# Patient Record
Sex: MALE | Race: WHITE | NOT HISPANIC OR LATINO | Employment: OTHER | URBAN - METROPOLITAN AREA
[De-identification: names, ages, dates, MRNs, and addresses within clinical notes are randomized per-mention and may not be internally consistent; named-entity substitution may affect disease eponyms.]

---

## 2023-05-31 LAB
AMMONIA (UMOL/L) IN PLASMA: 35 UMOL/L
FOLATE (NG/ML) IN SER/PLAS: 14.6 NG/ML
THYROTROPIN (MIU/L) IN SER/PLAS BY DETECTION LIMIT <= 0.05 MIU/L: 5.89 MIU/L (ref 0.44–3.98)
THYROXINE (T4) FREE (NG/DL) IN SER/PLAS: 0.82 NG/DL (ref 0.78–1.48)

## 2023-06-03 LAB — COPPER: 125 UG/DL (ref 70–140)

## 2023-06-05 LAB — METHYLMALONIC ACID, S: 0.12 UMOL/L (ref 0–0.4)

## 2023-06-06 LAB — VITAMIN B1, WHOLE BLOOD: 70 NMOL/L (ref 70–180)

## 2023-09-22 LAB
ALANINE AMINOTRANSFERASE (SGPT) (U/L) IN SER/PLAS: 23 U/L (ref 10–52)
ALBUMIN (G/DL) IN SER/PLAS: 4.6 G/DL (ref 3.4–5)
ALKALINE PHOSPHATASE (U/L) IN SER/PLAS: 76 U/L (ref 33–136)
ANION GAP IN SER/PLAS: 14 MMOL/L (ref 10–20)
ASPARTATE AMINOTRANSFERASE (SGOT) (U/L) IN SER/PLAS: 26 U/L (ref 9–39)
BASOPHILS (10*3/UL) IN BLOOD BY AUTOMATED COUNT: 0.07 X10E9/L (ref 0–0.1)
BASOPHILS/100 LEUKOCYTES IN BLOOD BY AUTOMATED COUNT: 0.9 % (ref 0–2)
BILIRUBIN TOTAL (MG/DL) IN SER/PLAS: 1 MG/DL (ref 0–1.2)
CALCIUM (MG/DL) IN SER/PLAS: 9.9 MG/DL (ref 8.6–10.6)
CARBON DIOXIDE, TOTAL (MMOL/L) IN SER/PLAS: 27 MMOL/L (ref 21–32)
CHLORIDE (MMOL/L) IN SER/PLAS: 105 MMOL/L (ref 98–107)
CHOLESTEROL (MG/DL) IN SER/PLAS: 226 MG/DL (ref 0–199)
CHOLESTEROL IN HDL (MG/DL) IN SER/PLAS: 85.9 MG/DL
CHOLESTEROL/HDL RATIO: 2.6
CREATININE (MG/DL) IN SER/PLAS: 0.77 MG/DL (ref 0.5–1.3)
EOSINOPHILS (10*3/UL) IN BLOOD BY AUTOMATED COUNT: 0.24 X10E9/L (ref 0–0.7)
EOSINOPHILS/100 LEUKOCYTES IN BLOOD BY AUTOMATED COUNT: 3 % (ref 0–6)
ERYTHROCYTE DISTRIBUTION WIDTH (RATIO) BY AUTOMATED COUNT: 14.4 % (ref 11.5–14.5)
ERYTHROCYTE MEAN CORPUSCULAR HEMOGLOBIN CONCENTRATION (G/DL) BY AUTOMATED: 33.4 G/DL (ref 32–36)
ERYTHROCYTE MEAN CORPUSCULAR VOLUME (FL) BY AUTOMATED COUNT: 99 FL (ref 80–100)
ERYTHROCYTES (10*6/UL) IN BLOOD BY AUTOMATED COUNT: 4.14 X10E12/L (ref 4.5–5.9)
GFR MALE: >90 ML/MIN/1.73M2
GLUCOSE (MG/DL) IN SER/PLAS: 96 MG/DL (ref 74–99)
HEMATOCRIT (%) IN BLOOD BY AUTOMATED COUNT: 41 % (ref 41–52)
HEMOGLOBIN (G/DL) IN BLOOD: 13.7 G/DL (ref 13.5–17.5)
IMMATURE GRANULOCYTES/100 LEUKOCYTES IN BLOOD BY AUTOMATED COUNT: 0.3 % (ref 0–0.9)
LDL: 124 MG/DL (ref 0–99)
LEUKOCYTES (10*3/UL) IN BLOOD BY AUTOMATED COUNT: 7.9 X10E9/L (ref 4.4–11.3)
LYMPHOCYTES (10*3/UL) IN BLOOD BY AUTOMATED COUNT: 2.32 X10E9/L (ref 1.2–4.8)
LYMPHOCYTES/100 LEUKOCYTES IN BLOOD BY AUTOMATED COUNT: 29.2 % (ref 13–44)
MONOCYTES (10*3/UL) IN BLOOD BY AUTOMATED COUNT: 0.93 X10E9/L (ref 0.1–1)
MONOCYTES/100 LEUKOCYTES IN BLOOD BY AUTOMATED COUNT: 11.7 % (ref 2–10)
NEUTROPHILS (10*3/UL) IN BLOOD BY AUTOMATED COUNT: 4.36 X10E9/L (ref 1.2–7.7)
NEUTROPHILS/100 LEUKOCYTES IN BLOOD BY AUTOMATED COUNT: 54.9 % (ref 40–80)
NRBC (PER 100 WBCS) BY AUTOMATED COUNT: 0 /100 WBC (ref 0–0)
PLATELETS (10*3/UL) IN BLOOD AUTOMATED COUNT: 245 X10E9/L (ref 150–450)
POTASSIUM (MMOL/L) IN SER/PLAS: 5 MMOL/L (ref 3.5–5.3)
PROSTATE SPECIFIC AG (NG/ML) IN SER/PLAS: 0.32 NG/ML (ref 0–4)
PROTEIN TOTAL: 7.1 G/DL (ref 6.4–8.2)
SODIUM (MMOL/L) IN SER/PLAS: 141 MMOL/L (ref 136–145)
THYROTROPIN (MIU/L) IN SER/PLAS BY DETECTION LIMIT <= 0.05 MIU/L: 14.76 MIU/L (ref 0.44–3.98)
TRIGLYCERIDE (MG/DL) IN SER/PLAS: 82 MG/DL (ref 0–149)
UREA NITROGEN (MG/DL) IN SER/PLAS: 14 MG/DL (ref 6–23)
VLDL: 16 MG/DL (ref 0–40)

## 2023-10-02 ENCOUNTER — APPOINTMENT (OUTPATIENT)
Dept: PHYSICAL THERAPY | Facility: CLINIC | Age: 68
End: 2023-10-02
Payer: COMMERCIAL

## 2023-10-04 ENCOUNTER — APPOINTMENT (OUTPATIENT)
Dept: PHYSICAL THERAPY | Facility: CLINIC | Age: 68
End: 2023-10-04
Payer: COMMERCIAL

## 2023-10-09 ENCOUNTER — APPOINTMENT (OUTPATIENT)
Dept: PHYSICAL THERAPY | Facility: CLINIC | Age: 68
End: 2023-10-09
Payer: COMMERCIAL

## 2023-10-10 PROBLEM — R74.01 TRANSAMINITIS: Status: ACTIVE | Noted: 2023-10-10

## 2023-10-10 PROBLEM — H43.813 PVD (POSTERIOR VITREOUS DETACHMENT), BOTH EYES: Status: ACTIVE | Noted: 2023-10-10

## 2023-10-10 PROBLEM — R26.89 IMBALANCE: Status: ACTIVE | Noted: 2023-10-10

## 2023-10-10 PROBLEM — H90.3 ASYMMETRIC SNHL (SENSORINEURAL HEARING LOSS): Status: ACTIVE | Noted: 2023-10-10

## 2023-10-10 PROBLEM — R42 DIZZINESS: Status: ACTIVE | Noted: 2023-10-10

## 2023-10-10 PROBLEM — R41.3 MEMORY LOSS: Status: ACTIVE | Noted: 2023-10-10

## 2023-10-10 PROBLEM — R25.1 TREMOR: Status: ACTIVE | Noted: 2023-10-10

## 2023-10-10 PROBLEM — F10.939 ALCOHOL WITHDRAWAL (MULTI): Status: ACTIVE | Noted: 2023-10-10

## 2023-10-10 PROBLEM — R45.1 AGITATION: Status: ACTIVE | Noted: 2023-10-10

## 2023-10-10 PROBLEM — H52.4 BILATERAL PRESBYOPIA: Status: ACTIVE | Noted: 2023-10-10

## 2023-10-10 PROBLEM — I10 BENIGN ESSENTIAL HYPERTENSION: Status: ACTIVE | Noted: 2023-10-10

## 2023-10-10 PROBLEM — R26.9 ABNORMAL GAIT: Status: ACTIVE | Noted: 2023-10-10

## 2023-10-10 PROBLEM — R53.83 FATIGUE: Status: ACTIVE | Noted: 2023-10-10

## 2023-10-10 PROBLEM — R29.898 LOWER EXTREMITY WEAKNESS: Status: ACTIVE | Noted: 2023-10-10

## 2023-10-10 PROBLEM — F10.20 ALCOHOL DEPENDENCE (MULTI): Status: ACTIVE | Noted: 2023-10-10

## 2023-10-10 PROBLEM — K40.90 INGUINAL HERNIA, LEFT: Status: ACTIVE | Noted: 2023-10-10

## 2023-10-10 PROBLEM — F17.210 CIGARETTE NICOTINE DEPENDENCE: Status: ACTIVE | Noted: 2023-10-10

## 2023-10-10 PROBLEM — H93.13 SUBJECTIVE TINNITUS OF BOTH EARS: Status: ACTIVE | Noted: 2023-10-10

## 2023-10-10 PROBLEM — R55 SYNCOPE: Status: ACTIVE | Noted: 2023-10-10

## 2023-10-10 PROBLEM — H25.13 AGE-RELATED NUCLEAR CATARACT OF BOTH EYES: Status: ACTIVE | Noted: 2023-10-10

## 2023-10-10 PROBLEM — H53.419 SCOTOMA: Status: ACTIVE | Noted: 2023-10-10

## 2023-10-10 PROBLEM — E78.2 MIXED HYPERLIPIDEMIA: Status: ACTIVE | Noted: 2023-10-10

## 2023-10-10 RX ORDER — ASCORBIC ACID 250 MG
250 TABLET ORAL
COMMUNITY

## 2023-10-10 RX ORDER — AMLODIPINE BESYLATE 2.5 MG/1
2.5 TABLET ORAL DAILY
COMMUNITY
Start: 2023-05-19

## 2023-10-10 RX ORDER — TRAZODONE HYDROCHLORIDE 50 MG/1
TABLET ORAL
COMMUNITY
Start: 2023-07-12

## 2023-10-10 RX ORDER — PHENOBARBITAL 64.8 MG/1
64.8 TABLET ORAL 2 TIMES DAILY
COMMUNITY
Start: 2022-07-10

## 2023-10-10 RX ORDER — FOLIC ACID 1 MG/1
1 TABLET ORAL DAILY
COMMUNITY
Start: 2022-07-10

## 2023-10-10 RX ORDER — LEVOTHYROXINE SODIUM 50 UG/1
TABLET ORAL
COMMUNITY
Start: 2023-09-29

## 2023-10-10 RX ORDER — LEVOTHYROXINE SODIUM 25 UG/1
25 TABLET ORAL
COMMUNITY

## 2023-10-10 RX ORDER — AMLODIPINE BESYLATE 5 MG/1
1 TABLET ORAL DAILY
COMMUNITY
Start: 2021-04-20

## 2023-10-10 RX ORDER — PROPRANOLOL HYDROCHLORIDE 20 MG/1
20 TABLET ORAL 2 TIMES DAILY
COMMUNITY
Start: 2023-06-30

## 2023-10-10 RX ORDER — ACETAMINOPHEN, DEXTROMETHORPHAN HBR, DOXYLAMINE SUCCINATE, PHENYLEPHRINE HCL 650; 20; 12.5; 1 MG/30ML; MG/30ML; MG/30ML; MG/30ML
1 SOLUTION ORAL DAILY
COMMUNITY

## 2023-10-10 RX ORDER — IBUPROFEN 200 MG
1 TABLET ORAL EVERY 24 HOURS
COMMUNITY
Start: 2022-07-10

## 2023-10-10 RX ORDER — MULTIVIT-MIN/IRON FUM/FOLIC AC 7.5 MG-4
1 TABLET ORAL DAILY
COMMUNITY
Start: 2022-07-10

## 2023-10-10 RX ORDER — LANOLIN ALCOHOL/MO/W.PET/CERES
1 CREAM (GRAM) TOPICAL DAILY
COMMUNITY
Start: 2022-07-10

## 2023-10-11 ENCOUNTER — DOCUMENTATION (OUTPATIENT)
Dept: PHYSICAL THERAPY | Facility: CLINIC | Age: 68
End: 2023-10-11
Payer: COMMERCIAL

## 2023-10-11 NOTE — PROGRESS NOTES
Physical Therapy                 Therapy Communication Note    Patient Name: Humble Banks  MRN: [id number]  Today's Date: 10/11/2023     Discipline: Physical Therapy    Missed Visit Reason:      Missed Time: No Show    Comment:  called patient and left message re: no show and with reminder of next appointment.

## 2023-10-13 ENCOUNTER — TREATMENT (OUTPATIENT)
Dept: PHYSICAL THERAPY | Facility: CLINIC | Age: 68
End: 2023-10-13
Payer: COMMERCIAL

## 2023-10-13 DIAGNOSIS — R26.9 ABNORMAL GAIT: Primary | ICD-10-CM

## 2023-10-13 DIAGNOSIS — M54.16 LUMBAR RADICULOPATHY: ICD-10-CM

## 2023-10-13 PROCEDURE — 97110 THERAPEUTIC EXERCISES: CPT | Mod: GP | Performed by: PHYSICAL THERAPIST

## 2023-10-13 PROCEDURE — 97014 ELECTRIC STIMULATION THERAPY: CPT | Mod: GP | Performed by: PHYSICAL THERAPIST

## 2023-10-13 NOTE — PROGRESS NOTES
Physical Therapy  Physical Therapy Progress Note    Patient Name Humble Banks  MRN: [id number]  Today's Date: 10/13/23  Time Calculation  Start Time: 0700  Stop Time: 0755  Time Calculation (min): 55 min    Preferred Name:  Ed    Insurance:     reviewed   Visit number: 15 total 10/24 ordered for LS   8-1-23: [facility]/100%/ no auth needed.   (per information provided by  pre-cert team)  Script: eval and treat for balance, 12+12 for LS/land and water  Script: LS 1-2 x/week for 4-6 weeks. + patient to bring in aquatics script from Dr. Tinoco.     Assessment:  Patient tolerated treatment well, did well with progression this date.  Needs continued work on/skilled PT for remaining functional, objective and subjective deficits to allow them to return to prior /optimal level of function with ADLs./core stability and balance for decreased falls.      Skilled care:  exercise modification /HEP education as noted below, education, modalities,.    Plan:    Continue with poc as documented in the legacy system.     Continue to progress per poc:   Continue with aquatics 2 x/week.  Patient to start gym machine program when he feels up to it and will let us know if he needs any additional instruction in land program.        Therapy Diagnoses:   1. Abnormal gait  Follow Up In Physical Therapy      2. Lumbar radiculopathy  Follow Up In Physical Therapy          Subjective:   Onset Date:  Balance:  1-1-22  LS:  6-1-23    Patient reports:  that he fell in the yard last week when he lost his footing on stones.  Patient fell onto the L side and it is sore on the L lateral hip.  He was able to get up on his own and did not need medical care.  He still has some residual soreness and requests to do land today due to feeling unsteady to get in the water.  Patient reports that he told Dr. Sheth about the fall and he wants him to see Dr. Ya about possible meds for balance.      Have you fallen  since last visit:  yes    Precautions:  "moderate fall risk  Low blood pressure,thyroid condition, possible hernia, see meds in chart  10-15# lifting restriction due to possible hernia.        Pain:  6/10  Location/Type of pain:  L Lateral hip and thigh, typical pain LS and L sided sciatica  What increases pain: walking     What decreases pain:  gel heating pad.      HEP compliance/understanding:  not recently due to fall, advised to do supine LS exercises to relieve pain.     Objective:   Objective Measurements:    Posture: min/mod for flexed.   Gait: guarded and antalgic  Balance:  min/mod high guard position with gait today    Treatment:   **= HEP  NV= Next visit  np= not performed  nb= non-billable  G= group HEP= discharged to [facility]     Therapeutic Exercise:  Minutes:  36'  Nu-step(subjective taken/education): L3 10'    R hip flexor/calf and hams stretch: 30 SEC 3 X/np  Airex CALF RAISES 20X w/ 1 finger tip tough on each hand /np    LTR: x 20**  B SKTC: 10\" x 5 ea. **  B supine piriformis stretch: 3/30\"**    Seated trunk flexion stretch: 10/10**    [employer] Machine instruction: np 10-13-23  [employer] LEG PRESS: Seat: 8: NV  Toe Raises: Seat: 8/ NV  LEG EXT 20# 12X2/np  Seated HIP ABD : Start: 0/40#/ 2 x 10  Seated hip ADD : STart: 2/ 30# / 2 x 10  [employer] Hamstring curl: 6 Pegs/ 2 x 10  [employer] lat pull down peg 4 10x2/np  SEATED ROW PEG 3 10X2/np  CABLE ROWS 12# 20X/np  NMR:    Minutes:  2'  EP UPS 6'' F/L 10X np  alt step taps 8\" 20x , no UE support/np  Airex balance beam WALK SIDE 5x/BACKWARD /heel toe 1X JULISSA BAR   Airex Marching/alt hip abd/ext: 20x each way w/ light touch on julissa bar   SIT TO STAND w/ arms crossed attempted but unable /np  AIREX STANDING BALANCE CG 30SEC X2/  AIREX ARM SWINGSw/ tandem balance CG 60 sec x 1 + 35 sec x 1  AIREX HEAD TURNS 10X /np    Theraband 3 way pull downs: Blue x 20 ea.**  Theraband Obliques: Blue x 20 ea. **  Theraband B pull forward/back: Blue x 20 ea. SBA x 1     pelvic tilts: x 10/5\"**  Bridges: x " 15    Modalities:    Untimed: IFC/MHP to L LS/piriformis area x 15'//40% supine with bolster  Minutes:  15    Education:  appropriate exercise to do during a flare up and when balance is poor.

## 2023-10-18 ENCOUNTER — TREATMENT (OUTPATIENT)
Dept: PHYSICAL THERAPY | Facility: CLINIC | Age: 68
End: 2023-10-18
Payer: COMMERCIAL

## 2023-10-18 DIAGNOSIS — M54.16 LUMBAR RADICULOPATHY: ICD-10-CM

## 2023-10-18 DIAGNOSIS — R26.9 ABNORMAL GAIT: Primary | ICD-10-CM

## 2023-10-18 PROCEDURE — 97113 AQUATIC THERAPY/EXERCISES: CPT | Mod: GP,CQ

## 2023-10-18 NOTE — PROGRESS NOTES
Physical Therapy  Physical Therapy Progress Note    Patient Name Humble Banks  MRN: [id number]  Today's Date: 10/18/23  Time Calculation  Start Time: 0825  Stop Time: 0910  Time Calculation (min): 45 min    Preferred Name:  Ed    Insurance:    (per information provided by  pre-cert team)  Visit #: 16 total 11/24 ordered for LS   8-1-23: [employer]/100%/ no auth needed.   (per information provided by  pre-cert team)  Script: eval and treat for balance, 12+12 for LS/land and water      Script:  LS 1-2 x/week for 4-6 weeks. + patient to bring in aquatics script from Dr. Tinoco.       Assessment:  Patient demonstrated fair tolerance of treatment today as he was unsteady in the pool and needed to maintain UE support in pool with handrails and use of pool noodle when in open space.    Needs continued work on/skilled PT for remaining functional, objective and subjective deficits to allow them to return to prior /optimal level of function with ADLs.  Patient is progressing with core engagement w/ verbal cues in pool.  Skilled care:  exercise/HEP progression, education, modalities, manual.      Plan:    Continue with poc as documented in the legacy system.     Continue to progress per poc:   NV add 3 way hip as able     Therapy Diagnoses:   1. Abnormal gait        2. Lumbar radiculopathy            Subjective:   Onset Date:  Onset Date:  Balance:  1-1-22  LS:  6-1-23     Patient reports:    Reports he is trying to be safe at home so as not to fall again.     Have you fallen  since last visit: no    Precautions: fall riskmoderate fall risk  Low blood pressure,thyroid condition, possible hernia, see meds in chart  10-15# lifting restriction due to possible hernia.          Pain:  5/10  Location/Type of pain:  L Lateral hip and thigh, typical pain LS and L sided sciatica   What increases pain: walking too much    What decreases pain:   gel heating pad     HEP compliance/understanding: has resumed HEP as able since his  fall    Objective:   Objective Measurements:      Gait:guarded and antalgic, reaches for objects for stability  Balance:  min/mod high guard position with gait today     Treatment:   **= HEP  NV= Next visit  np= not performed  nb= non-billable  G= group HEP= discharged to [facility]         Aquatics:  45 min  TM= treadmill T= speed C= current NV= next visit np= not performed nb= non-billable G= group    Stair hams and hip flexor/calf stretches: 3/30 ea.     TM for amb: T=3 C=10 x 4'  TM retro amb: T=0 C=10 x 3'  Side stepping: C=10 x 4 laps ea/ light  UE support    C=10 Marching x 4 directions: x 20 ea. W/ handrail support  C=10 squats with handrail support  C= B UE open paddles vikki flex/ext: x 20w/ BTW  C=10 3 way hip: NV  C=10 B/alt UE/B horizontal abd/add with open paddles: NV    BTW: can cans: w/ UE support   BTW: dynamic hamstring stretch: NV    Deep end with large blue noodle  For Cycle: x 3'  [place]/hip abd/add: 2' ea.   Hang: 3'.

## 2023-10-20 ENCOUNTER — APPOINTMENT (OUTPATIENT)
Dept: PHYSICAL THERAPY | Facility: CLINIC | Age: 68
End: 2023-10-20
Payer: COMMERCIAL

## 2023-10-26 ENCOUNTER — TREATMENT (OUTPATIENT)
Dept: PHYSICAL THERAPY | Facility: CLINIC | Age: 68
End: 2023-10-26
Payer: COMMERCIAL

## 2023-10-26 DIAGNOSIS — R26.9 ABNORMAL GAIT: ICD-10-CM

## 2023-10-26 DIAGNOSIS — M54.16 LUMBAR RADICULOPATHY: ICD-10-CM

## 2023-10-26 PROCEDURE — 97113 AQUATIC THERAPY/EXERCISES: CPT | Mod: GP,CQ

## 2023-10-26 NOTE — PROGRESS NOTES
Physical Therapy  Physical Therapy Progress Note    Patient Name Humble Banks  MRN: [id number]  Today's Date: 10/26/23  Time Calculation  Start Time: 0745  Stop Time: 0830  Time Calculation (min): 45 min    Preferred Name:  ED    Insurance:    (per information provided by  pre-cert team)  Visit #: 17 total 12 /24 ordered for LS   8-1-23: [employer]/100%/ no auth needed.   (per information provided by  pre-cert team)  Script: eval and treat for balance, 12+12 for LS/land and water                Assessment:  Patient tolerated treatment well, did well with progression this date with walking with greater current and required less support with water ex. .  Needs continued work on/balance and strength ,skilled PT for remaining functional, objective and subjective deficits to allow them to return to prior /optimal level of function with ADLs./performed program with greater endurance  Patient is progressing with balance and coordination   Skilled care:  exercise/HEP progression, education, modalities, manual.      Plan:    Continue with poc as documented in the legacy system.     Continue to progress per poc:   NV add paddles with side stepping    Therapy Diagnoses:   1. Abnormal gait  Follow Up In Physical Therapy      2. Lumbar radiculopathy  Follow Up In Physical Therapy          Subjective:   Onset Date:  1/1/22    Patient reports:  reports the water ex. Have been going better for his walking    Have you fallen  since last visit:  no      Precautions: fall riskmoderate fall risk  Low blood pressure,thyroid condition, possible hernia, see meds in chart  10-15# lifting restriction due to possible hernia.     Pain:  0/10  Location/Type of pain:  lower spine  What increases pain: falling     What decreases pain:  rest  HEP compliance/understanding:  yes     Objective:   Objective Measurements:    Function:   walking longer distances in the [employer] pool  Gait: improved with heel to toe walking  Balance:  still requires  hand support on treadmill      Aquatics:    M= treadmill T= speed C= current NV= next visit np= not performed nb= non-billable G= group     Stair hams and hip flexor/calf stretches: 3/30 ea.      TM for amb: T=3 C=10 x 5 min  TM retro amb: T=12 C=10 x 3'  Side stepping: C=12 x 4 laps ea/ light  UE support     C=12 Marching x 4 directions: x 20 ea. W/ barbell  C=12 squats with barbell  C= 12 B UE open paddles vikki flex/ext: x 20w/ BTW  C=12 3 way hip: 15 wall support   C=12 /alt UE/B horizontal abd/add with open paddles: NV     BTW: can cans: w/ UE support   BTW: dynamic hamstring stretch: 15x     Deep end with large blue noodle  For Cycle: x 3'  [place]/hip abd/add: 2' ea.   Hang: 3'.                  Education:  review on posture and abdominal bracing  Hep progression in ca pool  with multi hip

## 2023-11-02 ENCOUNTER — TREATMENT (OUTPATIENT)
Dept: PHYSICAL THERAPY | Facility: CLINIC | Age: 68
End: 2023-11-02
Payer: COMMERCIAL

## 2023-11-02 DIAGNOSIS — M54.16 LUMBAR RADICULOPATHY: ICD-10-CM

## 2023-11-02 DIAGNOSIS — R26.9 ABNORMAL GAIT: Primary | ICD-10-CM

## 2023-11-02 PROCEDURE — 97110 THERAPEUTIC EXERCISES: CPT | Mod: GP,CQ

## 2023-11-02 PROCEDURE — 97112 NEUROMUSCULAR REEDUCATION: CPT | Mod: GP,CQ

## 2023-11-02 NOTE — PROGRESS NOTES
Physical Therapy  Physical Therapy Progress Note    Patient Name Humble Banks   MRN: [id number]  Today's Date:11/2/23  Time Calculation  Start Time: 0745  Stop Time: 0830  Time Calculation (min): 45 min    Insurance:     Visit number: 18#   total 13/24 ordered for LS   8-1-23: [facility]/100%/ no auth needed.   (per information provided by  pre-cert team)  Script: eval and treat for balance, 12+12 for LS/land and water  Script: LS 1-2 x/week for 4-6 weeks. + patient to bring in aquatics script from Dr. Tinoco.  (per information provided by  pre-cert team)      Therapy Diagnoses:   1. Abnormal gait  Follow Up In Physical Therapy      2. Lumbar radiculopathy  Follow Up In Physical Therapy          General:  Reason for visit: pain in lower ext and hip   Referred by: Dr. Earnest Jefferson MD appt:  nov 9th  Preferred Name:  ed  Script:  eval treat  Onset Date:  1/1/2022    Assessment:  Patient tolerated treatment fair, did well with progression this date with walking on cable resistance .  Needs continued work on/ balance.endurance and strength.skilled PT for remaining functional, objective and subjective deficits to allow them to return to prior /optimal level of function with ADLs.  Patient is progressing with function/goals: in walking, but still requires some hand support around objects for balance   Skilled care:  instructed in correct posture with gait    Plan:    Continue with poc as documented in the legacy system.     Continue to progress per poc:   NV add side walks on cable     Subjective:   Patient reports:  states the pain is greater when he becomes more active    Have you fallen since last visit:  no      Precautions: fall riskmoderate fall risk  Low blood pressure,thyroid condition, possible hernia, see meds in chart  10-15# lifting restriction due to possible hernia    Pain:  5/10  Location/Type of pain:  left leg and hip, nagging pain    HEP compliance/understanding:  some times, states he has not  "been coming to the gym on his own    Objective:   Objective Measurements:    Function:   able to perform more yard work  Posture: forward head and shoulders  Gait: slow shuffle gait  Balance: required hand support for most activitys           Treatment:   **= HEP  NV= Next visit  np= not performed  nb= non-billable  G= group HEP= discharged to HEP      Therapeutic Exercise:       minutes    Nu-step(subjective taken/education): L3 10'     R hip flexor/calf and hams stretch: 30 SEC 3 X/np  Airex CALF RAISES 20X w/ 1 finger tip tough on each hand /np     LTR: x 20**  B SKTC: 10\" x 5 ea. **  B supine piriformis stretch: 3/30\"**      CABLE ROWS 12# 20X/  Cable walk pl 2# forward back 5x supervised         Neuromuscular Re-education:      minutes    alt step taps 8\" 20x , no UE support/np  Airex balance beam WALK SIDE 5x/BACKWARD /heel toe 1X JULISSA BAR   Airex Marching/alt hip abd/ext: 20x each way w/ light touch on julissa bar   SIT TO STAND w/ arms crossed attempted but unable /np  AIREX STANDING BALANCE CG 30SEC X2/  AIREX ARM SWINGSw/ tandem balance CG 60 sec x 1 + 35 sec x 1  AIREX HEAD TURNS 10X /np     Theraband 3 way pull downs: Blue x 20 ea.**  Theraband Obliques: Blue x 20 ea. **  Theraband B pull forward/back: Blue x 20 ea. SBA x 1      pelvic tilts: x 10/5\"**np  Bridges: x 15  np          Education:  in proper posture performing squats. Cable walking  Hep reviewed program  "

## 2023-11-07 ENCOUNTER — TREATMENT (OUTPATIENT)
Dept: PHYSICAL THERAPY | Facility: CLINIC | Age: 68
End: 2023-11-07
Payer: COMMERCIAL

## 2023-11-07 DIAGNOSIS — M54.16 LUMBAR RADICULOPATHY: ICD-10-CM

## 2023-11-07 DIAGNOSIS — R26.9 ABNORMAL GAIT: ICD-10-CM

## 2023-11-07 PROCEDURE — 97113 AQUATIC THERAPY/EXERCISES: CPT | Mod: GP,CQ

## 2023-11-07 NOTE — PROGRESS NOTES
Physical Therapy  Physical Therapy Progress Note    Patient Name Humble Banks   MRN: [id number]  Today's Date: 11/7/23  Time Calculation  Start Time: 0745  Stop Time: 0830  Time Calculation (min): 45 min    Insurance:    Visit number: 19   total 14/24 ordered for LS   8-1-23: [facility]/100%/ no auth needed.   (per information provided by  pre-cert team)  Script: eval and treat for balance, 12+12 for LS/land and water  Script: LS 1-2 x/week for 4-6 weeks. + patient to bring in aquatics script from Dr. Tinoco.  (per information provided by  pre-cert team)  Therapy Diagnoses:   1. Abnormal gait  Follow Up In Physical Therapy      2. Lumbar radiculopathy  Follow Up In Physical Therapy          General:  Reason for visit: pain in lower ext and hip   Referred by: Dr. Earnest Jefferson MD appt:  nov 9th  Preferred Name:  Ed  Script:  eval treat  Onset Date:  1/1/2022  Assessment:  Patient tolerated treatment well, did well with progression this date with taking longer strides on treadmill and posture  awareness.  Needs continued work on balance and strength/skilled PT for remaining functional, objective and subjective deficits to allow them to return to prior /optimal level of function with ADLs./  Patient is progressing with function/goals: yes  Skilled care:  cueing in posture and progression in ex    Plan:    Continue with poc as documented in the legacy system.     Continue to progress per poc:   NV add land ,increase balance activity    Subjective:   Patient reports:  states his body feel stiff in the AM but once he stretches he feels better. No change in pain over all but improved function    Have you fallen since last visit:  no    Precautions:   Precautions: fall riskmoderate fall risk  Low blood pressure,thyroid condition, possible hernia, see meds in chart  10-15# lifting restriction due to possible hernia  Pain:  5/10  Location/Type of pain:  left leg and hip, nagging pain     HEP compliance/understanding:   yes    Objective:   Objective Measurements:    Function:   working in yard, racking and bending to      Gait: short steps on land, improved stride in water with bar support. Patient use to take 42 steps across the [employer] pool now 35 steps  Balance: still requires side wall for balance      Treatment:          Aquatics:          45 minutes    M= treadmill T= speed C= current NV= next visit np= not performed nb= non-billable G= group     Stair hams and hip flexor/calf stretches: 3/30 ea.      TM for amb: T=3 C=10 x 5 min  TM retro amb: T=12 C=10 x 3'  Side stepping: C=12 x 4 laps ea/ light  UE support     C=12 Marching x 4 directions: x 20 ea. W/ barbell  C=12 squats with barbell  C= 12 B UE open paddles vikki flex/ext: x 20w/ BTW  C=12 3 way hip: 15 wall support   C=12 /alt UE/B horizontal abd/add with open paddles: NV     BTW: can cans: w/ UE support  10x  BTW: dynamic hamstring stretch: 15x     Deep end with large blue noodle  For Cycle: x 3'  [place]/hip abd/add: 2' ea.     Education:  progression in POC  HEP Progression:  increased longer strides with water walking

## 2023-11-09 ENCOUNTER — DOCUMENTATION (OUTPATIENT)
Dept: PHYSICAL THERAPY | Facility: CLINIC | Age: 68
End: 2023-11-09
Payer: COMMERCIAL

## 2023-11-09 NOTE — PROGRESS NOTES
Physical Therapy                 Therapy Communication Note    Patient Name: Humble Banks  MRN: [id number]  Today's Date: 11/9/2023     Discipline: Physical Therapy    Missed Visit Reason:      Missed Time: No Show    Comment:

## 2023-11-14 ENCOUNTER — TREATMENT (OUTPATIENT)
Dept: PHYSICAL THERAPY | Facility: CLINIC | Age: 68
End: 2023-11-14
Payer: COMMERCIAL

## 2023-11-14 DIAGNOSIS — M54.16 LUMBAR RADICULOPATHY: ICD-10-CM

## 2023-11-14 DIAGNOSIS — R26.9 ABNORMAL GAIT: Primary | ICD-10-CM

## 2023-11-14 PROCEDURE — 97113 AQUATIC THERAPY/EXERCISES: CPT | Mod: GP | Performed by: PHYSICAL THERAPIST

## 2023-11-14 NOTE — PROGRESS NOTES
Physical Therapy  Physical Therapy Progress Note    Patient Name Humble Banks   MRN: [id number]  Today's Date: 11/14/23  Time Calculation  Start Time: 0815  Stop Time: 0900  Time Calculation (min): 45 min    Insurance:    Visit number: 20   total 15/24 ordered for LS   8-1-23: [facility]/100%/ no auth needed.   (per information provided by  pre-cert team)  Script: eval and treat for balance, 12+12 for LS/land and water  Script: LS 1-2 x/week for 4-6 weeks. + patient to bring in aquatics script from Dr. Tinoco.    Therapy Diagnoses:   1. Abnormal gait  Follow Up In Physical Therapy      2. Lumbar radiculopathy  Follow Up In Physical Therapy        General:  Reason for visit: pain in lower ext and hip   Referred by: Dr. Earnest Jefferson MD appt:  nov 9th  Preferred Name:  Ed  Script:  eval treat  Onset Date:  1/1/2022    Assessment:  Patient tolerated treatment well, did well with progression this date.    Patient needs continued work on/skilled PT for: core stability and balance, to address remaining functional, objective and subjective deficits to allow them to return to prior /optimal level of function with ADLs.  Patient is progressing with goals: balance and able to do yard work  Skilled care:  aquatic exercise progression    Plan:    Continue to progress per poc:   NV add resume C for TM and progress with upper extremity resistance for core stability    Subjective:   Patient reports:  that his balance is doing a little better and he has been working on leaf clean up.      Have you fallen since last visit:  no    Precautions: fall riskmoderate fall risk  Low blood pressure,thyroid condition, possible hernia, see meds in chart  10-15# lifting restriction due to possible hernia    Pain:  5/10  Location/Type of pain:  L LS    HEP compliance/understanding:  yes    Objective:   Objective Measurements:    Function:   able to work on leaves without any falls.    Gait: min wide MIKEY and min for flexed posture with gait.      Treatment:   **= HEP  NV= Next visit  np= not performed  nb= non-billable  G= group HEP= discharged to [facility]  Aquatics:          45 minutes    TM= treadmill T= speed C= current NV= next visit np= not performed nb= non-billable G= group     Stair hams and hip flexor/calf stretches: 3/30 ea.      TM for amb: T=3 C=0 x 5 min(add C NV)  TM retro amb: T=0 C=0 x 3' (add C NV)  Side stepping: C=10 x 8 laps ea across the width of pool)     C=10 Marching x 4 directions: x 20 ea. light one UE support prn  C=02 squats with barbell x 20 with some LS pain reported.   C= 12 B UE open paddles vikki flex/ext: x 20w/ BTW  C=12 3 way hip: 20 wall support   C=12 /alt UE/B horizontal abd/add with open paddles: NV     BTW: can cans: w/ UE support  10x  BTW: dynamic hamstring stretch: x 10     Deep end with large blue noodle  For Cycle: x 3'  [place]/hip abd/add: 2' ea.   Hang:  x 3'    Education:  aquatic exercise progression

## 2023-11-16 ENCOUNTER — APPOINTMENT (OUTPATIENT)
Dept: PHYSICAL THERAPY | Facility: CLINIC | Age: 68
End: 2023-11-16
Payer: COMMERCIAL

## 2023-11-21 ENCOUNTER — TREATMENT (OUTPATIENT)
Dept: PHYSICAL THERAPY | Facility: CLINIC | Age: 68
End: 2023-11-21
Payer: COMMERCIAL

## 2023-11-21 DIAGNOSIS — M54.16 LUMBAR RADICULOPATHY: ICD-10-CM

## 2023-11-21 DIAGNOSIS — R26.9 ABNORMAL GAIT: ICD-10-CM

## 2023-11-21 PROCEDURE — 97113 AQUATIC THERAPY/EXERCISES: CPT | Mod: GP,CQ

## 2023-11-21 NOTE — PROGRESS NOTES
Physical Therapy  Physical Therapy Progress Note    Patient Name Humble Banks   MRN: [id number]  Today's Date: 11/21/23  Time Calculation  Start Time: 0745  Stop Time: 0830  Time Calculation (min): 45 min    Insurance:    Visit number: 21   total 16/24 ordered for LS   8-1-23: [facility]/100%/ no auth needed.   (per information provided by  pre-cert team)  Script: eval and treat for balance, 12+12 for LS/land and water  Script: LS 1-2 x/week for 4-6 weeks. + patient to bring in aquatics script from Dr. Tinoco.     Therapy Diagnoses:   1. Abnormal gait  Follow Up In Physical Therapy      2. Lumbar radiculopathy  Follow Up In Physical Therapy          General:  Reason for visit: pain in lower ext and hip   Referred by: Dr. Earnest Jefferson MD appt:  nov 9th  Preferred Name:  Ed  Script:  eval treat  Onset Date:  1/1/2022  Assessment:  Patient tolerated treatment: well, improved with lateral walking with no device and improved posture against current.requires less hand support with exs,  Patient needs continued work on further core and balance ex./skilled PT for: progression in ex. to address remaining functional, objective and subjective deficits to allow them to return to prior /optimal level of function with ADLs.  Patient is progressing with goals: yes  Skilled care:  cueing with posture and progression of ex.    Plan:         Continue to progress per poc:   NV add step ups     Subjective:   Patient reports:  no change in his complaint of pain but function is doing better    Have you fallen since last visit:  no    Precautions:     Pain:  5/10  Location/Type of pain:  L  LS    HEP compliance/understanding: yes    Objective:   Objective Measurements:    Function:   improved endurance, walks around the home with less resting  Posture: improved  with up right posture with cueing    Balance: performed increase ex in water with no hand support   Treatment:           Aquatics:          45 minutes  TM= treadmill T= speed  C= current NV= next visit np= not performed nb= non-billable G= group     Stair hams and hip flexor/calf stretches: 3/30 ea.      TM for amb: T=3 C=10 x 5 min(add C NV)  TM retro amb: T=0 C=10 x 3'   Side stepping: C=10 x 8 laps ea across the width of pool)     C=10 Marching x 4 directions: x 20 ea.   C=02 squats with barbell x 20 with some LS pain reported.   C= 12 B UE open paddles vikki flex/ext: x 20  C=12 3 way hip: 20 wall support   C=12 /alt UE/B horizontal abd/add with open paddles: NV  C=10 hip circles 10x each finger support   C=10 aqua ciser forward/up and down 15x      BTW: can cans: w/ UE support  10x  BTW: dynamic hamstring stretch: x 10     Deep end with large blue noodle  For Cycle: x 3'  [place]/hip abd/add: 2' ea.   Hang:  x 3'          +      Education:  instruction in posture aware ness  HEP Progression:  n/a

## 2023-11-28 ENCOUNTER — TREATMENT (OUTPATIENT)
Dept: PHYSICAL THERAPY | Facility: CLINIC | Age: 68
End: 2023-11-28
Payer: COMMERCIAL

## 2023-11-28 DIAGNOSIS — M54.16 LUMBAR RADICULOPATHY: ICD-10-CM

## 2023-11-28 DIAGNOSIS — R26.9 ABNORMAL GAIT: ICD-10-CM

## 2023-11-28 PROCEDURE — 97113 AQUATIC THERAPY/EXERCISES: CPT | Mod: GP | Performed by: PHYSICAL THERAPIST

## 2023-11-28 NOTE — PROGRESS NOTES
Physical Therapy  Physical Therapy Progress Note    Patient Name Humble Banks   MRN: [id number]  Today's Date: 11/28/23  Time Calculation  Start Time: 0810  Stop Time: 0900  Time Calculation (min): 50 min    Insurance:    Visit number: 22   total 17/24 ordered for LS   8-1-23: [facility]/100%/ no auth needed.   (per information provided by  pre-cert team)  Script: eval and treat for balance, 12+12 for LS/land and water  Script: LS 1-2 x/week for 4-6 weeks. + patient to bring in aquatics script from Dr. Tinoco.  Therapy Diagnoses:   1. Abnormal gait  Follow Up In Physical Therapy      2. Lumbar radiculopathy  Follow Up In Physical Therapy          General:  Reason for visit: pain in lower ext and hip   Referred by: Dr. Earnest Jefferson MD appt:  nov 9th  Preferred Name:  Ed  Script:  eval treat  Onset Date:  1/1/2022    Assessment:  Patient tolerated treatment well, did well with progression this date.    Patient needs continued work on/skilled PT for: balance, core stability and functional strength to address remaining functional, objective and subjective deficits to allow them to return to prior /optimal level of function with ADLs.  Patient is progressing with goals: improved balance and no recent report of falls.   Skilled care:  aquatic exercise progression.     Plan:    Continue to progress per poc:   NV continue to progress with activities in current to challenge balance.     Subjective:   Patient reports:  that he is not having pain, more of an aggravation in the LS.  Patient reports that he is feeling that balance is better, but he is unable to turn quickly and is very careful with this.      Have you fallen since last visit:  no    Precautions: fall riskmoderate fall risk  Low blood pressure,thyroid condition, possible hernia, see meds in chart  10-15# lifting restriction due to possible hernia         Pain:  5/10  Location/Type of pain:  L LS    HEP compliance/understanding:  yes    Objective:   Objective  Measurements:    Function:   good function with walking on flat ground.  Patient has been working on cleaning up his yard, fall yard work.    Posture: increased awareness of proper posture and body mechanics.    Gait: walking 200' on grass with shoes off to work on balance.    Treatment:     Aquatics:          45 minutes  TM= treadmill T= speed C= current NV= next visit np= not performed nb= non-billable G= group     Stair hams and hip flexor/calf stretches: 3/30 ea.      TM for amb: T=1 C=12 x 5 min  TM retro amb: T=0 C=12 x 4'   Side stepping: C=12 x 4 laps ea across the width of pool)     C=12 Marching x 4 directions: x 20 ea.   C=12 squats with barbell x 15 ea.  facing and away from current/cues for form   C= 12 B UE open paddles vikki flex/ext: x 20/np  C=12 3 way hip: 15 barbell support for flexion, but decreased balance/wall support for others.   C=12 /alt UE/B horizontal abd/add with open paddles: NV  C=12 hip circles 10x each finger support   C=12 aqua ciser forward/up and down 15x /np     BTW: can cans: w/ UE support  10x/np  BTW: dynamic hamstring stretch: x 10/np     Deep end with large blue noodle  For Cycle: x 3'  [place]/hip abd/add: 2' ea.   Hang:  x 3'      Education:  aquatic exercise progression

## 2023-11-30 ENCOUNTER — TREATMENT (OUTPATIENT)
Dept: PHYSICAL THERAPY | Facility: CLINIC | Age: 68
End: 2023-11-30
Payer: COMMERCIAL

## 2023-11-30 DIAGNOSIS — R26.9 ABNORMAL GAIT: ICD-10-CM

## 2023-11-30 DIAGNOSIS — M54.16 LUMBAR RADICULOPATHY: ICD-10-CM

## 2023-11-30 PROCEDURE — 97113 AQUATIC THERAPY/EXERCISES: CPT | Mod: GP | Performed by: PHYSICAL THERAPIST

## 2023-11-30 NOTE — PROGRESS NOTES
Physical Therapy  Physical Therapy Progress Note    Patient Name Humble Banks   MRN: [id number]  Today's Date: 11/30/23  Time Calculation  Start Time: 0815  Stop Time: 0910  Time Calculation (min): 55 min    Insurance:    Visit number: 23   total 18/24 ordered for LS   8-1-23: [facility]/100%/ no auth needed.   (per information provided by  pre-cert team)  Script: eval and treat for balance, 12+12 for LS/land and water  Script: LS 1-2 x/week for 4-6 weeks. + patient to bring in aquatics script from Dr. Tinoco.    Therapy Diagnoses:   1. Abnormal gait  Follow Up In Physical Therapy      2. Lumbar radiculopathy  Follow Up In Physical Therapy        General:  Reason for visit: pain in lower ext and hip   Referred by: Dr. Earnest Jefferson MD appt:  nov 9th  Preferred Name:  Ed  Script:  eval treat  Onset Date:  1/1/2022    Assessment:  Patient tolerated treatment well, did well with progression this date.    Patient needs continued work on/skilled PT for: balance and core stability to address remaining functional, objective and subjective deficits to allow them to return to prior /optimal level of function with ADLs.  Patient is progressing with goals: increased postural and body awareness.   Skilled care:  aquatic exercise progression    Plan:    Continue to progress per poc:   NV add activities to challenge balance in current.     Subjective:   Patient reports:  that he still has sciatic pain with prolonged walking, but relief with sitting.     Have you fallen since last visit:  no    Precautions: moderate fall risk  Low blood pressure,thyroid condition, possible hernia, see meds in chart  10-15# lifting restriction due to possible hernia      Pain:  5/10  Location/Type of pain:  L LS aching/sciatic pain at times with prolonged walking.     HEP compliance/understanding:  yes    Objective:   Objective Measurements:    Function:   able to control symptoms with position change.   Posture:  increased postural and  stabilization awareness.     Treatment:     Aquatics:          55 minutes  TM= treadmill T= speed C= current NV= next visit np= not performed nb= non-billable G= group     Stair hams and hip flexor/calf stretches: 3/30 ea.      TM for amb: T=1 C=12 x 5 min  TM retro amb: T=0 C=12 x 3'   Side stepping: C=12 x 5 laps ea across the width of pool facing and away from C     C=12 Marching x 4 directions: x 20 ea.   C=12 squats with barbell x 15 ea.  facing and away from current/cues for form   C= 12 B/alt  UE open paddles vikki flex/ext: x 20  C=12 3 way hip: 15 barbell/wall  support flexion only this date    C=12 /alt UE/B horizontal abd/add with open paddles: x 15   C=12 hip circles 10 x each finger support /NV  C=12 aqua ciser push/pull/up and down 15 x      BTW: can cans: w/ UE support  10x/np  BTW: dynamic hamstring stretch: x 10/np     Deep end with large blue noodle  For Cycle: x 3'  [place]/hip abd/add: 2' ea.   Hang:  x 3'      Education:  aquatic exercise progression

## 2023-12-05 ENCOUNTER — DOCUMENTATION (OUTPATIENT)
Dept: PHYSICAL THERAPY | Facility: CLINIC | Age: 68
End: 2023-12-05
Payer: COMMERCIAL

## 2023-12-05 NOTE — PROGRESS NOTES
Physical Therapy                 Therapy Communication Note    Patient Name: Humble Banks  MRN: [id number]  Today's Date: 12/5/2023     Discipline: Physical Therapy    Missed Visit Reason:      Missed Time: No Show    Comment:

## 2023-12-06 ENCOUNTER — TREATMENT (OUTPATIENT)
Dept: PHYSICAL THERAPY | Facility: CLINIC | Age: 68
End: 2023-12-06
Payer: COMMERCIAL

## 2023-12-06 DIAGNOSIS — R26.9 ABNORMAL GAIT: Primary | ICD-10-CM

## 2023-12-06 DIAGNOSIS — M54.16 LUMBAR RADICULOPATHY: ICD-10-CM

## 2023-12-06 PROCEDURE — 97113 AQUATIC THERAPY/EXERCISES: CPT | Mod: GP

## 2023-12-06 RX ORDER — ONDANSETRON HYDROCHLORIDE 2 MG/ML
INJECTION, SOLUTION INTRAVENOUS
Status: DISPENSED
Start: 2023-12-06 | End: 2023-12-07

## 2023-12-06 RX ORDER — KETOROLAC TROMETHAMINE 30 MG/ML
INJECTION, SOLUTION INTRAMUSCULAR; INTRAVENOUS
Status: DISPENSED
Start: 2023-12-06 | End: 2023-12-07

## 2023-12-06 NOTE — PROGRESS NOTES
"   Physical Therapy  Physical Therapy Progress Note    Patient Name Humble Banks   MRN: [id number]  Today's Date: 12/06/23  Time Calculation  Start Time: 0815  Stop Time: 0900  Time Calculation (min): 45 min    Insurance:    Visit number: 24   total 19/24 ordered for LS   8-1-23: [place]/100%/ no auth needed.   (per information provided by  pre-cert team)  Script: eval and treat for balance, 12+12 for LS/land and water  Script: LS 1-2 x/week for 4-6 weeks. + patient to bring in aquatics script from Dr. Tinoco.    Therapy Diagnoses:   1. Abnormal gait        2. Lumbar radiculopathy          General:  Reason for visit: pain in lower ext and hip   Referred by: Dr. Earnest Jefferson MD appt:  nov 9th  Preferred Name:  Ed  Script:  eval treat  Onset Date:  1/1/2022    Assessment:  Pt completes ex with occasional difficulty staying balanced when working again current of 12 but regains his own balance.   Patient will benefit from a recheck to determine future POC  Patient is progressing with goals: Recheck next visit to determine  Skilled care:  Supervised aquatics program    Plan:    NV Recheck    Subjective:   Patient reports:  he always has left sciatic pain, that only varies by a 1/2 to 1 point depending on his activity    Have you fallen since last visit:  Pt lost his balance as he stepped over the tub and into the shower.  He states he has no grab bars, and states \"I didn't fall and hurt myself\"    Precautions:moderate fall risk  Low blood pressure,thyroid condition, possible hernia, see meds in chart  10-15# lifting restriction due to possible hernia    Pain:  5/10  Location/Type of pain:  Left LE    HEP compliance/understanding:  yes. Pt states he uses obstacles outside as challenges for balance.    Objective:   Function:   Pt is staying busy to keep working on his posture and balance  Posture: stands in a forward flexed posture of trunk, hips, and knees and and ambulates on pool deck with wide MIKEY and shuffling " type gait towards steps of pool  Balance: Pt swayed a bit but regained control of posture when letting go of wall in between exercises    Treatment:   **= HEP  NV= Next visit  np= not performed  nb= non-billable  G= group HEP= discharged to [facility]    Aquatics:          45 minutes  TM= treadmill T= speed C= current NV= next visit np= not performed nb= non-billable G= group     Stair hams and hip flexor/calf stretches: 3/30 ea.      TM for amb: T=1 C=12 x 5 min  TM retro amb: T=0 C=12 x 3'   Side stepping: C=12 x 5 laps ea across the width of pool facing and away from C     C=12 Marching x 4 directions: x 20 ea.   C=12 squats with barbell x 15 ea.  facing and away from current/cues for form   C= 12 B/alt  UE open paddles vikki flex/ext: x 20  C=12 3 way hip: 15 barbell wall support     C=12 /alt UE/B horizontal abd/add with open paddles: x 15   C=12 hip circles 10 x each finger support /NV  C=12 aqua ciser push/pull/up and down 15 x      BTW: can cans: w/ UE support  10x/np  BTW: dynamic hamstring stretch: x 10     Deep end with large blue noodle  For Cycle: x 3'  [place]/hip abd/add: 2' ea.   Hang:  x 3'      Education:  Reinforced need for grab bars in bathroom area due his recent episode of slipping

## 2023-12-07 ENCOUNTER — TREATMENT (OUTPATIENT)
Dept: PHYSICAL THERAPY | Facility: CLINIC | Age: 68
End: 2023-12-07
Payer: COMMERCIAL

## 2023-12-07 DIAGNOSIS — M54.16 LUMBAR RADICULOPATHY: ICD-10-CM

## 2023-12-07 DIAGNOSIS — R26.9 ABNORMAL GAIT: Primary | ICD-10-CM

## 2023-12-07 PROCEDURE — 97110 THERAPEUTIC EXERCISES: CPT | Mod: GP | Performed by: PHYSICAL THERAPIST

## 2023-12-07 NOTE — PROGRESS NOTES
Physical Therapy  Physical Therapy Progress Note/re-assessment/discharge plan    Patient Name Humble Banks   MRN: [id number]  Today's Date: 23  Time Calculation  Start Time: 815  Stop Time: 855  Time Calculation (min): 40 min    Insurance:    Visit number: 24   total  ordered for LS   23: [facility]/100%/ no auth needed.   (per information provided by  pre-cert team)  Script: eval and treat for balance, 12+12 for LS/land and water  Script: LS 1-2 x/week for 4-6 weeks. + patient to bring in aquatics script from Dr. Tinoco.    Therapy Diagnoses:   1. Abnormal gait  Follow Up In Physical Therapy      2. Lumbar radiculopathy  Follow Up In Physical Therapy        General:  Reason for visit: pain in lower ext and hip   Referred by: Dr. Earnest Jefferson MD appt:    Preferred Name:  Ed  Script:  eval treat  Onset Date:  2022     Assessment:  STG: In 4 weeks  Increased postural awareness. Met    Compliant with HEP. Met    LTG: by discharge    Increased postural awareness and posture WFL. Met     pain to: 2/10 and patient I with self management of symptoms. Partially Met    Decreased pain and increased function with ADL's and IADL's. Improve Oswestry to: 15% limitation of function. Partially Met    Normal gait on level and uneven surfaces community level distances for improved function in the community. Partially Met    Normal reciprocal pattern on stairs for improved function to all levels of home. Partially Met    Increase trunk AROM to WFL for improved function with dressing and driving. Met    Increase trunk strength and stability and R/L LE strength to WFL for improved function with chores. Met    Increase B LE flexibility to WFL. Met    Decrease B /lower quarter tenderness 50-75% per patient report. Met    Decrease L LE radicular symptoms 50-75% per patient report. Not Met    I and compliant with HEP and proper:L LE/lower back care. Met    Skilled care:  re-assessment    Plan:     [facility]      Subjective:   Patient reports:  that he is doing aquatics exercises several days/week in addition to PT.  Patient feels that his balance is the same and plans to follow up with neurology re: this.  Patient is looking into other shower options as he is having difficulty with lifting L LE over the old fashioned tub to shower.  He plans to put additional grab bars as well.     Have you fallen since last visit:  no    Precautions: moderate fall risk  Low blood pressure,thyroid condition, possible hernia, see meds in chart  10-15# lifting restriction due to possible hernia    Pain:  5/10  Location/Type of pain:  LS and L LE grossly the same    HEP compliance/understanding:  yes    Objective:   Objective Measurements:    Sleep: a little better with proper positioning/sleep meds prn (was continued poor, interrupted, instructed in proper postures. Feels better supine but unable to sleep well that way.)   ADL's: gave patient a list of grab bars, shower bench and hand held shower to ask Dr. Sheth a script for /increased awareness of safety with dressing and showering(was difficulty with balance with showers, has list of recommended equipment from [facility] PT, needs to script/has one grab bar. Still has not gotten. Needs to get script. )  Chores: reciprocal pattern up stairs, down with step to pattern(was holding rail and patient is able to negotiates step reciprocally at times now )(was difficulty on stairs to do laundry. slow and careful with chores, riding . )  Driving: able to drive using foot now/some difficulty with checking blind spot to the L (was using cruise control so that he does not have to worry about foot shaking )(was some difficulty with transfers, decreased balance with standing. Some tremor with hands on steering wheel. 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 Has 9 holes in the back yard. )    Outcome Measure: ABC: 1000/16:  62.5:  37.5% limitation of function(was 820/16: 48.75%(was 640/16: 60%) limitation of function).   Oswestry: 36% limitation of function.      Posture: clonus B LE's with WB on forefoot in sitting.   23: LSB and for flexed  23/23: Patient reports increased awareness and more upright posture noted.     Palpation: - pop    Gait/stairs: slow with decreased step length and increased MIKEY and decreased B hip extension at terminal stance, stairs with step to pattern with rail with R LE WB.   23/23: reciprocal pattern up stairs at times. More upright pattern with gait.    Flexion: 12\"(was 10\") 3rd to floor  Extension: 0/15(was 0/8)  RSB: 2\" 6\" 3rd to fibular head stretch L  LSB:2\" pain N (was 6\" 3rd to fibular head)  RR: 25%(was 10%)  LR: 25%(was 10%)    Balance: SLS: R: L: NT  [place]: (was 21/)(was 16/) Moderate (was High) fall risk No change  TU.9\"(was 25.1\") (was 30.1\") without device, B UE on arm rests.  normals:  60-69 years of age (7.1 - 9.0 seconds)  70 - 79 years of age: (8.2 - 10.2 seconds)  80 - 99 years of age (10 - 12.7 seconds)    5 x sit-stand: 39.3\"(was 44.5\")(was 56.2\") B UE on arm rests  normals:  60-69 years of age: 11.4 seconds  70 - 79 years of age: 12.6 seconds   80 - 89 years of age: 14. 8 seconds    Neuro: alt finger to nose: min difficulty but slow (was mod difficulty,more so with eyes closed)  Heel up and down shin: min difficulty with eyes open.     ROM: B UE/LE WFL  MMT:   B UE: 4/5 throughout some tremor noted with testing No change  B LE: 4/5 throughout some tremor noted with testing No change  Abd: 3-/5(was 2+/5)(was 1+/5)(was 1/5)  Bridge: 50%(was 25%(was) 50%)    Flexibility:   Hams: 90/90: R: -22(was -18)(was -22)(was -40) L: -33(was -22) (was -40)  Heelcords: R: 0 " L: 3(was 2)(was -3)  Hip flexors: mod (was mod/sev0  Pecs: min/mod (was mod )  Upper traps/lev scap: mod No change    I with transfers, but slowly  Treatment:   **= HEP  NV= Next visit  np= not performed  nb= non-billable  G= group HEP= discharged to HEP  Therapeutic Exercise:     40 minutes  Re-assessment this date    Education:  poc

## 2024-06-28 ENCOUNTER — APPOINTMENT (OUTPATIENT)
Dept: RADIOLOGY | Facility: CLINIC | Age: 69
End: 2024-06-28
Payer: MEDICARE

## 2024-07-02 ENCOUNTER — HOSPITAL ENCOUNTER (OUTPATIENT)
Dept: RADIOLOGY | Facility: CLINIC | Age: 69
Discharge: HOME | End: 2024-07-02
Payer: MEDICARE

## 2024-07-02 DIAGNOSIS — F17.219 CIGARETTE NICOTINE DEPENDENCE WITH NICOTINE-INDUCED DISORDER: ICD-10-CM

## 2024-07-02 PROCEDURE — 71271 CT THORAX LUNG CANCER SCR C-: CPT

## 2024-09-30 ENCOUNTER — APPOINTMENT (OUTPATIENT)
Dept: RADIOLOGY | Facility: HOSPITAL | Age: 69
DRG: 897 | End: 2024-09-30
Payer: MEDICARE

## 2024-09-30 ENCOUNTER — HOSPITAL ENCOUNTER (INPATIENT)
Facility: HOSPITAL | Age: 69
Discharge: NURSING, REHAB OR LONG-TERM CARE | End: 2024-09-30
Attending: INTERNAL MEDICINE | Admitting: INTERNAL MEDICINE
Payer: MEDICARE

## 2024-09-30 ENCOUNTER — APPOINTMENT (OUTPATIENT)
Dept: CARDIOLOGY | Facility: HOSPITAL | Age: 69
DRG: 897 | End: 2024-09-30
Payer: MEDICARE

## 2024-09-30 DIAGNOSIS — F10.939 ALCOHOL WITHDRAWAL SYNDROME WITH COMPLICATION (MULTI): ICD-10-CM

## 2024-09-30 DIAGNOSIS — R26.2 UNABLE TO AMBULATE: Primary | ICD-10-CM

## 2024-09-30 DIAGNOSIS — F10.930 ALCOHOL WITHDRAWAL SYNDROME WITHOUT COMPLICATION (MULTI): ICD-10-CM

## 2024-09-30 DIAGNOSIS — R53.1 GENERALIZED WEAKNESS: ICD-10-CM

## 2024-09-30 LAB
ALBUMIN SERPL BCP-MCNC: 4 G/DL (ref 3.4–5)
ALP SERPL-CCNC: 75 U/L (ref 33–136)
ALT SERPL W P-5'-P-CCNC: 31 U/L (ref 10–52)
AMMONIA PLAS-SCNC: 31 UMOL/L (ref 16–53)
AMPHETAMINES UR QL SCN: NORMAL
ANION GAP SERPL CALC-SCNC: 21 MMOL/L (ref 10–20)
APAP SERPL-MCNC: <10 UG/ML
APPEARANCE UR: CLEAR
APTT PPP: 33 SECONDS (ref 27–38)
AST SERPL W P-5'-P-CCNC: 84 U/L (ref 9–39)
BARBITURATES UR QL SCN: NORMAL
BASOPHILS # BLD AUTO: 0.12 X10*3/UL (ref 0–0.1)
BASOPHILS NFR BLD AUTO: 3.2 %
BENZODIAZ UR QL SCN: NORMAL
BILIRUB SERPL-MCNC: 1 MG/DL (ref 0–1.2)
BILIRUB UR STRIP.AUTO-MCNC: NEGATIVE MG/DL
BNP SERPL-MCNC: 113 PG/ML (ref 0–99)
BUN SERPL-MCNC: 16 MG/DL (ref 6–23)
BZE UR QL SCN: NORMAL
CALCIUM SERPL-MCNC: 8.6 MG/DL (ref 8.6–10.3)
CANNABINOIDS UR QL SCN: NORMAL
CARDIAC TROPONIN I PNL SERPL HS: 14 NG/L (ref 0–20)
CARDIAC TROPONIN I PNL SERPL HS: 15 NG/L (ref 0–20)
CHLORIDE SERPL-SCNC: 101 MMOL/L (ref 98–107)
CO2 SERPL-SCNC: 24 MMOL/L (ref 21–32)
COLOR UR: ABNORMAL
CREAT SERPL-MCNC: 0.47 MG/DL (ref 0.5–1.3)
EGFRCR SERPLBLD CKD-EPI 2021: >90 ML/MIN/1.73M*2
EOSINOPHIL # BLD AUTO: 0.11 X10*3/UL (ref 0–0.7)
EOSINOPHIL NFR BLD AUTO: 3 %
ERYTHROCYTE [DISTWIDTH] IN BLOOD BY AUTOMATED COUNT: 12.8 % (ref 11.5–14.5)
ETHANOL SERPL-MCNC: 92 MG/DL
FENTANYL+NORFENTANYL UR QL SCN: NORMAL
GLUCOSE SERPL-MCNC: 85 MG/DL (ref 74–99)
GLUCOSE UR STRIP.AUTO-MCNC: NORMAL MG/DL
HCT VFR BLD AUTO: 32.9 % (ref 41–52)
HGB BLD-MCNC: 12 G/DL (ref 13.5–17.5)
HOLD SPECIMEN: NORMAL
IMM GRANULOCYTES # BLD AUTO: 0.07 X10*3/UL (ref 0–0.7)
IMM GRANULOCYTES NFR BLD AUTO: 1.9 % (ref 0–0.9)
INR PPP: 1.1 (ref 0.9–1.1)
KETONES UR STRIP.AUTO-MCNC: ABNORMAL MG/DL
LACTATE SERPL-SCNC: 3.1 MMOL/L (ref 0.4–2)
LACTATE SERPL-SCNC: 3.5 MMOL/L (ref 0.4–2)
LEUKOCYTE ESTERASE UR QL STRIP.AUTO: NEGATIVE
LYMPHOCYTES # BLD AUTO: 1.12 X10*3/UL (ref 1.2–4.8)
LYMPHOCYTES NFR BLD AUTO: 30.1 %
MAGNESIUM SERPL-MCNC: 1.04 MG/DL (ref 1.6–2.4)
MCH RBC QN AUTO: 35.1 PG (ref 26–34)
MCHC RBC AUTO-ENTMCNC: 36.5 G/DL (ref 32–36)
MCV RBC AUTO: 96 FL (ref 80–100)
METHADONE UR QL SCN: NORMAL
MONOCYTES # BLD AUTO: 0.6 X10*3/UL (ref 0.1–1)
MONOCYTES NFR BLD AUTO: 16.1 %
NEUTROPHILS # BLD AUTO: 1.7 X10*3/UL (ref 1.2–7.7)
NEUTROPHILS NFR BLD AUTO: 45.7 %
NITRITE UR QL STRIP.AUTO: NEGATIVE
NRBC BLD-RTO: 0 /100 WBCS (ref 0–0)
OPIATES UR QL SCN: NORMAL
OXYCODONE+OXYMORPHONE UR QL SCN: NORMAL
PCP UR QL SCN: NORMAL
PH UR STRIP.AUTO: 6.5 [PH]
PLATELET # BLD AUTO: 59 X10*3/UL (ref 150–450)
POTASSIUM SERPL-SCNC: 2.9 MMOL/L (ref 3.5–5.3)
PROT SERPL-MCNC: 7 G/DL (ref 6.4–8.2)
PROT UR STRIP.AUTO-MCNC: NEGATIVE MG/DL
PROTHROMBIN TIME: 12.2 SECONDS (ref 9.8–12.8)
RBC # BLD AUTO: 3.42 X10*6/UL (ref 4.5–5.9)
RBC # UR STRIP.AUTO: NEGATIVE /UL
SALICYLATES SERPL-MCNC: <3 MG/DL
SODIUM SERPL-SCNC: 143 MMOL/L (ref 136–145)
SP GR UR STRIP.AUTO: 1.01
T4 FREE SERPL-MCNC: 0.81 NG/DL (ref 0.61–1.12)
TSH SERPL-ACNC: 6.84 MIU/L (ref 0.44–3.98)
UROBILINOGEN UR STRIP.AUTO-MCNC: NORMAL MG/DL
WBC # BLD AUTO: 3.7 X10*3/UL (ref 4.4–11.3)

## 2024-09-30 PROCEDURE — G0378 HOSPITAL OBSERVATION PER HR: HCPCS

## 2024-09-30 PROCEDURE — 80143 DRUG ASSAY ACETAMINOPHEN: CPT | Performed by: INTERNAL MEDICINE

## 2024-09-30 PROCEDURE — 72170 X-RAY EXAM OF PELVIS: CPT | Performed by: RADIOLOGY

## 2024-09-30 PROCEDURE — 2500000001 HC RX 250 WO HCPCS SELF ADMINISTERED DRUGS (ALT 637 FOR MEDICARE OP): Performed by: INTERNAL MEDICINE

## 2024-09-30 PROCEDURE — 2500000002 HC RX 250 W HCPCS SELF ADMINISTERED DRUGS (ALT 637 FOR MEDICARE OP, ALT 636 FOR OP/ED): Performed by: INTERNAL MEDICINE

## 2024-09-30 PROCEDURE — 96361 HYDRATE IV INFUSION ADD-ON: CPT

## 2024-09-30 PROCEDURE — 84484 ASSAY OF TROPONIN QUANT: CPT | Performed by: INTERNAL MEDICINE

## 2024-09-30 PROCEDURE — 80307 DRUG TEST PRSMV CHEM ANLYZR: CPT | Performed by: INTERNAL MEDICINE

## 2024-09-30 PROCEDURE — 84443 ASSAY THYROID STIM HORMONE: CPT

## 2024-09-30 PROCEDURE — 70450 CT HEAD/BRAIN W/O DYE: CPT

## 2024-09-30 PROCEDURE — 82140 ASSAY OF AMMONIA: CPT | Performed by: INTERNAL MEDICINE

## 2024-09-30 PROCEDURE — 93005 ELECTROCARDIOGRAM TRACING: CPT

## 2024-09-30 PROCEDURE — 99285 EMERGENCY DEPT VISIT HI MDM: CPT

## 2024-09-30 PROCEDURE — 2500000001 HC RX 250 WO HCPCS SELF ADMINISTERED DRUGS (ALT 637 FOR MEDICARE OP)

## 2024-09-30 PROCEDURE — 72125 CT NECK SPINE W/O DYE: CPT

## 2024-09-30 PROCEDURE — 83735 ASSAY OF MAGNESIUM: CPT | Performed by: INTERNAL MEDICINE

## 2024-09-30 PROCEDURE — 70450 CT HEAD/BRAIN W/O DYE: CPT | Performed by: RADIOLOGY

## 2024-09-30 PROCEDURE — 84075 ASSAY ALKALINE PHOSPHATASE: CPT | Performed by: INTERNAL MEDICINE

## 2024-09-30 PROCEDURE — 2500000004 HC RX 250 GENERAL PHARMACY W/ HCPCS (ALT 636 FOR OP/ED)

## 2024-09-30 PROCEDURE — 85610 PROTHROMBIN TIME: CPT | Performed by: INTERNAL MEDICINE

## 2024-09-30 PROCEDURE — 36415 COLL VENOUS BLD VENIPUNCTURE: CPT | Performed by: INTERNAL MEDICINE

## 2024-09-30 PROCEDURE — 83605 ASSAY OF LACTIC ACID: CPT | Performed by: INTERNAL MEDICINE

## 2024-09-30 PROCEDURE — 2500000004 HC RX 250 GENERAL PHARMACY W/ HCPCS (ALT 636 FOR OP/ED): Performed by: INTERNAL MEDICINE

## 2024-09-30 PROCEDURE — 80320 DRUG SCREEN QUANTALCOHOLS: CPT | Performed by: INTERNAL MEDICINE

## 2024-09-30 PROCEDURE — 85730 THROMBOPLASTIN TIME PARTIAL: CPT | Performed by: INTERNAL MEDICINE

## 2024-09-30 PROCEDURE — 85025 COMPLETE CBC W/AUTO DIFF WBC: CPT | Performed by: INTERNAL MEDICINE

## 2024-09-30 PROCEDURE — 72170 X-RAY EXAM OF PELVIS: CPT

## 2024-09-30 PROCEDURE — 71045 X-RAY EXAM CHEST 1 VIEW: CPT | Performed by: RADIOLOGY

## 2024-09-30 PROCEDURE — 83880 ASSAY OF NATRIURETIC PEPTIDE: CPT | Performed by: INTERNAL MEDICINE

## 2024-09-30 PROCEDURE — 96375 TX/PRO/DX INJ NEW DRUG ADDON: CPT

## 2024-09-30 PROCEDURE — 2060000001 HC INTERMEDIATE ICU ROOM DAILY

## 2024-09-30 PROCEDURE — 81003 URINALYSIS AUTO W/O SCOPE: CPT | Performed by: INTERNAL MEDICINE

## 2024-09-30 PROCEDURE — 71045 X-RAY EXAM CHEST 1 VIEW: CPT

## 2024-09-30 PROCEDURE — 84439 ASSAY OF FREE THYROXINE: CPT

## 2024-09-30 PROCEDURE — 72125 CT NECK SPINE W/O DYE: CPT | Performed by: RADIOLOGY

## 2024-09-30 PROCEDURE — 96365 THER/PROPH/DIAG IV INF INIT: CPT

## 2024-09-30 PROCEDURE — 96366 THER/PROPH/DIAG IV INF ADDON: CPT

## 2024-09-30 RX ORDER — LANOLIN ALCOHOL/MO/W.PET/CERES
100 CREAM (GRAM) TOPICAL DAILY
Status: DISCONTINUED | OUTPATIENT
Start: 2024-10-03 | End: 2024-09-30 | Stop reason: SDUPTHER

## 2024-09-30 RX ORDER — LORAZEPAM 2 MG/1
2 TABLET ORAL EVERY 2 HOUR PRN
Status: DISCONTINUED | OUTPATIENT
Start: 2024-09-30 | End: 2024-09-30

## 2024-09-30 RX ORDER — ENOXAPARIN SODIUM 100 MG/ML
40 INJECTION SUBCUTANEOUS EVERY 24 HOURS
Status: DISCONTINUED | OUTPATIENT
Start: 2024-09-30 | End: 2024-10-02

## 2024-09-30 RX ORDER — PHENOBARBITAL 32.4 MG/1
97.2 TABLET ORAL 3 TIMES DAILY
Status: COMPLETED | OUTPATIENT
Start: 2024-10-01 | End: 2024-10-02

## 2024-09-30 RX ORDER — ESCITALOPRAM OXALATE 10 MG/1
10 TABLET ORAL DAILY
Status: DISPENSED | OUTPATIENT
Start: 2024-09-30

## 2024-09-30 RX ORDER — PROPRANOLOL HYDROCHLORIDE 40 MG/1
40 TABLET ORAL 2 TIMES DAILY
Status: ON HOLD | COMMUNITY

## 2024-09-30 RX ORDER — ESCITALOPRAM OXALATE 10 MG/1
1 TABLET ORAL DAILY
Status: ON HOLD | COMMUNITY

## 2024-09-30 RX ORDER — LORAZEPAM 0.5 MG/1
1 TABLET ORAL EVERY 2 HOUR PRN
Status: DISCONTINUED | OUTPATIENT
Start: 2024-09-30 | End: 2024-09-30

## 2024-09-30 RX ORDER — MULTIVIT-MIN/IRON FUM/FOLIC AC 7.5 MG-4
1 TABLET ORAL DAILY
Status: DISPENSED | OUTPATIENT
Start: 2024-09-30

## 2024-09-30 RX ORDER — PROPRANOLOL HYDROCHLORIDE 40 MG/1
40 TABLET ORAL 2 TIMES DAILY
Status: DISPENSED | OUTPATIENT
Start: 2024-09-30

## 2024-09-30 RX ORDER — FOLIC ACID 1 MG/1
1 TABLET ORAL DAILY
Status: DISCONTINUED | OUTPATIENT
Start: 2024-09-30 | End: 2024-09-30 | Stop reason: SDUPTHER

## 2024-09-30 RX ORDER — LORAZEPAM 0.5 MG/1
0.5 TABLET ORAL EVERY 2 HOUR PRN
Status: DISCONTINUED | OUTPATIENT
Start: 2024-09-30 | End: 2024-09-30

## 2024-09-30 RX ORDER — LANOLIN ALCOHOL/MO/W.PET/CERES
100 CREAM (GRAM) TOPICAL DAILY
Status: DISPENSED | OUTPATIENT
Start: 2024-10-03

## 2024-09-30 RX ORDER — LEVOTHYROXINE SODIUM 75 UG/1
75 TABLET ORAL DAILY
Status: ON HOLD | COMMUNITY

## 2024-09-30 RX ORDER — DIAZEPAM 5 MG/ML
10 INJECTION, SOLUTION INTRAMUSCULAR; INTRAVENOUS EVERY 2 HOUR PRN
Status: DISCONTINUED | OUTPATIENT
Start: 2024-09-30 | End: 2024-10-04

## 2024-09-30 RX ORDER — POTASSIUM CHLORIDE 1.5 G/1.58G
40 POWDER, FOR SOLUTION ORAL ONCE
Status: COMPLETED | OUTPATIENT
Start: 2024-09-30 | End: 2024-09-30

## 2024-09-30 RX ORDER — POTASSIUM CHLORIDE 14.9 MG/ML
20 INJECTION INTRAVENOUS
Status: COMPLETED | OUTPATIENT
Start: 2024-09-30 | End: 2024-09-30

## 2024-09-30 RX ORDER — BUPROPION HYDROCHLORIDE 150 MG/1
150 TABLET ORAL DAILY
Status: ON HOLD | COMMUNITY

## 2024-09-30 RX ORDER — BUPROPION HYDROCHLORIDE 150 MG/1
150 TABLET ORAL DAILY
Status: DISPENSED | OUTPATIENT
Start: 2024-09-30

## 2024-09-30 RX ORDER — MAGNESIUM SULFATE HEPTAHYDRATE 40 MG/ML
2 INJECTION, SOLUTION INTRAVENOUS ONCE
Status: COMPLETED | OUTPATIENT
Start: 2024-09-30 | End: 2024-09-30

## 2024-09-30 RX ORDER — THIAMINE HYDROCHLORIDE 100 MG/ML
100 INJECTION, SOLUTION INTRAMUSCULAR; INTRAVENOUS DAILY
Status: DISCONTINUED | OUTPATIENT
Start: 2024-09-30 | End: 2024-09-30 | Stop reason: SDUPTHER

## 2024-09-30 RX ORDER — MULTIVIT-MIN/IRON FUM/FOLIC AC 7.5 MG-4
1 TABLET ORAL DAILY
Status: DISCONTINUED | OUTPATIENT
Start: 2024-09-30 | End: 2024-09-30 | Stop reason: SDUPTHER

## 2024-09-30 RX ORDER — POTASSIUM CHLORIDE 20 MEQ/1
40 TABLET, EXTENDED RELEASE ORAL ONCE
Status: COMPLETED | OUTPATIENT
Start: 2024-09-30 | End: 2024-09-30

## 2024-09-30 RX ORDER — PHENOBARBITAL 32.4 MG/1
32.4 TABLET ORAL 3 TIMES DAILY
Status: COMPLETED | OUTPATIENT
Start: 2024-10-05 | End: 2024-10-06

## 2024-09-30 RX ORDER — TALC
3 POWDER (GRAM) TOPICAL NIGHTLY
Status: DISPENSED | OUTPATIENT
Start: 2024-09-30

## 2024-09-30 RX ORDER — FOLIC ACID 1 MG/1
1 TABLET ORAL DAILY
Status: DISPENSED | OUTPATIENT
Start: 2024-09-30

## 2024-09-30 RX ORDER — MAGNESIUM SULFATE HEPTAHYDRATE 40 MG/ML
4 INJECTION, SOLUTION INTRAVENOUS ONCE
Status: COMPLETED | OUTPATIENT
Start: 2024-09-30 | End: 2024-10-01

## 2024-09-30 RX ORDER — LEVOTHYROXINE SODIUM 75 UG/1
75 TABLET ORAL
Status: DISPENSED | OUTPATIENT
Start: 2024-09-30

## 2024-09-30 RX ORDER — PHENOBARBITAL SODIUM 65 MG/ML
260 INJECTION, SOLUTION INTRAMUSCULAR; INTRAVENOUS ONCE
Status: COMPLETED | OUTPATIENT
Start: 2024-09-30 | End: 2024-09-30

## 2024-09-30 RX ORDER — PHENOBARBITAL 32.4 MG/1
64.8 TABLET ORAL 3 TIMES DAILY
Status: COMPLETED | OUTPATIENT
Start: 2024-10-03 | End: 2024-10-04

## 2024-09-30 RX ORDER — POTASSIUM CHLORIDE 14.9 MG/ML
20 INJECTION INTRAVENOUS
Status: DISCONTINUED | OUTPATIENT
Start: 2024-09-30 | End: 2024-09-30

## 2024-09-30 RX ADMIN — MAGNESIUM SULFATE HEPTAHYDRATE 4 G: 40 INJECTION, SOLUTION INTRAVENOUS at 21:01

## 2024-09-30 RX ADMIN — PROPRANOLOL HYDROCHLORIDE 40 MG: 40 TABLET ORAL at 20:52

## 2024-09-30 RX ADMIN — Medication 3 MG: at 20:52

## 2024-09-30 RX ADMIN — Medication 1 TABLET: at 08:20

## 2024-09-30 RX ADMIN — FOLIC ACID 1 MG: 1 TABLET ORAL at 08:20

## 2024-09-30 RX ADMIN — THIAMINE HYDROCHLORIDE 100 MG: 100 INJECTION, SOLUTION INTRAMUSCULAR; INTRAVENOUS at 08:20

## 2024-09-30 RX ADMIN — SODIUM CHLORIDE, POTASSIUM CHLORIDE, SODIUM LACTATE AND CALCIUM CHLORIDE 1000 ML: 600; 310; 30; 20 INJECTION, SOLUTION INTRAVENOUS at 08:19

## 2024-09-30 RX ADMIN — PHENOBARBITAL SODIUM 260 MG: 65 INJECTION INTRAMUSCULAR at 20:22

## 2024-09-30 RX ADMIN — POTASSIUM CHLORIDE 20 MEQ: 14.9 INJECTION, SOLUTION INTRAVENOUS at 20:52

## 2024-09-30 RX ADMIN — POTASSIUM CHLORIDE 40 MEQ: 1.5 POWDER, FOR SOLUTION ORAL at 21:02

## 2024-09-30 RX ADMIN — THIAMINE HYDROCHLORIDE 500 MG: 100 INJECTION, SOLUTION INTRAMUSCULAR; INTRAVENOUS at 23:01

## 2024-09-30 RX ADMIN — MAGNESIUM SULFATE HEPTAHYDRATE 2 G: 40 INJECTION, SOLUTION INTRAVENOUS at 10:16

## 2024-09-30 RX ADMIN — LORAZEPAM 0.5 MG: 0.5 TABLET ORAL at 09:17

## 2024-09-30 RX ADMIN — SODIUM CHLORIDE, POTASSIUM CHLORIDE, SODIUM LACTATE AND CALCIUM CHLORIDE 1000 ML: 600; 310; 30; 20 INJECTION, SOLUTION INTRAVENOUS at 20:44

## 2024-09-30 RX ADMIN — POTASSIUM CHLORIDE 40 MEQ: 1500 TABLET, EXTENDED RELEASE ORAL at 10:15

## 2024-09-30 SDOH — SOCIAL STABILITY: SOCIAL INSECURITY: WITHIN THE LAST YEAR, HAVE YOU BEEN AFRAID OF YOUR PARTNER OR EX-PARTNER?: NO

## 2024-09-30 SDOH — ECONOMIC STABILITY: HOUSING INSECURITY: AT ANY TIME IN THE PAST 12 MONTHS, WERE YOU HOMELESS OR LIVING IN A SHELTER (INCLUDING NOW)?: NO

## 2024-09-30 SDOH — ECONOMIC STABILITY: FOOD INSECURITY: WITHIN THE PAST 12 MONTHS, THE FOOD YOU BOUGHT JUST DIDN'T LAST AND YOU DIDN'T HAVE MONEY TO GET MORE.: NEVER TRUE

## 2024-09-30 SDOH — ECONOMIC STABILITY: FOOD INSECURITY: HOW HARD IS IT FOR YOU TO PAY FOR THE VERY BASICS LIKE FOOD, HOUSING, MEDICAL CARE, AND HEATING?: NOT VERY HARD

## 2024-09-30 SDOH — SOCIAL STABILITY: SOCIAL INSECURITY: HAVE YOU HAD THOUGHTS OF HARMING ANYONE ELSE?: NO

## 2024-09-30 SDOH — SOCIAL STABILITY: SOCIAL INSECURITY
WITHIN THE LAST YEAR, HAVE YOU BEEN RAPED OR FORCED TO HAVE ANY KIND OF SEXUAL ACTIVITY BY YOUR PARTNER OR EX-PARTNER?: NO

## 2024-09-30 SDOH — SOCIAL STABILITY: SOCIAL INSECURITY: WERE YOU ABLE TO COMPLETE ALL THE BEHAVIORAL HEALTH SCREENINGS?: YES

## 2024-09-30 SDOH — SOCIAL STABILITY: SOCIAL INSECURITY: WITHIN THE LAST YEAR, HAVE YOU BEEN HUMILIATED OR EMOTIONALLY ABUSED IN OTHER WAYS BY YOUR PARTNER OR EX-PARTNER?: NO

## 2024-09-30 SDOH — SOCIAL STABILITY: SOCIAL INSECURITY
WITHIN THE LAST YEAR, HAVE YOU BEEN KICKED, HIT, SLAPPED, OR OTHERWISE PHYSICALLY HURT BY YOUR PARTNER OR EX-PARTNER?: NO

## 2024-09-30 SDOH — ECONOMIC STABILITY: FOOD INSECURITY: WITHIN THE PAST 12 MONTHS, YOU WORRIED THAT YOUR FOOD WOULD RUN OUT BEFORE YOU GOT MONEY TO BUY MORE.: NEVER TRUE

## 2024-09-30 SDOH — SOCIAL STABILITY: SOCIAL INSECURITY: HAS ANYONE EVER THREATENED TO HURT YOUR FAMILY OR YOUR PETS?: NO

## 2024-09-30 SDOH — ECONOMIC STABILITY: HOUSING INSECURITY: IN THE LAST 12 MONTHS, WAS THERE A TIME WHEN YOU WERE NOT ABLE TO PAY THE MORTGAGE OR RENT ON TIME?: NO

## 2024-09-30 SDOH — ECONOMIC STABILITY: INCOME INSECURITY: IN THE PAST 12 MONTHS, HAS THE ELECTRIC, GAS, OIL, OR WATER COMPANY THREATENED TO SHUT OFF SERVICE IN YOUR HOME?: NO

## 2024-09-30 SDOH — ECONOMIC STABILITY: INCOME INSECURITY: IN THE PAST 12 MONTHS HAS THE ELECTRIC, GAS, OIL, OR WATER COMPANY THREATENED TO SHUT OFF SERVICES IN YOUR HOME?: NO

## 2024-09-30 SDOH — SOCIAL STABILITY: SOCIAL INSECURITY: ABUSE: ADULT

## 2024-09-30 SDOH — ECONOMIC STABILITY: HOUSING INSECURITY: IN THE PAST 12 MONTHS, HOW MANY TIMES HAVE YOU MOVED WHERE YOU WERE LIVING?: 1

## 2024-09-30 SDOH — ECONOMIC STABILITY: TRANSPORTATION INSECURITY
IN THE PAST 12 MONTHS, HAS THE LACK OF TRANSPORTATION KEPT YOU FROM MEDICAL APPOINTMENTS OR FROM GETTING MEDICATIONS?: NO

## 2024-09-30 SDOH — ECONOMIC STABILITY: FOOD INSECURITY: WITHIN THE PAST 12 MONTHS, YOU WORRIED THAT YOUR FOOD WOULD RUN OUT BEFORE YOU GOT THE MONEY TO BUY MORE.: NEVER TRUE

## 2024-09-30 SDOH — SOCIAL STABILITY: SOCIAL INSECURITY
WITHIN THE LAST YEAR, HAVE TO BEEN RAPED OR FORCED TO HAVE ANY KIND OF SEXUAL ACTIVITY BY YOUR PARTNER OR EX-PARTNER?: NO

## 2024-09-30 SDOH — SOCIAL STABILITY: SOCIAL INSECURITY: DO YOU FEEL ANYONE HAS EXPLOITED OR TAKEN ADVANTAGE OF YOU FINANCIALLY OR OF YOUR PERSONAL PROPERTY?: NO

## 2024-09-30 SDOH — SOCIAL STABILITY: SOCIAL INSECURITY: ARE THERE ANY APPARENT SIGNS OF INJURIES/BEHAVIORS THAT COULD BE RELATED TO ABUSE/NEGLECT?: NO

## 2024-09-30 SDOH — ECONOMIC STABILITY: INCOME INSECURITY: IN THE LAST 12 MONTHS, WAS THERE A TIME WHEN YOU WERE NOT ABLE TO PAY THE MORTGAGE OR RENT ON TIME?: NO

## 2024-09-30 SDOH — ECONOMIC STABILITY: TRANSPORTATION INSECURITY: IN THE PAST 12 MONTHS, HAS LACK OF TRANSPORTATION KEPT YOU FROM MEDICAL APPOINTMENTS OR FROM GETTING MEDICATIONS?: NO

## 2024-09-30 SDOH — SOCIAL STABILITY: SOCIAL INSECURITY: ARE YOU OR HAVE YOU BEEN THREATENED OR ABUSED PHYSICALLY, EMOTIONALLY, OR SEXUALLY BY ANYONE?: NO

## 2024-09-30 SDOH — ECONOMIC STABILITY: TRANSPORTATION INSECURITY
IN THE PAST 12 MONTHS, HAS LACK OF TRANSPORTATION KEPT YOU FROM MEETINGS, WORK, OR FROM GETTING THINGS NEEDED FOR DAILY LIVING?: NO

## 2024-09-30 SDOH — SOCIAL STABILITY: SOCIAL INSECURITY: DOES ANYONE TRY TO KEEP YOU FROM HAVING/CONTACTING OTHER FRIENDS OR DOING THINGS OUTSIDE YOUR HOME?: NO

## 2024-09-30 SDOH — SOCIAL STABILITY: SOCIAL INSECURITY: DO YOU FEEL UNSAFE GOING BACK TO THE PLACE WHERE YOU ARE LIVING?: NO

## 2024-09-30 SDOH — SOCIAL STABILITY: SOCIAL INSECURITY: HAVE YOU HAD ANY THOUGHTS OF HARMING ANYONE ELSE?: NO

## 2024-09-30 SDOH — ECONOMIC STABILITY: INCOME INSECURITY: HOW HARD IS IT FOR YOU TO PAY FOR THE VERY BASICS LIKE FOOD, HOUSING, MEDICAL CARE, AND HEATING?: NOT VERY HARD

## 2024-09-30 ASSESSMENT — PATIENT HEALTH QUESTIONNAIRE - PHQ9
2. FEELING DOWN, DEPRESSED OR HOPELESS: NOT AT ALL
SUM OF ALL RESPONSES TO PHQ9 QUESTIONS 1 & 2: 0
1. LITTLE INTEREST OR PLEASURE IN DOING THINGS: NOT AT ALL

## 2024-09-30 ASSESSMENT — LIFESTYLE VARIABLES
ORIENTATION AND CLOUDING OF SENSORIUM: ORIENTED AND CAN DO SERIAL ADDITIONS
HOW OFTEN DURING THE LAST YEAR HAVE YOU NEEDED AN ALCOHOLIC DRINK FIRST THING IN THE MORNING TO GET YOURSELF GOING AFTER A NIGHT OF HEAVY DRINKING: NEVER
AGITATION: NORMAL ACTIVITY
HOW OFTEN DURING THE LAST YEAR HAVE YOU HAD A FEELING OF GUILT OR REMORSE AFTER DRINKING: NEVER
AGITATION: NORMAL ACTIVITY
AUDIT-C TOTAL SCORE: 4
ORIENTATION AND CLOUDING OF SENSORIUM: ORIENTED AND CAN DO SERIAL ADDITIONS
PAROXYSMAL SWEATS: NO SWEAT VISIBLE
VISUAL DISTURBANCES: NOT PRESENT
TOTAL SCORE: 1
TREMOR: 2
TOTAL SCORE: 6
NAUSEA AND VOMITING: NO NAUSEA AND NO VOMITING
TOTAL SCORE: 3
ORIENTATION AND CLOUDING OF SENSORIUM: ORIENTED AND CAN DO SERIAL ADDITIONS
TREMOR: 3
PAROXYSMAL SWEATS: NO SWEAT VISIBLE
ORIENTATION AND CLOUDING OF SENSORIUM: ORIENTED AND CAN DO SERIAL ADDITIONS
HOW OFTEN DO YOU HAVE A DRINK CONTAINING ALCOHOL: 2-3 TIMES A WEEK
AUDITORY DISTURBANCES: NOT PRESENT
VISUAL DISTURBANCES: NOT PRESENT
ORIENTATION AND CLOUDING OF SENSORIUM: ORIENTED AND CAN DO SERIAL ADDITIONS
HOW MANY STANDARD DRINKS CONTAINING ALCOHOL DO YOU HAVE ON A TYPICAL DAY: 3 OR 4
HEADACHE, FULLNESS IN HEAD: NOT PRESENT
NAUSEA AND VOMITING: NO NAUSEA AND NO VOMITING
ANXIETY: MILDLY ANXIOUS
HEADACHE, FULLNESS IN HEAD: NOT PRESENT
AUDITORY DISTURBANCES: NOT PRESENT
TOTAL SCORE: 3
AUDIT-C TOTAL SCORE: 4
PAROXYSMAL SWEATS: NO SWEAT VISIBLE
BLOOD PRESSURE: 162/101
TREMOR: MODERATE, WITH PATIENT'S ARMS EXTENDED
TOTAL SCORE: 4
TREMOR: 3
HOW OFTEN DURING THE LAST YEAR HAVE YOU FAILED TO DO WHAT WAS NORMALLY EXPECTED FROM YOU BECAUSE OF DRINKING: NEVER
NAUSEA AND VOMITING: NO NAUSEA AND NO VOMITING
SKIP TO QUESTIONS 9-10: 0
HEADACHE, FULLNESS IN HEAD: NOT PRESENT
HEADACHE, FULLNESS IN HEAD: NOT PRESENT
TREMOR: 2
EVER FELT BAD OR GUILTY ABOUT YOUR DRINKING: NO
ORIENTATION AND CLOUDING OF SENSORIUM: DISORIENTED FOR DATE BY NO MORE THAN 2 CALENDAR DAYS
AGITATION: NORMAL ACTIVITY
TOTAL SCORE: 3
AGITATION: NORMAL ACTIVITY
AGITATION: NORMAL ACTIVITY
ANXIETY: NO ANXIETY, AT EASE
HAVE YOU EVER FELT YOU SHOULD CUT DOWN ON YOUR DRINKING: NO
ANXIETY: MILDLY ANXIOUS
HAVE PEOPLE ANNOYED YOU BY CRITICIZING YOUR DRINKING: NO
PAROXYSMAL SWEATS: NO SWEAT VISIBLE
AGITATION: NORMAL ACTIVITY
HEADACHE, FULLNESS IN HEAD: NOT PRESENT
ANXIETY: MILDLY ANXIOUS
PRESCIPTION_ABUSE_PAST_12_MONTHS: NO
TOTAL SCORE: 6
VISUAL DISTURBANCES: NOT PRESENT
VISUAL DISTURBANCES: NOT PRESENT
AUDITORY DISTURBANCES: NOT PRESENT
EVER HAD A DRINK FIRST THING IN THE MORNING TO STEADY YOUR NERVES TO GET RID OF A HANGOVER: YES
ORIENTATION AND CLOUDING OF SENSORIUM: ORIENTED AND CAN DO SERIAL ADDITIONS
AUDIT TOTAL SCORE: 0
PAROXYSMAL SWEATS: NO SWEAT VISIBLE
AUDITORY DISTURBANCES: NOT PRESENT
NAUSEA AND VOMITING: NO NAUSEA AND NO VOMITING
TREMOR: 2
ORIENTATION AND CLOUDING OF SENSORIUM: ORIENTED AND CAN DO SERIAL ADDITIONS
HOW OFTEN DURING THE LAST YEAR HAVE YOU FOUND THAT YOU WERE NOT ABLE TO STOP DRINKING ONCE YOU HAD STARTED: NEVER
AGITATION: NORMAL ACTIVITY
AGITATION: NORMAL ACTIVITY
TREMOR: MODERATE, WITH PATIENT'S ARMS EXTENDED
HEADACHE, FULLNESS IN HEAD: NOT PRESENT
AUDIT TOTAL SCORE: -1
VISUAL DISTURBANCES: NOT PRESENT
AGITATION: NORMAL ACTIVITY
TOTAL SCORE: 4
NAUSEA AND VOMITING: NO NAUSEA AND NO VOMITING
VISUAL DISTURBANCES: NOT PRESENT
PAROXYSMAL SWEATS: NO SWEAT VISIBLE
HEADACHE, FULLNESS IN HEAD: NOT PRESENT
NAUSEA AND VOMITING: NO NAUSEA AND NO VOMITING
PAROXYSMAL SWEATS: NO SWEAT VISIBLE
HAVE YOU OR SOMEONE ELSE BEEN INJURED AS A RESULT OF YOUR DRINKING: NO
ORIENTATION AND CLOUDING OF SENSORIUM: ORIENTED AND CAN DO SERIAL ADDITIONS
ANXIETY: MILDLY ANXIOUS
PAROXYSMAL SWEATS: NO SWEAT VISIBLE
HEADACHE, FULLNESS IN HEAD: NOT PRESENT
VISUAL DISTURBANCES: NOT PRESENT
ANXIETY: MILDLY ANXIOUS
HEADACHE, FULLNESS IN HEAD: NOT PRESENT
HEADACHE, FULLNESS IN HEAD: NOT PRESENT
NAUSEA AND VOMITING: NO NAUSEA AND NO VOMITING
TREMOR: 3
AUDITORY DISTURBANCES: NOT PRESENT
PAROXYSMAL SWEATS: NO SWEAT VISIBLE
ORIENTATION AND CLOUDING OF SENSORIUM: ORIENTED AND CAN DO SERIAL ADDITIONS
HOW OFTEN DO YOU HAVE 6 OR MORE DRINKS ON ONE OCCASION: NEVER
AUDITORY DISTURBANCES: NOT PRESENT
ANXIETY: 2
TOTAL SCORE: 4
AUDITORY DISTURBANCES: NOT PRESENT
TOTAL SCORE: 4
AUDITORY DISTURBANCES: NOT PRESENT
NAUSEA AND VOMITING: NO NAUSEA AND NO VOMITING
VISUAL DISTURBANCES: NOT PRESENT
HEADACHE, FULLNESS IN HEAD: NOT PRESENT
AGITATION: NORMAL ACTIVITY
VISUAL DISTURBANCES: NOT PRESENT
HAS A RELATIVE, FRIEND, DOCTOR, OR ANOTHER HEALTH PROFESSIONAL EXPRESSED CONCERN ABOUT YOUR DRINKING OR SUGGESTED YOU CUT DOWN: NO
TREMOR: 3
HEADACHE, FULLNESS IN HEAD: NOT PRESENT
ANXIETY: MILDLY ANXIOUS
NAUSEA AND VOMITING: NO NAUSEA AND NO VOMITING
ANXIETY: MILDLY ANXIOUS
NAUSEA AND VOMITING: NO NAUSEA AND NO VOMITING
TOTAL SCORE: 4
SUBSTANCE_ABUSE_PAST_12_MONTHS: NO
TOTAL SCORE: 4
PAROXYSMAL SWEATS: NO SWEAT VISIBLE
TOTAL SCORE: 4
AUDITORY DISTURBANCES: NOT PRESENT
AGITATION: NORMAL ACTIVITY
VISUAL DISTURBANCES: NOT PRESENT
PAROXYSMAL SWEATS: NO SWEAT VISIBLE
ANXIETY: NO ANXIETY, AT EASE
AUDITORY DISTURBANCES: NOT PRESENT
NAUSEA AND VOMITING: NO NAUSEA AND NO VOMITING
TREMOR: 3
ORIENTATION AND CLOUDING OF SENSORIUM: ORIENTED AND CAN DO SERIAL ADDITIONS
ORIENTATION AND CLOUDING OF SENSORIUM: ORIENTED AND CAN DO SERIAL ADDITIONS
VISUAL DISTURBANCES: NOT PRESENT
AGITATION: NORMAL ACTIVITY
ANXIETY: MILDLY ANXIOUS
VISUAL DISTURBANCES: NOT PRESENT
PAROXYSMAL SWEATS: NO SWEAT VISIBLE
ANXIETY: 2
NAUSEA AND VOMITING: NO NAUSEA AND NO VOMITING

## 2024-09-30 ASSESSMENT — ACTIVITIES OF DAILY LIVING (ADL)
PATIENT'S MEMORY ADEQUATE TO SAFELY COMPLETE DAILY ACTIVITIES?: YES
WALKS IN HOME: NEEDS ASSISTANCE
BATHING: NEEDS ASSISTANCE
ADEQUATE_TO_COMPLETE_ADL: YES
LACK_OF_TRANSPORTATION: NO
LACK_OF_TRANSPORTATION: NO
DRESSING YOURSELF: INDEPENDENT
HEARING - LEFT EAR: FUNCTIONAL
JUDGMENT_ADEQUATE_SAFELY_COMPLETE_DAILY_ACTIVITIES: YES
HEARING - RIGHT EAR: FUNCTIONAL
GROOMING: INDEPENDENT
TOILETING: NEEDS ASSISTANCE
FEEDING YOURSELF: INDEPENDENT

## 2024-09-30 ASSESSMENT — COGNITIVE AND FUNCTIONAL STATUS - GENERAL
MOVING TO AND FROM BED TO CHAIR: A LITTLE
TOILETING: A LITTLE
MOBILITY SCORE: 18
HELP NEEDED FOR BATHING: A LITTLE
DAILY ACTIVITIY SCORE: 18
HELP NEEDED FOR BATHING: A LITTLE
TURNING FROM BACK TO SIDE WHILE IN FLAT BAD: A LITTLE
DRESSING REGULAR UPPER BODY CLOTHING: A LITTLE
DAILY ACTIVITIY SCORE: 22
MOVING TO AND FROM BED TO CHAIR: A LITTLE
DRESSING REGULAR LOWER BODY CLOTHING: A LITTLE
CLIMB 3 TO 5 STEPS WITH RAILING: A LOT
PERSONAL GROOMING: A LITTLE
CLIMB 3 TO 5 STEPS WITH RAILING: A LOT
EATING MEALS: A LITTLE
STANDING UP FROM CHAIR USING ARMS: A LITTLE
HELP NEEDED FOR BATHING: A LITTLE
WALKING IN HOSPITAL ROOM: A LITTLE
WALKING IN HOSPITAL ROOM: TOTAL
MOBILITY SCORE: 15
MOVING TO AND FROM BED TO CHAIR: A LITTLE
TURNING FROM BACK TO SIDE WHILE IN FLAT BAD: A LITTLE
TOILETING: A LITTLE
STANDING UP FROM CHAIR USING ARMS: A LITTLE
MOBILITY SCORE: 18
WALKING IN HOSPITAL ROOM: A LITTLE
DRESSING REGULAR LOWER BODY CLOTHING: A LITTLE
TURNING FROM BACK TO SIDE WHILE IN FLAT BAD: A LITTLE
STANDING UP FROM CHAIR USING ARMS: A LITTLE
PATIENT BASELINE BEDBOUND: NO
DAILY ACTIVITIY SCORE: 21
DRESSING REGULAR LOWER BODY CLOTHING: A LITTLE
CLIMB 3 TO 5 STEPS WITH RAILING: TOTAL

## 2024-09-30 ASSESSMENT — PAIN SCALES - GENERAL
PAINLEVEL_OUTOF10: 0 - NO PAIN

## 2024-09-30 ASSESSMENT — PAIN - FUNCTIONAL ASSESSMENT
PAIN_FUNCTIONAL_ASSESSMENT: 0-10
PAIN_FUNCTIONAL_ASSESSMENT: 0-10

## 2024-09-30 ASSESSMENT — COLUMBIA-SUICIDE SEVERITY RATING SCALE - C-SSRS
1. IN THE PAST MONTH, HAVE YOU WISHED YOU WERE DEAD OR WISHED YOU COULD GO TO SLEEP AND NOT WAKE UP?: NO
2. HAVE YOU ACTUALLY HAD ANY THOUGHTS OF KILLING YOURSELF?: NO
6. HAVE YOU EVER DONE ANYTHING, STARTED TO DO ANYTHING, OR PREPARED TO DO ANYTHING TO END YOUR LIFE?: NO

## 2024-09-30 NOTE — PROGRESS NOTES
Pharmacy Medication History Review    Humble Banks is a 69 y.o. male admitted for Unable to ambulate. Pharmacy reviewed the patient's [id number] medications and allergies for accuracy.    The list below reflectives the updated PTA list. Please review each medication in order reconciliation for additional clarification and justification.  Prior to Admission medications    Medication Sig Start Date End Date Authorizing Provider   buPROPion XL (Wellbutrin XL) 150 mg 24 hr tablet Take 1 tablet (150 mg) by mouth once daily.  Last filled 7/24 for 30ds   Historical Provider, MD   escitalopram (Lexapro) 10 mg tablet Take 1 tablet (10 mg) by mouth once daily.  Last filled 6/6 for 30ds   Historical Provider, MD   levothyroxine (Synthroid, Levoxyl) 75 mcg tablet Take 1 tablet (75 mcg) by mouth once daily.  Last filled 7/24 for 30ds   Historical Provider, MD   propranolol (Inderal) 40 mg tablet Take 1 tablet (40 mg) by mouth 2 times a day.  Last filled 6/6 for 30ds   Historical Provider, MD        The list below reflectives the updated allergy list. Please review each documented allergy for additional clarification and justification.  Allergies  Reviewed by Ivy Ramírez RN on 9/30/2024   No Known Allergies         Below are additional concerns with the patient's PTA list.      Jolynn Mascorro

## 2024-09-30 NOTE — NURSING NOTE
Admission complete. Patient states he drinks whiskey 2-3 days a week. States he drinks 2-3 glasses on the days that he drinks. His last drink was yesterday while watching the football game

## 2024-09-30 NOTE — ED PROVIDER NOTES
HPI   Chief Complaint   Patient presents with    Weakness, Gen       Patient presented for evaluation of generalized weakness.  Patient notes he has had increasing shakiness in the arms and legs.  Patient states he was told to get off the toilet today.  Patient arrives via EMS.  Patient notes his generalized weakness and shakiness has been increasing over the last 2 weeks.  Patient notes he does drink alcohol daily.  Patient states he drinks beer.  Last drink of alcohol was yesterday.  Patient states he had quit drinking alcohol for multiple months and started drinking again in July.  Patient states his son passed away at the age of 14 and his birthday was in July he started drinking again at that time.  Patient denies chest pain or shortness of breath.  Denies vomiting or diarrhea.    Patient notes he has had multiple falls over the last 2 weeks.  Last 1 was 2 days ago.  Patient states he fell in the couch.  Patient denies any his head or neck.      History provided by:  Patient          Patient History   Past Medical History:   Diagnosis Date    Generalized anxiety disorder     Anxiety, generalized     History reviewed. No pertinent surgical history.  Family History   Problem Relation Name Age of Onset    No Known Problems Mother      No Known Problems Father       Social History     Tobacco Use    Smoking status: Every Day     Types: Cigarettes    Smokeless tobacco: Current   Substance Use Topics    Alcohol use: Not on file    Drug use: Not on file       Physical Exam   ED Triage Vitals   Temp Pulse Resp BP   -- -- -- --      SpO2 Temp src Heart Rate Source Patient Position   -- -- -- --      BP Location FiO2 (%)     -- --       Physical Exam  Vitals and nursing note reviewed.   Constitutional:       General: He is not in acute distress.     Appearance: He is normal weight.   HENT:      Head: No Murray's sign, abrasion, contusion or laceration.      Right Ear: External ear normal.      Left Ear: External ear  normal.      Nose: Nose normal.      Mouth/Throat:      Mouth: Mucous membranes are moist.      Pharynx: Oropharynx is clear.   Eyes:      Extraocular Movements: Extraocular movements intact.      Conjunctiva/sclera: Conjunctivae normal.      Pupils: Pupils are equal, round, and reactive to light.   Neck:      Trachea: Trachea normal.   Cardiovascular:      Rate and Rhythm: Normal rate and regular rhythm.      Pulses: Normal pulses.           Radial pulses are 2+ on the right side and 2+ on the left side.        Popliteal pulses are 2+ on the right side and 2+ on the left side.   Pulmonary:      Effort: Pulmonary effort is normal.      Breath sounds: Normal breath sounds.   Abdominal:      Palpations: Abdomen is soft.      Tenderness: There is no abdominal tenderness.   Musculoskeletal:      Right shoulder: No bony tenderness.      Left shoulder: No bony tenderness.      Right elbow: No tenderness.      Left elbow: No tenderness.      Right wrist: No bony tenderness or snuff box tenderness.      Left wrist: No bony tenderness or snuff box tenderness.      Cervical back: No bony tenderness. No spinous process tenderness.      Thoracic back: No bony tenderness.      Lumbar back: No bony tenderness.      Right hip: No bony tenderness. Normal range of motion.      Left hip: No bony tenderness. Normal range of motion.      Right knee: No bony tenderness.      Left knee: No bony tenderness.      Right ankle: No tenderness.      Left ankle: No tenderness.   Skin:     General: Skin is warm.      Capillary Refill: Capillary refill takes less than 2 seconds.      Findings: No abrasion or laceration.   Neurological:      General: No focal deficit present.      Mental Status: He is alert and oriented to person, place, and time. Mental status is at baseline.      Sensory: Sensation is intact.      Motor: Tremor present.      Coordination: Coordination is intact.      Comments: Diffuse tremor in all 4 extremities   Psychiatric:          Mood and Affect: Mood normal.         Behavior: Behavior normal.           ED Course & MDM   ED Course as of 09/30/24 2108   Mon Sep 30, 2024   1227 Reassessed patient.  Patient unable to ambulate.  Concern for ongoing alcohol withdrawal.  Patient agrees with plan of admission. [JA]   1304 Discussed with Dr. Sheth and he accepts the patient to his service.  [JA]      ED Course User Index  [JA] Luis Radford DO         Diagnoses as of 09/30/24 2108   Generalized weakness   Alcohol withdrawal syndrome with complication (Multi)   Unable to ambulate                 No data recorded     [place] Coma Scale Score: 15 (09/30/24 1700 : Tasha Mendez RN)                           Medical Decision Making  Differential diagnosis: Alcohol withdrawal, seizures, intracranial hemorrhage, infection, electrolyte withdrawal, other    No focal neurodeficit on exam.  CT head and C-spine negative.  No infectious source found the ED.  Patient continues to have diffuse tremor.  Concern for alcohol withdrawal.  CIWA 6 at this time.  Patient is a fall risk given multiple falls recently.  Patient admitted for further evaluation.        Procedure  ECG 12 lead    Performed by: Luis Radford DO  Authorized by: Luis Radford DO    ECG interpreted by ED Physician in the absence of a cardiologist: yes    Comments:      9/30/2024 08: 14 sinus rhythm, rate 65, normal axis, ST elevation in aVR with mild diffuse depression of the leads, diffuse T wave flattening, abnormal EKG.  EKG interpreted by myself.       Luis Radford DO  09/30/24 2108

## 2024-09-30 NOTE — H&P
Internal Medicine History & Physical       Patient Name: Humble Banks   YOB: 1955        Primary Care Physician: Dr. Reggie Sheth    Specialists:    Chief Complaint:      History of Presenting Illness:  Patient is a 67-year-old male with past medical history of alcohol abuse (alcohol withdrawal in 2023 requiring phenobarbital with CIWA scores greater than 30), alcohol related hepatitis, hypothyroidism?  Tobacco abuse and hypertension who presented to [facility] with uncontrollable shaking and generalized weakness.  When speaking to the patient he mentions that he was on his way to the bathroom and felt quite weak.  Was unable to get himself off the toilet and that is why he called the ambulance.  He has had episodes of weakness in the past per chart review and patient does say that this feels similar to his previous episodes of withdrawal.  Patient quite tremulous throughout exam.  No other symptoms at this time.  Claims that he drinks 2 shot glasses worth of whiskey every time he drinks with the last drink being 9/29.    Emergency Room Interventions and Results:  Initial presentation the emergency department patient was hypertensive 172/111, pulse ox 97% on room air, heart rate of 74, temperature of 97.2 Fahrenheit and respirations of 18.  Patient's initial labs significant for potassium of 2.9, and anion gap of 18 with lactate of 3.5.  Patient's UA significant for +1 ketones.  Patient's acetaminophen and salicylate levels were undetectable.  Patient's alcohol level was 92.    Comprehensive 10 Point ROS Negative unless otherwise noted in HPI    Diagnoses: Alcohol withdrawal versus untreated hypothyroidism?    Past Medical History:  -As listed above  -Patient does not have a diagnosis of hypothyroidism in his chart but there is reportedly a prescription for levothyroxine in the past?  After evaluating patient's prior TSH levels.  He has  had elevated TSH since , most recently in 2023 was elevated at 14.76.  Unclear if patient has followed up with anybody or been taking any medications.    Past Surgical History:  -No surgical history    Medications: (Per Chart Review)  -Wellbutrin 150 mg daily  -Escitalopram 10 mg daily  -Synthroid 75 mcg daily  -Propranolol 40 mg twice daily.    Allergies:  -No known drug allergies    Family History:  Siblings: No medical issues  Mother: Passed away of diverticulitis  Father: Passed away after falling and hitting his head  Others:    Social History:  ETOH: Heavy alcohol use (8 shots of whiskey daily), stopped while he was  but has been drinking again since he got  and his son passed away.  Smokin cigarettes daily since college  Drugs:  -Lives with a friend and works in [employer].  -Lives in [place]  -Patient's son passed away at 14 years old due to a car accident     Objective:    BP (!) 147/99   Pulse 64   Temp 36.2 °C (97.2 °F) (Temporal)   Resp 20   Ht 1.829 m (6')   Wt 81.6 kg (180 lb)   SpO2 99%   BMI 24.41 kg/m²     Physical Exam:  Constitutional: Well developed,  no distress, alert and cooperative  Respiratory/Thorax: Patent airways, CTAB,   Cardiovascular: Regular, rate and rhythm, S1-S2 present  Gastrointestinal: Nondistended, soft, non-tender,   Neurological: alert and oriented x3, patient very tremulous, significant tremors when moving any of his extremities.  MSK: Full range of motion, no tenderness  Psychological: Appropriate mood and behavior  Skin: Warm and dry,     Assessment/Plan:    Admission Details  Patient is a 69-year-old male with a history of alcohol withdrawal and abuse who presents to [facility] with weakness and shaking with ambulatory dysfunction.  Acute Medical Conditions  #Alcohol withdrawal  #Concern for Warnicke's encephalopathy.  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  -If patient's ambulatory dysfunction does not improve with treatment of his withdrawal possible that it may be secondary to alcoholic myopathy.  His AST is elevated which may be consistent with muscle damage versus hepatocellular.    #Hypokalemia  #Hypomagnesemia  #Anion gap metabolic acidosis  #Lactic acidosis precipitating above.  -4 g magnesium  -80 mEq potassium after magnesium  -We will fluid resuscitate and monitor lactate.    #LFT elevation  -AST to ALT ratio greater than 2-1 consistent with alcohol abuse      Chronic Medical Conditions  #Hypothyroidism-patient has been taking his medications  #Hypertension-continue patient's home propranolol, will have to evaluate whether patient has history of essential tremor?  #Depression-escitalopram  #Anxiety-Wellbutrin    DVT: Enoxaparin  Diet: Regular diet  Fluids: 1 L LR bolus  Electrolytes: Replete magnesium potassium  Dispo: Stepdown to administer phenobarbital  Code Status: Confirmed full code  Consults: None  Antibiotics: None     Enoc Stout MD     Internal Medicine PGY-3  Available on [employer] for any questions.    This is a preliminary note pending signature from the attending physician.

## 2024-10-01 LAB
ANION GAP SERPL CALC-SCNC: 17 MMOL/L (ref 10–20)
BUN SERPL-MCNC: 12 MG/DL (ref 6–23)
CALCIUM SERPL-MCNC: 8.4 MG/DL (ref 8.6–10.3)
CHLORIDE SERPL-SCNC: 95 MMOL/L (ref 98–107)
CO2 SERPL-SCNC: 28 MMOL/L (ref 21–32)
CREAT SERPL-MCNC: 0.49 MG/DL (ref 0.5–1.3)
EGFRCR SERPLBLD CKD-EPI 2021: >90 ML/MIN/1.73M*2
ERYTHROCYTE [DISTWIDTH] IN BLOOD BY AUTOMATED COUNT: 12.9 % (ref 11.5–14.5)
GLUCOSE SERPL-MCNC: 86 MG/DL (ref 74–99)
HCT VFR BLD AUTO: 33.4 % (ref 41–52)
HGB BLD-MCNC: 11.8 G/DL (ref 13.5–17.5)
LACTATE SERPL-SCNC: 1.1 MMOL/L (ref 0.4–2)
MAGNESIUM SERPL-MCNC: 1.92 MG/DL (ref 1.6–2.4)
MCH RBC QN AUTO: 34.9 PG (ref 26–34)
MCHC RBC AUTO-ENTMCNC: 35.3 G/DL (ref 32–36)
MCV RBC AUTO: 99 FL (ref 80–100)
NRBC BLD-RTO: 0.4 /100 WBCS (ref 0–0)
PLATELET # BLD AUTO: 57 X10*3/UL (ref 150–450)
POTASSIUM SERPL-SCNC: 3.3 MMOL/L (ref 3.5–5.3)
RBC # BLD AUTO: 3.38 X10*6/UL (ref 4.5–5.9)
SODIUM SERPL-SCNC: 137 MMOL/L (ref 136–145)
WBC # BLD AUTO: 4.8 X10*3/UL (ref 4.4–11.3)

## 2024-10-01 PROCEDURE — 2060000001 HC INTERMEDIATE ICU ROOM DAILY

## 2024-10-01 PROCEDURE — 2500000004 HC RX 250 GENERAL PHARMACY W/ HCPCS (ALT 636 FOR OP/ED)

## 2024-10-01 PROCEDURE — 97166 OT EVAL MOD COMPLEX 45 MIN: CPT | Mod: GO

## 2024-10-01 PROCEDURE — G0378 HOSPITAL OBSERVATION PER HR: HCPCS

## 2024-10-01 PROCEDURE — 97161 PT EVAL LOW COMPLEX 20 MIN: CPT | Mod: GP

## 2024-10-01 PROCEDURE — 36415 COLL VENOUS BLD VENIPUNCTURE: CPT

## 2024-10-01 PROCEDURE — 80048 BASIC METABOLIC PNL TOTAL CA: CPT

## 2024-10-01 PROCEDURE — 83735 ASSAY OF MAGNESIUM: CPT

## 2024-10-01 PROCEDURE — 85027 COMPLETE CBC AUTOMATED: CPT

## 2024-10-01 PROCEDURE — 83605 ASSAY OF LACTIC ACID: CPT

## 2024-10-01 PROCEDURE — 2500000001 HC RX 250 WO HCPCS SELF ADMINISTERED DRUGS (ALT 637 FOR MEDICARE OP)

## 2024-10-01 RX ORDER — POTASSIUM CHLORIDE 1.5 G/1.58G
40 POWDER, FOR SOLUTION ORAL ONCE
Status: COMPLETED | OUTPATIENT
Start: 2024-10-01 | End: 2024-10-01

## 2024-10-01 RX ADMIN — LEVOTHYROXINE SODIUM 75 MCG: 0.07 TABLET ORAL at 05:58

## 2024-10-01 RX ADMIN — PROPRANOLOL HYDROCHLORIDE 40 MG: 40 TABLET ORAL at 08:25

## 2024-10-01 RX ADMIN — PHENOBARBITAL 97.2 MG: 32.4 TABLET ORAL at 08:25

## 2024-10-01 RX ADMIN — DIAZEPAM 10 MG: 10 INJECTION, SOLUTION INTRAMUSCULAR; INTRAVENOUS at 18:44

## 2024-10-01 RX ADMIN — FOLIC ACID 1 MG: 1 TABLET ORAL at 08:25

## 2024-10-01 RX ADMIN — PHENOBARBITAL 97.2 MG: 32.4 TABLET ORAL at 14:49

## 2024-10-01 RX ADMIN — PROPRANOLOL HYDROCHLORIDE 40 MG: 40 TABLET ORAL at 20:10

## 2024-10-01 RX ADMIN — BUPROPION HYDROCHLORIDE 150 MG: 150 TABLET, EXTENDED RELEASE ORAL at 08:25

## 2024-10-01 RX ADMIN — DIAZEPAM 10 MG: 10 INJECTION, SOLUTION INTRAMUSCULAR; INTRAVENOUS at 21:07

## 2024-10-01 RX ADMIN — Medication 3 MG: at 20:10

## 2024-10-01 RX ADMIN — Medication 1 TABLET: at 08:25

## 2024-10-01 RX ADMIN — POTASSIUM CHLORIDE 40 MEQ: 1.5 POWDER, FOR SOLUTION ORAL at 09:25

## 2024-10-01 RX ADMIN — THIAMINE HYDROCHLORIDE 500 MG: 100 INJECTION, SOLUTION INTRAMUSCULAR; INTRAVENOUS at 21:07

## 2024-10-01 RX ADMIN — PHENOBARBITAL 97.2 MG: 32.4 TABLET ORAL at 20:10

## 2024-10-01 RX ADMIN — THIAMINE HYDROCHLORIDE 500 MG: 100 INJECTION, SOLUTION INTRAMUSCULAR; INTRAVENOUS at 05:27

## 2024-10-01 RX ADMIN — DIAZEPAM 10 MG: 10 INJECTION, SOLUTION INTRAMUSCULAR; INTRAVENOUS at 23:54

## 2024-10-01 RX ADMIN — ESCITALOPRAM OXALATE 10 MG: 10 TABLET ORAL at 08:25

## 2024-10-01 RX ADMIN — THIAMINE HYDROCHLORIDE 500 MG: 100 INJECTION, SOLUTION INTRAMUSCULAR; INTRAVENOUS at 14:49

## 2024-10-01 ASSESSMENT — COGNITIVE AND FUNCTIONAL STATUS - GENERAL
PERSONAL GROOMING: A LITTLE
CLIMB 3 TO 5 STEPS WITH RAILING: A LOT
TOILETING: TOTAL
TURNING FROM BACK TO SIDE WHILE IN FLAT BAD: A LITTLE
PERSONAL GROOMING: A LITTLE
MOBILITY SCORE: 14
STANDING UP FROM CHAIR USING ARMS: A LITTLE
MOVING FROM LYING ON BACK TO SITTING ON SIDE OF FLAT BED WITH BEDRAILS: A LITTLE
CLIMB 3 TO 5 STEPS WITH RAILING: A LOT
DRESSING REGULAR LOWER BODY CLOTHING: A LOT
DAILY ACTIVITIY SCORE: 14
DRESSING REGULAR UPPER BODY CLOTHING: A LITTLE
DRESSING REGULAR UPPER BODY CLOTHING: A LOT
WALKING IN HOSPITAL ROOM: A LITTLE
TURNING FROM BACK TO SIDE WHILE IN FLAT BAD: A LITTLE
MOBILITY SCORE: 18
MOVING TO AND FROM BED TO CHAIR: A LITTLE
HELP NEEDED FOR BATHING: A LITTLE
WALKING IN HOSPITAL ROOM: A LOT
DRESSING REGULAR LOWER BODY CLOTHING: A LITTLE
MOVING TO AND FROM BED TO CHAIR: A LOT
HELP NEEDED FOR BATHING: A LOT
TOILETING: A LITTLE
STANDING UP FROM CHAIR USING ARMS: A LOT
DAILY ACTIVITIY SCORE: 19

## 2024-10-01 ASSESSMENT — LIFESTYLE VARIABLES
TREMOR: MODERATE, WITH PATIENT'S ARMS EXTENDED
PAROXYSMAL SWEATS: NO SWEAT VISIBLE
HEADACHE, FULLNESS IN HEAD: NOT PRESENT
AGITATION: NORMAL ACTIVITY
VISUAL DISTURBANCES: NOT PRESENT
ANXIETY: NO ANXIETY, AT EASE
AUDITORY DISTURBANCES: NOT PRESENT
TREMOR: MODERATE, WITH PATIENT'S ARMS EXTENDED
TREMOR: 3
ORIENTATION AND CLOUDING OF SENSORIUM: ORIENTED AND CAN DO SERIAL ADDITIONS
VISUAL DISTURBANCES: NOT PRESENT
NAUSEA AND VOMITING: NO NAUSEA AND NO VOMITING
VISUAL DISTURBANCES: NOT PRESENT
TOTAL SCORE: 7
AUDITORY DISTURBANCES: VERY MILD HARSHNESS OR ABILITY TO FRIGHTEN
AGITATION: NORMAL ACTIVITY
NAUSEA AND VOMITING: NO NAUSEA AND NO VOMITING
AGITATION: NORMAL ACTIVITY
AGITATION: NORMAL ACTIVITY
TOTAL SCORE: 4
AUDITORY DISTURBANCES: NOT PRESENT
AUDITORY DISTURBANCES: NOT PRESENT
AGITATION: NORMAL ACTIVITY
TREMOR: MODERATE, WITH PATIENT'S ARMS EXTENDED
PAROXYSMAL SWEATS: NO SWEAT VISIBLE
AGITATION: NORMAL ACTIVITY
AUDITORY DISTURBANCES: NOT PRESENT
PAROXYSMAL SWEATS: NO SWEAT VISIBLE
AGITATION: NORMAL ACTIVITY
NAUSEA AND VOMITING: NO NAUSEA AND NO VOMITING
AUDITORY DISTURBANCES: NOT PRESENT
AUDITORY DISTURBANCES: NOT PRESENT
HEADACHE, FULLNESS IN HEAD: NOT PRESENT
NAUSEA AND VOMITING: NO NAUSEA AND NO VOMITING
NAUSEA AND VOMITING: NO NAUSEA AND NO VOMITING
VISUAL DISTURBANCES: NOT PRESENT
AUDITORY DISTURBANCES: VERY MILD HARSHNESS OR ABILITY TO FRIGHTEN
TREMOR: MODERATE, WITH PATIENT'S ARMS EXTENDED
TOTAL SCORE: 4
AUDITORY DISTURBANCES: NOT PRESENT
ORIENTATION AND CLOUDING OF SENSORIUM: ORIENTED AND CAN DO SERIAL ADDITIONS
ORIENTATION AND CLOUDING OF SENSORIUM: ORIENTED AND CAN DO SERIAL ADDITIONS
AGITATION: SOMEWHAT MORE THAN NORMAL ACTIVITY
ANXIETY: MILDLY ANXIOUS
ORIENTATION AND CLOUDING OF SENSORIUM: ORIENTED AND CAN DO SERIAL ADDITIONS
ANXIETY: MILDLY ANXIOUS
ANXIETY: NO ANXIETY, AT EASE
HEADACHE, FULLNESS IN HEAD: NOT PRESENT
AGITATION: NORMAL ACTIVITY
ORIENTATION AND CLOUDING OF SENSORIUM: CANNOT DO SERIAL ADDITIONS OR IS UNCERTAIN ABOUT DATE
ANXIETY: NO ANXIETY, AT EASE
VISUAL DISTURBANCES: VERY MILD SENSITIVITY
ANXIETY: NO ANXIETY, AT EASE
TREMOR: MODERATE, WITH PATIENT'S ARMS EXTENDED
AGITATION: NORMAL ACTIVITY
ORIENTATION AND CLOUDING OF SENSORIUM: ORIENTED AND CAN DO SERIAL ADDITIONS
NAUSEA AND VOMITING: NO NAUSEA AND NO VOMITING
TOTAL SCORE: 4
VISUAL DISTURBANCES: NOT PRESENT
VISUAL DISTURBANCES: NOT PRESENT
ANXIETY: NO ANXIETY, AT EASE
VISUAL DISTURBANCES: VERY MILD SENSITIVITY
ORIENTATION AND CLOUDING OF SENSORIUM: ORIENTED AND CAN DO SERIAL ADDITIONS
TOTAL SCORE: 7
AGITATION: NORMAL ACTIVITY
ORIENTATION AND CLOUDING OF SENSORIUM: ORIENTED AND CAN DO SERIAL ADDITIONS
TREMOR: MODERATE, WITH PATIENT'S ARMS EXTENDED
NAUSEA AND VOMITING: NO NAUSEA AND NO VOMITING
AUDITORY DISTURBANCES: NOT PRESENT
NAUSEA AND VOMITING: NO NAUSEA AND NO VOMITING
VISUAL DISTURBANCES: NOT PRESENT
NAUSEA AND VOMITING: NO NAUSEA AND NO VOMITING
TOTAL SCORE: 4
TOTAL SCORE: 4
HEADACHE, FULLNESS IN HEAD: NOT PRESENT
HEADACHE, FULLNESS IN HEAD: NOT PRESENT
PAROXYSMAL SWEATS: NO SWEAT VISIBLE
ANXIETY: NO ANXIETY, AT EASE
TREMOR: MODERATE, WITH PATIENT'S ARMS EXTENDED
ORIENTATION AND CLOUDING OF SENSORIUM: ORIENTED AND CAN DO SERIAL ADDITIONS
NAUSEA AND VOMITING: NO NAUSEA AND NO VOMITING
ANXIETY: 2
VISUAL DISTURBANCES: VERY MILD SENSITIVITY
TREMOR: 5
TOTAL SCORE: 8
VISUAL DISTURBANCES: NOT PRESENT
ANXIETY: NO ANXIETY, AT EASE
TREMOR: MODERATE, WITH PATIENT'S ARMS EXTENDED
ORIENTATION AND CLOUDING OF SENSORIUM: CANNOT DO SERIAL ADDITIONS OR IS UNCERTAIN ABOUT DATE
PAROXYSMAL SWEATS: NO SWEAT VISIBLE
ORIENTATION AND CLOUDING OF SENSORIUM: ORIENTED AND CAN DO SERIAL ADDITIONS
PAROXYSMAL SWEATS: NO SWEAT VISIBLE
HEADACHE, FULLNESS IN HEAD: NOT PRESENT
HEADACHE, FULLNESS IN HEAD: NOT PRESENT
PAROXYSMAL SWEATS: NO SWEAT VISIBLE
PAROXYSMAL SWEATS: NO SWEAT VISIBLE
HEADACHE, FULLNESS IN HEAD: NOT PRESENT
PAROXYSMAL SWEATS: NO SWEAT VISIBLE
HEADACHE, FULLNESS IN HEAD: NOT PRESENT
TOTAL SCORE: 8
ANXIETY: MILDLY ANXIOUS
AUDITORY DISTURBANCES: VERY MILD HARSHNESS OR ABILITY TO FRIGHTEN
PAROXYSMAL SWEATS: NO SWEAT VISIBLE
HEADACHE, FULLNESS IN HEAD: NOT PRESENT
HEADACHE, FULLNESS IN HEAD: NOT PRESENT
NAUSEA AND VOMITING: NO NAUSEA AND NO VOMITING
TREMOR: MODERATE, WITH PATIENT'S ARMS EXTENDED
PAROXYSMAL SWEATS: NO SWEAT VISIBLE

## 2024-10-01 ASSESSMENT — PAIN SCALES - GENERAL
PAINLEVEL_OUTOF10: 0 - NO PAIN
PAINLEVEL_OUTOF10: 0 - NO PAIN

## 2024-10-01 ASSESSMENT — PAIN - FUNCTIONAL ASSESSMENT: PAIN_FUNCTIONAL_ASSESSMENT: 0-10

## 2024-10-01 NOTE — PROGRESS NOTES
Humble Banks is a 69 y.o. male on day 1 of admission presenting with Unable to ambulate.      Subjective   Patient says he is feeling well with no concerns at this time.         Objective     Last Recorded Vitals  BP (!) 138/94 (BP Location: Right arm, Patient Position: Lying)   Pulse 62   Temp 35.8 °C (96.4 °F) (Temporal)   Resp 18   Wt 81.6 kg (180 lb)   SpO2 96%   Intake/Output last 3 Shifts:    Intake/Output Summary (Last 24 hours) at 10/1/2024 1925  Last data filed at 10/1/2024 0646  Gross per 24 hour   Intake 1882.5 ml   Output 200 ml   Net 1682.5 ml       Admission Weight  Weight: 81.6 kg (180 lb) (09/30/24 0753)    Daily Weight  09/30/24 : 81.6 kg (180 lb)    Image Results  ECG 12 lead  Normal sinus rhythm  Nonspecific ST and T wave abnormality  Prolonged QT  Abnormal ECG  When compared with ECG of 29-MAR-2023 23:36,  Previous ECG has undetermined rhythm, needs review      Physical Exam  Constitutional: Well developed,  no distress, alert and cooperative  Respiratory/Thorax: Patent airways, CTAB,   Cardiovascular: Regular, rate and rhythm, S1-S2 present  Gastrointestinal: Nondistended, soft, non-tender,   Neurological: alert and oriented x3, patient's tremor improved from prior  MSK: Full range of motion, no tenderness  Psychological: Appropriate mood and behavior  Skin: Warm and dry,  Relevant Results               Assessment/Plan:     Admission Details  Patient is a 69-year-old male with a history of alcohol withdrawal and abuse who presents to [facility] with weakness and shaking with ambulatory dysfunction.  Acute Medical Conditions  #Alcohol withdrawal  #Concern for Warnicke's encephalopathy.  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  -PT/OT scores 14, will attempt placement     #Hypokalemia  #Hypomagnesemia  -Lactic acidosis resolved post fluid administration  -Electrolyte abnormality improved post repletion     #LFT elevation  -AST to ALT ratio greater than 2-1 consistent with alcohol abuse        Chronic Medical Conditions  #Hypothyroidism-patient has been taking his medications  #Hypertension-continue patient's home propranolol, will have to evaluate whether patient has history of essential tremor?  #Depression-escitalopram  #Anxiety-Wellbutrin     DVT: Enoxaparin  Diet: Regular diet  Fluids: 1 L LR bolus  Electrolytes: Replete potassium  Dispo: Stepdown   Code Status: Confirmed full code  Consults: None  Antibiotics: None      Enoc Stout MD     Internal Medicine PGY-3  Available on [employer] for any questions.     This is a preliminary note pending signature from the attending physician.

## 2024-10-01 NOTE — PROGRESS NOTES
Social work consult placed for positive medical risk screen. SW reviewed pt's chart and communicated with TCC. Per TCC note, pt denies SW needs and declined ETOH resources. No SW needs foreseen at this time. SW signing off; available upon request.

## 2024-10-01 NOTE — PROGRESS NOTES
Physical Therapy    Physical Therapy Evaluation    Patient Name: Humble Banks  MRN: [id number]  Today's Date: 10/1/2024   Time Calculation  Start Time: 0935  Stop Time: 0952  Time Calculation (min): 17 min  818/818-A    Assessment/Plan   PT Assessment  PT Assessment Results: Decreased strength, Decreased endurance, Impaired balance, Decreased mobility  End of Session Communication: Bedside nurse  End of Session Patient Position: Bed, 2 rail up, Alarm on (All needs in reach and no complaints noted)  IP OR SWING BED PT PLAN  Inpatient or Swing Bed: Inpatient  PT Plan  Treatment/Interventions: Bed mobility, Transfer training, Gait training  PT Plan: Ongoing PT  PT Frequency: 3 times per week  PT Discharge Recommendations: Moderate intensity level of continued care  PT - OK to Discharge: Yes    Subjective     Current Problem:  1. Unable to ambulate        2. Generalized weakness        3. Alcohol withdrawal syndrome with complication (Multi)          Patient Active Problem List   Diagnosis    Abnormal gait    Age-related nuclear cataract of both eyes    Agitation    Alcohol withdrawal (Multi)    Asymmetric SNHL (sensorineural hearing loss)    Benign essential hypertension    Bilateral presbyopia    Alcohol dependence    Cigarette nicotine dependence    Dizziness    Fatigue    Imbalance    Inguinal hernia, left    Lower extremity weakness    Memory loss    Mixed hyperlipidemia    PVD (posterior vitreous detachment), both eyes    Scotoma    Subjective tinnitus of both ears    Syncope    Transaminitis    Tremor    Lumbar radiculopathy    Unable to ambulate     General Visit Information:  General  Reason for Referral: PT Eval and Treat  Referred By: Reggie Sheth MD  Past Medical History Relevant to Rehab: 67-year-old male with past medical history of alcohol abuse (alcohol withdrawal in 2023 requiring phenobarbital with CIWA scores greater than 30), alcohol related hepatitis, hypothyroidism?  Tobacco abuse and  hypertension who presented to [facility] with uncontrollable shaking and generalized weakness.  When speaking to the patient he mentions that he was on his way to the bathroom and felt quite weak.  Was unable to get himself off the toilet and that is why he called the ambulance.  Co-Treatment: OT  Co-Treatment Reason: maximize safety and functional mobility  Prior to Session Communication: Bedside nurse  Patient Position Received: Bed, 2 rail up, Alarm off, not on at start of session (Agreeable to PT)    Home Living:  Home Living  Home Living Comments: Pt lives with friends in a 1 story house with 3 TERESA and HR. Laundry in basement. Bathroom has tub/shower with grab bar and standard toilet. Pt sleeps in a standard bed.    Prior Level of Function:  Prior Function Per Pt/Caregiver Report  Level of [place]: Independent with ADLs and functional transfers, Independent with homemaking with ambulation (Pt amb without an AD, (+) drive)    Precautions:  Precautions  Precautions Comment: Fall precautions, tremulous which increases with mobility    Objective     Pain:  Pain Assessment  Pain Assessment:  (0/10)    Cognition:  Cognition  Overall Cognitive Status: Within Functional Limits    General Assessments:  Sensation  Light Touch:  (B LE numbness/tingling, L>R)  Strength  Strength Comments: B LE ROM WFL, B LE hip strength 3+/5, B knee ext and ankle DF strength 4-/5  Dynamic Standing Balance  Dynamic Standing-Comments: Poor with RW and min A x 2    Functional Assessments:  Bed Mobility  Bed Mobility:  (supine <> sitting: SBA with increased effort required)  Transfers  Transfer:  (STS from EOB: min A x 2with gait belt 2/2 tremors and instability)  Ambulation/Gait Training  Ambulation/Gait Training Performed:  (Pt was able to side step x 3' using RW with min A x 2 with cueing for safety and RW management)    Outcome Measures:  [facility] Basic Mobility  Turning from your back to your side while in a flat bed  without using bedrails: A little  Moving from lying on your back to sitting on the side of a flat bed without using bedrails: A little  Moving to and from bed to chair (including a wheelchair): A lot  Standing up from a chair using your arms (e.g. wheelchair or bedside chair): A lot  To walk in hospital room: A lot  Climbing 3-5 steps with railing: A lot  Basic Mobility - Total Score: 14     Goals:  Encounter Problems       Encounter Problems (Active)       PT Problem       STG - Pt will transition supine <> sitting with SUP  (Progressing)       Start:  10/01/24    Expected End:  10/15/24            STG - Pt will transfer STS with CGA  (Progressing)       Start:  10/01/24    Expected End:  10/15/24            STG - Pt will amb 30' using RW with CGA  (Progressing)       Start:  10/01/24    Expected End:  10/15/24               Pain - Adult            Education Documentation  Mobility Training, taught by Komal Zhong PT at 10/1/2024  3:21 PM.  Learner: Patient  Readiness: Acceptance  Method: Explanation  Response: Verbalizes Understanding    Education Comments  No comments found.

## 2024-10-01 NOTE — PROGRESS NOTES
Occupational Therapy    Evaluation    Patient Name: Humble Banks  MRN: [id number]  Today's Date: 10/1/2024  Time Calculation  Start Time: 0936  Stop Time: 0952  Time Calculation (min): 16 min  818/818-A    Assessment  IP OT Assessment  OT Assessment: This hospitalization 9/30/2024 due to weakness, could not get up from toilet.  Per medical team assessment: #Alcohol withdrawal  #Concern for Warnicke's encephalopathy.  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     Patient would benefit from further OT to address ADL's and functional transfers/mobility due to high risk for falls and decline in baseline function.  Prognosis: Good  End of Session Communication: Bedside nurse  End of Session Patient Position: Bed, 2 rail up, Alarm off, not on at start of session; call-light within reach    Plan:  Treatment Interventions: ADL retraining, Functional transfer training, Patient/family training, Equipment evaluation/education, Compensatory technique education, Endurance training (energy conservation / diaphragmatic breathing techniques)  OT Frequency: 3 times per week  OT Discharge Recommendations: Moderate intensity level of continued care  OT - OK to Discharge: Yes (to next level of care when medically cleared by physician/medical team)    Subjective   Current Problem:  1. Unable to ambulate        2. Generalized weakness        3. Alcohol withdrawal syndrome with complication (Multi)          General:  General  Reason for Referral: ADL  Referred By: Reggie Sheth MD  Past Medical History Relevant to Rehab: alcohol abuse (alcohol withdrawal in 2023 requiring phenobarbital with CIWA scores greater than 30), alcohol related hepatitis, hypothyroidism?  Tobacco abuse, hypertension, anxiety/depression  Co-Treatment: PT (co-eval to maximize safety when assessing mobility)  Prior to Session  Communication: Bedside nurse who confirmed that patient is medically stable to participate in this OT session  Patient Position Received: Bed, 2 rail up  General Comment: Patient seen bedside; cooperative, motivated    Precautions:  Medical Precautions: Fall precautions  Precautions Comment: tremulous especially during moblity tasks    Pain:  Pain Assessment  Pain Assessment: 0-10  0-10 (Numeric) Pain Score: 0 - No pain    Objective   Cognition:  Overall Cognitive Status: Within Functional Limits  Orientation Level: Disoriented to time  Processing Speed: Delayed     Home Living:  Type of Home: House  Lives With: Friends  Home Adaptive Equipment: None  Home Layout: One level  Home Access: Stairs to enter with rails (3)  Bathroom Shower/Tub: Tub/shower unit  Bathroom Toilet: Standard  Bathroom Equipment: Grab bars in shower, Hand-held shower hose (plans on toilet grab bars)  Home Living Comments: sleeps in regular bed     Prior Function:  Level of [place]: Independent with ADLs and functional transfers, Independent with homemaking with ambulation  Ambulatory Assistance: Independent (no device)  Hand Dominance: Right  Prior Function Comments: drives; history of 2 falls (last one was last week) due to vertigo per patient    Current ADL:  LE Dressing Assistance: Minimal  LE Dressing Deficit: Don/doff R sock (SBA don sock for left foot with some difficulty)  ADL Comments: To further address in OT sessions using adaptive equipment/assistive techniques to promote indep. and ease in performance    Activity Tolerance:  Rate of Perceived Exertion (RPE): generalize weakness; easily fatigues    Bed Mobility/Transfers:   Bed Mobility  Bed Mobility: Yes  Bed Mobility 1  Bed Mobility 1: Supine to sitting, Sitting to supine  Level of Assistance 1: Moderate verbal cues (SBA with difficulty)  Bed Mobility Comments 1: HOB elevated  Transfers  Transfer: Yes  Transfer 1  Transfer From 1: Sit to, Stand to  Transfer to 1:  Bed  Transfer Device 1: Walker  Transfer Level of Assistance 1: Minimum assistance, +2, Moderate verbal cues    Ambulation/Gait Training:  Functional Mobility  Functional Mobility Performed: Yes  Functional Mobility 1  Device 1: Rolling walker  Functional Mobility Support Devices: Gait belt  Assistance 1: Minimum assistance, Moderate verbal cues (of 2 staff)  Comments 1: to perform few sidesteps along hospital bed    Sitting Balance:  Static Sitting Balance  Static Sitting-Level of Assistance: Contact guard    Standing Balance:  Static Standing Balance  Static Standing-Level of Assistance: Minimum assistance    Sensation:  Sensation Comment: reported BLE from sciatica LLE worse then RLE    Extremities: RUE   RUE : Within Functional Limits (AROM throughout.  Strength tested:  shoulder 4/5, elbow 4-/5, hand  4/5.) and LUE   LUE: Within Functional Limits (AROM throughout. Strength tested: shoulder 4/5, elbow 4-/5, hand  4/5.)    Outcome Measures: [facility] Daily Activity  Putting on and taking off regular lower body clothing: A lot  Bathing (including washing, rinsing, drying): A lot  Putting on and taking off regular upper body clothing: A lot  Toileting, which includes using toilet, bedpan or urinal: Total  Taking care of personal grooming such as brushing teeth: A little  Eating Meals: None  Daily Activity - Total Score: 14    EDUCATION:  Education Documentation  Mobility Training, taught by Mei Hilario OT at 10/1/2024 11:54 AM.  Learner: Patient  Readiness: Acceptance  Method: Explanation  Response: Demonstrated Understanding, Needs Reinforcement  Comment: to promote safety; bodily tremors    Goals:   Encounter Problems       Encounter Problems (Active)       OT Goals       Patient will complete upper and lower body bathing/dressing; toileting with supervision using adaptive equipment as needed  (Progressing)       Start:  10/01/24    Expected End:  10/15/24            Patient will perform bed mobility  and functional transfers safely with supervision: bed, chair, commode using DME as needed  (Progressing)       Start:  10/01/24    Expected End:  10/15/24            Patient will tolerate standing for 5 mins. and show overall good (-) standing balance during ADL's and functional transfers/mobility  (Progressing)       Start:  10/01/24    Expected End:  10/15/24            Patient will apply energy conservation/diaphragmatic breathing techniques to ADL's and functional transfers with minimal cues  (Progressing)       Start:  10/01/24    Expected End:  10/15/24

## 2024-10-01 NOTE — PROGRESS NOTES
10/01/24 0954   Discharge Planning   Living Arrangements Friends   Support Systems Friends/neighbors   Assistance Needed Independent   Type of Residence Private residence   Who is requesting discharge planning? Provider   Home or Post Acute Services None   Expected Discharge Disposition Home   Does the patient need discharge transport arranged? No   Patient Choice   Patient / Family choosing to utilize agency / facility established prior to hospitalization No     Care transitions assessment completed via bedside with patient. TCC introduced self and explained role. Demographics verified. Patient from home with friend.  Independent PTA. Denies use of assistive devices. Denies SW needs at this time. Declines ETOH resources.  Patient anticipates no skilled needs at discharge. States would prefer to continue outpatient therapy at [facility].  Dispo pending hospital course, await PT/OT evals. Patient's family/friends to transport home at time of discharge. TCC to continue to follow for discharge planning needs.      PCP: Dr. Reggie Sheth  Last seen: This Admit   Pharmacy: [facility] [place]   Insurance: [employer] Medicare   Dispo: PT/OT pending, prefers to continue w outpatient therapy at [facility].     MARLENE WHEATLEY RN TCC

## 2024-10-02 ENCOUNTER — APPOINTMENT (OUTPATIENT)
Dept: RADIOLOGY | Facility: HOSPITAL | Age: 69
DRG: 897 | End: 2024-10-02
Payer: MEDICARE

## 2024-10-02 LAB
ALBUMIN SERPL BCP-MCNC: 3.9 G/DL (ref 3.4–5)
ALP SERPL-CCNC: 63 U/L (ref 33–136)
ALT SERPL W P-5'-P-CCNC: 21 U/L (ref 10–52)
ANION GAP SERPL CALC-SCNC: 14 MMOL/L (ref 10–20)
AST SERPL W P-5'-P-CCNC: 44 U/L (ref 9–39)
BILIRUB SERPL-MCNC: 1.2 MG/DL (ref 0–1.2)
BUN SERPL-MCNC: 18 MG/DL (ref 6–23)
CALCIUM SERPL-MCNC: 8.7 MG/DL (ref 8.6–10.3)
CHLORIDE SERPL-SCNC: 97 MMOL/L (ref 98–107)
CO2 SERPL-SCNC: 29 MMOL/L (ref 21–32)
CREAT SERPL-MCNC: 0.51 MG/DL (ref 0.5–1.3)
EGFRCR SERPLBLD CKD-EPI 2021: >90 ML/MIN/1.73M*2
ERYTHROCYTE [DISTWIDTH] IN BLOOD BY AUTOMATED COUNT: 12.9 % (ref 11.5–14.5)
GLUCOSE SERPL-MCNC: 90 MG/DL (ref 74–99)
HCT VFR BLD AUTO: 33.5 % (ref 41–52)
HGB BLD-MCNC: 11.9 G/DL (ref 13.5–17.5)
MCH RBC QN AUTO: 35.3 PG (ref 26–34)
MCHC RBC AUTO-ENTMCNC: 35.5 G/DL (ref 32–36)
MCV RBC AUTO: 99 FL (ref 80–100)
NRBC BLD-RTO: 0 /100 WBCS (ref 0–0)
PLATELET # BLD AUTO: 69 X10*3/UL (ref 150–450)
POTASSIUM SERPL-SCNC: 3.2 MMOL/L (ref 3.5–5.3)
PROT SERPL-MCNC: 6.3 G/DL (ref 6.4–8.2)
RBC # BLD AUTO: 3.37 X10*6/UL (ref 4.5–5.9)
SODIUM SERPL-SCNC: 137 MMOL/L (ref 136–145)
WBC # BLD AUTO: 6.5 X10*3/UL (ref 4.4–11.3)

## 2024-10-02 PROCEDURE — 85027 COMPLETE CBC AUTOMATED: CPT

## 2024-10-02 PROCEDURE — 70551 MRI BRAIN STEM W/O DYE: CPT

## 2024-10-02 PROCEDURE — 2500000001 HC RX 250 WO HCPCS SELF ADMINISTERED DRUGS (ALT 637 FOR MEDICARE OP)

## 2024-10-02 PROCEDURE — 72148 MRI LUMBAR SPINE W/O DYE: CPT | Performed by: RADIOLOGY

## 2024-10-02 PROCEDURE — 72141 MRI NECK SPINE W/O DYE: CPT

## 2024-10-02 PROCEDURE — 36415 COLL VENOUS BLD VENIPUNCTURE: CPT | Performed by: PSYCHIATRY & NEUROLOGY

## 2024-10-02 PROCEDURE — 84075 ASSAY ALKALINE PHOSPHATASE: CPT

## 2024-10-02 PROCEDURE — 2500000004 HC RX 250 GENERAL PHARMACY W/ HCPCS (ALT 636 FOR OP/ED)

## 2024-10-02 PROCEDURE — 36415 COLL VENOUS BLD VENIPUNCTURE: CPT

## 2024-10-02 PROCEDURE — 99223 1ST HOSP IP/OBS HIGH 75: CPT | Performed by: PSYCHIATRY & NEUROLOGY

## 2024-10-02 PROCEDURE — 70551 MRI BRAIN STEM W/O DYE: CPT | Performed by: RADIOLOGY

## 2024-10-02 PROCEDURE — 82607 VITAMIN B-12: CPT | Mod: PARLAB | Performed by: PSYCHIATRY & NEUROLOGY

## 2024-10-02 PROCEDURE — 83921 ORGANIC ACID SINGLE QUANT: CPT | Performed by: PSYCHIATRY & NEUROLOGY

## 2024-10-02 PROCEDURE — 72141 MRI NECK SPINE W/O DYE: CPT | Performed by: RADIOLOGY

## 2024-10-02 PROCEDURE — 82746 ASSAY OF FOLIC ACID SERUM: CPT | Mod: PARLAB | Performed by: PSYCHIATRY & NEUROLOGY

## 2024-10-02 PROCEDURE — 2060000001 HC INTERMEDIATE ICU ROOM DAILY

## 2024-10-02 PROCEDURE — G0378 HOSPITAL OBSERVATION PER HR: HCPCS

## 2024-10-02 PROCEDURE — 72148 MRI LUMBAR SPINE W/O DYE: CPT

## 2024-10-02 PROCEDURE — 84520 ASSAY OF UREA NITROGEN: CPT

## 2024-10-02 RX ORDER — POTASSIUM CHLORIDE 1.5 G/1.58G
40 POWDER, FOR SOLUTION ORAL ONCE
Status: COMPLETED | OUTPATIENT
Start: 2024-10-02 | End: 2024-10-02

## 2024-10-02 RX ORDER — POTASSIUM CHLORIDE 14.9 MG/ML
20 INJECTION INTRAVENOUS
Status: COMPLETED | OUTPATIENT
Start: 2024-10-02 | End: 2024-10-03

## 2024-10-02 RX ADMIN — Medication 3 MG: at 20:57

## 2024-10-02 RX ADMIN — PHENOBARBITAL 97.2 MG: 32.4 TABLET ORAL at 21:12

## 2024-10-02 RX ADMIN — PHENOBARBITAL 97.2 MG: 32.4 TABLET ORAL at 16:15

## 2024-10-02 RX ADMIN — POTASSIUM CHLORIDE 20 MEQ: 200 INJECTION, SOLUTION INTRAVENOUS at 23:12

## 2024-10-02 RX ADMIN — FOLIC ACID 1 MG: 1 TABLET ORAL at 09:30

## 2024-10-02 RX ADMIN — POTASSIUM CHLORIDE 20 MEQ: 200 INJECTION, SOLUTION INTRAVENOUS at 20:57

## 2024-10-02 RX ADMIN — PROPRANOLOL HYDROCHLORIDE 40 MG: 40 TABLET ORAL at 20:57

## 2024-10-02 RX ADMIN — PROPRANOLOL HYDROCHLORIDE 40 MG: 40 TABLET ORAL at 09:34

## 2024-10-02 RX ADMIN — THIAMINE HYDROCHLORIDE 500 MG: 100 INJECTION, SOLUTION INTRAMUSCULAR; INTRAVENOUS at 20:56

## 2024-10-02 RX ADMIN — Medication 1 TABLET: at 09:30

## 2024-10-02 RX ADMIN — PHENOBARBITAL 97.2 MG: 32.4 TABLET ORAL at 10:51

## 2024-10-02 RX ADMIN — DIAZEPAM 10 MG: 10 INJECTION, SOLUTION INTRAMUSCULAR; INTRAVENOUS at 02:48

## 2024-10-02 RX ADMIN — THIAMINE HYDROCHLORIDE 500 MG: 100 INJECTION, SOLUTION INTRAMUSCULAR; INTRAVENOUS at 05:34

## 2024-10-02 RX ADMIN — LEVOTHYROXINE SODIUM 75 MCG: 0.07 TABLET ORAL at 05:34

## 2024-10-02 RX ADMIN — BUPROPION HYDROCHLORIDE 150 MG: 150 TABLET, EXTENDED RELEASE ORAL at 09:30

## 2024-10-02 RX ADMIN — POTASSIUM CHLORIDE 40 MEQ: 1.5 POWDER, FOR SOLUTION ORAL at 21:06

## 2024-10-02 RX ADMIN — ESCITALOPRAM OXALATE 10 MG: 10 TABLET ORAL at 09:30

## 2024-10-02 RX ADMIN — DIAZEPAM 10 MG: 10 INJECTION, SOLUTION INTRAMUSCULAR; INTRAVENOUS at 16:15

## 2024-10-02 RX ADMIN — DIAZEPAM 10 MG: 10 INJECTION, SOLUTION INTRAMUSCULAR; INTRAVENOUS at 22:03

## 2024-10-02 RX ADMIN — THIAMINE HYDROCHLORIDE 500 MG: 100 INJECTION, SOLUTION INTRAMUSCULAR; INTRAVENOUS at 14:24

## 2024-10-02 ASSESSMENT — LIFESTYLE VARIABLES
TREMOR: MODERATE, WITH PATIENT'S ARMS EXTENDED
ORIENTATION AND CLOUDING OF SENSORIUM: ORIENTED AND CAN DO SERIAL ADDITIONS
PAROXYSMAL SWEATS: NO SWEAT VISIBLE
VISUAL DISTURBANCES: VERY MILD SENSITIVITY
PAROXYSMAL SWEATS: NO SWEAT VISIBLE
TREMOR: 2
HEADACHE, FULLNESS IN HEAD: NOT PRESENT
TREMOR: MODERATE, WITH PATIENT'S ARMS EXTENDED
PAROXYSMAL SWEATS: NO SWEAT VISIBLE
NAUSEA AND VOMITING: NO NAUSEA AND NO VOMITING
ANXIETY: MILDLY ANXIOUS
NAUSEA AND VOMITING: NO NAUSEA AND NO VOMITING
TREMOR: 3
NAUSEA AND VOMITING: NO NAUSEA AND NO VOMITING
HEADACHE, FULLNESS IN HEAD: NOT PRESENT
VISUAL DISTURBANCES: NOT PRESENT
AGITATION: SOMEWHAT MORE THAN NORMAL ACTIVITY
PAROXYSMAL SWEATS: NO SWEAT VISIBLE
TOTAL SCORE: 8
TOTAL SCORE: 6
ORIENTATION AND CLOUDING OF SENSORIUM: ORIENTED AND CAN DO SERIAL ADDITIONS
NAUSEA AND VOMITING: NO NAUSEA AND NO VOMITING
TOTAL SCORE: 3
AGITATION: NORMAL ACTIVITY
HEADACHE, FULLNESS IN HEAD: NOT PRESENT
AUDITORY DISTURBANCES: VERY MILD HARSHNESS OR ABILITY TO FRIGHTEN
PULSE: 70
NAUSEA AND VOMITING: NO NAUSEA AND NO VOMITING
TOTAL SCORE: 2
AUDITORY DISTURBANCES: NOT PRESENT
PAROXYSMAL SWEATS: NO SWEAT VISIBLE
ANXIETY: NO ANXIETY, AT EASE
HEADACHE, FULLNESS IN HEAD: NOT PRESENT
AGITATION: NORMAL ACTIVITY
AGITATION: NORMAL ACTIVITY
VISUAL DISTURBANCES: VERY MILD SENSITIVITY
ORIENTATION AND CLOUDING OF SENSORIUM: CANNOT DO SERIAL ADDITIONS OR IS UNCERTAIN ABOUT DATE
ANXIETY: MILDLY ANXIOUS
AUDITORY DISTURBANCES: NOT PRESENT
TOTAL SCORE: 10
ORIENTATION AND CLOUDING OF SENSORIUM: CANNOT DO SERIAL ADDITIONS OR IS UNCERTAIN ABOUT DATE
AGITATION: NORMAL ACTIVITY
ORIENTATION AND CLOUDING OF SENSORIUM: CANNOT DO SERIAL ADDITIONS OR IS UNCERTAIN ABOUT DATE
NAUSEA AND VOMITING: NO NAUSEA AND NO VOMITING
ANXIETY: 2
TREMOR: MODERATE, WITH PATIENT'S ARMS EXTENDED
ANXIETY: MILDLY ANXIOUS
PAROXYSMAL SWEATS: NO SWEAT VISIBLE
ORIENTATION AND CLOUDING OF SENSORIUM: CANNOT DO SERIAL ADDITIONS OR IS UNCERTAIN ABOUT DATE
VISUAL DISTURBANCES: NOT PRESENT
AUDITORY DISTURBANCES: VERY MILD HARSHNESS OR ABILITY TO FRIGHTEN
AUDITORY DISTURBANCES: NOT PRESENT
AGITATION: 2
HEADACHE, FULLNESS IN HEAD: NOT PRESENT
VISUAL DISTURBANCES: NOT PRESENT
TREMOR: MODERATE, WITH PATIENT'S ARMS EXTENDED
VISUAL DISTURBANCES: NOT PRESENT
AUDITORY DISTURBANCES: NOT PRESENT
HEADACHE, FULLNESS IN HEAD: NOT PRESENT
ANXIETY: NO ANXIETY, AT EASE
TOTAL SCORE: 8

## 2024-10-02 ASSESSMENT — COGNITIVE AND FUNCTIONAL STATUS - GENERAL
DAILY ACTIVITIY SCORE: 21
WALKING IN HOSPITAL ROOM: A LITTLE
TURNING FROM BACK TO SIDE WHILE IN FLAT BAD: A LITTLE
MOBILITY SCORE: 19
CLIMB 3 TO 5 STEPS WITH RAILING: A LOT
MOVING TO AND FROM BED TO CHAIR: A LITTLE
MOVING TO AND FROM BED TO CHAIR: A LITTLE
PERSONAL GROOMING: A LITTLE
STANDING UP FROM CHAIR USING ARMS: A LITTLE
TURNING FROM BACK TO SIDE WHILE IN FLAT BAD: A LITTLE
TOILETING: A LITTLE
WALKING IN HOSPITAL ROOM: A LITTLE
EATING MEALS: A LITTLE
DAILY ACTIVITIY SCORE: 22
TOILETING: A LITTLE
MOBILITY SCORE: 18
STANDING UP FROM CHAIR USING ARMS: A LITTLE
CLIMB 3 TO 5 STEPS WITH RAILING: A LITTLE
PERSONAL GROOMING: A LITTLE

## 2024-10-02 ASSESSMENT — PAIN SCALES - GENERAL
PAINLEVEL_OUTOF10: 0 - NO PAIN
PAINLEVEL_OUTOF10: 0 - NO PAIN

## 2024-10-02 ASSESSMENT — ENCOUNTER SYMPTOMS
WEAKNESS: 1
CONFUSION: 1

## 2024-10-02 NOTE — PROGRESS NOTES
Humble Banks is a 69 y.o. male on day 2 of admission presenting with Unable to ambulate.      Subjective   Patient says he is feeling well with no concerns at this time.         Objective     Last Recorded Vitals  /84   Pulse 70   Temp 36 °C (96.8 °F)   Resp 18   Wt 81.6 kg (180 lb)   SpO2 98%   Intake/Output last 3 Shifts:    Intake/Output Summary (Last 24 hours) at 10/2/2024 1820  Last data filed at 10/1/2024 2126  Gross per 24 hour   Intake 240 ml   Output --   Net 240 ml       Admission Weight  Weight: 81.6 kg (180 lb) (09/30/24 0753)    Daily Weight  09/30/24 : 81.6 kg (180 lb)    Image Results  ECG 12 lead  Normal sinus rhythm  Nonspecific ST and T wave abnormality  Prolonged QT  Abnormal ECG  When compared with ECG of 29-MAR-2023 23:36,  Previous ECG has undetermined rhythm, needs review      Physical Exam  Constitutional: Well developed,  no distress, alert and cooperative  Respiratory/Thorax: Patent airways, CTAB,   Cardiovascular: Regular, rate and rhythm, S1-S2 present  Gastrointestinal: Nondistended, soft, non-tender,   Neurological: alert and oriented x3, patient's tremor improved from prior  MSK: Full range of motion, no tenderness  Psychological: Appropriate mood and behavior  Skin: Warm and dry,  Relevant Results               Assessment/Plan:     Admission Details  Patient is a 69-year-old male with a history of alcohol withdrawal and abuse who presents to [facility] with weakness and shaking with ambulatory dysfunction.  Daily progress:    10/2/2024: Patient scoring up to 8 overnight on CIWA, receiving as needed diazepam.  His strength and tremors that are both greatly improved.  Discussed the patient should not be drinking again.    Acute Medical Conditions  #Alcohol withdrawal  #Concern for Warnicke's encephalopathy.  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  -PT/OT scores 14, will attempt placement     #Hypokalemia  #Hypomagnesemia  -Lactic acidosis resolved post fluid administration  -Electrolyte abnormality improved post repletion     #LFT elevation  -AST to ALT ratio greater than 2-1 consistent with alcohol abuse        Chronic Medical Conditions  #Hypothyroidism-patient has been taking his medications  #Hypertension-continue patient's home propranolol, will have to evaluate whether patient has history of essential tremor?  #Depression-escitalopram  #Anxiety-Wellbutrin     DVT: Enoxaparin  Diet: Regular diet  Fluids: None   electrolytes: Replete potassium  Dispo: Stepdown   Code Status: Confirmed full code  Consults: None  Antibiotics: None      Enoc Stout MD     Internal Medicine PGY-3  Available on [employer]2Go for any questions.     This is a preliminary note pending signature from the attending physician.

## 2024-10-02 NOTE — CARE PLAN
The patient's goals for the shift include  CIWA less than 6.    The clinical goals for the shift include CIWA score less than 6

## 2024-10-02 NOTE — CONSULTS
Inpatient consult to Neurology  Consult performed by: Saroj Benítez MD  Consult ordered by: Reggie Sheth MD          History Of Present Illness  Humble Banks is a 69 y.o. male presenting with difficulty with ambulation.  The patient states that he was on his way to the bathroom prior to being admitted and felt quite weak in his lower extremities.  He states that he was unable to get himself off the toilet and he called 911 and ambulance came to his house.  The patient states that he has had episodes of the weakness in the past.  The patient states that he has not been able to ambulates well since this past Sunday.  He feels that his lower extremities are weak but he denies any neck pain, back pain or upper extremity weakness.  The patient was admitted to the ER and had a CT scan of the brain done that was negative for any acute process.  The patient also CT scan of the cervical spine that showed no fracture or subluxation.  Past Medical History  Past Medical History:   Diagnosis Date    Generalized anxiety disorder     Anxiety, generalized   The patient's past medical history is significant for hypothyroidism.  Surgical History  The patient's past surgical history was reviewed and shows that he has had no surgeries.  Social History  Social History     Tobacco Use    Smoking status: Every Day     Types: Cigarettes    Smokeless tobacco: Current     Allergies  Patient has no known allergies.  Medications Prior to Admission   Medication Sig Dispense Refill Last Dose    buPROPion XL (Wellbutrin XL) 150 mg 24 hr tablet Take 1 tablet (150 mg) by mouth once daily.   Unknown    escitalopram (Lexapro) 10 mg tablet Take 1 tablet (10 mg) by mouth once daily.   Unknown    levothyroxine (Synthroid, Levoxyl) 75 mcg tablet Take 1 tablet (75 mcg) by mouth once daily.   Unknown    propranolol (Inderal) 40 mg tablet Take 1 tablet (40 mg) by mouth 2 times a day.   Unknown     Family history  The patient's family history  shows that his father  after he fell and hit his head.  His mother  of diverticulitis.    Review of Systems   Neurological:  Positive for weakness.   Psychiatric/Behavioral:  Positive for confusion.    All other systems reviewed and are negative.        Neurological Exam  Physical Exam  Last Recorded Vitals  Blood pressure 139/84, pulse 70, temperature 36 °C (96.8 °F), resp. rate 18, height 1.829 m (6'), weight 81.6 kg (180 lb), SpO2 98%.    The patient is a well developed, [mildly obese] [male] in no acute distress.    The patient's funduscopic examination shows no papilledema bilaterally.    The patient's extremity examination shows that the pulses are 2+ in the upper and lower extremities bilaterally and there is no edema in the lower extremities bilaterally.    The patient's mental status testing is alert and oriented × 2 with mild confusion but  with no evidence of aphasia or dysarthria.  The patient's memory testing, fund of knowledge and concentration are all poor and the patient is unable to remember any objects out of 3 at 5 minutes.  The patient's cranial nerves 2, 3, 4, 5, 6, 7, 8, 9, 10, 11 and 12 are all within normal limits.  The patient's motor testing shows normal tone, bulk, and power in the upper extremities and 5-/5 power in the proximal lower extremities bilaterally.  The patient's distal lower extremity strength is 5/5.  The patient has a tremor of the upper extremities bilaterally and lower extremities bilaterally at rest.  The patient has no evidence to suggest decree spontaneous movements or mild bradykinesia.  There is a question of mild cogwheel rigidity of the upper extremities bilaterally.  x  The patient's sensory testing is intact to light touch in the upper and lower extremities bilaterally.  The patient's cerebellar testing is intact in the upper and lower extremities bilaterally.  The patient's station and gait were not tested as the patient is a high fall risk.  The  patient's reflexes are 1+ in the upper and lower extremities and symmetrical.    Relevant Results            Diana Coma Scale  Best Eye Response: Spontaneous  Best Verbal Response: Oriented  Best Motor Response: Follows commands  Mcclellan Coma Scale Score: 15            Scheduled medications  buPROPion XL, 150 mg, oral, Daily  escitalopram, 10 mg, oral, Daily  folic acid, 1 mg, oral, Daily  levothyroxine, 75 mcg, oral, Daily before breakfast  melatonin, 3 mg, oral, Nightly  multivitamin with minerals, 1 tablet, oral, Daily  PHENobarbitaL, 97.2 mg, oral, TID   Followed by  [START ON 10/3/2024] PHENobarbitaL, 64.8 mg, oral, TID   Followed by  [START ON 10/5/2024] PHENobarbitaL, 32.4 mg, oral, TID  potassium chloride, 40 mEq, oral, Once  potassium chloride, 20 mEq, intravenous, q2h  propranolol, 40 mg, oral, BID  [START ON 10/3/2024] thiamine, 100 mg, oral, Daily  thiamine, 500 mg, intravenous, q8h LUCY      Continuous medications     PRN medications  PRN medications: diazePAM    Results for orders placed or performed during the hospital encounter of 09/30/24 (from the past 96 hour(s))   CBC and Auto Differential   Result Value Ref Range    WBC 3.7 (L) 4.4 - 11.3 x10*3/uL    nRBC 0.0 0.0 - 0.0 /100 WBCs    RBC 3.42 (L) 4.50 - 5.90 x10*6/uL    Hemoglobin 12.0 (L) 13.5 - 17.5 g/dL    Hematocrit 32.9 (L) 41.0 - 52.0 %    MCV 96 80 - 100 fL    MCH 35.1 (H) 26.0 - 34.0 pg    MCHC 36.5 (H) 32.0 - 36.0 g/dL    RDW 12.8 11.5 - 14.5 %    Platelets 59 (L) 150 - 450 x10*3/uL    Neutrophils % 45.7 40.0 - 80.0 %    Immature Granulocytes %, Automated 1.9 (H) 0.0 - 0.9 %    Lymphocytes % 30.1 13.0 - 44.0 %    Monocytes % 16.1 2.0 - 10.0 %    Eosinophils % 3.0 0.0 - 6.0 %    Basophils % 3.2 0.0 - 2.0 %    Neutrophils Absolute 1.70 1.20 - 7.70 x10*3/uL    Immature Granulocytes Absolute, Automated 0.07 0.00 - 0.70 x10*3/uL    Lymphocytes Absolute 1.12 (L) 1.20 - 4.80 x10*3/uL    Monocytes Absolute 0.60 0.10 - 1.00 x10*3/uL     Eosinophils Absolute 0.11 0.00 - 0.70 x10*3/uL    Basophils Absolute 0.12 (H) 0.00 - 0.10 x10*3/uL   Magnesium   Result Value Ref Range    Magnesium 1.04 (L) 1.60 - 2.40 mg/dL   Comprehensive metabolic panel   Result Value Ref Range    Glucose 85 74 - 99 mg/dL    Sodium 143 136 - 145 mmol/L    Potassium 2.9 (LL) 3.5 - 5.3 mmol/L    Chloride 101 98 - 107 mmol/L    Bicarbonate 24 21 - 32 mmol/L    Anion Gap 21 (H) 10 - 20 mmol/L    Urea Nitrogen 16 6 - 23 mg/dL    Creatinine 0.47 (L) 0.50 - 1.30 mg/dL    eGFR >90 >60 mL/min/1.73m*2    Calcium 8.6 8.6 - 10.3 mg/dL    Albumin 4.0 3.4 - 5.0 g/dL    Alkaline Phosphatase 75 33 - 136 U/L    Total Protein 7.0 6.4 - 8.2 g/dL    AST 84 (H) 9 - 39 U/L    Bilirubin, Total 1.0 0.0 - 1.2 mg/dL    ALT 31 10 - 52 U/L   Lactate   Result Value Ref Range    Lactate 3.5 (H) 0.4 - 2.0 mmol/L   B-Type Natriuretic Peptide   Result Value Ref Range     (H) 0 - 99 pg/mL   Protime-INR   Result Value Ref Range    Protime 12.2 9.8 - 12.8 seconds    INR 1.1 0.9 - 1.1   aPTT   Result Value Ref Range    aPTT 33 27 - 38 seconds   Ammonia   Result Value Ref Range    Ammonia 31 16 - 53 umol/L   Troponin I, High Sensitivity, Initial   Result Value Ref Range    Troponin I, High Sensitivity 14 0 - 20 ng/L   Acute Toxicology Panel, Blood   Result Value Ref Range    Acetaminophen <10.0 10.0 - 30.0 ug/mL    Salicylate  <3 4 - 20 mg/dL    Alcohol 92 (H) <=10 mg/dL   ECG 12 lead   Result Value Ref Range    Ventricular Rate 65 BPM    Atrial Rate 65 BPM    WV Interval 176 ms    QRS Duration 92 ms    QT Interval 448 ms    QTC Calculation(Bazett) 465 ms    P Axis 4 degrees    R Axis -6 degrees    T Axis 17 degrees    QRS Count 11 beats    Q Onset 210 ms    P Onset 122 ms    P Offset 175 ms    T Offset 434 ms    QTC Fredericia 459 ms   Troponin, High Sensitivity, 1 Hour   Result Value Ref Range    Troponin I, High Sensitivity 15 0 - 20 ng/L   Lactate   Result Value Ref Range    Lactate 3.1 (H) 0.4 - 2.0  mmol/L   TSH with reflex to Free T4 if abnormal   Result Value Ref Range    Thyroid Stimulating Hormone 6.84 (H) 0.44 - 3.98 mIU/L   Thyroxine, Free   Result Value Ref Range    Thyroxine, Free 0.81 0.61 - 1.12 ng/dL   Drug Screen, Urine   Result Value Ref Range    Amphetamine Screen, Urine Presumptive Negative Presumptive Negative    Barbiturate Screen, Urine Presumptive Negative Presumptive Negative    Benzodiazepines Screen, Urine Presumptive Negative Presumptive Negative    Cannabinoid Screen, Urine Presumptive Negative Presumptive Negative    Cocaine Metabolite Screen, Urine Presumptive Negative Presumptive Negative    Fentanyl Screen, Urine Presumptive Negative Presumptive Negative    Opiate Screen, Urine Presumptive Negative Presumptive Negative    Oxycodone Screen, Urine Presumptive Negative Presumptive Negative    PCP Screen, Urine Presumptive Negative Presumptive Negative    Methadone Screen, Urine Presumptive Negative Presumptive Negative   Urinalysis with Reflex Culture and Microscopic   Result Value Ref Range    Color, Urine Light-Yellow Light-Yellow, Yellow, Dark-Yellow    Appearance, Urine Clear Clear    Specific Gravity, Urine 1.014 1.005 - 1.035    pH, Urine 6.5 5.0, 5.5, 6.0, 6.5, 7.0, 7.5, 8.0    Protein, Urine NEGATIVE NEGATIVE, 10 (TRACE), 20 (TRACE) mg/dL    Glucose, Urine Normal Normal mg/dL    Blood, Urine NEGATIVE NEGATIVE    Ketones, Urine 20 (1+) (A) NEGATIVE mg/dL    Bilirubin, Urine NEGATIVE NEGATIVE    Urobilinogen, Urine Normal Normal mg/dL    Nitrite, Urine NEGATIVE NEGATIVE    Leukocyte Esterase, Urine NEGATIVE NEGATIVE   Extra Urine Gray Tube   Result Value Ref Range    Extra Tube Hold for add-ons.    CBC   Result Value Ref Range    WBC 4.8 4.4 - 11.3 x10*3/uL    nRBC 0.4 (H) 0.0 - 0.0 /100 WBCs    RBC 3.38 (L) 4.50 - 5.90 x10*6/uL    Hemoglobin 11.8 (L) 13.5 - 17.5 g/dL    Hematocrit 33.4 (L) 41.0 - 52.0 %    MCV 99 80 - 100 fL    MCH 34.9 (H) 26.0 - 34.0 pg    MCHC 35.3 32.0 - 36.0  g/dL    RDW 12.9 11.5 - 14.5 %    Platelets 57 (L) 150 - 450 x10*3/uL   Basic metabolic panel   Result Value Ref Range    Glucose 86 74 - 99 mg/dL    Sodium 137 136 - 145 mmol/L    Potassium 3.3 (L) 3.5 - 5.3 mmol/L    Chloride 95 (L) 98 - 107 mmol/L    Bicarbonate 28 21 - 32 mmol/L    Anion Gap 17 10 - 20 mmol/L    Urea Nitrogen 12 6 - 23 mg/dL    Creatinine 0.49 (L) 0.50 - 1.30 mg/dL    eGFR >90 >60 mL/min/1.73m*2    Calcium 8.4 (L) 8.6 - 10.3 mg/dL   Lactate   Result Value Ref Range    Lactate 1.1 0.4 - 2.0 mmol/L   Magnesium   Result Value Ref Range    Magnesium 1.92 1.60 - 2.40 mg/dL   CBC   Result Value Ref Range    WBC 6.5 4.4 - 11.3 x10*3/uL    nRBC 0.0 0.0 - 0.0 /100 WBCs    RBC 3.37 (L) 4.50 - 5.90 x10*6/uL    Hemoglobin 11.9 (L) 13.5 - 17.5 g/dL    Hematocrit 33.5 (L) 41.0 - 52.0 %    MCV 99 80 - 100 fL    MCH 35.3 (H) 26.0 - 34.0 pg    MCHC 35.5 32.0 - 36.0 g/dL    RDW 12.9 11.5 - 14.5 %    Platelets 69 (L) 150 - 450 x10*3/uL   Comprehensive Metabolic Panel   Result Value Ref Range    Glucose 90 74 - 99 mg/dL    Sodium 137 136 - 145 mmol/L    Potassium 3.2 (L) 3.5 - 5.3 mmol/L    Chloride 97 (L) 98 - 107 mmol/L    Bicarbonate 29 21 - 32 mmol/L    Anion Gap 14 10 - 20 mmol/L    Urea Nitrogen 18 6 - 23 mg/dL    Creatinine 0.51 0.50 - 1.30 mg/dL    eGFR >90 >60 mL/min/1.73m*2    Calcium 8.7 8.6 - 10.3 mg/dL    Albumin 3.9 3.4 - 5.0 g/dL    Alkaline Phosphatase 63 33 - 136 U/L    Total Protein 6.3 (L) 6.4 - 8.2 g/dL    AST 44 (H) 9 - 39 U/L    Bilirubin, Total 1.2 0.0 - 1.2 mg/dL    ALT 21 10 - 52 U/L          I have personally reviewed the following imaging results ECG 12 lead    Result Date: 10/1/2024  Normal sinus rhythm Nonspecific ST and T wave abnormality Prolonged QT Abnormal ECG When compared with ECG of 29-MAR-2023 23:36, Previous ECG has undetermined rhythm, needs review    CT cervical spine wo IV contrast    Result Date: 9/30/2024  Interpreted By:  Tesha Mejia, STUDY: CT CERVICAL SPINE WO IV  CONTRAST;  9/30/2024 8:35 am   INDICATION: Signs/Symptoms:fall.     COMPARISON: None.   ACCESSION NUMBER(S): [id number]   ORDERING CLINICIAN: ELSA HANEY   TECHNIQUE: Axial CT images of the cervical spine are obtained. Axial, coronal and sagittal reconstructions are provided for review.   FINDINGS: There is mild anterior osteophytic spurring at C4-5 and C5-6. There is disc space narrowing with moderate anterior and mild posterior osteophytic spurring at C6-7   Fractures: There is no evidence for an acute fracture of the cervical spine.   Vertebral Alignment: Within normal limits.   Craniocervical Junction: The odontoid process and craniocervical junction are intact.   Vertebrae/Disc Spaces:  The cervical vertebral body heights are intact and the disc spaces are preserved.   Prevertebral/Paraspinal Soft Tissues: The prevertebral and paraspinal soft tissues are unremarkable.         No evidence for an acute fracture or subluxation of the cervical spine.   MACRO: None   Signed by: Tesha Mejia 9/30/2024 8:42 AM Dictation workstation:   [id number]    CT head wo IV contrast    Result Date: 9/30/2024  Interpreted By:  Tesha Mejia, STUDY: CT HEAD WO IV CONTRAST;  9/30/2024 8:35 am   INDICATION: Signs/Symptoms:fall.   COMPARISON: 03/29/2023   ACCESSION NUMBER(S): [id number]   ORDERING CLINICIAN: ELSA HANEY   TECHNIQUE: Examination was performed in the axial plane with sagittal and coronal reconstructions. Bone and soft tissue algorithms were performed.   FINDINGS: INTRACRANIAL: The ventricular system is symmetrical and nondilated. No mass or mass effect is identified. There is no hemorrhage or subdural fluid collection. There is no acute infarct. There is a fracture of the calvarium. There are small areas of sclerosis involving the right frontal bone and left parieto-occipital bone, stable compared to 03/29/2023. These are likely bone islands.   EXTRACRANIAL: There is moderate mucosal thickening the ethmoid  air cells. There is mild mucosal thickening of the maxillary sinuses. There is non destructive benign-appearing thickening of the right maxillary sinus, likely reactive from inflammation or prior infection.       No acute intracranial pathology.   MACRO: None   Signed by: Tesha Mejia 9/30/2024 8:40 AM Dictation workstation:   [id number]    XR chest 1 view    Result Date: 9/30/2024  Interpreted By:  Angelina Guzmán, STUDY: XR CHEST 1 VIEW 9/30/2024 8:10 am   INDICATION: Generalized weakness and fall   COMPARISON: 03/29/2023   ACCESSION NUMBER(S): [id number]   ORDERING CLINICIAN: ELSA HANEY   TECHNIQUE: AP erect view of the chest at bedside   FINDINGS: The cardiac size is within normal limits with calcified plaque visible within the aortic knob and wall of the descending thoracic aorta. The lungs are clear without pleural abnormality. The bony thorax appears intact.       No acute cardiopulmonary disease.   Signed by: Angelina Guzmán 9/30/2024 8:26 AM Dictation workstation:   [id number]    XR pelvis 1-2 views    Result Date: 9/30/2024  Interpreted By:  Angelina Guzmán, STUDY: XR PELVIS 1-2 VIEWS 9/30/2024 8:10 am   INDICATION: Generalized weakness with pain and fall   COMPARISON: None available.   ACCESSION NUMBER(S): [id number]   ORDERING CLINICIAN: ELSA HANEY   TECHNIQUE: AP view of the pelvis   FINDINGS: Normal mineralization. The hip joint space is maintained. No fracture, dislocation, or bony abnormality is seen.   The pelvic ring is intact without fracture or disruption.       No pelvic fracture demonstrated. No acute bony abnormality of the pelvis is seen.   Signed by: Angelina Guzmán 9/30/2024 8:25 AM Dictation workstation:   [id number]       Assessment/Plan   Assessment & Plan  Unable to ambulate    Impression: The patient is a 69-year-old male with multiple medical problems who presents with difficulties with ambulation and probable alcohol withdrawal.  His neurological examination is abnormal and noted  above.  The differential diagnosis of his difficulty with ambulation includes lumbar stenosis, cervical stenosis, cerebral infarction, underlying alcoholic polyneuropathy, general debilitation and systemic infection.    The differential diagnosis for his tremor includes alcohol withdrawal, essential tremor and medication effect.        Plan: The patient needs an MRI of the brain, cervical and lumbar spines all without contrast.  The patient needs to continue thiamine, multivitamins and folic acid.  The patient needs to stop drinking alcohol.  I would certainly treat any underlying factions and normalize any metabolic abnormalities.  The patient needs to continue stroke risk factor modification.   The patient needs a PT, OT, social service, rehab and speech therapy consult.  The patient needs DVT prophylaxis.  The patient needs neurochecks as per protocol.  I will follow his tremor clinically.  I will send the note to Dr. Sheth.  Thank you very much for sending me this very interesting consultation.  I discussed all these issues in detail with the patient and answered all their questions.  I will continue to follow the patient while they are in the hospital.  The patient needs follow-up with their primary care doctor within 2 weeks of discharge.  On discharge, the patient will follow up with me in the office in 4 months.          Saroj Benítez MD

## 2024-10-02 NOTE — PROGRESS NOTES
Attempted bedside visit for dispo planning. PT/OT recommending SNF, [facility] 14. SNF list printed and attempted to provide to patient. Patient resting with eyes closed. Will re-attempt as able.   MARLENE WHEATLEY RN T[facility]

## 2024-10-03 ENCOUNTER — APPOINTMENT (OUTPATIENT)
Dept: RADIOLOGY | Facility: HOSPITAL | Age: 69
DRG: 897 | End: 2024-10-03
Payer: MEDICARE

## 2024-10-03 LAB
ANION GAP SERPL CALC-SCNC: 13 MMOL/L (ref 10–20)
ATRIAL RATE: 65 BPM
BUN SERPL-MCNC: 16 MG/DL (ref 6–23)
CALCIUM SERPL-MCNC: 8.4 MG/DL (ref 8.6–10.3)
CHLORIDE SERPL-SCNC: 101 MMOL/L (ref 98–107)
CO2 SERPL-SCNC: 26 MMOL/L (ref 21–32)
CREAT SERPL-MCNC: 0.52 MG/DL (ref 0.5–1.3)
EGFRCR SERPLBLD CKD-EPI 2021: >90 ML/MIN/1.73M*2
FOLATE SERPL-MCNC: 12.8 NG/ML
GLUCOSE SERPL-MCNC: 105 MG/DL (ref 74–99)
HOLD SPECIMEN: NORMAL
P AXIS: 4 DEGREES
P OFFSET: 175 MS
P ONSET: 122 MS
POTASSIUM SERPL-SCNC: 3.2 MMOL/L (ref 3.5–5.3)
PR INTERVAL: 176 MS
Q ONSET: 210 MS
QRS COUNT: 11 BEATS
QRS DURATION: 92 MS
QT INTERVAL: 448 MS
QTC CALCULATION(BAZETT): 465 MS
QTC FREDERICIA: 459 MS
R AXIS: -6 DEGREES
SODIUM SERPL-SCNC: 137 MMOL/L (ref 136–145)
T AXIS: 17 DEGREES
T OFFSET: 434 MS
VENTRICULAR RATE: 65 BPM
VIT B12 SERPL-MCNC: 511 PG/ML (ref 211–911)

## 2024-10-03 PROCEDURE — 80048 BASIC METABOLIC PNL TOTAL CA: CPT

## 2024-10-03 PROCEDURE — 72146 MRI CHEST SPINE W/O DYE: CPT | Performed by: RADIOLOGY

## 2024-10-03 PROCEDURE — 2500000001 HC RX 250 WO HCPCS SELF ADMINISTERED DRUGS (ALT 637 FOR MEDICARE OP)

## 2024-10-03 PROCEDURE — 2500000004 HC RX 250 GENERAL PHARMACY W/ HCPCS (ALT 636 FOR OP/ED)

## 2024-10-03 PROCEDURE — G0378 HOSPITAL OBSERVATION PER HR: HCPCS

## 2024-10-03 PROCEDURE — 99233 SBSQ HOSP IP/OBS HIGH 50: CPT | Performed by: PSYCHIATRY & NEUROLOGY

## 2024-10-03 PROCEDURE — 36415 COLL VENOUS BLD VENIPUNCTURE: CPT

## 2024-10-03 PROCEDURE — 2060000001 HC INTERMEDIATE ICU ROOM DAILY

## 2024-10-03 PROCEDURE — 72146 MRI CHEST SPINE W/O DYE: CPT

## 2024-10-03 RX ADMIN — THIAMINE HYDROCHLORIDE 500 MG: 100 INJECTION, SOLUTION INTRAMUSCULAR; INTRAVENOUS at 14:16

## 2024-10-03 RX ADMIN — ESCITALOPRAM OXALATE 10 MG: 10 TABLET ORAL at 10:53

## 2024-10-03 RX ADMIN — THIAMINE HCL TAB 100 MG 100 MG: 100 TAB at 10:52

## 2024-10-03 RX ADMIN — PROPRANOLOL HYDROCHLORIDE 40 MG: 40 TABLET ORAL at 19:48

## 2024-10-03 RX ADMIN — LEVOTHYROXINE SODIUM 75 MCG: 0.07 TABLET ORAL at 06:13

## 2024-10-03 RX ADMIN — BUPROPION HYDROCHLORIDE 150 MG: 150 TABLET, EXTENDED RELEASE ORAL at 10:53

## 2024-10-03 RX ADMIN — FOLIC ACID 1 MG: 1 TABLET ORAL at 10:53

## 2024-10-03 RX ADMIN — Medication 1 TABLET: at 10:53

## 2024-10-03 RX ADMIN — PHENOBARBITAL 64.8 MG: 32.4 TABLET ORAL at 14:18

## 2024-10-03 RX ADMIN — Medication 3 MG: at 19:48

## 2024-10-03 RX ADMIN — THIAMINE HYDROCHLORIDE 500 MG: 100 INJECTION, SOLUTION INTRAMUSCULAR; INTRAVENOUS at 05:39

## 2024-10-03 RX ADMIN — PHENOBARBITAL 64.8 MG: 32.4 TABLET ORAL at 10:56

## 2024-10-03 RX ADMIN — PHENOBARBITAL 64.8 MG: 32.4 TABLET ORAL at 19:48

## 2024-10-03 ASSESSMENT — COGNITIVE AND FUNCTIONAL STATUS - GENERAL
TOILETING: A LITTLE
PERSONAL GROOMING: A LITTLE
MOVING TO AND FROM BED TO CHAIR: A LITTLE
DRESSING REGULAR LOWER BODY CLOTHING: A LITTLE
DAILY ACTIVITIY SCORE: 18
MOBILITY SCORE: 16
MOVING FROM LYING ON BACK TO SITTING ON SIDE OF FLAT BED WITH BEDRAILS: A LITTLE
WALKING IN HOSPITAL ROOM: A LOT
DRESSING REGULAR UPPER BODY CLOTHING: A LITTLE
TURNING FROM BACK TO SIDE WHILE IN FLAT BAD: A LITTLE
HELP NEEDED FOR BATHING: A LITTLE
STANDING UP FROM CHAIR USING ARMS: A LITTLE
EATING MEALS: A LITTLE
CLIMB 3 TO 5 STEPS WITH RAILING: A LOT

## 2024-10-03 ASSESSMENT — LIFESTYLE VARIABLES
HEADACHE, FULLNESS IN HEAD: NOT PRESENT
PAROXYSMAL SWEATS: NO SWEAT VISIBLE
ANXIETY: NO ANXIETY, AT EASE
TREMOR: 2
ORIENTATION AND CLOUDING OF SENSORIUM: ORIENTED AND CAN DO SERIAL ADDITIONS
VISUAL DISTURBANCES: NOT PRESENT
AUDITORY DISTURBANCES: NOT PRESENT
ANXIETY: NO ANXIETY, AT EASE
VISUAL DISTURBANCES: NOT PRESENT
TOTAL SCORE: 2
TOTAL SCORE: 2
TREMOR: NOT VISIBLE, BUT CAN BE FELT FINGERTIP TO FINGERTIP
TREMOR: NOT VISIBLE, BUT CAN BE FELT FINGERTIP TO FINGERTIP
NAUSEA AND VOMITING: NO NAUSEA AND NO VOMITING
AUDITORY DISTURBANCES: NOT PRESENT
TOTAL SCORE: 2
AUDITORY DISTURBANCES: NOT PRESENT
VISUAL DISTURBANCES: NOT PRESENT
NAUSEA AND VOMITING: NO NAUSEA AND NO VOMITING
AGITATION: NORMAL ACTIVITY
TOTAL SCORE: 2
VISUAL DISTURBANCES: NOT PRESENT
AGITATION: NORMAL ACTIVITY
ORIENTATION AND CLOUDING OF SENSORIUM: CANNOT DO SERIAL ADDITIONS OR IS UNCERTAIN ABOUT DATE
TOTAL SCORE: 1
AGITATION: NORMAL ACTIVITY
ANXIETY: NO ANXIETY, AT EASE
VISUAL DISTURBANCES: NOT PRESENT
AUDITORY DISTURBANCES: NOT PRESENT
TOTAL SCORE: 1
NAUSEA AND VOMITING: NO NAUSEA AND NO VOMITING
NAUSEA AND VOMITING: NO NAUSEA AND NO VOMITING
VISUAL DISTURBANCES: NOT PRESENT
AUDITORY DISTURBANCES: NOT PRESENT
NAUSEA AND VOMITING: NO NAUSEA AND NO VOMITING
NAUSEA AND VOMITING: NO NAUSEA AND NO VOMITING
ORIENTATION AND CLOUDING OF SENSORIUM: CANNOT DO SERIAL ADDITIONS OR IS UNCERTAIN ABOUT DATE
HEADACHE, FULLNESS IN HEAD: NOT PRESENT
VISUAL DISTURBANCES: NOT PRESENT
TOTAL SCORE: 2
TREMOR: NOT VISIBLE, BUT CAN BE FELT FINGERTIP TO FINGERTIP
TOTAL SCORE: 3
HEADACHE, FULLNESS IN HEAD: NOT PRESENT
PAROXYSMAL SWEATS: NO SWEAT VISIBLE
AUDITORY DISTURBANCES: NOT PRESENT
ANXIETY: NO ANXIETY, AT EASE
AGITATION: NORMAL ACTIVITY
TOTAL SCORE: 3
NAUSEA AND VOMITING: NO NAUSEA AND NO VOMITING
PAROXYSMAL SWEATS: NO SWEAT VISIBLE
ORIENTATION AND CLOUDING OF SENSORIUM: CANNOT DO SERIAL ADDITIONS OR IS UNCERTAIN ABOUT DATE
PAROXYSMAL SWEATS: NO SWEAT VISIBLE
PAROXYSMAL SWEATS: NO SWEAT VISIBLE
AGITATION: NORMAL ACTIVITY
ORIENTATION AND CLOUDING OF SENSORIUM: CANNOT DO SERIAL ADDITIONS OR IS UNCERTAIN ABOUT DATE
AGITATION: NORMAL ACTIVITY
TOTAL SCORE: 3
NAUSEA AND VOMITING: NO NAUSEA AND NO VOMITING
TREMOR: NOT VISIBLE, BUT CAN BE FELT FINGERTIP TO FINGERTIP
ANXIETY: NO ANXIETY, AT EASE
VISUAL DISTURBANCES: NOT PRESENT
AGITATION: NORMAL ACTIVITY
TREMOR: 2
NAUSEA AND VOMITING: NO NAUSEA AND NO VOMITING
ORIENTATION AND CLOUDING OF SENSORIUM: CANNOT DO SERIAL ADDITIONS OR IS UNCERTAIN ABOUT DATE
HEADACHE, FULLNESS IN HEAD: NOT PRESENT
HEADACHE, FULLNESS IN HEAD: NOT PRESENT
AGITATION: NORMAL ACTIVITY
HEADACHE, FULLNESS IN HEAD: NOT PRESENT
AUDITORY DISTURBANCES: NOT PRESENT
ANXIETY: NO ANXIETY, AT EASE
AGITATION: NORMAL ACTIVITY
ORIENTATION AND CLOUDING OF SENSORIUM: CANNOT DO SERIAL ADDITIONS OR IS UNCERTAIN ABOUT DATE
TREMOR: NOT VISIBLE, BUT CAN BE FELT FINGERTIP TO FINGERTIP
HEADACHE, FULLNESS IN HEAD: NOT PRESENT
ANXIETY: NO ANXIETY, AT EASE
HEADACHE, FULLNESS IN HEAD: NOT PRESENT
TREMOR: NOT VISIBLE, BUT CAN BE FELT FINGERTIP TO FINGERTIP
AUDITORY DISTURBANCES: NOT PRESENT
PAROXYSMAL SWEATS: NO SWEAT VISIBLE
AUDITORY DISTURBANCES: NOT PRESENT
HEADACHE, FULLNESS IN HEAD: NOT PRESENT
TREMOR: NOT VISIBLE, BUT CAN BE FELT FINGERTIP TO FINGERTIP
ANXIETY: NO ANXIETY, AT EASE
PAROXYSMAL SWEATS: NO SWEAT VISIBLE
ORIENTATION AND CLOUDING OF SENSORIUM: ORIENTED AND CAN DO SERIAL ADDITIONS
VISUAL DISTURBANCES: NOT PRESENT
ANXIETY: NO ANXIETY, AT EASE
ANXIETY: NO ANXIETY, AT EASE
VISUAL DISTURBANCES: NOT PRESENT
HEADACHE, FULLNESS IN HEAD: NOT PRESENT
PAROXYSMAL SWEATS: NO SWEAT VISIBLE
AGITATION: NORMAL ACTIVITY
ORIENTATION AND CLOUDING OF SENSORIUM: CANNOT DO SERIAL ADDITIONS OR IS UNCERTAIN ABOUT DATE
PAROXYSMAL SWEATS: NO SWEAT VISIBLE
TREMOR: 2
ORIENTATION AND CLOUDING OF SENSORIUM: CANNOT DO SERIAL ADDITIONS OR IS UNCERTAIN ABOUT DATE
PAROXYSMAL SWEATS: NO SWEAT VISIBLE
NAUSEA AND VOMITING: NO NAUSEA AND NO VOMITING
AUDITORY DISTURBANCES: NOT PRESENT

## 2024-10-03 ASSESSMENT — PAIN SCALES - GENERAL
PAINLEVEL_OUTOF10: 0 - NO PAIN
PAINLEVEL_OUTOF10: 0 - NO PAIN

## 2024-10-03 NOTE — PROGRESS NOTES
Humble Banks is a 69 y.o. male on day 3 of admission presenting with Unable to ambulate.      Subjective   Patient says he is feeling well with no concerns at this time.         Objective     Last Recorded Vitals  /81   Pulse 60   Temp 36.2 °C (97.2 °F)   Resp 18   Wt 81.6 kg (180 lb)   SpO2 96%   Intake/Output last 3 Shifts:    Intake/Output Summary (Last 24 hours) at 10/3/2024 6518  Last data filed at 10/3/2024 0614  Gross per 24 hour   Intake 1040 ml   Output --   Net 1040 ml       Admission Weight  Weight: 81.6 kg (180 lb) (09/30/24 0753)    Daily Weight  09/30/24 : 81.6 kg (180 lb)    Image Results  ECG 12 lead  Normal sinus rhythm  Nonspecific ST and T wave abnormality  Prolonged QT  Abnormal ECG  When compared with ECG of 29-MAR-2023 23:36,  Previous ECG has undetermined rhythm, needs review  See ED provider note for full interpretation and clinical correlation  Confirmed by Leidy Ceballos ([id number]) on 10/3/2024 5:34:53 PM      Physical Exam  Constitutional: Well developed,  no distress, alert and cooperative  Respiratory/Thorax: Patent airways, CTAB,   Cardiovascular: Regular, rate and rhythm, S1-S2 present  Gastrointestinal: Nondistended, soft, non-tender,   Neurological: alert and oriented x3, patient's tremor improved from prior  MSK: Full range of motion, no tenderness  Psychological: Appropriate mood and behavior  Skin: Warm and dry,  Relevant Results               Assessment/Plan:     Admission Details  Patient is a 69-year-old male with a history of alcohol withdrawal and abuse who presents to [facility] with weakness and shaking with ambulatory dysfunction.  Daily progress:    10/2/2024: Patient scoring up to 8 overnight on CIWA, receiving as needed diazepam.  His strength and tremors that are both greatly improved.  Discussed the patient should not be drinking again.    10/3/2024: MRIs were ordered by neurology team to evaluate patient's ambulatory dysfunction, these  are pending.  Patient's CIWA scores were as high as 10 overnight but improved throughout the day.    Acute Medical Conditions  #Alcohol withdrawal  #Concern for Warnicke's encephalopathy.  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  -PT/OT scores 14, will attempt placement     #Hypokalemia  #Hypomagnesemia  -Lactic acidosis resolved post fluid administration  -Electrolyte abnormality improved post repletion     #LFT elevation  -AST to ALT ratio greater than 2-1 consistent with alcohol abuse        Chronic Medical Conditions  #Hypothyroidism-patient has been taking his medications  #Hypertension-continue patient's home propranolol, will have to evaluate whether patient has history of essential tremor?  #Depression-escitalopram  #Anxiety-Wellbutrin     DVT: Enoxaparin  Diet: Regular diet  Fluids: None   electrolytes: Replete potassium  Dispo: Stepdown   Code Status: Confirmed full code  Consults: None  Antibiotics: None      Enoc Stout MD     Internal Medicine PGY-3  Available on [employer] for any questions.     This is a preliminary note pending signature from the attending physician.

## 2024-10-03 NOTE — PROGRESS NOTES
Hmuble Banks is a 69 y.o. male on day 3 of admission presenting with Unable to ambulate.      Subjective   The patient is a 69-year-old male who was on his way to the bathroom prior to being admitted and felt weak in his lower extremities.  He states that he was unable to get himself off the toilet and called 911.  The patient was taken by squad to the ER where he had a CT scan of the brain done that was negative for any acute process.  Currently the patient is stable and denies any new neurological problems.       Objective     Last Recorded Vitals  Blood pressure 123/79, pulse 59, temperature 35.9 °C (96.6 °F), resp. rate 18, height 1.829 m (6'), weight 81.6 kg (180 lb), SpO2 100%.    Physical Exam  Neurological Exam  The patient's mental status testing shows that the patient is alert and oriented ×3 with no evidence of any aphasia or dysarthria.  The patient's cranial nerve testing 2, 3, 4, 5, 6, and 7 are all within normal limits.  The patient's motor testing shows normal tone, bulk and power in the upper and lower extremities bilaterally with the exception of the patient has 5-45 strength in the proximal lower extremities.        Relevant Results    Scheduled medications  buPROPion XL, 150 mg, oral, Daily  escitalopram, 10 mg, oral, Daily  folic acid, 1 mg, oral, Daily  levothyroxine, 75 mcg, oral, Daily before breakfast  melatonin, 3 mg, oral, Nightly  multivitamin with minerals, 1 tablet, oral, Daily  PHENobarbitaL, 64.8 mg, oral, TID   Followed by  [START ON 10/5/2024] PHENobarbitaL, 32.4 mg, oral, TID  propranolol, 40 mg, oral, BID  thiamine, 100 mg, oral, Daily  thiamine, 500 mg, intravenous, q8h LUCY      Continuous medications     PRN medications  PRN medications: diazePAM    Results for orders placed or performed during the hospital encounter of 09/30/24 (from the past 96 hour(s))   CBC and Auto Differential   Result Value Ref Range    WBC 3.7 (L) 4.4 - 11.3 x10*3/uL    nRBC 0.0 0.0 - 0.0 /100 WBCs     RBC 3.42 (L) 4.50 - 5.90 x10*6/uL    Hemoglobin 12.0 (L) 13.5 - 17.5 g/dL    Hematocrit 32.9 (L) 41.0 - 52.0 %    MCV 96 80 - 100 fL    MCH 35.1 (H) 26.0 - 34.0 pg    MCHC 36.5 (H) 32.0 - 36.0 g/dL    RDW 12.8 11.5 - 14.5 %    Platelets 59 (L) 150 - 450 x10*3/uL    Neutrophils % 45.7 40.0 - 80.0 %    Immature Granulocytes %, Automated 1.9 (H) 0.0 - 0.9 %    Lymphocytes % 30.1 13.0 - 44.0 %    Monocytes % 16.1 2.0 - 10.0 %    Eosinophils % 3.0 0.0 - 6.0 %    Basophils % 3.2 0.0 - 2.0 %    Neutrophils Absolute 1.70 1.20 - 7.70 x10*3/uL    Immature Granulocytes Absolute, Automated 0.07 0.00 - 0.70 x10*3/uL    Lymphocytes Absolute 1.12 (L) 1.20 - 4.80 x10*3/uL    Monocytes Absolute 0.60 0.10 - 1.00 x10*3/uL    Eosinophils Absolute 0.11 0.00 - 0.70 x10*3/uL    Basophils Absolute 0.12 (H) 0.00 - 0.10 x10*3/uL   Magnesium   Result Value Ref Range    Magnesium 1.04 (L) 1.60 - 2.40 mg/dL   Comprehensive metabolic panel   Result Value Ref Range    Glucose 85 74 - 99 mg/dL    Sodium 143 136 - 145 mmol/L    Potassium 2.9 (LL) 3.5 - 5.3 mmol/L    Chloride 101 98 - 107 mmol/L    Bicarbonate 24 21 - 32 mmol/L    Anion Gap 21 (H) 10 - 20 mmol/L    Urea Nitrogen 16 6 - 23 mg/dL    Creatinine 0.47 (L) 0.50 - 1.30 mg/dL    eGFR >90 >60 mL/min/1.73m*2    Calcium 8.6 8.6 - 10.3 mg/dL    Albumin 4.0 3.4 - 5.0 g/dL    Alkaline Phosphatase 75 33 - 136 U/L    Total Protein 7.0 6.4 - 8.2 g/dL    AST 84 (H) 9 - 39 U/L    Bilirubin, Total 1.0 0.0 - 1.2 mg/dL    ALT 31 10 - 52 U/L   Lactate   Result Value Ref Range    Lactate 3.5 (H) 0.4 - 2.0 mmol/L   B-Type Natriuretic Peptide   Result Value Ref Range     (H) 0 - 99 pg/mL   Protime-INR   Result Value Ref Range    Protime 12.2 9.8 - 12.8 seconds    INR 1.1 0.9 - 1.1   aPTT   Result Value Ref Range    aPTT 33 27 - 38 seconds   Ammonia   Result Value Ref Range    Ammonia 31 16 - 53 umol/L   Troponin I, High Sensitivity, Initial   Result Value Ref Range    Troponin I, High Sensitivity  14 0 - 20 ng/L   Acute Toxicology Panel, Blood   Result Value Ref Range    Acetaminophen <10.0 10.0 - 30.0 ug/mL    Salicylate  <3 4 - 20 mg/dL    Alcohol 92 (H) <=10 mg/dL   ECG 12 lead   Result Value Ref Range    Ventricular Rate 65 BPM    Atrial Rate 65 BPM    NV Interval 176 ms    QRS Duration 92 ms    QT Interval 448 ms    QTC Calculation(Bazett) 465 ms    P Axis 4 degrees    R Axis -6 degrees    T Axis 17 degrees    QRS Count 11 beats    Q Onset 210 ms    P Onset 122 ms    P Offset 175 ms    T Offset 434 ms    QTC Fredericia 459 ms   Troponin, High Sensitivity, 1 Hour   Result Value Ref Range    Troponin I, High Sensitivity 15 0 - 20 ng/L   Lactate   Result Value Ref Range    Lactate 3.1 (H) 0.4 - 2.0 mmol/L   TSH with reflex to Free T4 if abnormal   Result Value Ref Range    Thyroid Stimulating Hormone 6.84 (H) 0.44 - 3.98 mIU/L   Thyroxine, Free   Result Value Ref Range    Thyroxine, Free 0.81 0.61 - 1.12 ng/dL   Drug Screen, Urine   Result Value Ref Range    Amphetamine Screen, Urine Presumptive Negative Presumptive Negative    Barbiturate Screen, Urine Presumptive Negative Presumptive Negative    Benzodiazepines Screen, Urine Presumptive Negative Presumptive Negative    Cannabinoid Screen, Urine Presumptive Negative Presumptive Negative    Cocaine Metabolite Screen, Urine Presumptive Negative Presumptive Negative    Fentanyl Screen, Urine Presumptive Negative Presumptive Negative    Opiate Screen, Urine Presumptive Negative Presumptive Negative    Oxycodone Screen, Urine Presumptive Negative Presumptive Negative    PCP Screen, Urine Presumptive Negative Presumptive Negative    Methadone Screen, Urine Presumptive Negative Presumptive Negative   Urinalysis with Reflex Culture and Microscopic   Result Value Ref Range    Color, Urine Light-Yellow Light-Yellow, Yellow, Dark-Yellow    Appearance, Urine Clear Clear    Specific Gravity, Urine 1.014 1.005 - 1.035    pH, Urine 6.5 5.0, 5.5, 6.0, 6.5, 7.0, 7.5, 8.0     Protein, Urine NEGATIVE NEGATIVE, 10 (TRACE), 20 (TRACE) mg/dL    Glucose, Urine Normal Normal mg/dL    Blood, Urine NEGATIVE NEGATIVE    Ketones, Urine 20 (1+) (A) NEGATIVE mg/dL    Bilirubin, Urine NEGATIVE NEGATIVE    Urobilinogen, Urine Normal Normal mg/dL    Nitrite, Urine NEGATIVE NEGATIVE    Leukocyte Esterase, Urine NEGATIVE NEGATIVE   Extra Urine Gray Tube   Result Value Ref Range    Extra Tube Hold for add-ons.    CBC   Result Value Ref Range    WBC 4.8 4.4 - 11.3 x10*3/uL    nRBC 0.4 (H) 0.0 - 0.0 /100 WBCs    RBC 3.38 (L) 4.50 - 5.90 x10*6/uL    Hemoglobin 11.8 (L) 13.5 - 17.5 g/dL    Hematocrit 33.4 (L) 41.0 - 52.0 %    MCV 99 80 - 100 fL    MCH 34.9 (H) 26.0 - 34.0 pg    MCHC 35.3 32.0 - 36.0 g/dL    RDW 12.9 11.5 - 14.5 %    Platelets 57 (L) 150 - 450 x10*3/uL   Basic metabolic panel   Result Value Ref Range    Glucose 86 74 - 99 mg/dL    Sodium 137 136 - 145 mmol/L    Potassium 3.3 (L) 3.5 - 5.3 mmol/L    Chloride 95 (L) 98 - 107 mmol/L    Bicarbonate 28 21 - 32 mmol/L    Anion Gap 17 10 - 20 mmol/L    Urea Nitrogen 12 6 - 23 mg/dL    Creatinine 0.49 (L) 0.50 - 1.30 mg/dL    eGFR >90 >60 mL/min/1.73m*2    Calcium 8.4 (L) 8.6 - 10.3 mg/dL   Lactate   Result Value Ref Range    Lactate 1.1 0.4 - 2.0 mmol/L   Magnesium   Result Value Ref Range    Magnesium 1.92 1.60 - 2.40 mg/dL   CBC   Result Value Ref Range    WBC 6.5 4.4 - 11.3 x10*3/uL    nRBC 0.0 0.0 - 0.0 /100 WBCs    RBC 3.37 (L) 4.50 - 5.90 x10*6/uL    Hemoglobin 11.9 (L) 13.5 - 17.5 g/dL    Hematocrit 33.5 (L) 41.0 - 52.0 %    MCV 99 80 - 100 fL    MCH 35.3 (H) 26.0 - 34.0 pg    MCHC 35.5 32.0 - 36.0 g/dL    RDW 12.9 11.5 - 14.5 %    Platelets 69 (L) 150 - 450 x10*3/uL   Comprehensive Metabolic Panel   Result Value Ref Range    Glucose 90 74 - 99 mg/dL    Sodium 137 136 - 145 mmol/L    Potassium 3.2 (L) 3.5 - 5.3 mmol/L    Chloride 97 (L) 98 - 107 mmol/L    Bicarbonate 29 21 - 32 mmol/L    Anion Gap 14 10 - 20 mmol/L    Urea Nitrogen 18 6 -  23 mg/dL    Creatinine 0.51 0.50 - 1.30 mg/dL    eGFR >90 >60 mL/min/1.73m*2    Calcium 8.7 8.6 - 10.3 mg/dL    Albumin 3.9 3.4 - 5.0 g/dL    Alkaline Phosphatase 63 33 - 136 U/L    Total Protein 6.3 (L) 6.4 - 8.2 g/dL    AST 44 (H) 9 - 39 U/L    Bilirubin, Total 1.2 0.0 - 1.2 mg/dL    ALT 21 10 - 52 U/L   Folate   Result Value Ref Range    Folate, Serum 12.8 >5.0 ng/mL   Vitamin B12   Result Value Ref Range    Vitamin B12 511 211 - 911 pg/mL   Basic Metabolic Panel   Result Value Ref Range    Glucose 105 (H) 74 - 99 mg/dL    Sodium 137 136 - 145 mmol/L    Potassium 3.2 (L) 3.5 - 5.3 mmol/L    Chloride 101 98 - 107 mmol/L    Bicarbonate 26 21 - 32 mmol/L    Anion Gap 13 10 - 20 mmol/L    Urea Nitrogen 16 6 - 23 mg/dL    Creatinine 0.52 0.50 - 1.30 mg/dL    eGFR >90 >60 mL/min/1.73m*2    Calcium 8.4 (L) 8.6 - 10.3 mg/dL   Lavender Top   Result Value Ref Range    Extra Tube Hold for add-ons.      I did review the images of the MRI of the brain, cervical and lumbar spines  There is no evidence to suggest an acute stroke in the brain.  There are mild degenerative changes noted in the cervical and lumbar spines but no significant cervical or lumbar stenosis was noted.  The official reports are pending.    Assessment/Plan     The patient is stable from a neurological standpoint.  I will check the official results of the MRIs that been done.  The patient does need an MRI of the thoracic spine.  I would certainly continue with thiamine, multivitamins and folic acid.  The patient needs to stop drinking alcohol.  I would certainly treat any underlying infections and normalize any metabolic abnormalities.  I would certainly continue stroke risk factor modification.  The patient will need an EMG and nerve conduction study as an outpatient.  I am very concerned the patient will not be able to take care of himself at home and will need to be placed in a nursing home.  The neurology NP, Soniya Llanos will continue to follow the  patient while he is in the hospital.      Saroj Benítez MD

## 2024-10-03 NOTE — CARE PLAN
The patient's goals for the shift include  rest    The clinical goals for the shift include monitor CIWA scoring and medicate as ordered    Problem: Safety - Adult  Goal: Free from fall injury  Outcome: Progressing

## 2024-10-04 LAB
ANION GAP SERPL CALC-SCNC: 12 MMOL/L (ref 10–20)
BUN SERPL-MCNC: 17 MG/DL (ref 6–23)
CALCIUM SERPL-MCNC: 8.6 MG/DL (ref 8.6–10.3)
CHLORIDE SERPL-SCNC: 101 MMOL/L (ref 98–107)
CO2 SERPL-SCNC: 27 MMOL/L (ref 21–32)
CREAT SERPL-MCNC: 0.55 MG/DL (ref 0.5–1.3)
EGFRCR SERPLBLD CKD-EPI 2021: >90 ML/MIN/1.73M*2
GLUCOSE SERPL-MCNC: 108 MG/DL (ref 74–99)
HOLD SPECIMEN: NORMAL
HOLD SPECIMEN: NORMAL
POTASSIUM SERPL-SCNC: 3.2 MMOL/L (ref 3.5–5.3)
SODIUM SERPL-SCNC: 137 MMOL/L (ref 136–145)

## 2024-10-04 PROCEDURE — 99232 SBSQ HOSP IP/OBS MODERATE 35: CPT | Performed by: NURSE PRACTITIONER

## 2024-10-04 PROCEDURE — G0378 HOSPITAL OBSERVATION PER HR: HCPCS

## 2024-10-04 PROCEDURE — 99232 SBSQ HOSP IP/OBS MODERATE 35: CPT

## 2024-10-04 PROCEDURE — 36415 COLL VENOUS BLD VENIPUNCTURE: CPT

## 2024-10-04 PROCEDURE — 97535 SELF CARE MNGMENT TRAINING: CPT | Mod: CO,GO

## 2024-10-04 PROCEDURE — 97535 SELF CARE MNGMENT TRAINING: CPT | Mod: GP,CQ

## 2024-10-04 PROCEDURE — 2500000004 HC RX 250 GENERAL PHARMACY W/ HCPCS (ALT 636 FOR OP/ED)

## 2024-10-04 PROCEDURE — 2500000001 HC RX 250 WO HCPCS SELF ADMINISTERED DRUGS (ALT 637 FOR MEDICARE OP)

## 2024-10-04 PROCEDURE — 2060000001 HC INTERMEDIATE ICU ROOM DAILY

## 2024-10-04 PROCEDURE — 80048 BASIC METABOLIC PNL TOTAL CA: CPT

## 2024-10-04 RX ORDER — DIAZEPAM 5 MG/ML
5 INJECTION, SOLUTION INTRAMUSCULAR; INTRAVENOUS EVERY 2 HOUR PRN
Status: ACTIVE | OUTPATIENT
Start: 2024-10-04

## 2024-10-04 RX ORDER — ENOXAPARIN SODIUM 100 MG/ML
40 INJECTION SUBCUTANEOUS EVERY 24 HOURS
Status: DISPENSED | OUTPATIENT
Start: 2024-10-04

## 2024-10-04 RX ORDER — POTASSIUM CHLORIDE 14.9 MG/ML
20 INJECTION INTRAVENOUS
Status: COMPLETED | OUTPATIENT
Start: 2024-10-04 | End: 2024-10-04

## 2024-10-04 RX ADMIN — PROPRANOLOL HYDROCHLORIDE 40 MG: 40 TABLET ORAL at 20:21

## 2024-10-04 RX ADMIN — PHENOBARBITAL 64.8 MG: 32.4 TABLET ORAL at 08:37

## 2024-10-04 RX ADMIN — FOLIC ACID 1 MG: 1 TABLET ORAL at 08:37

## 2024-10-04 RX ADMIN — PHENOBARBITAL 64.8 MG: 32.4 TABLET ORAL at 20:21

## 2024-10-04 RX ADMIN — ESCITALOPRAM OXALATE 10 MG: 10 TABLET ORAL at 08:37

## 2024-10-04 RX ADMIN — Medication 3 MG: at 20:21

## 2024-10-04 RX ADMIN — LEVOTHYROXINE SODIUM 75 MCG: 0.07 TABLET ORAL at 06:11

## 2024-10-04 RX ADMIN — Medication 1 TABLET: at 08:37

## 2024-10-04 RX ADMIN — THIAMINE HCL TAB 100 MG 100 MG: 100 TAB at 08:37

## 2024-10-04 RX ADMIN — BUPROPION HYDROCHLORIDE 150 MG: 150 TABLET, EXTENDED RELEASE ORAL at 08:37

## 2024-10-04 RX ADMIN — PHENOBARBITAL 64.8 MG: 32.4 TABLET ORAL at 15:22

## 2024-10-04 RX ADMIN — POTASSIUM CHLORIDE 20 MEQ: 200 INJECTION, SOLUTION INTRAVENOUS at 11:14

## 2024-10-04 RX ADMIN — POTASSIUM CHLORIDE 20 MEQ: 200 INJECTION, SOLUTION INTRAVENOUS at 08:37

## 2024-10-04 RX ADMIN — ENOXAPARIN SODIUM 40 MG: 40 INJECTION SUBCUTANEOUS at 13:42

## 2024-10-04 ASSESSMENT — LIFESTYLE VARIABLES
HEADACHE, FULLNESS IN HEAD: NOT PRESENT
AUDITORY DISTURBANCES: NOT PRESENT
AUDITORY DISTURBANCES: NOT PRESENT
VISUAL DISTURBANCES: NOT PRESENT
ORIENTATION AND CLOUDING OF SENSORIUM: ORIENTED AND CAN DO SERIAL ADDITIONS
VISUAL DISTURBANCES: NOT PRESENT
NAUSEA AND VOMITING: NO NAUSEA AND NO VOMITING
NAUSEA AND VOMITING: NO NAUSEA AND NO VOMITING
HEADACHE, FULLNESS IN HEAD: NOT PRESENT
ORIENTATION AND CLOUDING OF SENSORIUM: ORIENTED AND CAN DO SERIAL ADDITIONS
TREMOR: 2
PAROXYSMAL SWEATS: NO SWEAT VISIBLE
ORIENTATION AND CLOUDING OF SENSORIUM: ORIENTED AND CAN DO SERIAL ADDITIONS
TREMOR: NOT VISIBLE, BUT CAN BE FELT FINGERTIP TO FINGERTIP
PAROXYSMAL SWEATS: NO SWEAT VISIBLE
ANXIETY: NO ANXIETY, AT EASE
VISUAL DISTURBANCES: VERY MILD SENSITIVITY
ORIENTATION AND CLOUDING OF SENSORIUM: ORIENTED AND CAN DO SERIAL ADDITIONS
PAROXYSMAL SWEATS: NO SWEAT VISIBLE
ANXIETY: NO ANXIETY, AT EASE
NAUSEA AND VOMITING: NO NAUSEA AND NO VOMITING
AGITATION: NORMAL ACTIVITY
NAUSEA AND VOMITING: NO NAUSEA AND NO VOMITING
ANXIETY: NO ANXIETY, AT EASE
PAROXYSMAL SWEATS: NO SWEAT VISIBLE
TACTILE DISTURBANCES: VERY MILD ITCHING, PINS AND NEEDLES, BURNING OR NUMBNESS
TOTAL SCORE: 3
ANXIETY: NO ANXIETY, AT EASE
TREMOR: NOT VISIBLE, BUT CAN BE FELT FINGERTIP TO FINGERTIP
AUDITORY DISTURBANCES: NOT PRESENT
AGITATION: NORMAL ACTIVITY
NAUSEA AND VOMITING: NO NAUSEA AND NO VOMITING
AGITATION: NORMAL ACTIVITY
TACTILE DISTURBANCES: VERY MILD ITCHING, PINS AND NEEDLES, BURNING OR NUMBNESS
AUDITORY DISTURBANCES: NOT PRESENT
HEADACHE, FULLNESS IN HEAD: NOT PRESENT
TOTAL SCORE: 3
ORIENTATION AND CLOUDING OF SENSORIUM: ORIENTED AND CAN DO SERIAL ADDITIONS
AUDITORY DISTURBANCES: NOT PRESENT
HEADACHE, FULLNESS IN HEAD: NOT PRESENT
PAROXYSMAL SWEATS: NO SWEAT VISIBLE
AUDITORY DISTURBANCES: NOT PRESENT
TOTAL SCORE: 1
ORIENTATION AND CLOUDING OF SENSORIUM: ORIENTED AND CAN DO SERIAL ADDITIONS
ANXIETY: NO ANXIETY, AT EASE
VISUAL DISTURBANCES: NOT PRESENT
AGITATION: NORMAL ACTIVITY
PAROXYSMAL SWEATS: NO SWEAT VISIBLE
VISUAL DISTURBANCES: NOT PRESENT
AGITATION: NORMAL ACTIVITY
TREMOR: NOT VISIBLE, BUT CAN BE FELT FINGERTIP TO FINGERTIP
VISUAL DISTURBANCES: NOT PRESENT
AGITATION: NORMAL ACTIVITY
TREMOR: 2
TREMOR: 2
HEADACHE, FULLNESS IN HEAD: NOT PRESENT
ANXIETY: NO ANXIETY, AT EASE
TOTAL SCORE: 1
TOTAL SCORE: 1
HEADACHE, FULLNESS IN HEAD: NOT PRESENT
TOTAL SCORE: 4
TACTILE DISTURBANCES: VERY MILD ITCHING, PINS AND NEEDLES, BURNING OR NUMBNESS
NAUSEA AND VOMITING: NO NAUSEA AND NO VOMITING

## 2024-10-04 ASSESSMENT — COGNITIVE AND FUNCTIONAL STATUS - GENERAL
STANDING UP FROM CHAIR USING ARMS: A LOT
MOBILITY SCORE: 12
EATING MEALS: A LITTLE
MOVING FROM LYING ON BACK TO SITTING ON SIDE OF FLAT BED WITH BEDRAILS: A LITTLE
HELP NEEDED FOR BATHING: A LOT
WALKING IN HOSPITAL ROOM: TOTAL
MOVING TO AND FROM BED TO CHAIR: A LOT
DRESSING REGULAR LOWER BODY CLOTHING: A LOT
PERSONAL GROOMING: A LITTLE
DRESSING REGULAR UPPER BODY CLOTHING: A LOT
TURNING FROM BACK TO SIDE WHILE IN FLAT BAD: A LITTLE
CLIMB 3 TO 5 STEPS WITH RAILING: TOTAL
DAILY ACTIVITIY SCORE: 14
TOILETING: A LOT

## 2024-10-04 ASSESSMENT — ACTIVITIES OF DAILY LIVING (ADL): HOME_MANAGEMENT_TIME_ENTRY: 15

## 2024-10-04 ASSESSMENT — PAIN - FUNCTIONAL ASSESSMENT: PAIN_FUNCTIONAL_ASSESSMENT: 0-10

## 2024-10-04 ASSESSMENT — PAIN SCALES - GENERAL
PAINLEVEL_OUTOF10: 0 - NO PAIN

## 2024-10-04 NOTE — PROGRESS NOTES
Humble Banks is a 69 y.o. male on day 4 of admission presenting with Unable to ambulate.      Subjective   Patient reports to feeling mildly anxious and agitated. He denies any shortness of breath, chest pain, dizziness, diaphoresis, nausea/vomiting, diarrhea, constipation.    Objective     Last Recorded Vitals  BP (!) 137/98 (BP Location: Left arm, Patient Position: Lying)   Pulse 60   Temp 35.6 °C (96.1 °F) (Temporal)   Resp 16   Wt 81.6 kg (180 lb)   SpO2 98%   Intake/Output last 3 Shifts:    Intake/Output Summary (Last 24 hours) at 10/4/2024 1123  Last data filed at 10/4/2024 0416  Gross per 24 hour   Intake --   Output 250 ml   Net -250 ml       Admission Weight  Weight: 81.6 kg (180 lb) (09/30/24 0753)    Daily Weight  09/30/24 : 81.6 kg (180 lb)    Image Results  MR lumbar spine wo IV contrast  Narrative: Interpreted By:  Maulik Moscoso,   STUDY:  MR LUMBAR SPINE WO IV CONTRAST performed 10/2/2024 8:47 pm      INDICATION:  Signs/Symptoms:Difficulty with ambulation.          COMPARISON:  None.      ACCESSION NUMBER(S):  [id number]      ORDERING CLINICIAN:  DERREK DELONG      TECHNIQUE:  Multiplanar multisequence MR imaging of the lumbar spine performed  without intravenous contrast. Sagittal T1, T2, STIR, and partial  axial T2 weighted images of the lumbar spine were acquired. Per  technologist note, patient undergoing alcohol withdrawal symptoms  with motion artifact degrading assessment. Patient self extricated  precluding acquisition of complete axial images. Moreover, the  partially acquired T2 axial images are essentially nondiagnostic  given the degree of motion artifact.      FINDINGS:  Segmentation: Presumed normal segmentation.      Conus: The conus terminates at the L1-L2 interspace level. Cauda  equina are unremarkable to the extent visualized.      Epidural fluid: None.      Alignment: Grade 1 anterolisthesis of L4 on L5 estimated at 3 mm.  Alignment is otherwise anatomic.       Vertebral bodies: Vertebral body heights are maintained.      Marrow signal: Mild nonspecific marrow heterogeneity that may reflect  underlying demineralization. No suspicious STIR hyperintensity/marrow  edema.      Intervertebral discs: Mild degenerative disc height loss at L4-L5  with disc desiccation. Remaining discs are grossly maintained.          Degenerative change: Assessment is limited in the absence of  diagnostic axial imaging.      T12-L1: No spinal canal stenosis or neural foraminal narrowing.      L1-2: Mild disc bulge. No spinal canal stenosis. No significant  neural foraminal narrowing.      L2-3: Mild para foraminal disc bulging and endplate spurring. No  spinal canal stenosis. Minimal neural foraminal narrowing.      L3-4: Mild disc bulge and endplate spurring. No definite spinal canal  stenosis. Mild bilateral neural foraminal narrowing.      L4-5: There is moderate to severe facet arthropathy suggested  bilaterally. There is disc uncovering/bulge with likely tiny  superimposed central disc extrusion with cranial migration. There is  mild spinal canal stenosis suggested. Moderate  left-greater-than-right neural foraminal narrowing with suspected  impingement of the exiting left L4 nerve root.      L5-S1: Mild disc bulge. No spinal canal stenosis. Minimal/mild  bilateral neural foraminal narrowing.      Soft tissues: The prevertebral and posterior paraspinal soft tissues  are unremarkable, significantly limited in assessment.      Impression: 1. Exam limitations as described above with diagnostic axial  sequences unable to be acquired.  2. Facet arthropathy with degenerative grade 1 anterolisthesis of L4  on L5 with mild spinal canal stenosis and moderate  left-greater-than-right neural foraminal narrowing suspected with  probable impingement of the exiting left L4 nerve root.      MACRO:  None      Signed by: Maulik Moscoso 10/4/2024 9:04 AM  Dictation workstation:   [id number]   thoracic  spine wo IV contrast  Narrative: Interpreted By:  Maulik Moscoso,   STUDY:  MR THORACIC SPINE WO IV CONTRAST;  10/3/2024 7:10 pm      INDICATION:  Signs/Symptoms:The patient has difficulties with ambulation..          COMPARISON:  None.      ACCESSION NUMBER(S):  [id number]      ORDERING CLINICIAN:  DERREK DELONG      TECHNIQUE:  Noncontrast multiplanar multisequence MR imaging of the thoracic  spine was performed. Sagittal T1, T2, STIR and axial T2 and T1  weighted MR images of the thoracic spine were obtained.      FINDINGS:  Spinal cord: Normal in caliber and signal characteristics.      Extra-axial fluid: None.      Alignment: Within normal limits.      Vertebral body heights: Normal.      Marrow signal: Element of nonspecific mild marrow heterogeneity.  There is no abnormal STIR hyperintensity/marrow edema within the  thoracic spine.      Intervertebral discs: No signal abnormality. There are a few trace  noncompressive tiny disc protrusions and minimal disc bulge  components within the thoracic region.      Degenerative change: No spinal canal stenosis. No neural foraminal  narrowing. No significant facet arthropathy.      Paraspinous Soft Tissues: Within normal limits.      Impression: Unremarkable MR assessment of the thoracic spine. Thoracic vertebral  body height and alignment is maintained with no suspicious marrow  signal abnormality. No significant degenerative change. Normal MR  appearance of the thoracic spinal cord.      MACRO:  None      Signed by: Maulik Moscoso 10/4/2024 8:56 AM  Dictation workstation:   [id number]  MR cervical spine wo IV contrast  Narrative: Interpreted By:  Maulik Moscoso,   STUDY:  MR CERVICAL SPINE WO IV CONTRAST;  10/2/2024 8:46 pm      INDICATION:  Signs/Symptoms:Difficulty with ambulation.      COMPARISON:  CT cervical spine dated 09/30/2024.      ACCESSION NUMBER(S):  [id number]      ORDERING CLINICIAN:  DERREK DELONG      TECHNIQUE:  Multiplanar multisequence  noncontrast MR imaging was performed  through the cervical spine. Noncontrast sagittal T1, T2, STIR, axial  T1 and axial T2 weighted images were acquired through the cervical  spine.      Motion artifact degrades assessment, particularly on axial T1 images.  Per technologist note, patient undergoing alcohol withdrawal.      FINDINGS:  Cord: Normal in caliber and signal.      Epidural fluid: None.      Alignment: Straightening of the expected cervical lordosis. Equivocal  anterolisthesis of C7 on T1 with right-sided facet arthropathy.      Vertebral bodies: Degenerative endplate height loss at C6 and C7.  Remaining cervical vertebral body heights are maintained.      Marrow signal: Type 2 Modic endplate signal change at C6-C7. No  suspicious STIR hyperintensity/marrow edema within the cervical  region.      Intervertebral Discs: Moderate to advanced degenerative disc height  loss at C6-C7. Remaining disc spaces are grossly maintained.          Degenerative change:      C1-C2:  Minimal/mild arthrosis centered about the dens. No spinal  canal stenosis.      C2-C3:  No spinal canal stenosis or neural foraminal narrowing.      C3-C4:  Minimal bilateral uncovertebral spurring. No spinal canal  stenosis or substantial neural foraminal narrowing.      C4-C5:  Minimal uncovertebral and facet spurring. Minimal disc bulge.  No spinal canal stenosis. No substantial neural foraminal narrowing.      C5-C6:  No significant facet arthropathy. Right-greater-than-left  ligamentum flavum hypertrophy. Mild disc bulge with bilateral  uncovertebral spurring. Tiny central disc osteophyte protrusion  component. Mild spinal canal stenosis with slight ventral  flattening/indentation of the cervical spinal cord with no cord  signal abnormality. No substantial neural foraminal narrowing.      C6-C7:  Minimal/mild facet arthropathy with ligamentum flavum  thickening. Mild disc bulge with uncovertebral and endplate spurring.  Ventral thecal sac  indentation without spinal canal stenosis.  Moderate bilateral, right-greater-than-left, neural foraminal  narrowing.      C7-T1:  Moderate right and mild left facet arthropathy. Slight  ligamentum flavum thickening. Minimal disc uncovering. No spinal  canal stenosis. Mild right and no substantial left neural foraminal  narrowing.      Soft tissues: The prevertebral and posterior paraspinous soft tissues  are within normal limits.      Impression: Mild spinal canal stenosis at C5-C6. No additional spinal canal  stenosis within the remainder of the cervical spine.      Cervical spondylosis and uncovertebral spurring at C6-C7 with  moderate, right-greater-than-left, bilateral neural foraminal  narrowing.      Slight ventral cord flattening/indentation at C5-C6 as it is draped  over posterior disc osteophyte components. Otherwise normal MR  appearance of the cervical spinal cord.      MACRO:  None      Signed by: Maulik Moscoso 10/4/2024 8:52 AM  Dictation workstation:   [id number]  MR brain wo IV contrast  Narrative: Interpreted By:  Maulik Moscoso,   STUDY:  MR BRAIN WO IV CONTRAST;  10/2/2024 8:46 pm      INDICATION:  Signs/Symptoms:Difficulty with ambulation.          COMPARISON:  None.      ACCESSION NUMBER(S):  [id number]      ORDERING CLINICIAN:  DERREK DELONG      TECHNIQUE:  Standard multiplanar multisequence MR imaging was performed through  the brain without intravenous contrast. Axial T2, FLAIR, DWI,  gradient echo T2 and sagittal and coronal T1 weighted images of brain  were acquired.  Motion artifact mildly degrades assessment.      FINDINGS:  Parenchyma: There is no diffusion restriction abnormality to suggest  acute infarct.  No evidence of recent hemorrhage. There is no mass  effect or midline shift. Mild burden of nonspecific T2/FLAIR white  matter hyperintensities within the bilateral cerebral hemispheres,  favor chronic small vessel ischemic disease. Suspect tiny remote left  subinsular lacunar  infarct.      CSF Spaces: The ventricles, sulci and basal cisterns are within  normal limits for age with mild-to-moderate generalized brain  atrophy. No disproportionate ventriculomegaly. Basilar cisterns are  patent.      Extra-axial spaces: No extra-axial fluid collection.      Paranasal Sinuses: Mild, relatively diffuse mucosal thickening of the  bilateral ethmoids and maxillary paranasal sinuses. No significant  opacification.      Mastoids: Trace fluid bilaterally without significant opacification.      Orbits: Normal.      Calvarium: No suspicious osseous marrow signal.      Impression: No acute infarct, recent hemorrhage, or intracranial mass effect.      Mild chronic small vessel ischemic disease. Mild-to-moderate  generalized brain atrophy. No disproportionate ventriculomegaly.      MACRO:  None      Signed by: Maulik Moscoso 10/4/2024 8:42 AM  Dictation workstation:   [id number]      Physical Exam  Constitutional: well developed, awake, alert, no acute distress  ENMT: mucous membranes moist, EOMI, conjunctivae clear  Head/Neck: normocephalic, atraumatic; supple, trachea midline  Respiratory/Thorax: patent airways, CTAB; no wheezes, rales, or rhonchi  Cardiovascular: RRR, no murmur  Gastrointestinal: soft, nondistended, non-tender, bowel sounds appreciated  Extremities: palpable peripheral pulses, no edema or cyanosis  Neurological: AO x2, no focal deficits, NO tremors appreciated on outstretched arms today  Psychological: appropriate mood and behavior  Skin: warm and dry     Assessment/Plan   Humble Banks is a 69-year-old male with a past medical history significant for alcohol withdrawal and abuse, presented to [facility] with weakness, shaking and ambulatory dysfunction.    Daily progress:  10/4/2024  CIWA score today is 2.  Patient is symptomatically feeling better.  Receiving diazepam as needed.  Patient counseled on alcohol abstinence.  He did follow recommendations mentioned below.    Update: As per  radiology report, MRI of the thoracic spine was unremarkable, MRI of the lumbar spine showed facet arthropathy with degenerative grade 1 anterior listhesis of L4 on L5 with mild spinal canal stenosis and moderate left greater than right neuroforaminal narrowing with suspect probable impingement of existing left L4 nerve root, of note the exam was limited as patient was undergoing alcohol withdrawals.  MRI of cervical spine showed mild spinal canal stenosis at C5-C6.  Cervical spondylosis and on uncovertebral spurring at C6-C7 with moderate right greater than left bilateral neuroforaminal narrowing.  Slight ventral cord flattening/indentation at C5-C6 as it is draped over posterior disc osteophyte components.    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  #Ambulatory dysfunction in setting of above  -Continue phenobarbital taper  -As per patient's chart it indicates that he has had severe alcohol withdrawal in the past and CIWA scores greater than 30  -Folic acid and thiamine supplementation  -As needed diazepam available for CIWA scores  -PT/OT 14, will attempt placement    #Hypokalemia  # Hypomagnesemia, resolved  -Lactic acidosis resolved post fluid administration  -Replete electrolyte abnormalities, as needed    #LFT elevation  -AST to ALT ratio greater than 2-1 consistent with alcohol abuse    Chronic medical condition  #Hypothyroidism  #Hypertension  #Depression  #Anxiety  -Continue levothyroxine, propranolol, citalopram, Wellbutrin    DVT enoxaparin  Diet regular diet  Fluids none  Electrolytes replete potassium  Dispo stepdown  CODE STATUS full code  Consults none      Jennifer Grissom MD  PGY1 internal medcine

## 2024-10-04 NOTE — PROGRESS NOTES
Spoke to patient at bedside for dispo planning. Discussed PT/OT recommending SNF, [facility]. Patient agreeable to SNF. SNF list provided, freedom of choice explained. Patient states understanding. No preference at this time. Agreeable to blanket referral and will make preference after responses. DSC tasked to send referrals to [facility] at [facility], [facility], [facility], [facility] and [facility]. Encouraged patient to let TC know if additional choices made from the SNF list. Will need updated PT/OT notes for precert, update board for PT/OT to see today. TCC to continue to follow for discharge planning.     Needs to be  sitter free x24-48 hrs for SNF placement.     UPDATE 1543: Per CAITY, ASHER Tinoco [phone]. Rn obtained POA paperwork. Plan for [facility] at [facility] at this time. Can accept when off CIWA precautions.     MARLENE WHEATLEY RN TCC

## 2024-10-04 NOTE — CARE PLAN
The clinical goals for the shift include CIWA score less than 6    Problem: Safety - Adult  Goal: Free from fall injury  Outcome: Progressing

## 2024-10-04 NOTE — PROGRESS NOTES
Physical Therapy    Physical Therapy Treatment    Patient Name: Humble Banks  MRN: [id number]  Department: [facility]  Room: [address] [place] [address]  Today's Date: 10/4/2024  Time Calculation  Start Time: 1347  Stop Time: 1410  Time Calculation (min): 23 min       Assessment/Plan   PT Assessment  End of Session Patient Position: Bed, 2 rail up, Alarm off, not on at start of session, Alarm off, caregiver present (sitter present in room)  PT Plan  Treatment/Interventions: Bed mobility, Transfer training, Gait training  PT Plan: Ongoing PT  PT Frequency: 3 times per week  PT Discharge Recommendations: Moderate intensity level of continued care  PT - OK to Discharge: Yes      General Visit Information:   PT  Visit  PT Received On: 10/04/24  General  Co-Treatment: co-tx with OT to ensure safe therapeutic tx to facilitate maximum participation with skilled intervention  Prior to Session Communication: Bedside nurse  Patient Position Received: Bed, 2 rail up, Alarm off, not on at start of session, Alarm off, caregiver present (sitter present in room)  General Comment:  (pt pleasant; agreeable to participate in therapy session)       Subjective   Precautions:  Precautions  Medical Precautions: Fall precautions  Precautions Comment:  tremulous; external catheter        Objective   Pain:  Pain Assessment  Pain Assessment: 0-10  0-10 (Numeric) Pain Score: 0 - No pain     Treatments:  Bed Mobility  Bed Mobility:  (sup <> sit with SBA)    Ambulation/Gait Training  Ambulation/Gait Training Performed:  (pt ambulates 3 ft bed > BSC with FWW and mod A x 2.  slow pace; appearing effortful to initially advance BLEs.  pt then ambulates 6 ft back to bed including sidetepping R, FWW and min A x 2.  pt moving much quicker and slightly impulsive upon return to bed.  cuing for sequencing throughout.  pt tremulous. )    Transfers  Transfer:  (sit <> stand x2 trials with FWW and mod A x 2.  cuing for safe hand placement and sequencing though pt with  difficulty adhering.)    Outcome Measures:  [facility] Basic Mobility  Turning from your back to your side while in a flat bed without using bedrails: A little  Moving from lying on your back to sitting on the side of a flat bed without using bedrails: A little  Moving to and from bed to chair (including a wheelchair): A lot  Standing up from a chair using your arms (e.g. wheelchair or bedside chair): A lot  To walk in hospital room: Total  Climbing 3-5 steps with railing: Total  Basic Mobility - Total Score: 12    Education Documentation  Mobility Training, taught by Nirali Alonso PTA at 10/4/2024  2:49 PM.  Learner: Patient  Readiness: Acceptance  Method: Explanation  Response: Verbalizes Understanding, Needs Reinforcement    Education Comments  No comments found.        EDUCATION:       Encounter Problems       Encounter Problems (Active)       PT Problem       STG - Pt will transition supine <> sitting with SUP  (Progressing)       Start:  10/01/24    Expected End:  10/15/24            STG - Pt will transfer STS with CGA  (Progressing)       Start:  10/01/24    Expected End:  10/15/24            STG - Pt will amb 30' using RW with CGA  (Progressing)       Start:  10/01/24    Expected End:  10/15/24

## 2024-10-04 NOTE — PROGRESS NOTES
Occupational Therapy    OT Treatment    Patient Name: Humble Banks  MRN: [id number]  Department: [facility]  Room: [facility]-  Today's Date: 10/4/2024  Time Calculation  Start Time: 1347  Stop Time: 1410  Time Calculation (min): 23 min        Assessment:  End of Session Communication: PCT/NA/CTA     Plan:  Treatment Interventions: ADL retraining, Functional transfer training, Patient/family training, Equipment evaluation/education, Compensatory technique education, Endurance training (energy conservation / diaphragmatic breathing techniques)  OT Frequency: 3 times per week  OT Discharge Recommendations: Moderate intensity level of continued care  OT - OK to Discharge: Yes (to next level of care when medically cleared by physician/medical team)  Treatment Interventions: ADL retraining, Functional transfer training, Patient/family training, Equipment evaluation/education, Compensatory technique education, Endurance training (energy conservation / diaphragmatic breathing techniques)    Subjective   Previous Visit Info:  OT Last Visit  OT Received On: 10/04/24  General:  General  Co-Treatment: PT (Simultaneous filing. User may not have seen previous data.)  Co-Treatment Reason: maximize safety and functional mobility  Prior to Session Communication: Bedside nurse (Simultaneous filing. User may not have seen previous data.)  Patient Position Received: Bed, 3 rail up, Alarm off, caregiver present (Simultaneous filing. User may not have seen previous data.)  General Comment: pleasantly confused, in agreement to therapy session, sitter present (Simultaneous filing. User may not have seen previous data.)  Precautions:  Medical Precautions: Fall precautions (Simultaneous filing. User may not have seen previous data.)  Precautions Comment: external catheter (Simultaneous filing. User may not have seen previous data.)           Objective         Activities of Daily Living: UE Dressing  UE Dressing Level of Assistance: Moderate  assistance  UE Dressing Where Assessed: Bed level  UE Dressing Comments: don gown d/t soiled gown. pt requires increased time to complete d/t moving at slow pace    LE Dressing  LE Dressing: Yes  Sock Level of Assistance: Maximum assistance  LE Dressing Where Assessed: Edge of bed    Toileting  Toileting Level of Assistance: Maximum assistance  Where Assessed: Bedside commode  Functional Standing Tolerance:  Time: 1:30 standing at FWW  Bed Mobility/Transfers: Bed Mobility 1  Bed Mobility 1: Supine to sitting, Sitting to supine  Level of Assistance 1:  (SBA with cues for technique)    Transfer 1  Technique 1: Sit to stand, Stand to sit  Transfer Device 1: Walker  Transfer Level of Assistance 1: Moderate assistance (x2)    Toilet Transfers  Toilet Transfer From: Rolling walker  Toilet Transfer Type: To and from  Toilet Transfer to: Standard bedside commode  Toilet Transfer Technique: Ambulating  Toilet Transfers: Moderate assistance (x2)    Functional Mobility:  Functional Mobility  Functional Mobility Performed: Yes  Functional Mobility 1  Device 1: Rolling walker  Assistance 1:  (Mod A x2 from EOB to commode increased difficulty and slow pace. MIn A x2 from commode back to EOB)      Outcome Measures:[facility] Daily Activity  Putting on and taking off regular lower body clothing: A lot  Bathing (including washing, rinsing, drying): A lot  Putting on and taking off regular upper body clothing: A lot  Toileting, which includes using toilet, bedpan or urinal: A lot  Taking care of personal grooming such as brushing teeth: A little  Eating Meals: A little  Daily Activity - Total Score: 14        Education Documentation  No documentation found.  Education Comments  No comments found.             Goals:  Encounter Problems       Encounter Problems (Active)       OT Goals       Patient will complete upper and lower body bathing/dressing; toileting with supervision using adaptive equipment as needed  (Progressing)       Start:   10/01/24    Expected End:  10/15/24            Patient will perform bed mobility and functional transfers safely with supervision: bed, chair, commode using DME as needed  (Progressing)       Start:  10/01/24    Expected End:  10/15/24            Patient will tolerate standing for 5 mins. and show overall good (-) standing balance during ADL's and functional transfers/mobility  (Progressing)       Start:  10/01/24    Expected End:  10/15/24            Patient will apply energy conservation/diaphragmatic breathing techniques to ADL's and functional transfers with minimal cues  (Progressing)       Start:  10/01/24    Expected End:  10/15/24

## 2024-10-04 NOTE — PROGRESS NOTES
"[facility] Progress Note    Humble Banks is a 69 y.o. male on day 4 of admission presenting with Unable to ambulate.  Subjective   Pt seen and examined by myself for first time today. Initially seen by Dr. Benítez. Pt resting in bed. Sitter at bedside. Pt sleeping, easily arousable. Declines any new neurological symptoms today, states \"I am just trying to catch up on sleep.\"       Objective     Vitals:    10/04/24 0812 10/04/24 1001 10/04/24 1201 10/04/24 1600   BP: (!) 167/99 (!) 137/98 139/87 135/88   BP Location: Left arm Left arm Left arm Left arm   Patient Position: Lying Lying Lying Lying   Pulse: 50 60 57 59   Resp:  16     Temp: 36 °C (96.8 °F) 35.6 °C (96.1 °F) 35.6 °C (96.1 °F) 35.9 °C (96.6 °F)   TempSrc: Temporal Temporal Temporal Temporal   SpO2: 96% 98% 95% 95%   Weight:       Height:             Physical Exam  Vitals reviewed.   Psychiatric:         Speech: Speech normal.       Neurological Exam  Mental Status  Arousable to verbal stimuli. Oriented to person, place and time. Speech is normal. Language is fluent with no aphasia.    Cranial Nerves  CN II-XII grossly intact.    Motor  Normal muscle bulk throughout. No fasciculations present. Normal muscle tone. No abnormal involuntary movements.  At least antigravity throughout.    Sensory  Light touch is normal in upper and lower extremities.     Coordination  Right: Finger-to-nose normal.Left: Finger-to-nose normal.      Scheduled medications  buPROPion XL, 150 mg, oral, Daily  enoxaparin, 40 mg, subcutaneous, q24h  escitalopram, 10 mg, oral, Daily  folic acid, 1 mg, oral, Daily  levothyroxine, 75 mcg, oral, Daily before breakfast  melatonin, 3 mg, oral, Nightly  multivitamin with minerals, 1 tablet, oral, Daily  PHENobarbitaL, 64.8 mg, oral, TID   Followed by  [START ON 10/5/2024] PHENobarbitaL, 32.4 mg, oral, TID  propranolol, 40 mg, oral, BID  thiamine, 100 mg, oral, Daily      Continuous medications     PRN medications  PRN medications: " [id number]     Lab Results   Component Value Date    WBC 6.5 10/02/2024    RBC 3.37 (L) 10/02/2024    HGB 11.9 (L) 10/02/2024    HCT 33.5 (L) 10/02/2024    PLT 69 (L) 10/02/2024     10/04/2024    K 3.2 (L) 10/04/2024     10/04/2024    BUN 17 10/04/2024    CREATININE 0.55 10/04/2024    EGFR >90 10/04/2024    CALCIUM 8.6 10/04/2024    ALKPHOS 63 10/02/2024    AST 44 (H) 10/02/2024    ALT 21 10/02/2024    MG 1.92 10/01/2024    SIVLQFQY16 511 10/02/2024    HGBA1C 5.3 02/01/2022    CHOL 226 (H) 09/22/2023    HDL 85.9 09/22/2023    TRIG 82 09/22/2023    TSH 6.84 (H) 09/30/2024    TSH 14.76 (H) 09/22/2023    TSH 5.89 (H) 05/31/2023       Below CT and MRI images and reports have been personally reviewed in PACS, agree with interpretations.    MR thoracic spine wo IV contrast 10/03/2024    Narrative  Interpreted By:  Maulik Moscoso,  STUDY:  MR THORACIC SPINE WO IV CONTRAST;  10/3/2024 7:10 pm    INDICATION:  Signs/Symptoms:The patient has difficulties with ambulation..      COMPARISON:  None.    ACCESSION NUMBER(S):  [id number]    ORDERING CLINICIAN:  DERREK DELONG    TECHNIQUE:  Noncontrast multiplanar multisequence MR imaging of the thoracic  spine was performed. Sagittal T1, T2, STIR and axial T2 and T1  weighted MR images of the thoracic spine were obtained.    FINDINGS:  Spinal cord: Normal in caliber and signal characteristics.    Extra-axial fluid: None.    Alignment: Within normal limits.    Vertebral body heights: Normal.    Marrow signal: Element of nonspecific mild marrow heterogeneity.  There is no abnormal STIR hyperintensity/marrow edema within the  thoracic spine.    Intervertebral discs: No signal abnormality. There are a few trace  noncompressive tiny disc protrusions and minimal disc bulge  components within the thoracic region.    Degenerative change: No spinal canal stenosis. No neural foraminal  narrowing. No significant facet arthropathy.    Paraspinous Soft Tissues: Within normal  limits.    Impression  Unremarkable MR assessment of the thoracic spine. Thoracic vertebral  body height and alignment is maintained with no suspicious marrow  signal abnormality. No significant degenerative change. Normal MR  appearance of the thoracic spinal cord.    MACRO:  None    Signed by: Maulik Moscoso 10/4/2024 8:56 AM  Dictation workstation:   [id number]      MR lumbar spine wo IV contrast 10/02/2024    Narrative  Interpreted By:  Maulik Moscoso,  STUDY:  MR LUMBAR SPINE WO IV CONTRAST performed 10/2/2024 8:47 pm    INDICATION:  Signs/Symptoms:Difficulty with ambulation.      COMPARISON:  None.    ACCESSION NUMBER(S):  [id number]    ORDERING CLINICIAN:  DERREK DELONG    TECHNIQUE:  Multiplanar multisequence MR imaging of the lumbar spine performed  without intravenous contrast. Sagittal T1, T2, STIR, and partial  axial T2 weighted images of the lumbar spine were acquired. Per  technologist note, patient undergoing alcohol withdrawal symptoms  with motion artifact degrading assessment. Patient self extricated  precluding acquisition of complete axial images. Moreover, the  partially acquired T2 axial images are essentially nondiagnostic  given the degree of motion artifact.    FINDINGS:  Segmentation: Presumed normal segmentation.    Conus: The conus terminates at the L1-L2 interspace level. Cauda  equina are unremarkable to the extent visualized.    Epidural fluid: None.    Alignment: Grade 1 anterolisthesis of L4 on L5 estimated at 3 mm.  Alignment is otherwise anatomic.    Vertebral bodies: Vertebral body heights are maintained.    Marrow signal: Mild nonspecific marrow heterogeneity that may reflect  underlying demineralization. No suspicious STIR hyperintensity/marrow  edema.    Intervertebral discs: Mild degenerative disc height loss at L4-L5  with disc desiccation. Remaining discs are grossly maintained.      Degenerative change: Assessment is limited in the absence of  diagnostic axial  imaging.    T12-L1: No spinal canal stenosis or neural foraminal narrowing.    L1-2: Mild disc bulge. No spinal canal stenosis. No significant  neural foraminal narrowing.    L2-3: Mild para foraminal disc bulging and endplate spurring. No  spinal canal stenosis. Minimal neural foraminal narrowing.    L3-4: Mild disc bulge and endplate spurring. No definite spinal canal  stenosis. Mild bilateral neural foraminal narrowing.    L4-5: There is moderate to severe facet arthropathy suggested  bilaterally. There is disc uncovering/bulge with likely tiny  superimposed central disc extrusion with cranial migration. There is  mild spinal canal stenosis suggested. Moderate  left-greater-than-right neural foraminal narrowing with suspected  impingement of the exiting left L4 nerve root.    L5-S1: Mild disc bulge. No spinal canal stenosis. Minimal/mild  bilateral neural foraminal narrowing.    Soft tissues: The prevertebral and posterior paraspinal soft tissues  are unremarkable, significantly limited in assessment.    Impression  1. Exam limitations as described above with diagnostic axial  sequences unable to be acquired.  2. Facet arthropathy with degenerative grade 1 anterolisthesis of L4  on L5 with mild spinal canal stenosis and moderate  left-greater-than-right neural foraminal narrowing suspected with  probable impingement of the exiting left L4 nerve root.    MACRO:  None    Signed by: Maulik Moscoso 10/4/2024 9:04 AM  Dictation workstation:   [id number]      MR cervical spine wo IV contrast 10/02/2024    Narrative  Interpreted By:  Maulik Moscoso,  STUDY:  MR CERVICAL SPINE WO IV CONTRAST;  10/2/2024 8:46 pm    INDICATION:  Signs/Symptoms:Difficulty with ambulation.    COMPARISON:  CT cervical spine dated 09/30/2024.    ACCESSION NUMBER(S):  [id number]    ORDERING CLINICIAN:  DREREK DELONG    TECHNIQUE:  Multiplanar multisequence noncontrast MR imaging was performed  through the cervical spine. Noncontrast  sagittal T1, T2, STIR, axial  T1 and axial T2 weighted images were acquired through the cervical  spine.    Motion artifact degrades assessment, particularly on axial T1 images.  Per technologist note, patient undergoing alcohol withdrawal.    FINDINGS:  Cord: Normal in caliber and signal.    Epidural fluid: None.    Alignment: Straightening of the expected cervical lordosis. Equivocal  anterolisthesis of C7 on T1 with right-sided facet arthropathy.    Vertebral bodies: Degenerative endplate height loss at C6 and C7.  Remaining cervical vertebral body heights are maintained.    Marrow signal: Type 2 Modic endplate signal change at C6-C7. No  suspicious STIR hyperintensity/marrow edema within the cervical  region.    Intervertebral Discs: Moderate to advanced degenerative disc height  loss at C6-C7. Remaining disc spaces are grossly maintained.      Degenerative change:    C1-C2:  Minimal/mild arthrosis centered about the dens. No spinal  canal stenosis.    C2-C3:  No spinal canal stenosis or neural foraminal narrowing.    C3-C4:  Minimal bilateral uncovertebral spurring. No spinal canal  stenosis or substantial neural foraminal narrowing.    C4-C5:  Minimal uncovertebral and facet spurring. Minimal disc bulge.  No spinal canal stenosis. No substantial neural foraminal narrowing.    C5-C6:  No significant facet arthropathy. Right-greater-than-left  ligamentum flavum hypertrophy. Mild disc bulge with bilateral  uncovertebral spurring. Tiny central disc osteophyte protrusion  component. Mild spinal canal stenosis with slight ventral  flattening/indentation of the cervical spinal cord with no cord  signal abnormality. No substantial neural foraminal narrowing.    C6-C7:  Minimal/mild facet arthropathy with ligamentum flavum  thickening. Mild disc bulge with uncovertebral and endplate spurring.  Ventral thecal sac indentation without spinal canal stenosis.  Moderate bilateral, right-greater-than-left, neural  foraminal  narrowing.    C7-T1:  Moderate right and mild left facet arthropathy. Slight  ligamentum flavum thickening. Minimal disc uncovering. No spinal  canal stenosis. Mild right and no substantial left neural foraminal  narrowing.    Soft tissues: The prevertebral and posterior paraspinous soft tissues  are within normal limits.    Impression  Mild spinal canal stenosis at C5-C6. No additional spinal canal  stenosis within the remainder of the cervical spine.    Cervical spondylosis and uncovertebral spurring at C6-C7 with  moderate, right-greater-than-left, bilateral neural foraminal  narrowing.    Slight ventral cord flattening/indentation at C5-C6 as it is draped  over posterior disc osteophyte components. Otherwise normal MR  appearance of the cervical spinal cord.    MACRO:  None    Signed by: Maulik Moscoso 10/4/2024 8:52 AM  Dictation workstation:   [id number]                 Diana Coma Scale  Best Eye Response: Spontaneous  Best Verbal Response: Oriented  Best Motor Response: Follows commands  Diana Coma Scale Score: 15        Assessment/Plan   This patient currently has cardiac telemetry ordered; if you would like to modify or discontinue the telemetry order, click here to go to the orders activity to modify/discontinue the order.  Principal Problem:    Unable to ambulate      IMPRESSION:  Humble Banks is a 69 y.o. male with history of alcohol abuse, alcohol related hepatitis, hypothyroidism, HTN and smoking presented for weakness and difficulty ambulating.   HCT - no acute findings  CT c-spine - no acute findings  MRI brain wo contrast - no acute findings, mild SVID and mild-moderate atrophy  MRI c-spine - mild spinal canal stenosis at C5-C6 with slight ventral cord flattening, spondylosis C6-C7   MRI t-spine - unremarkable  MRI l-spine - mild spinal canal stenosis L4-L5 with probable impingement of exiting L L4 nerve root    RECOMMENDATIONS:  Continue supportive care  Continue PT/OT, likely  will need SNF on discharge  ETOH cessation  Continue MV, thiamine and folate supplementation.   Consider EMG/NCT outpatient.     Discussed with patient, all questions answered.   Will sign off from neurology, please reach out if further input needed.        I personally spent 35 minutes today, exclusive of procedures, providing care for this patient, including preparation, face to face time, documentation and other services such as review of medical records, diagnostic result, patient education, counseling, coordination of care as specified in the encounter.    Soniya Llanos, APRN-CNP

## 2024-10-05 LAB
ALBUMIN SERPL BCP-MCNC: 3.6 G/DL (ref 3.4–5)
ANION GAP SERPL CALC-SCNC: 11 MMOL/L (ref 10–20)
BUN SERPL-MCNC: 16 MG/DL (ref 6–23)
CALCIUM SERPL-MCNC: 8.9 MG/DL (ref 8.6–10.3)
CHLORIDE SERPL-SCNC: 103 MMOL/L (ref 98–107)
CO2 SERPL-SCNC: 26 MMOL/L (ref 21–32)
CREAT SERPL-MCNC: 0.48 MG/DL (ref 0.5–1.3)
EGFRCR SERPLBLD CKD-EPI 2021: >90 ML/MIN/1.73M*2
ERYTHROCYTE [DISTWIDTH] IN BLOOD BY AUTOMATED COUNT: 12.8 % (ref 11.5–14.5)
GLUCOSE SERPL-MCNC: 100 MG/DL (ref 74–99)
HCT VFR BLD AUTO: 33 % (ref 41–52)
HGB BLD-MCNC: 11.5 G/DL (ref 13.5–17.5)
MCH RBC QN AUTO: 35.2 PG (ref 26–34)
MCHC RBC AUTO-ENTMCNC: 34.8 G/DL (ref 32–36)
MCV RBC AUTO: 101 FL (ref 80–100)
NRBC BLD-RTO: 0 /100 WBCS (ref 0–0)
PHOSPHATE SERPL-MCNC: 3.7 MG/DL (ref 2.5–4.9)
PLATELET # BLD AUTO: 127 X10*3/UL (ref 150–450)
POTASSIUM SERPL-SCNC: 3.4 MMOL/L (ref 3.5–5.3)
RBC # BLD AUTO: 3.27 X10*6/UL (ref 4.5–5.9)
SODIUM SERPL-SCNC: 137 MMOL/L (ref 136–145)
WBC # BLD AUTO: 6.5 X10*3/UL (ref 4.4–11.3)

## 2024-10-05 PROCEDURE — G0378 HOSPITAL OBSERVATION PER HR: HCPCS

## 2024-10-05 PROCEDURE — 80069 RENAL FUNCTION PANEL: CPT

## 2024-10-05 PROCEDURE — 2060000001 HC INTERMEDIATE ICU ROOM DAILY

## 2024-10-05 PROCEDURE — 84100 ASSAY OF PHOSPHORUS: CPT

## 2024-10-05 PROCEDURE — 85027 COMPLETE CBC AUTOMATED: CPT

## 2024-10-05 PROCEDURE — 2500000001 HC RX 250 WO HCPCS SELF ADMINISTERED DRUGS (ALT 637 FOR MEDICARE OP)

## 2024-10-05 PROCEDURE — 36415 COLL VENOUS BLD VENIPUNCTURE: CPT

## 2024-10-05 PROCEDURE — 2500000002 HC RX 250 W HCPCS SELF ADMINISTERED DRUGS (ALT 637 FOR MEDICARE OP, ALT 636 FOR OP/ED)

## 2024-10-05 PROCEDURE — 99232 SBSQ HOSP IP/OBS MODERATE 35: CPT

## 2024-10-05 PROCEDURE — 2500000004 HC RX 250 GENERAL PHARMACY W/ HCPCS (ALT 636 FOR OP/ED)

## 2024-10-05 RX ORDER — POTASSIUM CHLORIDE 20 MEQ/1
40 TABLET, EXTENDED RELEASE ORAL ONCE
Status: COMPLETED | OUTPATIENT
Start: 2024-10-05 | End: 2024-10-05

## 2024-10-05 RX ADMIN — Medication 1 TABLET: at 09:23

## 2024-10-05 RX ADMIN — BUPROPION HYDROCHLORIDE 150 MG: 150 TABLET, EXTENDED RELEASE ORAL at 09:22

## 2024-10-05 RX ADMIN — PHENOBARBITAL 32.4 MG: 32.4 TABLET ORAL at 15:28

## 2024-10-05 RX ADMIN — PHENOBARBITAL 32.4 MG: 32.4 TABLET ORAL at 19:56

## 2024-10-05 RX ADMIN — ESCITALOPRAM OXALATE 10 MG: 10 TABLET ORAL at 09:23

## 2024-10-05 RX ADMIN — ENOXAPARIN SODIUM 40 MG: 40 INJECTION SUBCUTANEOUS at 13:01

## 2024-10-05 RX ADMIN — Medication 3 MG: at 19:56

## 2024-10-05 RX ADMIN — LEVOTHYROXINE SODIUM 75 MCG: 0.07 TABLET ORAL at 05:17

## 2024-10-05 RX ADMIN — PROPRANOLOL HYDROCHLORIDE 40 MG: 40 TABLET ORAL at 09:22

## 2024-10-05 RX ADMIN — PHENOBARBITAL 32.4 MG: 32.4 TABLET ORAL at 09:22

## 2024-10-05 RX ADMIN — THIAMINE HCL TAB 100 MG 100 MG: 100 TAB at 09:23

## 2024-10-05 RX ADMIN — POTASSIUM CHLORIDE 40 MEQ: 1500 TABLET, EXTENDED RELEASE ORAL at 17:31

## 2024-10-05 RX ADMIN — FOLIC ACID 1 MG: 1 TABLET ORAL at 09:23

## 2024-10-05 RX ADMIN — PROPRANOLOL HYDROCHLORIDE 40 MG: 40 TABLET ORAL at 19:56

## 2024-10-05 ASSESSMENT — COGNITIVE AND FUNCTIONAL STATUS - GENERAL
MOBILITY SCORE: 16
CLIMB 3 TO 5 STEPS WITH RAILING: A LOT
TURNING FROM BACK TO SIDE WHILE IN FLAT BAD: A LITTLE
PERSONAL GROOMING: A LITTLE
TOILETING: A LITTLE
EATING MEALS: A LITTLE
DRESSING REGULAR UPPER BODY CLOTHING: A LITTLE
DAILY ACTIVITIY SCORE: 18
MOVING FROM LYING ON BACK TO SITTING ON SIDE OF FLAT BED WITH BEDRAILS: A LITTLE
WALKING IN HOSPITAL ROOM: A LOT
HELP NEEDED FOR BATHING: A LITTLE
MOVING TO AND FROM BED TO CHAIR: A LITTLE
STANDING UP FROM CHAIR USING ARMS: A LITTLE
DRESSING REGULAR LOWER BODY CLOTHING: A LITTLE

## 2024-10-05 ASSESSMENT — PAIN SCALES - GENERAL
PAINLEVEL_OUTOF10: 0 - NO PAIN
PAINLEVEL_OUTOF10: 0 - NO PAIN

## 2024-10-05 NOTE — PROGRESS NOTES
Humble Banks is a 69 y.o. male on day 5 of admission presenting with Unable to ambulate.      Subjective   Patient says he feels a lot more comfortable today.  He does not feel anxious or agitated.  He says he does not have any active symptoms like shortness of breath, chest pain, nausea/vomiting diarrhea or diaphoresis.    Objective     Last Recorded Vitals  /85   Pulse 56   Temp 36.2 °C (97.2 °F)   Resp 18   Wt 81.6 kg (180 lb)   SpO2 96%   Intake/Output last 3 Shifts:    Intake/Output Summary (Last 24 hours) at 10/5/2024 1616  Last data filed at 10/4/2024 1700  Gross per 24 hour   Intake --   Output 600 ml   Net -600 ml       Admission Weight  Weight: 81.6 kg (180 lb) (09/30/24 0753)    Daily Weight  09/30/24 : 81.6 kg (180 lb)    Image Results  MR lumbar spine wo IV contrast  Narrative: Interpreted By:  Maulik Moscoso,   STUDY:  MR LUMBAR SPINE WO IV CONTRAST performed 10/2/2024 8:47 pm      INDICATION:  Signs/Symptoms:Difficulty with ambulation.          COMPARISON:  None.      ACCESSION NUMBER(S):  [id number]      ORDERING CLINICIAN:  DERREK DELONG      TECHNIQUE:  Multiplanar multisequence MR imaging of the lumbar spine performed  without intravenous contrast. Sagittal T1, T2, STIR, and partial  axial T2 weighted images of the lumbar spine were acquired. Per  technologist note, patient undergoing alcohol withdrawal symptoms  with motion artifact degrading assessment. Patient self extricated  precluding acquisition of complete axial images. Moreover, the  partially acquired T2 axial images are essentially nondiagnostic  given the degree of motion artifact.      FINDINGS:  Segmentation: Presumed normal segmentation.      Conus: The conus terminates at the L1-L2 interspace level. Cauda  equina are unremarkable to the extent visualized.      Epidural fluid: None.      Alignment: Grade 1 anterolisthesis of L4 on L5 estimated at 3 mm.  Alignment is otherwise anatomic.      Vertebral bodies:  Vertebral body heights are maintained.      Marrow signal: Mild nonspecific marrow heterogeneity that may reflect  underlying demineralization. No suspicious STIR hyperintensity/marrow  edema.      Intervertebral discs: Mild degenerative disc height loss at L4-L5  with disc desiccation. Remaining discs are grossly maintained.          Degenerative change: Assessment is limited in the absence of  diagnostic axial imaging.      T12-L1: No spinal canal stenosis or neural foraminal narrowing.      L1-2: Mild disc bulge. No spinal canal stenosis. No significant  neural foraminal narrowing.      L2-3: Mild para foraminal disc bulging and endplate spurring. No  spinal canal stenosis. Minimal neural foraminal narrowing.      L3-4: Mild disc bulge and endplate spurring. No definite spinal canal  stenosis. Mild bilateral neural foraminal narrowing.      L4-5: There is moderate to severe facet arthropathy suggested  bilaterally. There is disc uncovering/bulge with likely tiny  superimposed central disc extrusion with cranial migration. There is  mild spinal canal stenosis suggested. Moderate  left-greater-than-right neural foraminal narrowing with suspected  impingement of the exiting left L4 nerve root.      L5-S1: Mild disc bulge. No spinal canal stenosis. Minimal/mild  bilateral neural foraminal narrowing.      Soft tissues: The prevertebral and posterior paraspinal soft tissues  are unremarkable, significantly limited in assessment.      Impression: 1. Exam limitations as described above with diagnostic axial  sequences unable to be acquired.  2. Facet arthropathy with degenerative grade 1 anterolisthesis of L4  on L5 with mild spinal canal stenosis and moderate  left-greater-than-right neural foraminal narrowing suspected with  probable impingement of the exiting left L4 nerve root.      MACRO:  None      Signed by: Maulik Moscoso 10/4/2024 9:04 AM  Dictation workstation:   [id number]  MR thoracic spine wo IV  contrast  Narrative: Interpreted By:  Maulik Moscoso,   STUDY:  MR THORACIC SPINE WO IV CONTRAST;  10/3/2024 7:10 pm      INDICATION:  Signs/Symptoms:The patient has difficulties with ambulation..          COMPARISON:  None.      ACCESSION NUMBER(S):  [id number]      ORDERING CLINICIAN:  DERREK DELONG      TECHNIQUE:  Noncontrast multiplanar multisequence MR imaging of the thoracic  spine was performed. Sagittal T1, T2, STIR and axial T2 and T1  weighted MR images of the thoracic spine were obtained.      FINDINGS:  Spinal cord: Normal in caliber and signal characteristics.      Extra-axial fluid: None.      Alignment: Within normal limits.      Vertebral body heights: Normal.      Marrow signal: Element of nonspecific mild marrow heterogeneity.  There is no abnormal STIR hyperintensity/marrow edema within the  thoracic spine.      Intervertebral discs: No signal abnormality. There are a few trace  noncompressive tiny disc protrusions and minimal disc bulge  components within the thoracic region.      Degenerative change: No spinal canal stenosis. No neural foraminal  narrowing. No significant facet arthropathy.      Paraspinous Soft Tissues: Within normal limits.      Impression: Unremarkable MR assessment of the thoracic spine. Thoracic vertebral  body height and alignment is maintained with no suspicious marrow  signal abnormality. No significant degenerative change. Normal MR  appearance of the thoracic spinal cord.      MACRO:  None      Signed by: Maulik Moscoso 10/4/2024 8:56 AM  Dictation workstation:   [id number]  MR cervical spine wo IV contrast  Narrative: Interpreted By:  Maulik Moscoso,   STUDY:  MR CERVICAL SPINE WO IV CONTRAST;  10/2/2024 8:46 pm      INDICATION:  Signs/Symptoms:Difficulty with ambulation.      COMPARISON:  CT cervical spine dated 09/30/2024.      ACCESSION NUMBER(S):  [id number]      ORDERING CLINICIAN:  DERREK DELONG      TECHNIQUE:  Multiplanar multisequence noncontrast MR  imaging was performed  through the cervical spine. Noncontrast sagittal T1, T2, STIR, axial  T1 and axial T2 weighted images were acquired through the cervical  spine.      Motion artifact degrades assessment, particularly on axial T1 images.  Per technologist note, patient undergoing alcohol withdrawal.      FINDINGS:  Cord: Normal in caliber and signal.      Epidural fluid: None.      Alignment: Straightening of the expected cervical lordosis. Equivocal  anterolisthesis of C7 on T1 with right-sided facet arthropathy.      Vertebral bodies: Degenerative endplate height loss at C6 and C7.  Remaining cervical vertebral body heights are maintained.      Marrow signal: Type 2 Modic endplate signal change at C6-C7. No  suspicious STIR hyperintensity/marrow edema within the cervical  region.      Intervertebral Discs: Moderate to advanced degenerative disc height  loss at C6-C7. Remaining disc spaces are grossly maintained.          Degenerative change:      C1-C2:  Minimal/mild arthrosis centered about the dens. No spinal  canal stenosis.      C2-C3:  No spinal canal stenosis or neural foraminal narrowing.      C3-C4:  Minimal bilateral uncovertebral spurring. No spinal canal  stenosis or substantial neural foraminal narrowing.      C4-C5:  Minimal uncovertebral and facet spurring. Minimal disc bulge.  No spinal canal stenosis. No substantial neural foraminal narrowing.      C5-C6:  No significant facet arthropathy. Right-greater-than-left  ligamentum flavum hypertrophy. Mild disc bulge with bilateral  uncovertebral spurring. Tiny central disc osteophyte protrusion  component. Mild spinal canal stenosis with slight ventral  flattening/indentation of the cervical spinal cord with no cord  signal abnormality. No substantial neural foraminal narrowing.      C6-C7:  Minimal/mild facet arthropathy with ligamentum flavum  thickening. Mild disc bulge with uncovertebral and endplate spurring.  Ventral thecal sac indentation  without spinal canal stenosis.  Moderate bilateral, right-greater-than-left, neural foraminal  narrowing.      C7-T1:  Moderate right and mild left facet arthropathy. Slight  ligamentum flavum thickening. Minimal disc uncovering. No spinal  canal stenosis. Mild right and no substantial left neural foraminal  narrowing.      Soft tissues: The prevertebral and posterior paraspinous soft tissues  are within normal limits.      Impression: Mild spinal canal stenosis at C5-C6. No additional spinal canal  stenosis within the remainder of the cervical spine.      Cervical spondylosis and uncovertebral spurring at C6-C7 with  moderate, right-greater-than-left, bilateral neural foraminal  narrowing.      Slight ventral cord flattening/indentation at C5-C6 as it is draped  over posterior disc osteophyte components. Otherwise normal MR  appearance of the cervical spinal cord.      MACRO:  None      Signed by: Maulik Moscoso 10/4/2024 8:52 AM  Dictation workstation:   [id number]  MR brain wo IV contrast  Narrative: Interpreted By:  Maulik Moscoso,   STUDY:  MR BRAIN WO IV CONTRAST;  10/2/2024 8:46 pm      INDICATION:  Signs/Symptoms:Difficulty with ambulation.          COMPARISON:  None.      ACCESSION NUMBER(S):  [id number]      ORDERING CLINICIAN:  DERREK DELONG      TECHNIQUE:  Standard multiplanar multisequence MR imaging was performed through  the brain without intravenous contrast. Axial T2, FLAIR, DWI,  gradient echo T2 and sagittal and coronal T1 weighted images of brain  were acquired.  Motion artifact mildly degrades assessment.      FINDINGS:  Parenchyma: There is no diffusion restriction abnormality to suggest  acute infarct.  No evidence of recent hemorrhage. There is no mass  effect or midline shift. Mild burden of nonspecific T2/FLAIR white  matter hyperintensities within the bilateral cerebral hemispheres,  favor chronic small vessel ischemic disease. Suspect tiny remote left  subinsular lacunar infarct.       CSF Spaces: The ventricles, sulci and basal cisterns are within  normal limits for age with mild-to-moderate generalized brain  atrophy. No disproportionate ventriculomegaly. Basilar cisterns are  patent.      Extra-axial spaces: No extra-axial fluid collection.      Paranasal Sinuses: Mild, relatively diffuse mucosal thickening of the  bilateral ethmoids and maxillary paranasal sinuses. No significant  opacification.      Mastoids: Trace fluid bilaterally without significant opacification.      Orbits: Normal.      Calvarium: No suspicious osseous marrow signal.      Impression: No acute infarct, recent hemorrhage, or intracranial mass effect.      Mild chronic small vessel ischemic disease. Mild-to-moderate  generalized brain atrophy. No disproportionate ventriculomegaly.      MACRO:  None      Signed by: Maulik Moscoso 10/4/2024 8:42 AM  Dictation workstation:   [id number]      Physical Exam  Constitutional: well developed, awake, alert, no acute distress  ENMT: mucous membranes moist, EOMI, conjunctivae clear  Head/Neck: normocephalic, atraumatic; supple, trachea midline  Respiratory/Thorax: patent airways, CTAB; no wheezes, rales, or rhonchi  Cardiovascular: RRR, no murmur  Gastrointestinal: soft, nondistended, non-tender, bowel sounds appreciated  Extremities: palpable peripheral pulses, no edema or cyanosis  Neurological: AO x3, no focal deficits, no tremors appreciated on outstretched arms today  Psychological: appropriate mood and behavior  Skin: warm and dry     Assessment/Plan   Humble Banks is a 69-year-old male with a past medical history significant for alcohol withdrawal and abuse, presented to [facility] with weakness, shaking and ambulatory dysfunction.    Daily progress:  10/5/2024  CIWA score today is 0.  Removing CIWA and sitter protocol.  Patient is feeling better.  Patient counseled on alcohol abstinence.  He did follow recommendations mentioned below.    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  #Ambulatory dysfunction in setting of above  -Continue phenobarbital taper  -Folic acid and thiamine supplementation  -As needed diazepam for acute agitation  -PT/OT 14, will attempt placement    #Hypokalemia  # Hypomagnesemia, resolved  -Lactic acidosis resolved post fluid administration  -Replete electrolyte abnormalities, as needed    #LFT elevation  -AST to ALT ratio greater than 2-1 consistent with alcohol abuse    Chronic medical condition  #Hypothyroidism  #Hypertension  #Depression  #Anxiety  -Continue levothyroxine, propranolol, citalopram, Wellbutrin    DVT enoxaparin  Diet regular diet  Fluids none  Electrolytes replete potassium  Dispo stepdown  CODE STATUS full code  Consults none      Jennifer Grissom MD  PGY1 internal medcine

## 2024-10-06 VITALS
WEIGHT: 180 LBS | BODY MASS INDEX: 24.38 KG/M2 | HEART RATE: 55 BPM | SYSTOLIC BLOOD PRESSURE: 144 MMHG | RESPIRATION RATE: 16 BRPM | TEMPERATURE: 97 F | OXYGEN SATURATION: 96 % | HEIGHT: 72 IN | DIASTOLIC BLOOD PRESSURE: 80 MMHG

## 2024-10-06 PROBLEM — R26.2 UNABLE TO AMBULATE: Status: RESOLVED | Noted: 2024-09-30 | Resolved: 2024-10-06

## 2024-10-06 LAB
ALBUMIN SERPL BCP-MCNC: 3.4 G/DL (ref 3.4–5)
ANION GAP SERPL CALC-SCNC: 13 MMOL/L (ref 10–20)
BUN SERPL-MCNC: 16 MG/DL (ref 6–23)
CALCIUM SERPL-MCNC: 8.6 MG/DL (ref 8.6–10.3)
CHLORIDE SERPL-SCNC: 103 MMOL/L (ref 98–107)
CO2 SERPL-SCNC: 26 MMOL/L (ref 21–32)
CREAT SERPL-MCNC: 0.47 MG/DL (ref 0.5–1.3)
EGFRCR SERPLBLD CKD-EPI 2021: >90 ML/MIN/1.73M*2
ERYTHROCYTE [DISTWIDTH] IN BLOOD BY AUTOMATED COUNT: 12.8 % (ref 11.5–14.5)
GLUCOSE SERPL-MCNC: 99 MG/DL (ref 74–99)
HCT VFR BLD AUTO: 32.1 % (ref 41–52)
HGB BLD-MCNC: 11.1 G/DL (ref 13.5–17.5)
MCH RBC QN AUTO: 34.8 PG (ref 26–34)
MCHC RBC AUTO-ENTMCNC: 34.6 G/DL (ref 32–36)
MCV RBC AUTO: 101 FL (ref 80–100)
METHYLMALONATE SERPL-SCNC: 0.15 UMOL/L (ref 0–0.4)
NRBC BLD-RTO: 0 /100 WBCS (ref 0–0)
PHOSPHATE SERPL-MCNC: 3.5 MG/DL (ref 2.5–4.9)
PLATELET # BLD AUTO: 157 X10*3/UL (ref 150–450)
POTASSIUM SERPL-SCNC: 3.9 MMOL/L (ref 3.5–5.3)
RBC # BLD AUTO: 3.19 X10*6/UL (ref 4.5–5.9)
SODIUM SERPL-SCNC: 138 MMOL/L (ref 136–145)
WBC # BLD AUTO: 6.7 X10*3/UL (ref 4.4–11.3)

## 2024-10-06 PROCEDURE — 2060000001 HC INTERMEDIATE ICU ROOM DAILY

## 2024-10-06 PROCEDURE — 85027 COMPLETE CBC AUTOMATED: CPT

## 2024-10-06 PROCEDURE — 80069 RENAL FUNCTION PANEL: CPT

## 2024-10-06 PROCEDURE — 36415 COLL VENOUS BLD VENIPUNCTURE: CPT

## 2024-10-06 PROCEDURE — 99239 HOSP IP/OBS DSCHRG MGMT >30: CPT

## 2024-10-06 PROCEDURE — 2500000001 HC RX 250 WO HCPCS SELF ADMINISTERED DRUGS (ALT 637 FOR MEDICARE OP)

## 2024-10-06 PROCEDURE — 2500000004 HC RX 250 GENERAL PHARMACY W/ HCPCS (ALT 636 FOR OP/ED)

## 2024-10-06 PROCEDURE — G0378 HOSPITAL OBSERVATION PER HR: HCPCS

## 2024-10-06 RX ORDER — FOLIC ACID 1 MG/1
1 TABLET ORAL DAILY
Qty: 30 TABLET | Refills: 0 | Status: SHIPPED | OUTPATIENT
Start: 2024-10-07 | End: 2024-11-06

## 2024-10-06 RX ORDER — LANOLIN ALCOHOL/MO/W.PET/CERES
100 CREAM (GRAM) TOPICAL DAILY
Qty: 30 TABLET | Refills: 0 | Status: SHIPPED | OUTPATIENT
Start: 2024-10-07 | End: 2024-11-06

## 2024-10-06 RX ORDER — MULTIVIT-MIN/IRON FUM/FOLIC AC 7.5 MG-4
1 TABLET ORAL DAILY
Qty: 30 TABLET | Refills: 0 | Status: SHIPPED | OUTPATIENT
Start: 2024-10-06 | End: 2024-11-05

## 2024-10-06 RX ADMIN — PHENOBARBITAL 32.4 MG: 32.4 TABLET ORAL at 09:56

## 2024-10-06 RX ADMIN — PHENOBARBITAL 32.4 MG: 32.4 TABLET ORAL at 15:01

## 2024-10-06 RX ADMIN — THIAMINE HCL TAB 100 MG 100 MG: 100 TAB at 09:56

## 2024-10-06 RX ADMIN — BUPROPION HYDROCHLORIDE 150 MG: 150 TABLET, EXTENDED RELEASE ORAL at 09:57

## 2024-10-06 RX ADMIN — PROPRANOLOL HYDROCHLORIDE 40 MG: 40 TABLET ORAL at 20:47

## 2024-10-06 RX ADMIN — ESCITALOPRAM OXALATE 10 MG: 10 TABLET ORAL at 09:56

## 2024-10-06 RX ADMIN — LEVOTHYROXINE SODIUM 75 MCG: 0.07 TABLET ORAL at 05:28

## 2024-10-06 RX ADMIN — PHENOBARBITAL 32.4 MG: 32.4 TABLET ORAL at 20:47

## 2024-10-06 RX ADMIN — Medication 1 TABLET: at 09:56

## 2024-10-06 RX ADMIN — PROPRANOLOL HYDROCHLORIDE 40 MG: 40 TABLET ORAL at 09:56

## 2024-10-06 RX ADMIN — Medication 3 MG: at 20:47

## 2024-10-06 RX ADMIN — ENOXAPARIN SODIUM 40 MG: 40 INJECTION SUBCUTANEOUS at 13:43

## 2024-10-06 RX ADMIN — FOLIC ACID 1 MG: 1 TABLET ORAL at 09:57

## 2024-10-06 ASSESSMENT — PAIN SCALES - GENERAL
PAINLEVEL_OUTOF10: 0 - NO PAIN
PAINLEVEL_OUTOF10: 0 - NO PAIN

## 2024-10-06 ASSESSMENT — COGNITIVE AND FUNCTIONAL STATUS - GENERAL
WALKING IN HOSPITAL ROOM: A LOT
TURNING FROM BACK TO SIDE WHILE IN FLAT BAD: A LITTLE
DRESSING REGULAR UPPER BODY CLOTHING: A LITTLE
MOBILITY SCORE: 16
EATING MEALS: A LITTLE
MOVING FROM LYING ON BACK TO SITTING ON SIDE OF FLAT BED WITH BEDRAILS: A LITTLE
DRESSING REGULAR LOWER BODY CLOTHING: A LITTLE
MOVING TO AND FROM BED TO CHAIR: A LITTLE
CLIMB 3 TO 5 STEPS WITH RAILING: A LOT
DAILY ACTIVITIY SCORE: 18
PERSONAL GROOMING: A LITTLE
TOILETING: A LITTLE
STANDING UP FROM CHAIR USING ARMS: A LITTLE
HELP NEEDED FOR BATHING: A LITTLE

## 2024-10-06 NOTE — DISCHARGE SUMMARY
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   propranolol 40 mg tablet; Commonly known as: Inderal; Ask about: Which   instructions should I use?       Test Results Pending At Discharge  Pending Labs       Order Current Status    Methylmalonic Acid In process            Hospital Course  Edsouth Banks is a 69-year-old male with a past medical history significant for alcohol withdrawal and abuse, presented to [facility] with weakness, shaking and ambulatory dysfunction.  Patient mention he was on his way to the bathroom and felt quite weak was unable to get himself off the toilet and that is when he called the EMS.  In the ED patient was hypertensive 172/111, pulse ox 97% on room air, heart rate 74, afebrile respirations 18.  Labs are significant for low potassium 2.9, anion gap 18, lactate 3.5.  Urinalysis was significant for +1 ketones.  Patient's alcohol level was 92.  Patient was admitted to general medicine dia for further management.  Lactic acidosis resolved post fluid administration.  LFT elevations were consistent with alcohol abuse (AST to ALT ratio greater than 2-1).  CIWA protocol was begun and sitter precautions were in place.  Phenobarbital taper was started for preventing alcohol withdrawals, thiamine and folate were began.  Neurology was consulted as patient was having difficulty with ambulation which was suspected to be secondary to alcoholic myopathy.  Neurology  recommended MRI of the brain, cervical and lumbar spines without contrast.  Thoracic spine MRI was unremarkable, lumbar spine showed facet arthropathy with degenerative changes and mild spinal canal stenosis.  MRI of the cervical spine showed mild spinal canal stenosis and cervical spondylosis all correlating with degenerative changes, please read full MRI report attached on patient's file for finer details. Neurology recommended outpatient nerve conduction study and EMG.  Patient is doing much better than on admission, CIWA score 0, CIWA protocol and seizure precautions were removed yesterday.  Patient received low impact scores and is now being discharged to skilled nursing facility, with thiamine and folate supplementation and multivitamin.  Patient will follow-up with neurology and primary care provider in the outpatient setting for posthospitalization follow-up upon discharge.      Pertinent Physical Exam At Time of Discharge  Physical Exam  Constitutional: well developed, awake, alert, no acute distress  ENMT: mucous membranes moist, EOMI, conjunctivae clear  Head/Neck: normocephalic, atraumatic; supple, trachea midline  Respiratory/Thorax: patent airways, CTAB; no wheezes, rales, or rhonchi  Cardiovascular: RRR, no murmur  Gastrointestinal: soft, nondistended, non-tender, bowel sounds appreciated  Extremities: palpable peripheral pulses, no edema or cyanosis  Neurological: AO x3, no focal deficits  Psychological: appropriate mood and behavior  Skin: warm and dry    Outpatient Follow Up  No future appointments.      Jennifer Grissom MD  PGY1 internal medicine

## 2024-10-06 NOTE — PROGRESS NOTES
Received secure message from patient's nurse, patient has a written discharge order.  Patient's nurse confirms, he is restraint and sitter-free x 24 hrs.  Prior TCC notes reviewed.  [facility] reviewed for updates to blanket SNF referral.  All facilities are able to accept patient.  This TCC met with patient for preference.  Patient identified, verified demographic information.  Patient's preference is [facility].  Direct precert team messaged to start precert.  Nursing, provider, and facility updated.  Care Transitions will continue to follow.    1:52 pm addendum  Received secure message from provider, patient was to discharge to [facility].  Reviewed TCC note for POA contact information.  This TCC placed call to Earnest STERLING, to verify preference is [facility], not [facility].  This TCC updated patient's contact information to add POA information.  POA very appreciative.  Facilities updated via [facility].  Provider and nursing updated.  Direct Precert team updated with change to [facility], instead of [facility].  Precert team to update precert on Monday.  Care Transitions will continue to follow.    3:04 pm addendum  Call placed to Naida SANTIAGO, clinical liaison for [facility], and provided update.  Naida appreciative of information.  Care Transitions will continue to follow.

## 2024-10-06 NOTE — DISCHARGE INSTRUCTIONS
Please follow-up with your primary care provider for posthospitalization follow-up.    Please follow-up with neurology for further study: nerve conduction study and an EMG study in the outpatient setting.

## 2024-10-06 NOTE — CARE PLAN
The patient's goals for the shift include rest and comfort.    The clinical goals for the shift include rest

## 2024-10-07 VITALS
HEART RATE: 58 BPM | BODY MASS INDEX: 24.38 KG/M2 | HEIGHT: 72 IN | DIASTOLIC BLOOD PRESSURE: 105 MMHG | TEMPERATURE: 97.9 F | OXYGEN SATURATION: 98 % | WEIGHT: 180 LBS | SYSTOLIC BLOOD PRESSURE: 158 MMHG | RESPIRATION RATE: 16 BRPM

## 2024-10-07 PROCEDURE — G0378 HOSPITAL OBSERVATION PER HR: HCPCS

## 2024-10-07 PROCEDURE — 2500000001 HC RX 250 WO HCPCS SELF ADMINISTERED DRUGS (ALT 637 FOR MEDICARE OP)

## 2024-10-07 RX ADMIN — Medication 1 TABLET: at 08:24

## 2024-10-07 RX ADMIN — ESCITALOPRAM OXALATE 10 MG: 10 TABLET ORAL at 08:24

## 2024-10-07 RX ADMIN — THIAMINE HCL TAB 100 MG 100 MG: 100 TAB at 08:24

## 2024-10-07 RX ADMIN — FOLIC ACID 1 MG: 1 TABLET ORAL at 08:24

## 2024-10-07 RX ADMIN — LEVOTHYROXINE SODIUM 75 MCG: 0.07 TABLET ORAL at 05:59

## 2024-10-07 RX ADMIN — BUPROPION HYDROCHLORIDE 150 MG: 150 TABLET, EXTENDED RELEASE ORAL at 08:24

## 2024-10-07 RX ADMIN — PROPRANOLOL HYDROCHLORIDE 40 MG: 40 TABLET ORAL at 08:24

## 2024-10-07 ASSESSMENT — COGNITIVE AND FUNCTIONAL STATUS - GENERAL
MOVING TO AND FROM BED TO CHAIR: A LITTLE
MOVING FROM LYING ON BACK TO SITTING ON SIDE OF FLAT BED WITH BEDRAILS: A LITTLE
TURNING FROM BACK TO SIDE WHILE IN FLAT BAD: A LITTLE
DRESSING REGULAR LOWER BODY CLOTHING: A LITTLE
PERSONAL GROOMING: A LITTLE
EATING MEALS: A LITTLE
TOILETING: A LITTLE
WALKING IN HOSPITAL ROOM: A LOT
HELP NEEDED FOR BATHING: A LITTLE
DRESSING REGULAR UPPER BODY CLOTHING: A LITTLE
STANDING UP FROM CHAIR USING ARMS: A LITTLE
CLIMB 3 TO 5 STEPS WITH RAILING: A LOT
MOBILITY SCORE: 16
DAILY ACTIVITIY SCORE: 18

## 2024-10-07 ASSESSMENT — PAIN SCALES - GENERAL: PAINLEVEL_OUTOF10: 0 - NO PAIN

## 2024-10-07 NOTE — PROGRESS NOTES
"Awaiting precert for SNF. Direct precert team notified precert change to [facility] from [facility]. Direct Precert team states portal saying [facility] is OON. DSC tasked to check with facility if truly OON.     UPDATE 0942: Message from [facility] \"Our renato team has confirmed that our contract covers this plan as in network. I've asked them to reach out to rectify the issue so we can proceed.\"    UPDATE 0945: Patient updated at bedside, awaiting precert. Confirmed [facility].     UPDATE 1259: Precert approved for SNF and was able to switch to [facility]. Primary resident notified and confirmed, patient ready for discharge. DSC tasked to arrange transport. Awaiting transport time.     UPDATE 1313: Transport for 230PM via [facility]. RN updated via secure chat. POA called and updated. [facility]   MARLENE WHEATLEY, RN TCC      "

## 2024-12-19 ENCOUNTER — APPOINTMENT (OUTPATIENT)
Dept: RADIOLOGY | Facility: HOSPITAL | Age: 69
End: 2024-12-19
Payer: MEDICARE

## 2024-12-19 ENCOUNTER — HOSPITAL ENCOUNTER (EMERGENCY)
Facility: HOSPITAL | Age: 69
Discharge: HOME | End: 2024-12-19
Attending: EMERGENCY MEDICINE
Payer: MEDICARE

## 2024-12-19 VITALS
SYSTOLIC BLOOD PRESSURE: 152 MMHG | RESPIRATION RATE: 20 BRPM | HEART RATE: 66 BPM | OXYGEN SATURATION: 98 % | HEIGHT: 72 IN | TEMPERATURE: 97.2 F | WEIGHT: 186 LBS | BODY MASS INDEX: 25.19 KG/M2 | DIASTOLIC BLOOD PRESSURE: 92 MMHG
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
## 2024-12-19 DIAGNOSIS — F10.930 ALCOHOL WITHDRAWAL SYNDROME WITHOUT COMPLICATION (MULTI): ICD-10-CM

## 2024-12-19 DIAGNOSIS — F10.90 ALCOHOL USE DISORDER: Primary | ICD-10-CM

## 2024-12-19 DIAGNOSIS — E03.9 HYPOTHYROIDISM, UNSPECIFIED TYPE: ICD-10-CM

## 2024-12-19 DIAGNOSIS — R53.1 GENERALIZED WEAKNESS: ICD-10-CM

## 2024-12-19 DIAGNOSIS — F32.A DEPRESSION, UNSPECIFIED DEPRESSION TYPE: ICD-10-CM

## 2024-12-19 DIAGNOSIS — F41.9 ANXIETY: ICD-10-CM

## 2024-12-19 LAB
ALBUMIN SERPL BCP-MCNC: 4 G/DL (ref 3.4–5)
ALP SERPL-CCNC: 100 U/L (ref 33–136)
ALT SERPL W P-5'-P-CCNC: 31 U/L (ref 10–52)
ANION GAP SERPL CALC-SCNC: 14 MMOL/L (ref 10–20)
AST SERPL W P-5'-P-CCNC: 117 U/L (ref 9–39)
BASOPHILS # BLD AUTO: 0.09 X10*3/UL (ref 0–0.1)
BASOPHILS NFR BLD AUTO: 1.9 %
BILIRUB SERPL-MCNC: 0.4 MG/DL (ref 0–1.2)
BUN SERPL-MCNC: 14 MG/DL (ref 6–23)
CALCIUM SERPL-MCNC: 8.9 MG/DL (ref 8.6–10.3)
CHLORIDE SERPL-SCNC: 106 MMOL/L (ref 98–107)
CO2 SERPL-SCNC: 28 MMOL/L (ref 21–32)
CREAT SERPL-MCNC: 0.58 MG/DL (ref 0.5–1.3)
EGFRCR SERPLBLD CKD-EPI 2021: >90 ML/MIN/1.73M*2
EOSINOPHIL # BLD AUTO: 0.2 X10*3/UL (ref 0–0.7)
EOSINOPHIL NFR BLD AUTO: 4.1 %
ERYTHROCYTE [DISTWIDTH] IN BLOOD BY AUTOMATED COUNT: 14 % (ref 11.5–14.5)
ETHANOL SERPL-MCNC: 431 MG/DL
GLUCOSE SERPL-MCNC: 96 MG/DL (ref 74–99)
HCT VFR BLD AUTO: 36.2 % (ref 41–52)
HGB BLD-MCNC: 12.4 G/DL (ref 13.5–17.5)
IMM GRANULOCYTES # BLD AUTO: 0.04 X10*3/UL (ref 0–0.7)
IMM GRANULOCYTES NFR BLD AUTO: 0.8 % (ref 0–0.9)
LYMPHOCYTES # BLD AUTO: 2.14 X10*3/UL (ref 1.2–4.8)
LYMPHOCYTES NFR BLD AUTO: 44 %
MAGNESIUM SERPL-MCNC: 1.49 MG/DL (ref 1.6–2.4)
MCH RBC QN AUTO: 34.4 PG (ref 26–34)
MCHC RBC AUTO-ENTMCNC: 34.3 G/DL (ref 32–36)
MCV RBC AUTO: 101 FL (ref 80–100)
MONOCYTES # BLD AUTO: 0.82 X10*3/UL (ref 0.1–1)
MONOCYTES NFR BLD AUTO: 16.9 %
NEUTROPHILS # BLD AUTO: 1.57 X10*3/UL (ref 1.2–7.7)
NEUTROPHILS NFR BLD AUTO: 32.3 %
NRBC BLD-RTO: 0 /100 WBCS (ref 0–0)
PLATELET # BLD AUTO: 67 X10*3/UL (ref 150–450)
POTASSIUM SERPL-SCNC: 3.6 MMOL/L (ref 3.5–5.3)
PROT SERPL-MCNC: 7.2 G/DL (ref 6.4–8.2)
RBC # BLD AUTO: 3.6 X10*6/UL (ref 4.5–5.9)
SODIUM SERPL-SCNC: 144 MMOL/L (ref 136–145)
WBC # BLD AUTO: 4.9 X10*3/UL (ref 4.4–11.3)

## 2024-12-19 PROCEDURE — 85025 COMPLETE CBC W/AUTO DIFF WBC: CPT

## 2024-12-19 PROCEDURE — 71046 X-RAY EXAM CHEST 2 VIEWS: CPT

## 2024-12-19 PROCEDURE — 70450 CT HEAD/BRAIN W/O DYE: CPT

## 2024-12-19 PROCEDURE — 36415 COLL VENOUS BLD VENIPUNCTURE: CPT

## 2024-12-19 PROCEDURE — 83735 ASSAY OF MAGNESIUM: CPT

## 2024-12-19 PROCEDURE — 71046 X-RAY EXAM CHEST 2 VIEWS: CPT | Mod: FOREIGN READ | Performed by: RADIOLOGY

## 2024-12-19 PROCEDURE — 84075 ASSAY ALKALINE PHOSPHATASE: CPT

## 2024-12-19 PROCEDURE — 2500000001 HC RX 250 WO HCPCS SELF ADMINISTERED DRUGS (ALT 637 FOR MEDICARE OP)

## 2024-12-19 PROCEDURE — 82077 ASSAY SPEC XCP UR&BREATH IA: CPT

## 2024-12-19 PROCEDURE — 99285 EMERGENCY DEPT VISIT HI MDM: CPT | Mod: 25 | Performed by: EMERGENCY MEDICINE

## 2024-12-19 PROCEDURE — 72125 CT NECK SPINE W/O DYE: CPT

## 2024-12-19 RX ORDER — ESCITALOPRAM OXALATE 10 MG/1
10 TABLET ORAL DAILY
Qty: 14 TABLET | Refills: 0 | Status: SHIPPED | OUTPATIENT
Start: 2024-12-19 | End: 2025-01-02

## 2024-12-19 RX ORDER — MULTIVIT-MIN/IRON FUM/FOLIC AC 7.5 MG-4
1 TABLET ORAL DAILY
Qty: 14 TABLET | Refills: 0 | Status: SHIPPED | OUTPATIENT
Start: 2024-12-19 | End: 2025-01-02

## 2024-12-19 RX ORDER — LEVOTHYROXINE SODIUM 75 UG/1
75 TABLET ORAL DAILY
Qty: 14 TABLET | Refills: 0 | Status: SHIPPED | OUTPATIENT
Start: 2024-12-19 | End: 2025-01-02

## 2024-12-19 RX ORDER — BUPROPION HYDROCHLORIDE 150 MG/1
150 TABLET ORAL DAILY
Qty: 14 TABLET | Refills: 0 | Status: SHIPPED | OUTPATIENT
Start: 2024-12-19 | End: 2025-01-02

## 2024-12-19 RX ORDER — LANOLIN ALCOHOL/MO/W.PET/CERES
100 CREAM (GRAM) TOPICAL DAILY
Status: DISCONTINUED | OUTPATIENT
Start: 2024-12-19 | End: 2024-12-20 | Stop reason: HOSPADM

## 2024-12-19 RX ORDER — MULTIVIT-MIN/IRON FUM/FOLIC AC 7.5 MG-4
1 TABLET ORAL DAILY
Status: DISCONTINUED | OUTPATIENT
Start: 2024-12-19 | End: 2024-12-20 | Stop reason: HOSPADM

## 2024-12-19 RX ORDER — FOLIC ACID 1 MG/1
1 TABLET ORAL DAILY
Status: DISCONTINUED | OUTPATIENT
Start: 2024-12-19 | End: 2024-12-20 | Stop reason: HOSPADM

## 2024-12-19 RX ORDER — PROPRANOLOL HYDROCHLORIDE 40 MG/1
40 TABLET ORAL 2 TIMES DAILY
Qty: 28 TABLET | Refills: 0 | Status: SHIPPED | OUTPATIENT
Start: 2024-12-19 | End: 2024-12-27 | Stop reason: HOSPADM

## 2024-12-19 RX ADMIN — FOLIC ACID 1 MG: 1 TABLET ORAL at 13:47

## 2024-12-19 RX ADMIN — THIAMINE HCL TAB 100 MG 100 MG: 100 TAB at 13:47

## 2024-12-19 RX ADMIN — Medication 1 TABLET: at 13:48

## 2024-12-19 ASSESSMENT — LIFESTYLE VARIABLES
VISUAL DISTURBANCES: NOT PRESENT
TOTAL SCORE: 1
TREMOR: NO TREMOR
AGITATION: NORMAL ACTIVITY
PULSE: 66
HEADACHE, FULLNESS IN HEAD: NOT PRESENT
PAROXYSMAL SWEATS: NO SWEAT VISIBLE
BLOOD PRESSURE: 152/92
ORIENTATION AND CLOUDING OF SENSORIUM: ORIENTED AND CAN DO SERIAL ADDITIONS
AUDITORY DISTURBANCES: NOT PRESENT
NAUSEA AND VOMITING: NO NAUSEA AND NO VOMITING
ANXIETY: MILDLY ANXIOUS

## 2024-12-19 ASSESSMENT — COLUMBIA-SUICIDE SEVERITY RATING SCALE - C-SSRS
2. HAVE YOU ACTUALLY HAD ANY THOUGHTS OF KILLING YOURSELF?: NO
6. HAVE YOU EVER DONE ANYTHING, STARTED TO DO ANYTHING, OR PREPARED TO DO ANYTHING TO END YOUR LIFE?: NO
1. IN THE PAST MONTH, HAVE YOU WISHED YOU WERE DEAD OR WISHED YOU COULD GO TO SLEEP AND NOT WAKE UP?: NO

## 2024-12-19 ASSESSMENT — PAIN - FUNCTIONAL ASSESSMENT: PAIN_FUNCTIONAL_ASSESSMENT: 0-10

## 2024-12-19 ASSESSMENT — PAIN SCALES - GENERAL: PAINLEVEL_OUTOF10: 0 - NO PAIN

## 2024-12-19 NOTE — DISCHARGE INSTRUCTIONS
Please continue all your current medications.  Follow-up with your primary care doctor as needed.  Return to the emergency department for any new or worsening symptoms or for any other concerns.

## 2024-12-19 NOTE — ED PROVIDER NOTES
Emergency Department Provider Note        History of Present Illness     CC: Fall     HPI:  This is a 69-year-old male with a past medical history of alcohol use, alcohol withdrawal, hypertension, hypothyroidism, anxiety and depression who presents to the emergency department for generalized weakness and alcohol use.  Patient states that he had about 1-1/2 shots of alcohol this morning and his coffee.  Felt the need to urinate and was trying to get to the bathroom.  Successfully mated to the toilet however was unable to get himself up afterwards.  Denies falling down to the ground but does state that he lowered himself.  Was unable to get himself up after this.  His roommate called EMS.  States that he drinks about every other day.  Denies history of alcohol withdrawal however this is documented multiple times in his chart.  Denies a history of seizures.  Has been eating and drinking well recently.  Denies fever, chills, infectious symptoms.  Denies any current symptoms including chest pain, shortness of breath, or abdominal pain.  Denies extremity pain.  Denies headache or neck pain.  Denies any symptoms at this time.    Limitations to history: Alcohol intoxication   Independent historian(s): None  Records Reviewed: Recent available ED and inpatient notes reviewed in EMR.    PMHx/PSHx:  Per HPI.   - has a past medical history of Generalized anxiety disorder.  - has no past surgical history on file.    Medications:  Reviewed in EMR. See EMR for complete list of medications and doses.    Allergies:  Patient has no known allergies.    Social History:  - Tobacco:  reports that he has been smoking cigarettes. He uses smokeless tobacco.   - Alcohol:  has no history on file for alcohol use.   - Illicit Drugs:  has no history on file for drug use.     ROS:  Per HPI.       Physical Exam     Triage Vitals:  T 36.2 °C (97.2 °F)  HR 59  /78  RR 20  O2        General: Male patient lying comfortably in bed, no acute  cardiorespiratory distress, chronically ill-appearing, appropriately interactive though intoxicated.  Head: Normocephalic. Atraumatic.  Neck: No midline cervical spine tenderness with palpation.  FROM. No gross masses.   Eyes: EOMI. No scleral icterus or injection.  4 mm equal and reactive bilaterally.  ENT: Dry mucous membranes, no apparent trauma or lesions.  CV: Regular rhythm. No murmurs, rubs, gallops appreciated. 2+ radial pulses bilaterally.  Resp: Clear to auscultation bilaterally. No respiratory distress.   GI: Soft, non-distended.  No tenderness with palpation.     MSK: Full ROM in bilateral upper and lower extremities. No gross step offs or deformities.  EXT: No peripheral edema, contusions, or wounds.  Skin: Warm and dry, no rashes or lesions.  Neuro: Alert.  Intoxicated.  No focal neurological deficits.  Equal motor strength in bilateral upper and lower extremities.  Sensation intact throughout.  Speech fluent.      Medical Decision Making & ED Course     Labs:   Labs Reviewed   CBC WITH AUTO DIFFERENTIAL   COMPREHENSIVE METABOLIC PANEL   MAGNESIUM   URINALYSIS WITH REFLEX CULTURE AND MICROSCOPIC    Narrative:     The following orders were created for panel order Urinalysis with Reflex Culture and Microscopic.  Procedure                               Abnormality         Status                     ---------                               -----------         ------                     Urinalysis with Reflex C...[[id number]]                                                 Extra Urine Gray Tube[[id number]]                                                         Please view results for these tests on the individual orders.   ALCOHOL   URINALYSIS WITH REFLEX CULTURE AND MICROSCOPIC   EXTRA URINE GRAY TUBE   POCT GLUCOSE METER        Imaging:   XR chest 2 views    (Results Pending)   CT head wo IV contrast    (Results Pending)   CT cervical spine wo IV contrast    (Results Pending)        EKG:  None    MDM:  This  is a 69-year-old male who presents to the emergency department with generalized weakness and intoxication.  He is hemodynamically stable and in no significant distress upon arrival.  Vital signs are within normal limits.  Aside from intoxication his physical exam is unremarkable.  Suspect his weakness is secondary to alcohol use however will ensure there was no traumatic injury as the patient is an unreliable historian.  Also considered infection including viral illness, urinary tract infection, pneumonia.  Will rule out traumatic injury of the head and neck with CT imaging.  Basic labs and chest x-ray to be obtained as well.     See below for the ED course.  Patient remained without signs of withdrawal while here in the emergency department.  His workup is largely unremarkable.  He is appropriate for discharge home.  Unfortunately his roommate is not willing to accept him back into the home given his alcohol use and frequent falls.  Social work and [facility] were consulted to evaluate the patient.  Patient is agreeable for rehabilitative services with [facility].  They were able to find a rehab facility that accepted the patient for admission.  Patient remained appropriate for discharge and ultimately will be discharged to a rehab facility.      ED Course:  ED Course as of 12/19/24 1903   Thu Dec 19, 2024   1302 Alcohol(!!): 431 [VM]   1302 CT head wo IV contrast  No intracranial abnormality. [VM]   1302 CT cervical spine wo IV contrast  No cervical neck injury. [VM]   1302 XR chest 2 views  No acute cardiopulmonary process. [VM]   1303 CBC and Auto Differential(!)  No leukocytosis.  There is a mild macrocytic anemia of 12.4 not requiring transfusion. [VM]   1303 Comprehensive metabolic panel(!)  No electrolyte abnormalities. [VM]   1303 AST(!): 117  Consistent with chronic alcohol use. []      ED Course User Index  [] Darrell Bautista DO         Diagnoses as of 12/19/24 1903   Alcohol use disorder   Fall, initial  encounter   Generalized weakness       Independent Result Review and Interpretation: Relevant laboratory and radiographic results were reviewed and independently interpreted by myself.  As necessary, they are commented on in the ED Course.    Social Determinants Limiting Care:  None identified      Patient seen by and discussed with the attending emergency medicine physician.       Disposition    Discharge    Darrell Bautista DO   Emergency Medicine PGY-3  [facility]      Procedures      Procedures ? SmartLinks last updated 12/19/2024 10:39 AM        Darrell Bautista DO  Resident  12/19/24 0226

## 2024-12-19 NOTE — ED TRIAGE NOTES
Pt presents to ED via EMS from home for a unwitnessed fall. Pt states he got dizzy in the bathroom. Pt denies LOC or hitting his head. Pt endorses + ETOH.

## 2024-12-19 NOTE — PROGRESS NOTES
Spiritual Care Visit   Notes  Mr. Banks welcomed a visit. This visit showed care and concern and offered the opportunity for emotional, spiritual care if needed. I allowed time and space for spiritual support as part of a holistic approach to wellness that addressed the whole person. *** was offered the opportunity for a visit for emotional, spiritual support. I was unable to assess at this time. This was a holistic approach that addressed wellness as whole-body care the allows for the integration of the body, mind and spirit. I followed the policy for PPE. There are no other needs.  Spiritual Care Request    Reason for Visit:  Routine Visit: Introduction  Continue Visiting: No   Request Received From:  Referral From:   Focus of Care:  Visited With: Patient   Refer to :    Spiritual Care Assessment    Spiritual Assessment:  are Provided:  Sense of Community and or Pentecostalism Affiliation:  None    Addressed Needs/Concerns and/or Catie Through:  Outcome:  Outcome of Spiritual Care Visit: Comfort/healing presence   Advance Directives:    Patient: Humble Banks    Date: 12/19/2024  Time: 3:13 PM  Total time (min.):***    I am available upon request.  [facility] Department of Spiritual Care Contact #: [phone]  Signed by: Rev. Kendra Go ThM, MA

## 2024-12-20 ENCOUNTER — HOSPITAL ENCOUNTER (INPATIENT)
Facility: HOSPITAL | Age: 69
End: 2024-12-20
Attending: EMERGENCY MEDICINE | Admitting: NURSE PRACTITIONER
Payer: MEDICARE

## 2024-12-20 DIAGNOSIS — R26.2 UNABLE TO AMBULATE: Primary | ICD-10-CM

## 2024-12-20 DIAGNOSIS — F10.930 ALCOHOL WITHDRAWAL SYNDROME WITHOUT COMPLICATION (MULTI): ICD-10-CM

## 2024-12-20 DIAGNOSIS — M54.32 LEFT SIDED SCIATICA: ICD-10-CM

## 2024-12-20 DIAGNOSIS — I10 BENIGN ESSENTIAL HYPERTENSION: ICD-10-CM

## 2024-12-20 PROBLEM — S22.49XA RIB FRACTURES: Status: ACTIVE | Noted: 2024-12-20

## 2024-12-20 PROBLEM — M51.369 DDD (DEGENERATIVE DISC DISEASE), LUMBAR: Status: ACTIVE | Noted: 2023-07-19

## 2024-12-20 PROBLEM — F41.1 GENERALIZED ANXIETY DISORDER: Status: ACTIVE | Noted: 2024-12-20

## 2024-12-20 PROBLEM — H43.819 POSTERIOR VITREOUS DETACHMENT: Status: ACTIVE | Noted: 2022-11-08

## 2024-12-20 LAB
ALBUMIN SERPL BCP-MCNC: 4.2 G/DL (ref 3.4–5)
ALP SERPL-CCNC: 80 U/L (ref 33–136)
ALT SERPL W P-5'-P-CCNC: 28 U/L (ref 10–52)
ANION GAP SERPL CALC-SCNC: 20 MMOL/L (ref 10–20)
AST SERPL W P-5'-P-CCNC: 71 U/L (ref 9–39)
BASOPHILS # BLD AUTO: 0.07 X10*3/UL (ref 0–0.1)
BASOPHILS NFR BLD AUTO: 1.4 %
BILIRUB SERPL-MCNC: 0.9 MG/DL (ref 0–1.2)
BUN SERPL-MCNC: 21 MG/DL (ref 6–23)
CALCIUM SERPL-MCNC: 9.7 MG/DL (ref 8.6–10.6)
CHLORIDE SERPL-SCNC: 100 MMOL/L (ref 98–107)
CO2 SERPL-SCNC: 25 MMOL/L (ref 21–32)
CREAT SERPL-MCNC: 0.59 MG/DL (ref 0.5–1.3)
EGFRCR SERPLBLD CKD-EPI 2021: >90 ML/MIN/1.73M*2
EOSINOPHIL # BLD AUTO: 0.06 X10*3/UL (ref 0–0.7)
EOSINOPHIL NFR BLD AUTO: 1.2 %
ERYTHROCYTE [DISTWIDTH] IN BLOOD BY AUTOMATED COUNT: 13.4 % (ref 11.5–14.5)
ETHANOL SERPL-MCNC: <10 MG/DL
GLUCOSE SERPL-MCNC: 84 MG/DL (ref 74–99)
HCT VFR BLD AUTO: 32.7 % (ref 41–52)
HGB BLD-MCNC: 11.8 G/DL (ref 13.5–17.5)
IMM GRANULOCYTES # BLD AUTO: 0.04 X10*3/UL (ref 0–0.7)
IMM GRANULOCYTES NFR BLD AUTO: 0.8 % (ref 0–0.9)
LIPASE SERPL-CCNC: 47 U/L (ref 9–82)
LYMPHOCYTES # BLD AUTO: 1.5 X10*3/UL (ref 1.2–4.8)
LYMPHOCYTES NFR BLD AUTO: 29.6 %
MAGNESIUM SERPL-MCNC: 1.14 MG/DL (ref 1.6–2.4)
MCH RBC QN AUTO: 34.4 PG (ref 26–34)
MCHC RBC AUTO-ENTMCNC: 36.1 G/DL (ref 32–36)
MCV RBC AUTO: 95 FL (ref 80–100)
MONOCYTES # BLD AUTO: 1.24 X10*3/UL (ref 0.1–1)
MONOCYTES NFR BLD AUTO: 24.5 %
NEUTROPHILS # BLD AUTO: 2.16 X10*3/UL (ref 1.2–7.7)
NEUTROPHILS NFR BLD AUTO: 42.5 %
NRBC BLD-RTO: 0 /100 WBCS (ref 0–0)
PLATELET # BLD AUTO: 73 X10*3/UL (ref 150–450)
POTASSIUM SERPL-SCNC: 3.2 MMOL/L (ref 3.5–5.3)
PROT SERPL-MCNC: 7.9 G/DL (ref 6.4–8.2)
RBC # BLD AUTO: 3.43 X10*6/UL (ref 4.5–5.9)
SODIUM SERPL-SCNC: 142 MMOL/L (ref 136–145)
WBC # BLD AUTO: 5.1 X10*3/UL (ref 4.4–11.3)

## 2024-12-20 PROCEDURE — 2500000004 HC RX 250 GENERAL PHARMACY W/ HCPCS (ALT 636 FOR OP/ED): Performed by: NURSE PRACTITIONER

## 2024-12-20 PROCEDURE — 36415 COLL VENOUS BLD VENIPUNCTURE: CPT | Performed by: NURSE PRACTITIONER

## 2024-12-20 PROCEDURE — 97161 PT EVAL LOW COMPLEX 20 MIN: CPT | Mod: GP

## 2024-12-20 PROCEDURE — S4991 NICOTINE PATCH NONLEGEND: HCPCS | Performed by: NURSE PRACTITIONER

## 2024-12-20 PROCEDURE — 2500000005 HC RX 250 GENERAL PHARMACY W/O HCPCS: Performed by: NURSE PRACTITIONER

## 2024-12-20 PROCEDURE — 82077 ASSAY SPEC XCP UR&BREATH IA: CPT | Performed by: NURSE PRACTITIONER

## 2024-12-20 PROCEDURE — 96372 THER/PROPH/DIAG INJ SC/IM: CPT | Performed by: NURSE PRACTITIONER

## 2024-12-20 PROCEDURE — 2500000001 HC RX 250 WO HCPCS SELF ADMINISTERED DRUGS (ALT 637 FOR MEDICARE OP): Performed by: NURSE PRACTITIONER

## 2024-12-20 PROCEDURE — 1100000001 HC PRIVATE ROOM DAILY

## 2024-12-20 PROCEDURE — 99223 1ST HOSP IP/OBS HIGH 75: CPT | Performed by: NURSE PRACTITIONER

## 2024-12-20 PROCEDURE — 80053 COMPREHEN METABOLIC PANEL: CPT | Performed by: NURSE PRACTITIONER

## 2024-12-20 PROCEDURE — 97110 THERAPEUTIC EXERCISES: CPT | Mod: GP

## 2024-12-20 PROCEDURE — 99285 EMERGENCY DEPT VISIT HI MDM: CPT | Performed by: EMERGENCY MEDICINE

## 2024-12-20 PROCEDURE — 83690 ASSAY OF LIPASE: CPT | Performed by: NURSE PRACTITIONER

## 2024-12-20 PROCEDURE — 83735 ASSAY OF MAGNESIUM: CPT | Performed by: NURSE PRACTITIONER

## 2024-12-20 PROCEDURE — 2500000002 HC RX 250 W HCPCS SELF ADMINISTERED DRUGS (ALT 637 FOR MEDICARE OP, ALT 636 FOR OP/ED): Performed by: NURSE PRACTITIONER

## 2024-12-20 PROCEDURE — 85025 COMPLETE CBC W/AUTO DIFF WBC: CPT | Performed by: NURSE PRACTITIONER

## 2024-12-20 PROCEDURE — 99285 EMERGENCY DEPT VISIT HI MDM: CPT | Performed by: NURSE PRACTITIONER

## 2024-12-20 RX ORDER — ACETAMINOPHEN 500 MG
5 TABLET ORAL NIGHTLY PRN
Status: DISCONTINUED | OUTPATIENT
Start: 2024-12-20 | End: 2024-12-27 | Stop reason: HOSPADM

## 2024-12-20 RX ORDER — LIDOCAINE 560 MG/1
1 PATCH PERCUTANEOUS; TOPICAL; TRANSDERMAL DAILY
Status: DISCONTINUED | OUTPATIENT
Start: 2024-12-20 | End: 2024-12-27 | Stop reason: HOSPADM

## 2024-12-20 RX ORDER — BUPROPION HYDROCHLORIDE 150 MG/1
150 TABLET ORAL DAILY
Status: DISCONTINUED | OUTPATIENT
Start: 2024-12-21 | End: 2024-12-27 | Stop reason: HOSPADM

## 2024-12-20 RX ORDER — MULTIVIT-MIN/IRON FUM/FOLIC AC 7.5 MG-4
1 TABLET ORAL DAILY
Status: DISCONTINUED | OUTPATIENT
Start: 2024-12-21 | End: 2024-12-27 | Stop reason: HOSPADM

## 2024-12-20 RX ORDER — POTASSIUM CHLORIDE 20 MEQ/1
40 TABLET, EXTENDED RELEASE ORAL ONCE
Status: COMPLETED | OUTPATIENT
Start: 2024-12-20 | End: 2024-12-20

## 2024-12-20 RX ORDER — IBUPROFEN 600 MG/1
600 TABLET ORAL EVERY 6 HOURS PRN
Status: DISCONTINUED | OUTPATIENT
Start: 2024-12-20 | End: 2024-12-27 | Stop reason: HOSPADM

## 2024-12-20 RX ORDER — LORAZEPAM 1 MG/1
2 TABLET ORAL EVERY 2 HOUR PRN
Status: DISCONTINUED | OUTPATIENT
Start: 2024-12-20 | End: 2024-12-27 | Stop reason: HOSPADM

## 2024-12-20 RX ORDER — METHOCARBAMOL 500 MG/1
500 TABLET, FILM COATED ORAL EVERY 6 HOURS PRN
Status: DISCONTINUED | OUTPATIENT
Start: 2024-12-20 | End: 2024-12-27 | Stop reason: HOSPADM

## 2024-12-20 RX ORDER — ENOXAPARIN SODIUM 100 MG/ML
40 INJECTION SUBCUTANEOUS EVERY 24 HOURS
Status: DISCONTINUED | OUTPATIENT
Start: 2024-12-20 | End: 2024-12-27 | Stop reason: HOSPADM

## 2024-12-20 RX ORDER — POLYETHYLENE GLYCOL 3350 17 G/17G
17 POWDER, FOR SOLUTION ORAL DAILY PRN
Status: DISCONTINUED | OUTPATIENT
Start: 2024-12-20 | End: 2024-12-27 | Stop reason: HOSPADM

## 2024-12-20 RX ORDER — TRAMADOL HYDROCHLORIDE 50 MG/1
50 TABLET ORAL EVERY 6 HOURS PRN
Status: ACTIVE | OUTPATIENT
Start: 2024-12-20 | End: 2024-12-21

## 2024-12-20 RX ORDER — LORAZEPAM 1 MG/1
1 TABLET ORAL EVERY 2 HOUR PRN
Status: DISCONTINUED | OUTPATIENT
Start: 2024-12-20 | End: 2024-12-27 | Stop reason: HOSPADM

## 2024-12-20 RX ORDER — ONDANSETRON 4 MG/1
4 TABLET, FILM COATED ORAL EVERY 6 HOURS PRN
COMMUNITY
Start: 2024-12-20

## 2024-12-20 RX ORDER — ONDANSETRON 4 MG/1
4 TABLET, ORALLY DISINTEGRATING ORAL EVERY 6 HOURS PRN
Status: DISCONTINUED | OUTPATIENT
Start: 2024-12-20 | End: 2024-12-20

## 2024-12-20 RX ORDER — PROPRANOLOL HYDROCHLORIDE 40 MG/1
40 TABLET ORAL 2 TIMES DAILY
Status: DISCONTINUED | OUTPATIENT
Start: 2024-12-20 | End: 2024-12-27 | Stop reason: HOSPADM

## 2024-12-20 RX ORDER — ONDANSETRON HYDROCHLORIDE 2 MG/ML
4 INJECTION, SOLUTION INTRAVENOUS EVERY 6 HOURS PRN
Status: DISCONTINUED | OUTPATIENT
Start: 2024-12-20 | End: 2024-12-20

## 2024-12-20 RX ORDER — ESCITALOPRAM OXALATE 10 MG/1
10 TABLET ORAL DAILY
Status: DISCONTINUED | OUTPATIENT
Start: 2024-12-21 | End: 2024-12-27 | Stop reason: HOSPADM

## 2024-12-20 RX ORDER — LEVOTHYROXINE SODIUM 75 UG/1
75 TABLET ORAL DAILY
Status: DISCONTINUED | OUTPATIENT
Start: 2024-12-21 | End: 2024-12-27 | Stop reason: HOSPADM

## 2024-12-20 RX ORDER — CLONIDINE HYDROCHLORIDE 0.1 MG/1
0.1 TABLET ORAL AS NEEDED
COMMUNITY
Start: 2024-12-20 | End: 2024-12-20 | Stop reason: ALTCHOICE

## 2024-12-20 RX ORDER — LANOLIN ALCOHOL/MO/W.PET/CERES
100 CREAM (GRAM) TOPICAL DAILY
Status: DISCONTINUED | OUTPATIENT
Start: 2024-12-20 | End: 2024-12-21

## 2024-12-20 RX ORDER — IBUPROFEN 600 MG/1
600 TABLET ORAL EVERY 6 HOURS PRN
COMMUNITY
Start: 2024-12-20

## 2024-12-20 RX ORDER — FOLIC ACID 1 MG/1
1 TABLET ORAL DAILY
Status: DISCONTINUED | OUTPATIENT
Start: 2024-12-20 | End: 2024-12-27 | Stop reason: HOSPADM

## 2024-12-20 RX ORDER — KETOROLAC TROMETHAMINE 30 MG/ML
30 INJECTION, SOLUTION INTRAMUSCULAR; INTRAVENOUS ONCE
Status: COMPLETED | OUTPATIENT
Start: 2024-12-20 | End: 2024-12-20

## 2024-12-20 RX ORDER — IBUPROFEN 200 MG
1 TABLET ORAL EVERY 24 HOURS
Status: DISCONTINUED | OUTPATIENT
Start: 2024-12-20 | End: 2024-12-27 | Stop reason: HOSPADM

## 2024-12-20 RX ORDER — MAGNESIUM SULFATE HEPTAHYDRATE 40 MG/ML
2 INJECTION, SOLUTION INTRAVENOUS ONCE
Status: COMPLETED | OUTPATIENT
Start: 2024-12-20 | End: 2024-12-21

## 2024-12-20 RX ORDER — LORAZEPAM 0.5 MG/1
0.5 TABLET ORAL EVERY 2 HOUR PRN
Status: DISCONTINUED | OUTPATIENT
Start: 2024-12-20 | End: 2024-12-27 | Stop reason: HOSPADM

## 2024-12-20 RX ORDER — ORPHENADRINE CITRATE 30 MG/ML
60 INJECTION INTRAMUSCULAR; INTRAVENOUS ONCE
Status: COMPLETED | OUTPATIENT
Start: 2024-12-20 | End: 2024-12-20

## 2024-12-20 RX ADMIN — ORPHENADRINE CITRATE 60 MG: 30 INJECTION, SOLUTION INTRAMUSCULAR; INTRAVENOUS at 15:08

## 2024-12-20 RX ADMIN — IBUPROFEN 600 MG: 600 TABLET, FILM COATED ORAL at 22:52

## 2024-12-20 RX ADMIN — FOLIC ACID 1 MG: 1 TABLET ORAL at 21:49

## 2024-12-20 RX ADMIN — POTASSIUM CHLORIDE 40 MEQ: 1500 TABLET, EXTENDED RELEASE ORAL at 21:49

## 2024-12-20 RX ADMIN — THIAMINE HCL TAB 100 MG 100 MG: 100 TAB at 21:49

## 2024-12-20 RX ADMIN — LIDOCAINE 1 PATCH: 4 PATCH TOPICAL at 21:49

## 2024-12-20 RX ADMIN — KETOROLAC TROMETHAMINE 30 MG: 30 INJECTION, SOLUTION INTRAMUSCULAR; INTRAVENOUS at 15:10

## 2024-12-20 RX ADMIN — MAGNESIUM SULFATE IN WATER 2 G: 40 INJECTION, SOLUTION INTRAVENOUS at 21:49

## 2024-12-20 RX ADMIN — NICOTINE 1 PATCH: 14 PATCH, EXTENDED RELEASE TRANSDERMAL at 21:49

## 2024-12-20 ASSESSMENT — LIFESTYLE VARIABLES
ANXIETY: NO ANXIETY, AT EASE
AGITATION: NORMAL ACTIVITY
TREMOR: NO TREMOR
HEADACHE, FULLNESS IN HEAD: NOT PRESENT
PAROXYSMAL SWEATS: NO SWEAT VISIBLE
VISUAL DISTURBANCES: NOT PRESENT
BLOOD PRESSURE: 149/91
ORIENTATION AND CLOUDING OF SENSORIUM: ORIENTED AND CAN DO SERIAL ADDITIONS
AUDITORY DISTURBANCES: NOT PRESENT
PULSE: 65
NAUSEA AND VOMITING: NO NAUSEA AND NO VOMITING

## 2024-12-20 ASSESSMENT — PAIN - FUNCTIONAL ASSESSMENT
PAIN_FUNCTIONAL_ASSESSMENT: 0-10
PAIN_FUNCTIONAL_ASSESSMENT: 0-10

## 2024-12-20 ASSESSMENT — PAIN DESCRIPTION - LOCATION
LOCATION: LEG
LOCATION: LEG
LOCATION: BACK

## 2024-12-20 ASSESSMENT — ACTIVITIES OF DAILY LIVING (ADL): LACK_OF_TRANSPORTATION: NO

## 2024-12-20 ASSESSMENT — COGNITIVE AND FUNCTIONAL STATUS - GENERAL
CLIMB 3 TO 5 STEPS WITH RAILING: TOTAL
MOBILITY SCORE: 13
STANDING UP FROM CHAIR USING ARMS: A LOT
TURNING FROM BACK TO SIDE WHILE IN FLAT BAD: A LITTLE
MOVING FROM LYING ON BACK TO SITTING ON SIDE OF FLAT BED WITH BEDRAILS: A LITTLE
MOVING TO AND FROM BED TO CHAIR: A LOT
WALKING IN HOSPITAL ROOM: A LOT

## 2024-12-20 ASSESSMENT — ENCOUNTER SYMPTOMS
WEAKNESS: 1
HEADACHES: 0
VOMITING: 0
CHILLS: 0
COUGH: 0
DIARRHEA: 0
DIFFICULTY URINATING: 0
SHORTNESS OF BREATH: 0
NAUSEA: 0
DIZZINESS: 0
FEVER: 0
BACK PAIN: 1
ABDOMINAL PAIN: 0

## 2024-12-20 ASSESSMENT — PAIN SCALES - GENERAL
PAINLEVEL_OUTOF10: 7

## 2024-12-20 ASSESSMENT — PAIN DESCRIPTION - PAIN TYPE: TYPE: ACUTE PAIN

## 2024-12-20 ASSESSMENT — PAIN DESCRIPTION - DESCRIPTORS
DESCRIPTORS: ACHING
DESCRIPTORS: TINGLING

## 2024-12-20 ASSESSMENT — PAIN DESCRIPTION - ORIENTATION: ORIENTATION: LEFT;LOWER

## 2024-12-20 NOTE — PROGRESS NOTES
12/20/24 1644   Discharge Planning   Living Arrangements Friends   Support Systems Friends/neighbors   Assistance Needed walker   Type of Residence Private residence   Number of Stairs to Enter Residence 4   Number of Stairs Within Residence 9  (basement)   Do you have animals or pets at home? No   Who is requesting discharge planning? Provider   Home or Post Acute Services None   Expected Discharge Disposition SNF   Does the patient need discharge transport arranged? Yes   RoundTrip coordination needed? Yes   Financial Resource Strain   How hard is it for you to pay for the very basics like food, housing, medical care, and heating? Somewhat   Housing Stability   In the last 12 months, was there a time when you were not able to pay the mortgage or rent on time? Y   In the past 12 months, how many times have you moved where you were living? 1   At any time in the past 12 months, were you homeless or living in a shelter (including now)? N   Transportation Needs   In the past 12 months, has lack of transportation kept you from medical appointments or from getting medications? no   In the past 12 months, has lack of transportation kept you from meetings, work, or from getting things needed for daily living? No   Patient Choice   Patient / Family choosing to utilize agency / facility established prior to hospitalization No   Stroke Family Assessment   Stroke Family Assessment Needed No   Intensity of Service   Intensity of Service 0-30 min     Humble Banks is a 69 y.o. male on day 0 of admission     TCC introduced self to patient and role in care. Patient reports he lives in a single family home with roommate Earnest who is a support to him.  Patient reports before admission he used a walker to ambulate and was independent for all ADLs. He reports he has been having numbness and tingling in his left side for months but the trouble walking only started today. When asked about physical therapy and going to a SNF for  "rehab patient reports he wants to \"fix his feet\" and wants to go back home. TCC informed patient that in order to go home safely he may need to go to the SNF to help strengthen his legs. Patient stated he would think about it. Patient reports having a PCP, Dr. Iverson, out of [place]. Patient does report he is having trouble paying his mortgage and water and sewage bills. TCC will continue to follow patient for discharge planning.    1733- PT recommended SNF for patient. TCC to review choices with patient.    1840- TCC gave patient a list of SNFs nearby his residence to review. TCC will check back in with patient for choices.    1937- Patient allowed TCC to chose 4 facilities as long as he is close to home. Referral sent to [facility], [facility], [facility] and [facility] and the [facility] in [facility]     Nikki Meraz, MELINDA      "

## 2024-12-20 NOTE — ED PROVIDER NOTES
Chief Complaint   Patient presents with    Leg Pain    Sciatica       HPI       69 year old male presents to the Emergency Department today complaining of left lower back pain that he describes as tingling in nature, constant for the past week, radiates down the lateral aspect of the left leg to the knee, and varies in intensity. Reports to having a longstanding history of such. Denies any associated fever, chills, headache, neck pain, chest pain, shortness of breath, abdominal pain, nausea, vomiting, diarrhea, constipation, hematemesis, hematochezia, melena, urinary symptoms, paresthesias, focal weakness of extremities, or problems with bowel or bladder function. Was evaluated at [facility] yesterday and was discharged to an alcohol rehabilitation facility. When it was noted that he could not walk, he was brought here for further evaluation and care for ECF placement.       History provided by:  Patient             Patient History   Past Medical History:   Diagnosis Date    Generalized anxiety disorder     Anxiety, generalized     No past surgical history on file.  Family History   Problem Relation Name Age of Onset    No Known Problems Mother      No Known Problems Father       Social History     Tobacco Use    Smoking status: Every Day     Types: Cigarettes    Smokeless tobacco: Current   Substance Use Topics    Alcohol use: Not on file    Drug use: Not on file           Physical Exam  Constitutional:       Appearance: Normal appearance.   HENT:      Head: Normocephalic.      Right Ear: Tympanic membrane, ear canal and external ear normal.      Left Ear: Tympanic membrane, ear canal and external ear normal.      Nose: Nose normal. No septal deviation.      Right Turbinates: Not enlarged.      Left Turbinates: Not enlarged.      Mouth/Throat:      Lips: Pink.      Mouth: Mucous membranes are moist.      Dentition: No dental caries.      Tongue: No lesions.      Pharynx: Oropharynx is clear. Uvula midline.       Tonsils: No tonsillar exudate. 1+ on the right. 1+ on the left.   Eyes:      General: Lids are normal.      Conjunctiva/sclera: Conjunctivae normal.   Cardiovascular:      Rate and Rhythm: Normal rate and regular rhythm.      Pulses:           Radial pulses are 3+ on the right side and 3+ on the left side.      Heart sounds: Normal heart sounds. No murmur heard.     No friction rub. No gallop.   Pulmonary:      Effort: Pulmonary effort is normal.      Breath sounds: Normal breath sounds. No wheezing, rhonchi or rales.   Abdominal:      General: Abdomen is flat. Bowel sounds are normal.      Palpations: Abdomen is soft.      Tenderness: There is no abdominal tenderness. There is no right CVA tenderness or left CVA tenderness. Negative signs include Trejo's sign and McBurney's sign.   Musculoskeletal:      Cervical back: Full passive range of motion without pain and neck supple.      Comments: No edema, cyanosis, or clubbing noted. No spinous process tenderness, but does have left paraspinal muscle tenderness to the lumbar sacral region. No saddle paresthesias. Negative straight leg raises. Able to plantarflex and dorsiflex bilateral great toes without difficulty.   Lymphadenopathy:      Cervical: No cervical adenopathy.   Skin:     General: Skin is warm.      Capillary Refill: Capillary refill takes less than 2 seconds.      Findings: No petechiae or rash. Rash is not purpuric.   Neurological:      Mental Status: He is alert and oriented to person, place, and time.      Sensory: Sensation is intact.      Motor: Motor function is intact.      Coordination: Coordination is intact.      Gait: Gait is intact.   Psychiatric:         Attention and Perception: Attention normal.         Mood and Affect: Mood normal.         Speech: Speech normal.         Behavior: Behavior normal. Behavior is cooperative.         Labs Reviewed   CBC WITH AUTO DIFFERENTIAL   COMPREHENSIVE METABOLIC PANEL   LIPASE   ALCOHOL       No orders to  display            ED Course & MDM   ED Course as of 12/20/24 1758   Fri Dec 20, 2024   1704 CIWA is a 1. PT at bedside for evaluation.   [JZ]   1727 PT recommends SNF placement. [JZ]   1747 Social work recommends admission due to insurance approval will not likely occur until Monday.   [JZ]   1752 Reached out to [facility] for admission and they are requesting labs prior to accepting for admission. [JZ]      ED Course User Index  [JZ] ADELITA Arciniega-CNP         Diagnoses as of 12/20/24 1758   Unable to ambulate           Medical Decision Making  Patient was seen and evaluated by Dr. Parks. Social work consulted for evaluation for SNF placement. PT and OT consults placed. PT recommends SNF placement. Social work recommends admission due to medical approval not being completed until Monday. [facility] Plan is to admit for SNF placement when labs are resulted. Care signed out to oncoming staff pending such.    Diagnostic Impression:    1. Inability to ambulate           Your medication list        ASK your doctor about these medications        Instructions Last Dose Given Next Dose Due   buPROPion  mg 24 hr tablet  Commonly known as: Wellbutrin XL      Take 1 tablet (150 mg) by mouth once daily for 14 days.       escitalopram 10 mg tablet  Commonly known as: Lexapro      Take 1 tablet (10 mg) by mouth once daily for 14 days.       levothyroxine 75 mcg tablet  Commonly known as: Synthroid, Levoxyl      Take 1 tablet (75 mcg) by mouth once daily for 14 days.       multivitamin with minerals tablet      Take 1 tablet by mouth once daily for 14 days.       propranolol 40 mg tablet  Commonly known as: Inderal      Take 1 tablet (40 mg) by mouth 2 times a day for 14 days.                  Procedure  Procedures     NEVA Arciniega  12/20/24 7359

## 2024-12-20 NOTE — ED TRIAGE NOTES
Pt states that he fell yesterday and has left leg pain, pt was seen yesterday for same complaint, denies any new trauma or injury

## 2024-12-20 NOTE — PROGRESS NOTES
Physical Therapy    Physical Therapy Evaluation & Treatment    Patient Name: Humble Banks  MRN: [id number]  Department: [facility] [place] [facility] ED  Room: [id number]*  Today's Date: 12/20/2024   Time Calculation  Start Time: 1645  Stop Time: 1709  Time Calculation (min): 24 min    Assessment/Plan   PT Assessment  PT Assessment Results: Decreased strength, Impaired balance, Decreased mobility, Decreased coordination, Decreased safety awareness  Rehab Prognosis: Good  Barriers to Discharge Home: Caregiver assistance, Cognition needs, Physical needs  Caregiver Assistance: Caregiver assistance needed per identified barriers - however, level of patient's required assistance exceeds assistance available at home  Cognition Needs: 24hr supervision for safety awareness needed, Insight of patient limited regarding functional ability/needs, Cognition-related high falls risk  Physical Needs: Stair navigation into home limited by function/safety, Ambulating household distances limited by function/safety, High falls risk due to function or environment  End of Session Communication: Bedside nurse  Assessment Comment: Pt currently demos decreased strength and poor balance, needing increased assist levels to complete mobility tasks.  Will benefit from skilled PT to address mobilty deficits.  End of Session Patient Position: Up in chair, Alarm off, not on at start of session   IP OR SWING BED PT PLAN  Inpatient or Swing Bed: Inpatient  PT Plan  Treatment/Interventions: Bed mobility, Transfer training, Gait training, Stair training, Balance training, Strengthening, Therapeutic exercise, Therapeutic activity  PT Plan: Ongoing PT  PT Frequency: 3 times per week  PT Discharge Recommendations: Moderate intensity level of continued care  PT Recommended Transfer Status: Assist x1  PT - OK to Discharge: Yes (POC/goals/discharge rec intensity created)      Subjective     General Visit Information:  General  Reason for Referral: falls, difficulty  "amb  Past Medical History Relevant to Rehab: alcohol abuse, HTN, anxiety, Wernicke encephalopathy?  Prior to Session Communication: Bedside nurse  General Comment: Pt agreeable to participate, reports ongoing tingling in (L) LE, reports has not been up walking this date. States falls were from \"passing out\".  Demonstrates difficulty with sit-> stand transfers and amb with whw, needing assist to maintain safety.  Some difficulty with directional cues.  Will continue to follow.  Home Living:  Home Living  Type of Home: House  Lives With: Friends  Home Adaptive Equipment: Walker rolling or standard  Home Access: Stairs to enter with rails  Entrance Stairs-Number of Steps: 3  Home Living Comments: was in [place] ER yesterday for falls, was discharged to Alcohol inpatient rehab center but could not ambulate there, sent to [facility] ED  Prior Level of Function:  Prior Function Per Pt/Caregiver Report  ADL Assistance:  (typically (I))  Ambulatory Assistance:  (reports (I) with rollator)  Prior Function Comments: noted hospital admission in Oct with falls/weakness with discharge to SNF  Precautions:  Precautions  Hearing/Visual Limitations: appears WFL  Medical Precautions: Fall precautions    Vital Signs (Past 2hrs)        Date/Time Vitals Session Patient Position Pulse Resp SpO2 BP MAP (mmHg)    12/20/24 1652 --  --  65  --  --  149/91  --                       Objective   Pain:  Pain Assessment  Pain Assessment: 0-10  0-10 (Numeric) Pain Score: 7  Pain Location: Leg  Pain Orientation: Left  Pain Descriptors: Tingling  Cognition:  Cognition  Arousal/Alertness: Appropriate responses to stimuli  Following Commands: Follows one step commands with repetition (some difficulty with multistep cues, difficulty with sequencing tasks)  Safety Judgment: Decreased awareness of need for safety precautions  Insight: Mild  Impulsive: Moderately    General Assessments:     Activity Tolerance  Endurance: Tolerates 10 - 20 min exercise with " multiple rests    Sensation  Sensation Comment: reports tingling (L) LE but also describes as pain        Perception  Inattention/Neglect: Appears intact  Initiation: Cues to initiate tasks  Motor Planning: Hand over hand to sequence tasks  Perseveration: Not present    Coordination  Movements are Fluid and Coordinated: No  Coordination Comment: tremors (B) UEs and LEs with activity, (B) UE also at rest    Postural Control  Posture Comment: kyphotic posture     Static Standing Balance  Static Standing-Balance Support: Bilateral upper extremity supported  Static Standing-Level of Assistance: Minimum assistance, Contact guard  Static Standing-Comment/Number of Minutes: whw  Dynamic Standing Balance  Dynamic Standing-Balance Support: Bilateral upper extremity supported  Dynamic Standing-Level of Assistance: Moderate assistance  Dynamic Standing-Comments: whw  Functional Assessments:  Bed Mobility  Bed Mobility: No (seated in wheelchair in ED will upon PT entry and exit)    Transfers  Transfer: Yes  Transfer 1  Transfer From 1: Chair with arms to  Transfer to 1: Stand  Transfer Device 1: Walker  Transfer Level of Assistance 1: Maximum assistance, Maximum verbal cues, Minimal tactile cues  Trials/Comments 1: cues to push off wheelchair arms initially though pt unsuccesful with first attempt.  Second attempt with 2 hands on walker also unsuccessful.  Required step by step cues for rocking motion and momentum to complete full stand on 3rd trial.  Transfers 2  Transfer From 2: Stand to  Transfer to 2: Chair with arms  Transfer Level of Assistance 2: Moderate assistance, Moderate verbal cues, Minimal tactile cues  Trials/Comments 2: decreased control with sitting into lower chair, completed x2, heavy verbal cues for safety  Transfers 3  Transfer From 3: Chair with arms to  Transfer to 3: Stand  Transfer Level of Assistance 3: Moderate assistance, Moderate verbal cues, Minimal tactile cues  Trials/Comments 3: pushing off  wheelchair with 2 hands, impulsively trying to complete on his own to fix his pants though does need less assist on this trial    Ambulation/Gait Training  Ambulation/Gait Training Performed: Yes  Ambulation/Gait Training 1  Surface 1: Level tile  Device 1: Rolling walker  Assistance 1: Moderate assistance, Maximum verbal cues, Minimal tactile cues  Quality of Gait 1: Narrow base of support, Decreased step length (shaky/tremors in extremities, limited foot to floor clearance)  Comments/Distance (ft) 1: 2 ft fwd/bkwd, deferred further due to poor balance and tremors  Extremity/Trunk Assessments:  RLE   RLE : Exceptions to WFL  Strength RLE  RLE Overall Strength: Greater than or equal to 3/5 as evidenced by functional mobility  LLE   LLE : Exceptions to WFL  Strength LLE  LLE Overall Strength: Greater than or equal to 3/5 as evidenced by functional mobility  Treatments:  Therapeutic Exercise  Therapeutic Exercise Performed: Yes  Therapeutic Exercise Activity 1: (B) LE : APs, LAQs cues for performance, demonstration for slow controlled performance with LAQ x 10reps each  Therapeutic Exercise Activity 2: standing marches in place with mod Ax1 and whw for balance x 10 reps each, limited foot to floor clearance noted, shaky throughout  Outcome Measures:  [facility] Basic Mobility  Turning from your back to your side while in a flat bed without using bedrails: A little  Moving from lying on your back to sitting on the side of a flat bed without using bedrails: A little  Moving to and from bed to chair (including a wheelchair): A lot  Standing up from a chair using your arms (e.g. wheelchair or bedside chair): A lot  To walk in hospital room: A lot  Climbing 3-5 steps with railing: Total  Basic Mobility - Total Score: 13    Encounter Problems       Encounter Problems (Active)       Balance       Patient will maintain balance to allow for safe mobility with CGA and whw.        Start:  12/20/24    Expected End:  01/03/25                Mobility       STG - Patient will ambulate with whw, CGA, 25 ft x 4       Start:  12/20/24    Expected End:  01/03/25            as feasibly safe to attempt to simulate home setup, will complete 3 steps with mod Ax1.        Start:  12/20/24    Expected End:  01/03/25               PT Transfers       STG - Patient will transfer sit to and from stand with CGA.        Start:  12/20/24    Expected End:  01/03/25                   Education Documentation  Mobility Training, taught by Tona Hardy, PT at 12/20/2024  5:32 PM.  Learner: Patient  Readiness: Acceptance  Method: Explanation, Demonstration  Response: Verbalizes Understanding, Needs Reinforcement  Comment: safety with mobility/transfers, techniques      12/20/24 at 5:34 PM - Tona Hardy, PT

## 2024-12-21 LAB
ALBUMIN SERPL BCP-MCNC: 4.1 G/DL (ref 3.4–5)
ALP SERPL-CCNC: 70 U/L (ref 33–136)
ALT SERPL W P-5'-P-CCNC: 46 U/L (ref 10–52)
ANION GAP SERPL CALC-SCNC: 19 MMOL/L (ref 10–20)
APTT PPP: 34 SECONDS (ref 27–38)
AST SERPL W P-5'-P-CCNC: 142 U/L (ref 9–39)
BASOPHILS # BLD AUTO: 0.12 X10*3/UL (ref 0–0.1)
BASOPHILS NFR BLD AUTO: 1.5 %
BILIRUB SERPL-MCNC: 1 MG/DL (ref 0–1.2)
BUN SERPL-MCNC: 21 MG/DL (ref 6–23)
CALCIUM SERPL-MCNC: 9.8 MG/DL (ref 8.6–10.6)
CHLORIDE SERPL-SCNC: 102 MMOL/L (ref 98–107)
CO2 SERPL-SCNC: 23 MMOL/L (ref 21–32)
CREAT SERPL-MCNC: 0.56 MG/DL (ref 0.5–1.3)
EGFRCR SERPLBLD CKD-EPI 2021: >90 ML/MIN/1.73M*2
EOSINOPHIL # BLD AUTO: 0.15 X10*3/UL (ref 0–0.7)
EOSINOPHIL NFR BLD AUTO: 1.9 %
ERYTHROCYTE [DISTWIDTH] IN BLOOD BY AUTOMATED COUNT: 13.7 % (ref 11.5–14.5)
GLUCOSE SERPL-MCNC: 73 MG/DL (ref 74–99)
HAV IGM SER QL: NONREACTIVE
HBV CORE IGM SER QL: NONREACTIVE
HBV SURFACE AG SERPL QL IA: NONREACTIVE
HCT VFR BLD AUTO: 36.2 % (ref 41–52)
HCV AB SER QL: NONREACTIVE
HGB BLD-MCNC: 12 G/DL (ref 13.5–17.5)
IMM GRANULOCYTES # BLD AUTO: 0.05 X10*3/UL (ref 0–0.7)
IMM GRANULOCYTES NFR BLD AUTO: 0.6 % (ref 0–0.9)
INR PPP: 1.1 (ref 0.9–1.1)
LYMPHOCYTES # BLD AUTO: 2.34 X10*3/UL (ref 1.2–4.8)
LYMPHOCYTES NFR BLD AUTO: 30.1 %
MAGNESIUM SERPL-MCNC: 1.6 MG/DL (ref 1.6–2.4)
MCH RBC QN AUTO: 33.2 PG (ref 26–34)
MCHC RBC AUTO-ENTMCNC: 33.1 G/DL (ref 32–36)
MCV RBC AUTO: 100 FL (ref 80–100)
MONOCYTES # BLD AUTO: 1.38 X10*3/UL (ref 0.1–1)
MONOCYTES NFR BLD AUTO: 17.7 %
NEUTROPHILS # BLD AUTO: 3.74 X10*3/UL (ref 1.2–7.7)
NEUTROPHILS NFR BLD AUTO: 48.2 %
NRBC BLD-RTO: 0 /100 WBCS (ref 0–0)
PLATELET # BLD AUTO: 78 X10*3/UL (ref 150–450)
POTASSIUM SERPL-SCNC: 4 MMOL/L (ref 3.5–5.3)
PROT SERPL-MCNC: 7.7 G/DL (ref 6.4–8.2)
PROTHROMBIN TIME: 12.8 SECONDS (ref 9.8–12.8)
RBC # BLD AUTO: 3.61 X10*6/UL (ref 4.5–5.9)
SODIUM SERPL-SCNC: 140 MMOL/L (ref 136–145)
WBC # BLD AUTO: 7.8 X10*3/UL (ref 4.4–11.3)

## 2024-12-21 PROCEDURE — 80074 ACUTE HEPATITIS PANEL: CPT | Performed by: NURSE PRACTITIONER

## 2024-12-21 PROCEDURE — 84075 ASSAY ALKALINE PHOSPHATASE: CPT | Performed by: NURSE PRACTITIONER

## 2024-12-21 PROCEDURE — 83735 ASSAY OF MAGNESIUM: CPT | Performed by: NURSE PRACTITIONER

## 2024-12-21 PROCEDURE — S4991 NICOTINE PATCH NONLEGEND: HCPCS | Performed by: NURSE PRACTITIONER

## 2024-12-21 PROCEDURE — 36415 COLL VENOUS BLD VENIPUNCTURE: CPT | Performed by: NURSE PRACTITIONER

## 2024-12-21 PROCEDURE — 2500000001 HC RX 250 WO HCPCS SELF ADMINISTERED DRUGS (ALT 637 FOR MEDICARE OP): Performed by: STUDENT IN AN ORGANIZED HEALTH CARE EDUCATION/TRAINING PROGRAM

## 2024-12-21 PROCEDURE — 2500000002 HC RX 250 W HCPCS SELF ADMINISTERED DRUGS (ALT 637 FOR MEDICARE OP, ALT 636 FOR OP/ED): Performed by: NURSE PRACTITIONER

## 2024-12-21 PROCEDURE — 99233 SBSQ HOSP IP/OBS HIGH 50: CPT | Performed by: STUDENT IN AN ORGANIZED HEALTH CARE EDUCATION/TRAINING PROGRAM

## 2024-12-21 PROCEDURE — 1100000001 HC PRIVATE ROOM DAILY

## 2024-12-21 PROCEDURE — 85610 PROTHROMBIN TIME: CPT | Performed by: NURSE PRACTITIONER

## 2024-12-21 PROCEDURE — 85025 COMPLETE CBC W/AUTO DIFF WBC: CPT | Performed by: NURSE PRACTITIONER

## 2024-12-21 PROCEDURE — 2500000001 HC RX 250 WO HCPCS SELF ADMINISTERED DRUGS (ALT 637 FOR MEDICARE OP): Performed by: NURSE PRACTITIONER

## 2024-12-21 PROCEDURE — 2500000004 HC RX 250 GENERAL PHARMACY W/ HCPCS (ALT 636 FOR OP/ED): Performed by: STUDENT IN AN ORGANIZED HEALTH CARE EDUCATION/TRAINING PROGRAM

## 2024-12-21 RX ORDER — LOPERAMIDE HYDROCHLORIDE 2 MG/1
2 CAPSULE ORAL 4 TIMES DAILY PRN
Status: DISCONTINUED | OUTPATIENT
Start: 2024-12-21 | End: 2024-12-27 | Stop reason: HOSPADM

## 2024-12-21 RX ORDER — THIAMINE HYDROCHLORIDE 100 MG/ML
500 INJECTION, SOLUTION INTRAMUSCULAR; INTRAVENOUS 3 TIMES DAILY
Status: COMPLETED | OUTPATIENT
Start: 2024-12-21 | End: 2024-12-24

## 2024-12-21 RX ADMIN — LOPERAMIDE HYDROCHLORIDE 2 MG: 2 CAPSULE ORAL at 16:53

## 2024-12-21 RX ADMIN — LORAZEPAM 2 MG: 1 TABLET ORAL at 22:31

## 2024-12-21 RX ADMIN — ESCITALOPRAM OXALATE 10 MG: 10 TABLET ORAL at 10:10

## 2024-12-21 RX ADMIN — PROPRANOLOL HYDROCHLORIDE 40 MG: 40 TABLET ORAL at 01:02

## 2024-12-21 RX ADMIN — LEVOTHYROXINE SODIUM 75 MCG: 75 TABLET ORAL at 05:07

## 2024-12-21 RX ADMIN — LORAZEPAM 2 MG: 1 TABLET ORAL at 20:14

## 2024-12-21 RX ADMIN — NICOTINE 1 PATCH: 14 PATCH, EXTENDED RELEASE TRANSDERMAL at 20:14

## 2024-12-21 RX ADMIN — Medication 1 TABLET: at 10:10

## 2024-12-21 RX ADMIN — LORAZEPAM 0.5 MG: 0.5 TABLET ORAL at 14:33

## 2024-12-21 RX ADMIN — PROPRANOLOL HYDROCHLORIDE 40 MG: 40 TABLET ORAL at 13:03

## 2024-12-21 RX ADMIN — Medication 5 MG: at 20:14

## 2024-12-21 RX ADMIN — LORAZEPAM 0.5 MG: 0.5 TABLET ORAL at 17:07

## 2024-12-21 RX ADMIN — THIAMINE HYDROCHLORIDE 500 MG: 100 INJECTION, SOLUTION INTRAMUSCULAR; INTRAVENOUS at 14:33

## 2024-12-21 RX ADMIN — PROPRANOLOL HYDROCHLORIDE 40 MG: 40 TABLET ORAL at 20:14

## 2024-12-21 RX ADMIN — THIAMINE HYDROCHLORIDE 500 MG: 100 INJECTION, SOLUTION INTRAMUSCULAR; INTRAVENOUS at 20:14

## 2024-12-21 RX ADMIN — ENOXAPARIN SODIUM 40 MG: 100 INJECTION SUBCUTANEOUS at 20:14

## 2024-12-21 RX ADMIN — BUPROPION 150 MG: 150 TABLET, EXTENDED RELEASE ORAL at 10:09

## 2024-12-21 SDOH — ECONOMIC STABILITY: INCOME INSECURITY: IN THE PAST 12 MONTHS HAS THE ELECTRIC, GAS, OIL, OR WATER COMPANY THREATENED TO SHUT OFF SERVICES IN YOUR HOME?: NO

## 2024-12-21 SDOH — ECONOMIC STABILITY: FOOD INSECURITY: WITHIN THE PAST 12 MONTHS, THE FOOD YOU BOUGHT JUST DIDN'T LAST AND YOU DIDN'T HAVE MONEY TO GET MORE.: NEVER TRUE

## 2024-12-21 SDOH — SOCIAL STABILITY: SOCIAL INSECURITY: WITHIN THE LAST YEAR, HAVE YOU BEEN AFRAID OF YOUR PARTNER OR EX-PARTNER?: NO

## 2024-12-21 SDOH — ECONOMIC STABILITY: FOOD INSECURITY: WITHIN THE PAST 12 MONTHS, YOU WORRIED THAT YOUR FOOD WOULD RUN OUT BEFORE YOU GOT THE MONEY TO BUY MORE.: NEVER TRUE

## 2024-12-21 SDOH — SOCIAL STABILITY: SOCIAL INSECURITY: DO YOU FEEL UNSAFE GOING BACK TO THE PLACE WHERE YOU ARE LIVING?: NO

## 2024-12-21 SDOH — SOCIAL STABILITY: SOCIAL INSECURITY: WITHIN THE LAST YEAR, HAVE YOU BEEN HUMILIATED OR EMOTIONALLY ABUSED IN OTHER WAYS BY YOUR PARTNER OR EX-PARTNER?: NO

## 2024-12-21 SDOH — SOCIAL STABILITY: SOCIAL INSECURITY: DOES ANYONE TRY TO KEEP YOU FROM HAVING/CONTACTING OTHER FRIENDS OR DOING THINGS OUTSIDE YOUR HOME?: NO

## 2024-12-21 SDOH — ECONOMIC STABILITY: HOUSING INSECURITY: AT ANY TIME IN THE PAST 12 MONTHS, WERE YOU HOMELESS OR LIVING IN A SHELTER (INCLUDING NOW)?: NO

## 2024-12-21 SDOH — ECONOMIC STABILITY: HOUSING INSECURITY: IN THE LAST 12 MONTHS, WAS THERE A TIME WHEN YOU WERE NOT ABLE TO PAY THE MORTGAGE OR RENT ON TIME?: NO

## 2024-12-21 SDOH — SOCIAL STABILITY: SOCIAL INSECURITY: ARE THERE ANY APPARENT SIGNS OF INJURIES/BEHAVIORS THAT COULD BE RELATED TO ABUSE/NEGLECT?: NO

## 2024-12-21 SDOH — ECONOMIC STABILITY: HOUSING INSECURITY: IN THE PAST 12 MONTHS, HOW MANY TIMES HAVE YOU MOVED WHERE YOU WERE LIVING?: 1

## 2024-12-21 SDOH — SOCIAL STABILITY: SOCIAL INSECURITY: ARE YOU OR HAVE YOU BEEN THREATENED OR ABUSED PHYSICALLY, EMOTIONALLY, OR SEXUALLY BY ANYONE?: NO

## 2024-12-21 SDOH — SOCIAL STABILITY: SOCIAL INSECURITY: ABUSE: ADULT

## 2024-12-21 SDOH — SOCIAL STABILITY: SOCIAL INSECURITY: WERE YOU ABLE TO COMPLETE ALL THE BEHAVIORAL HEALTH SCREENINGS?: YES

## 2024-12-21 SDOH — SOCIAL STABILITY: SOCIAL INSECURITY: DO YOU FEEL ANYONE HAS EXPLOITED OR TAKEN ADVANTAGE OF YOU FINANCIALLY OR OF YOUR PERSONAL PROPERTY?: NO

## 2024-12-21 SDOH — ECONOMIC STABILITY: TRANSPORTATION INSECURITY: IN THE PAST 12 MONTHS, HAS LACK OF TRANSPORTATION KEPT YOU FROM MEDICAL APPOINTMENTS OR FROM GETTING MEDICATIONS?: NO

## 2024-12-21 SDOH — ECONOMIC STABILITY: FOOD INSECURITY: HOW HARD IS IT FOR YOU TO PAY FOR THE VERY BASICS LIKE FOOD, HOUSING, MEDICAL CARE, AND HEATING?: NOT HARD AT ALL

## 2024-12-21 SDOH — SOCIAL STABILITY: SOCIAL INSECURITY: HAVE YOU HAD THOUGHTS OF HARMING ANYONE ELSE?: NO

## 2024-12-21 SDOH — SOCIAL STABILITY: SOCIAL INSECURITY: HAS ANYONE EVER THREATENED TO HURT YOUR FAMILY OR YOUR PETS?: NO

## 2024-12-21 ASSESSMENT — PAIN SCALES - GENERAL
PAINLEVEL_OUTOF10: 0 - NO PAIN
PAINLEVEL_OUTOF10: 0 - NO PAIN

## 2024-12-21 ASSESSMENT — LIFESTYLE VARIABLES
TREMOR: MODERATE, WITH PATIENT'S ARMS EXTENDED
TREMOR: 6
ORIENTATION AND CLOUDING OF SENSORIUM: DISORIENTED FOR DATE BY MORE THAN 2 CALENDAR DAYS
AUDITORY DISTURBANCES: MILD HARSHNESS OR ABILITY TO FRIGHTEN
ANXIETY: NO ANXIETY, AT EASE
ORIENTATION AND CLOUDING OF SENSORIUM: CANNOT DO SERIAL ADDITIONS OR IS UNCERTAIN ABOUT DATE
AUDITORY DISTURBANCES: NOT PRESENT
AUDITORY DISTURBANCES: NOT PRESENT
SKIP TO QUESTIONS 9-10: 0
AUDITORY DISTURBANCES: NOT PRESENT
AGITATION: SOMEWHAT MORE THAN NORMAL ACTIVITY
ORIENTATION AND CLOUDING OF SENSORIUM: DISORIENTED FOR DATE BY MORE THAN 2 CALENDAR DAYS
VISUAL DISTURBANCES: NOT PRESENT
ANXIETY: MILDLY ANXIOUS
PAROXYSMAL SWEATS: NO SWEAT VISIBLE
HEADACHE, FULLNESS IN HEAD: NOT PRESENT
PAROXYSMAL SWEATS: NO SWEAT VISIBLE
ANXIETY: NO ANXIETY, AT EASE
TOTAL SCORE: 7
HEADACHE, FULLNESS IN HEAD: NOT PRESENT
ANXIETY: NO ANXIETY, AT EASE
ORIENTATION AND CLOUDING OF SENSORIUM: CANNOT DO SERIAL ADDITIONS OR IS UNCERTAIN ABOUT DATE
VISUAL DISTURBANCES: NOT PRESENT
ANXIETY: MILDLY ANXIOUS
NAUSEA AND VOMITING: NO NAUSEA AND NO VOMITING
TREMOR: NO TREMOR
HOW OFTEN DO YOU HAVE A DRINK CONTAINING ALCOHOL: PATIENT DECLINED
AUDITORY DISTURBANCES: NOT PRESENT
TREMOR: NO TREMOR
ANXIETY: MODERATELY ANXIOUS, OR GUARDED, SO ANXIETY IS INFERRED
TOTAL SCORE: 0
TREMOR: MODERATE, WITH PATIENT'S ARMS EXTENDED
NAUSEA AND VOMITING: NO NAUSEA AND NO VOMITING
NAUSEA AND VOMITING: NO NAUSEA AND NO VOMITING
TREMOR: MODERATE, WITH PATIENT'S ARMS EXTENDED
PAROXYSMAL SWEATS: NO SWEAT VISIBLE
AGITATION: SOMEWHAT MORE THAN NORMAL ACTIVITY
AUDIT-C TOTAL SCORE: -1
HEADACHE, FULLNESS IN HEAD: NOT PRESENT
NAUSEA AND VOMITING: NO NAUSEA AND NO VOMITING
NAUSEA AND VOMITING: NO NAUSEA AND NO VOMITING
PAROXYSMAL SWEATS: NO SWEAT VISIBLE
HEADACHE, FULLNESS IN HEAD: NOT PRESENT
NAUSEA AND VOMITING: NO NAUSEA AND NO VOMITING
HOW MANY STANDARD DRINKS CONTAINING ALCOHOL DO YOU HAVE ON A TYPICAL DAY: PATIENT DECLINED
NAUSEA AND VOMITING: NO NAUSEA AND NO VOMITING
HEADACHE, FULLNESS IN HEAD: NOT PRESENT
TOTAL SCORE: 0
AGITATION: NORMAL ACTIVITY
VISUAL DISTURBANCES: NOT PRESENT
AUDITORY DISTURBANCES: NOT PRESENT
AUDITORY DISTURBANCES: NOT PRESENT
PAROXYSMAL SWEATS: NO SWEAT VISIBLE
AGITATION: MODERATELY FIDGETY AND RESTLESS
ORIENTATION AND CLOUDING OF SENSORIUM: ORIENTED AND CAN DO SERIAL ADDITIONS
AGITATION: NORMAL ACTIVITY
TOTAL SCORE: 7
ORIENTATION AND CLOUDING OF SENSORIUM: ORIENTED AND CAN DO SERIAL ADDITIONS
AGITATION: NORMAL ACTIVITY
TOTAL SCORE: 17
ORIENTATION AND CLOUDING OF SENSORIUM: CANNOT DO SERIAL ADDITIONS OR IS UNCERTAIN ABOUT DATE
VISUAL DISTURBANCES: NOT PRESENT
AUDIT-C TOTAL SCORE: -1
TREMOR: MODERATE, WITH PATIENT'S ARMS EXTENDED
VISUAL DISTURBANCES: NOT PRESENT
PAROXYSMAL SWEATS: NO SWEAT VISIBLE
HOW OFTEN DO YOU HAVE 6 OR MORE DRINKS ON ONE OCCASION: PATIENT DECLINED
TOTAL SCORE: 5
VISUAL DISTURBANCES: NOT PRESENT
ANXIETY: 3
PAROXYSMAL SWEATS: NO SWEAT VISIBLE
HEADACHE, FULLNESS IN HEAD: NOT PRESENT
TOTAL SCORE: 13
HEADACHE, FULLNESS IN HEAD: NOT PRESENT
VISUAL DISTURBANCES: NOT PRESENT
AGITATION: SOMEWHAT MORE THAN NORMAL ACTIVITY

## 2024-12-21 ASSESSMENT — COGNITIVE AND FUNCTIONAL STATUS - GENERAL
DAILY ACTIVITIY SCORE: 23
DRESSING REGULAR LOWER BODY CLOTHING: A LITTLE
STANDING UP FROM CHAIR USING ARMS: A LITTLE
DRESSING REGULAR LOWER BODY CLOTHING: A LITTLE
DAILY ACTIVITIY SCORE: 23
CLIMB 3 TO 5 STEPS WITH RAILING: A LOT
MOVING TO AND FROM BED TO CHAIR: A LITTLE
MOVING TO AND FROM BED TO CHAIR: A LITTLE
STANDING UP FROM CHAIR USING ARMS: A LITTLE
WALKING IN HOSPITAL ROOM: A LITTLE
PATIENT BASELINE BEDBOUND: NO
CLIMB 3 TO 5 STEPS WITH RAILING: A LOT
WALKING IN HOSPITAL ROOM: A LITTLE
MOBILITY SCORE: 19
MOBILITY SCORE: 19

## 2024-12-21 ASSESSMENT — ACTIVITIES OF DAILY LIVING (ADL)
PATIENT'S MEMORY ADEQUATE TO SAFELY COMPLETE DAILY ACTIVITIES?: YES
HEARING - LEFT EAR: FUNCTIONAL
BATHING: NEEDS ASSISTANCE
HEARING - RIGHT EAR: FUNCTIONAL
TOILETING: NEEDS ASSISTANCE
FEEDING YOURSELF: INDEPENDENT
LACK_OF_TRANSPORTATION: NO
ADEQUATE_TO_COMPLETE_ADL: YES
DRESSING YOURSELF: INDEPENDENT
WALKS IN HOME: NEEDS ASSISTANCE
GROOMING: INDEPENDENT
JUDGMENT_ADEQUATE_SAFELY_COMPLETE_DAILY_ACTIVITIES: YES

## 2024-12-21 ASSESSMENT — PATIENT HEALTH QUESTIONNAIRE - PHQ9
1. LITTLE INTEREST OR PLEASURE IN DOING THINGS: NOT AT ALL
2. FEELING DOWN, DEPRESSED OR HOPELESS: NOT AT ALL
SUM OF ALL RESPONSES TO PHQ9 QUESTIONS 1 & 2: 0

## 2024-12-21 NOTE — CARE PLAN
The patient's goals for the shift include      The clinical goals for the shift include pt will remain safe this shift -tr    Over the shift, the patient did not make progress toward the following goals. Barriers to progression include   . Recommendations to address these barriers include   .

## 2024-12-21 NOTE — H&P
History Of Present Illness  Humble Banks is a 69 y.o. male with a PMH of alcohol abuse, DDD of L spine, CHINEDU, HLD, HTN, hypothyroidism, sciatica of left side of back, and L inguinal hernia. Mr. Banks presented to ED from residential alcohol rehab facility for further evaluation due to inability to ambulate in setting of left sided lower back pain from sciatica. He rates back pain as 8/10 at rest and >10 with ambulation. Pt was initially seen s/p fall at [facility] and sent to rehab after pt's roommate said pt could not return due to constant falls in setting of alcohol intoxication. Labs obtained as part of ED workup reviewed and notable for anemia, thrombocytopenia, hypokalemia, hypomagnesemia, and elevated AST. Ethanol <10 on admit but it was 431 yesterday. No acute findings seen on imaging obtained while in the ED. CIWA protocol initiated while in the ED and last CIWA score was 1. Pt was evaluated by PT/OT while in the ED and SNF placement was recommended. Anemia, thrombocytopenia, elevated AST and electrolyte imbalances likely 2/2 alcohol use. Pt admitted to medicine service while he awaits SNF placement.     Past Medical History  Past Medical History:   Diagnosis Date    Alcohol abuse     DDD (degenerative disc disease), lumbar     Generalized anxiety disorder     Anxiety, generalized    HLD (hyperlipidemia)     Hypertension     Hypothyroidism     Left inguinal hernia     Sciatica of left side     Tobacco use disorder        Surgical History  History reviewed. No pertinent surgical history.     Social History  He reports that he has been smoking cigarettes. He uses smokeless tobacco. He reports current alcohol use. He reports that he does not use drugs.    Family History  Family History   Problem Relation Name Age of Onset    No Known Problems Mother      No Known Problems Father          Allergies  Patient has no known allergies.    Review of Systems   Constitutional:  Negative for chills and fever.   HENT:   Negative for congestion.    Eyes:  Negative for visual disturbance.   Respiratory:  Negative for cough and shortness of breath.    Cardiovascular:  Negative for chest pain.   Gastrointestinal:  Negative for abdominal pain, diarrhea, nausea and vomiting.   Genitourinary:  Negative for difficulty urinating.   Musculoskeletal:  Positive for back pain and gait problem.   Skin: Negative.    Neurological:  Positive for weakness. Negative for dizziness and headaches.        Physical Exam  Vitals reviewed.   Constitutional:       General: He is not in acute distress.  HENT:      Head: Normocephalic.      Nose: Nose normal.      Mouth/Throat:      Mouth: Mucous membranes are dry.   Eyes:      Extraocular Movements: Extraocular movements intact.   Cardiovascular:      Rate and Rhythm: Normal rate.      Pulses: Normal pulses.      Heart sounds: Normal heart sounds.   Pulmonary:      Effort: Pulmonary effort is normal. No respiratory distress.      Breath sounds: Normal breath sounds.   Abdominal:      General: Bowel sounds are normal.      Palpations: Abdomen is soft.   Musculoskeletal:         General: Normal range of motion.   Skin:     General: Skin is warm and dry.   Neurological:      Mental Status: He is alert and oriented to person, place, and time.          Last Recorded Vitals  Blood pressure (!) 149/91, pulse 65, temperature 36.6 °C (97.9 °F), temperature source Temporal, resp. rate 16, height 1.829 m (6'), weight 84.4 kg (186 lb), SpO2 97%.    Relevant Results  XR chest 2 views    Result Date: 12/19/2024  STUDY: Chest Radiographs;  12/19/2024 at 10:55. INDICATION: Generalized weakness. COMPARISON: Chest, single portable view obtained on 03/29/2024 at 22:05 hours. ACCESSION NUMBER(S): [id number] ORDERING CLINICIAN: SOLITARIO LAZARO TECHNIQUE:  Frontal and lateral chest. FINDINGS: CARDIOMEDIASTINAL SILHOUETTE: Cardiomediastinal silhouette is likely within normal limits considering technique and depth of  inspiration.  LUNGS: Accentuation the lung markings from hypoventilatory changes and techniques.  No consolidation.  No pulmonary edema.  No pleural effusion or pneumothorax.  ABDOMEN: No evidence of free air below the diaphragm..  BONES: Grossly intact.    No consolidation or pulmonary edema. Signed by Faisal Paniagua DO    CT cervical spine wo IV contrast    Result Date: 12/19/2024  Interpreted By:  Tesha Mejia, STUDY: CT CERVICAL SPINE WO IV CONTRAST;  12/19/2024 10:58 am   INDICATION: Signs/Symptoms:intox fall?.     COMPARISON: 09/30/2024   ACCESSION NUMBER(S): [id number]   ORDERING CLINICIAN: SOLITARIO LAZARO   TECHNIQUE: Axial CT images of the cervical spine are obtained. Axial, coronal and sagittal reconstructions are provided for review.   FINDINGS: There is mild anterior osteophytic spurring at C5-6. There is marked anterior osteophytic spurring and mild posterior osteophytic spurring with disc space narrowing at C6-7.   Fractures: There is no evidence for an acute fracture of the cervical spine.   Vertebral Alignment: Within normal limits.   Craniocervical Junction: The odontoid process and craniocervical junction are intact.   Vertebrae/Disc Spaces:  The cervical vertebral body heights are intact and the disc spaces are preserved.   Prevertebral/Paraspinal Soft Tissues: The prevertebral and paraspinal soft tissues are unremarkable.         No evidence for an acute fracture or subluxation of the cervical spine.   MACRO: None   Signed by: Tesha Mejia 12/19/2024 11:16 AM Dictation workstation:   [id number]    CT head wo IV contrast    Result Date: 12/19/2024  Interpreted By:  Tesha Mejia, STUDY: CT HEAD WO IV CONTRAST;  12/19/2024 10:58 am   INDICATION: Signs/Symptoms:intox fall?.   COMPARISON: 09/30/2024   ACCESSION NUMBER(S): [id number]   ORDERING CLINICIAN: SOLITARIO LAZARO   TECHNIQUE: Examination was performed in the axial plane with sagittal and coronal reconstructions.    FINDINGS: INTRACRANIAL: There is prominence of the ventricular system and cerebral sulci consistent with cerebral atrophy. No mass or mass effect is identified. There is no hemorrhage or subdural fluid collection. There is no acute infarct. There is no fracture of the calvarium.   EXTRACRANIAL: Visualized paranasal sinuses and mastoids are clear. There is thickening of the wall of the right maxillary sinus, likely sequela of chronic sinusitis. There is mild mucosal thickening of the ethmoid air cells.       No acute intracranial pathology.   MACRO: None   Signed by: Tesha Mejia 12/19/2024 11:14 AM Dictation workstation:   [id number]      Results for orders placed or performed during the hospital encounter of 12/20/24 (from the past 24 hours)   CBC and Auto Differential   Result Value Ref Range    WBC 5.1 4.4 - 11.3 x10*3/uL    nRBC 0.0 0.0 - 0.0 /100 WBCs    RBC 3.43 (L) 4.50 - 5.90 x10*6/uL    Hemoglobin 11.8 (L) 13.5 - 17.5 g/dL    Hematocrit 32.7 (L) 41.0 - 52.0 %    MCV 95 80 - 100 fL    MCH 34.4 (H) 26.0 - 34.0 pg    MCHC 36.1 (H) 32.0 - 36.0 g/dL    RDW 13.4 11.5 - 14.5 %    Platelets 73 (L) 150 - 450 x10*3/uL    Neutrophils % 42.5 40.0 - 80.0 %    Immature Granulocytes %, Automated 0.8 0.0 - 0.9 %    Lymphocytes % 29.6 13.0 - 44.0 %    Monocytes % 24.5 2.0 - 10.0 %    Eosinophils % 1.2 0.0 - 6.0 %    Basophils % 1.4 0.0 - 2.0 %    Neutrophils Absolute 2.16 1.20 - 7.70 x10*3/uL    Immature Granulocytes Absolute, Automated 0.04 0.00 - 0.70 x10*3/uL    Lymphocytes Absolute 1.50 1.20 - 4.80 x10*3/uL    Monocytes Absolute 1.24 (H) 0.10 - 1.00 x10*3/uL    Eosinophils Absolute 0.06 0.00 - 0.70 x10*3/uL    Basophils Absolute 0.07 0.00 - 0.10 x10*3/uL   Comprehensive metabolic panel   Result Value Ref Range    Glucose 84 74 - 99 mg/dL    Sodium 142 136 - 145 mmol/L    Potassium 3.2 (L) 3.5 - 5.3 mmol/L    Chloride 100 98 - 107 mmol/L    Bicarbonate 25 21 - 32 mmol/L    Anion Gap 20 10 - 20 mmol/L    Urea Nitrogen  21 6 - 23 mg/dL    Creatinine 0.59 0.50 - 1.30 mg/dL    eGFR >90 >60 mL/min/1.73m*2    Calcium 9.7 8.6 - 10.6 mg/dL    Albumin 4.2 3.4 - 5.0 g/dL    Alkaline Phosphatase 80 33 - 136 U/L    Total Protein 7.9 6.4 - 8.2 g/dL    AST 71 (H) 9 - 39 U/L    Bilirubin, Total 0.9 0.0 - 1.2 mg/dL    ALT 28 10 - 52 U/L   Lipase   Result Value Ref Range    Lipase 47 9 - 82 U/L   Alcohol   Result Value Ref Range    Alcohol <10 <=10 mg/dL   Magnesium   Result Value Ref Range    Magnesium 1.14 (L) 1.60 - 2.40 mg/dL      ED Medication Administration from 12/20/2024 1315 to 12/20/2024 2140         Date/Time Order Dose Route Action Action by     12/20/2024 1508 EST orphenadrine (Norflex) injection 60 mg 60 mg intramuscular Given [place]      12/20/2024 1510 EST ketorolac (Toradol) injection 30 mg 30 mg intramuscular Given [place],      12/20/2024 2028 EST enoxaparin (Lovenox) syringe 40 mg -- subcutaneous Held by provider JOSE LUIS Mendez     12/20/2024 2100 EST enoxaparin (Lovenox) syringe 40 mg -- subcutaneous Dose Auto Held JOSE LUIS Mendez     12/21/2024 2100 EST enoxaparin (Lovenox) syringe 40 mg -- subcutaneous Dose Auto Held JOSE LUIS Mendez     12/22/2024 2100 EST enoxaparin (Lovenox) syringe 40 mg -- subcutaneous Dose Auto Held JOSE LUIS Mendez     12/23/2024 2100 EST enoxaparin (Lovenox) syringe 40 mg -- subcutaneous Dose Auto Held JOSE LUIS Mendez     12/24/2024 2100 EST enoxaparin (Lovenox) syringe 40 mg -- subcutaneous Dose Auto Held JOSE LUIS Mendez           Scheduled medications  [START ON 12/21/2024] buPROPion XL, 150 mg, oral, Daily  [Held by provider] enoxaparin, 40 mg, subcutaneous, q24h  [START ON 12/21/2024] escitalopram, 10 mg, oral, Daily  folic acid, 1 mg, oral, Daily  [START ON 12/21/2024] levothyroxine, 75 mcg, oral, Daily  lidocaine, 1 patch, transdermal, Daily  magnesium sulfate, 2 g, intravenous, Once  [START ON 12/21/2024] multivitamin with minerals, 1 tablet, oral, Daily  nicotine, 1 patch, transdermal, q24h  potassium chloride CR, 40 mEq, oral,  Once  propranolol, 40 mg, oral, BID  thiamine, 100 mg, oral, Daily      Continuous medications     PRN medications  PRN medications: ibuprofen, LORazepam **OR** LORazepam **OR** LORazepam, methocarbamol, ondansetron ODT **OR** ondansetron, traMADol       Assessment/Plan     #left sided sciatica (chronic)  #unable to ambulate  - PT/OT consulted and SNF placement recommended  - falls precautions  - pain regimen: Ibuprofen 600 mg q6h PRN, Lidocaine patch, Ultram 50 mg q6h PRN severe pain (x24hrs) and Robaxin 500 mg q6h PRN  - heat packs as needed    #hypokalemia  #hypomagnesemia  #alcohol use disorder  - AST 71 on admit  - K 3.2 on admit  - Mg2+ 1.14 on admit  - ethanol level: 431 on 12/19-> <10 on 12/20  - 40 mEq of potassium chloride x1 dose and 2g magnesium sulfate x1 dose ordered  - electrolyte imbalance likely d/t alcohol use  - CIWA protocol using oral Lorazepam, last CIWA score 1  - MVI, Thiamine 100 mg, and folic acid 1 mg PO every day    #anemia  #thrombocytopenia (chronic)  - likely 2/2 alcohol use  - Hgb 11.8 on admit  - PLT 73 on admit , PLT 67 on 12/19  - per chart review thrombocytopenia has been present since 7/2022  - continue to monitor and transfuse as needed  - CBC ordered for AM    #HTN  - last /91  - continue to monitor BP  - c/w Propranolol 40 mg PO BID    #CHINEDU  - c/w Lexaprol 10 mg PO every day and Wellbutrin  mg PO every day    #hypothyroidism  - c/w Synthroid 75 mcg PO every day    #tobacco use disorder  - cessation recommended  - Nicotine patch ordered    Dispo: admit to RNF pending SNF placement. Estimated LOS: 2-3 days       I spent 75 minutes in the professional and overall care of this patient.      Ena Mendez, APRN-CNP

## 2024-12-21 NOTE — PROGRESS NOTES
Humble Banks is a 69 y.o. y.o. male on day 1 of admission presenting with Unable to ambulate [R26.2].     Subjective   Received update from MD that patient is not medically ready for discharge; ADOD is 12/24. Patient currently does not have capacity and decision making is deferring to his HCPOA Earnest.     Earnest aware of recommendation for moderate intensity therapy at discharge and agreeable for SNF. Reports that [facility] is his FOC. He is also interested in receiving outpatient chem dep resources for the patient; will consult [facility] on Monday for follow up.     [facility] reviewed; [facility] reviewing for acceptance.    SW will continue to follow.    UPDATE 1300 Received update that [facility] is able to accept at discharge; patients HCPOA Earnest was updated.     SW will continue to follow.    - Keily ENGLE, MA, LSW  Care Transitions   Epic Secure Chat or [id number]

## 2024-12-21 NOTE — PROGRESS NOTES
Humble Banks is a 69 y.o. male on day 1 of admission presenting with Unable to ambulate.      Subjective   Seen and evaluated at bedside, patient very pleasantly confused.  Thinks that he is going to [place] for his PT.  He denies any acute complaints, says he has fallen 4 times in the last 6 months.  He is interested in resuming his aqua therapy which I told him we unfortunately do not have here at [facility].  He expressed an understanding and said regular PT is fine.         Objective     Last Recorded Vitals  BP (!) 132/91 (BP Location: Right arm, Patient Position: Lying)   Pulse 69   Temp 36.8 °C (98.2 °F) (Temporal)   Resp 19   Wt 84.4 kg (186 lb)   SpO2 94%   Intake/Output last 3 Shifts:    Intake/Output Summary (Last 24 hours) at 12/21/2024 1141  Last data filed at 12/21/2024 0949  Gross per 24 hour   Intake 170 ml   Output 150 ml   Net 20 ml       Admission Weight  Weight: 84.4 kg (186 lb) (12/20/24 1329)    Daily Weight  12/20/24 : 84.4 kg (186 lb)    General Exam  General Appearance: Patient is awake, alert and cooperative, and appears to be in no acute distress.  Head: NCAT  Eyes: PERRL, EOMI. vision is grossly intact intermittent vertical nystagmus  Cardiac: Normal S1 and S2. No murmurs. Rhythm is regular.  Lungs: CTAB  Musculoskeletal: Range of motion in all extremities intact, decreased strength in all extremities 4/5 strength  Extremities: Trace bilateral lower extremity edema, bilateral tremors of the hand and upper extremities.   Neuro: CN II-XII grossly intact. Tongue Fasciculations  Skin: Clammy intermittently diaphoretic.  Psych: Alert and oriented to person only, not place or time    Image Results  XR chest 2 views  Narrative: STUDY:  Chest Radiographs;  12/19/2024 at 10:55.  INDICATION:  Generalized weakness.  COMPARISON:  Chest, single portable view obtained on 03/29/2024 at 22:05 hours.  ACCESSION NUMBER(S):  [id number]  ORDERING CLINICIAN:  SOLITARIO LAZARO  TECHNIQUE:  Frontal and  lateral chest.   FINDINGS:  CARDIOMEDIASTINAL SILHOUETTE:  Cardiomediastinal silhouette is likely within normal limits  considering technique and depth of inspiration.     LUNGS:  Accentuation the lung markings from hypoventilatory changes and  techniques.  No consolidation.  No pulmonary edema.  No pleural  effusion or pneumothorax.     ABDOMEN:  No evidence of free air below the diaphragm..     BONES:  Grossly intact.  Impression: No consolidation or pulmonary edema.  Signed by Faisal Paniagua,   CT cervical spine wo IV contrast  Narrative: Interpreted By:  Tesha Mejia,   STUDY:  CT CERVICAL SPINE WO IV CONTRAST;  12/19/2024 10:58 am      INDICATION:  Signs/Symptoms:intox fall?.          COMPARISON:  09/30/2024      ACCESSION NUMBER(S):  [id number]      ORDERING CLINICIAN:  SOLITARIO LAZARO      TECHNIQUE:  Axial CT images of the cervical spine are obtained. Axial, coronal  and sagittal reconstructions are provided for review.      FINDINGS:  There is mild anterior osteophytic spurring at C5-6.  There is marked anterior osteophytic spurring and mild posterior  osteophytic spurring with disc space narrowing at C6-7.      Fractures: There is no evidence for an acute fracture of the cervical  spine.      Vertebral Alignment: Within normal limits.      Craniocervical Junction: The odontoid process and craniocervical  junction are intact.      Vertebrae/Disc Spaces:  The cervical vertebral body heights are  intact and the disc spaces are preserved.      Prevertebral/Paraspinal Soft Tissues: The prevertebral and paraspinal  soft tissues are unremarkable.          Impression: No evidence for an acute fracture or subluxation of the cervical  spine.      MACRO:  None      Signed by: Tesha Mejia 12/19/2024 11:16 AM  Dictation workstation:   [id number]  CT head wo IV contrast  Narrative: Interpreted By:  Tesha Mejia,   STUDY:  CT HEAD WO IV CONTRAST;  12/19/2024 10:58 am       INDICATION:  Signs/Symptoms:intox fall?.      COMPARISON:  09/30/2024      ACCESSION NUMBER(S):  [id number]      ORDERING CLINICIAN:  SOLITARIO LAZARO      TECHNIQUE:  Examination was performed in the axial plane with sagittal and  coronal reconstructions.      FINDINGS:  INTRACRANIAL:  There is prominence of the ventricular system and cerebral sulci  consistent with cerebral atrophy. No mass or mass effect is  identified. There is no hemorrhage or subdural fluid collection.  There is no acute infarct.  There is no fracture of the calvarium.      EXTRACRANIAL:  Visualized paranasal sinuses and mastoids are clear. There is  thickening of the wall of the right maxillary sinus, likely sequela  of chronic sinusitis. There is mild mucosal thickening of the ethmoid  air cells.      Impression: No acute intracranial pathology.      MACRO:  None      Signed by: Tesha Mejia 12/19/2024 11:14 AM  Dictation workstation:   [id number]      Physical Exam    Relevant Results      Results for orders placed or performed during the hospital encounter of 12/20/24 (from the past 24 hours)   CBC and Auto Differential   Result Value Ref Range    WBC 5.1 4.4 - 11.3 x10*3/uL    nRBC 0.0 0.0 - 0.0 /100 WBCs    RBC 3.43 (L) 4.50 - 5.90 x10*6/uL    Hemoglobin 11.8 (L) 13.5 - 17.5 g/dL    Hematocrit 32.7 (L) 41.0 - 52.0 %    MCV 95 80 - 100 fL    MCH 34.4 (H) 26.0 - 34.0 pg    MCHC 36.1 (H) 32.0 - 36.0 g/dL    RDW 13.4 11.5 - 14.5 %    Platelets 73 (L) 150 - 450 x10*3/uL    Neutrophils % 42.5 40.0 - 80.0 %    Immature Granulocytes %, Automated 0.8 0.0 - 0.9 %    Lymphocytes % 29.6 13.0 - 44.0 %    Monocytes % 24.5 2.0 - 10.0 %    Eosinophils % 1.2 0.0 - 6.0 %    Basophils % 1.4 0.0 - 2.0 %    Neutrophils Absolute 2.16 1.20 - 7.70 x10*3/uL    Immature Granulocytes Absolute, Automated 0.04 0.00 - 0.70 x10*3/uL    Lymphocytes Absolute 1.50 1.20 - 4.80 x10*3/uL    Monocytes Absolute 1.24 (H) 0.10 - 1.00 x10*3/uL    Eosinophils Absolute  0.06 0.00 - 0.70 x10*3/uL    Basophils Absolute 0.07 0.00 - 0.10 x10*3/uL   Comprehensive metabolic panel   Result Value Ref Range    Glucose 84 74 - 99 mg/dL    Sodium 142 136 - 145 mmol/L    Potassium 3.2 (L) 3.5 - 5.3 mmol/L    Chloride 100 98 - 107 mmol/L    Bicarbonate 25 21 - 32 mmol/L    Anion Gap 20 10 - 20 mmol/L    Urea Nitrogen 21 6 - 23 mg/dL    Creatinine 0.59 0.50 - 1.30 mg/dL    eGFR >90 >60 mL/min/1.73m*2    Calcium 9.7 8.6 - 10.6 mg/dL    Albumin 4.2 3.4 - 5.0 g/dL    Alkaline Phosphatase 80 33 - 136 U/L    Total Protein 7.9 6.4 - 8.2 g/dL    AST 71 (H) 9 - 39 U/L    Bilirubin, Total 0.9 0.0 - 1.2 mg/dL    ALT 28 10 - 52 U/L   Lipase   Result Value Ref Range    Lipase 47 9 - 82 U/L   Alcohol   Result Value Ref Range    Alcohol <10 <=10 mg/dL   Magnesium   Result Value Ref Range    Magnesium 1.14 (L) 1.60 - 2.40 mg/dL   Coagulation Screen   Result Value Ref Range    Protime 12.8 9.8 - 12.8 seconds    INR 1.1 0.9 - 1.1    aPTT 34 27 - 38 seconds   Hepatitis panel, acute   Result Value Ref Range    Hepatitis B Surface AG Nonreactive Nonreactive    Hepatitis A  AB- IgM Nonreactive Nonreactive    Hepatitis B Core AB; IgM Nonreactive Nonreactive    Hepatitis C AB Nonreactive Nonreactive   Comprehensive metabolic panel   Result Value Ref Range    Glucose 73 (L) 74 - 99 mg/dL    Sodium 140 136 - 145 mmol/L    Potassium 4.0 3.5 - 5.3 mmol/L    Chloride 102 98 - 107 mmol/L    Bicarbonate 23 21 - 32 mmol/L    Anion Gap 19 10 - 20 mmol/L    Urea Nitrogen 21 6 - 23 mg/dL    Creatinine 0.56 0.50 - 1.30 mg/dL    eGFR >90 >60 mL/min/1.73m*2    Calcium 9.8 8.6 - 10.6 mg/dL    Albumin 4.1 3.4 - 5.0 g/dL    Alkaline Phosphatase 70 33 - 136 U/L    Total Protein 7.7 6.4 - 8.2 g/dL     (H) 9 - 39 U/L    Bilirubin, Total 1.0 0.0 - 1.2 mg/dL    ALT 46 10 - 52 U/L   Magnesium   Result Value Ref Range    Magnesium 1.60 1.60 - 2.40 mg/dL   CBC and Auto Differential   Result Value Ref Range    WBC 7.8 4.4 - 11.3  x10*3/uL    nRBC 0.0 0.0 - 0.0 /100 WBCs    RBC 3.61 (L) 4.50 - 5.90 x10*6/uL    Hemoglobin 12.0 (L) 13.5 - 17.5 g/dL    Hematocrit 36.2 (L) 41.0 - 52.0 %     80 - 100 fL    MCH 33.2 26.0 - 34.0 pg    MCHC 33.1 32.0 - 36.0 g/dL    RDW 13.7 11.5 - 14.5 %    Platelets 78 (L) 150 - 450 x10*3/uL    Neutrophils % 48.2 40.0 - 80.0 %    Immature Granulocytes %, Automated 0.6 0.0 - 0.9 %    Lymphocytes % 30.1 13.0 - 44.0 %    Monocytes % 17.7 2.0 - 10.0 %    Eosinophils % 1.9 0.0 - 6.0 %    Basophils % 1.5 0.0 - 2.0 %    Neutrophils Absolute 3.74 1.20 - 7.70 x10*3/uL    Immature Granulocytes Absolute, Automated 0.05 0.00 - 0.70 x10*3/uL    Lymphocytes Absolute 2.34 1.20 - 4.80 x10*3/uL    Monocytes Absolute 1.38 (H) 0.10 - 1.00 x10*3/uL    Eosinophils Absolute 0.15 0.00 - 0.70 x10*3/uL    Basophils Absolute 0.12 (H) 0.00 - 0.10 x10*3/uL     No results found.             Assessment/Plan                  Assessment & Plan  Unable to ambulate    69-year-old M with history most notable for alcohol use disorder with severe dependence presenting with recurrent falls likely in the setting of acute alcoholic intoxication and presumed Wernicke encephalopathy.     Earnest Youssef ([phone]) is pt's HCPOA.  He provided collateral information that patient drinks at least 1 L of bourbon per day.  He has had multiple falls in the last week that required assistance from EMS to help patient up off of the floor.  Patient has been recurrently intoxicated with subsequent episodes of bowel and bladder incontinence.  HCPOA very much wants patient to enroll in detox and rehab services.      #Alcohol withdrawal syndrome  #Alcohol use disorder, with severe dependence  #Wernicke encephalopathy  #Electrolyte abnormalities  -ethanol level: 431 on 12/19-> <10 on 12/20  -K 3.2, Mg2+ 1.14 on admit, electrolyte imbalance likely d/t alcohol use  -CIWA protocol using oral Lorazepam  -High-dose thiamine for 3 days followed by p.o. thiamine  -MVI,  and folic acid 1 mg PO every day    #left sided sciatica (chronic)  #unable to ambulate  -PT/OT consulted and SNF placement recommended  -falls precautions  -pain regimen: Ibuprofen 600 mg q6h PRN, Lidocaine patch, Ultram 50 mg q6h PRN severe pain (x24hrs) and Robaxin 500 mg q6h PRN  -heat packs as needed     #Anemia  #Thrombocytopenia (chronic)  -likely 2/2 alcohol use  -Hgb 11.8 on admit  -PLT 73 on admit , PLT 67 on 12/19  -per chart review thrombocytopenia has been present since 7/2022  -continue to monitor and transfuse as needed  -CBC ordered for AM     #HTN  -Continue home propranolol 40 mg PO BID     #CHINEDU  -Continue home Lexaprol 10 mg PO every day and Wellbutrin  mg PO every day     #Hypothyroidism  -Continue home Synthroid 75 mcg PO every day     #Nicotine use disorder  -cessation recommended  -Nicotine patch ordered     F: None  E: Replete as needed  N: Regular  GI: None  DVT: Lovenox  CODE STATUS: Full code  Capacity eval: Does not have capacity, will place separate capacity evaluation significant event note  Emergency contact/healthcare power of  Earnest Youssef [phone]    Disposition: Pending improvement from Wernicke's and alcohol withdrawal.  Patient Receiving detox services and rehab.  Rehab facility of [facility].  Would recommend thrive reaching out to [facility] as well, early next week to coordinate rehab/detox dispo              A total of > 50 minutes was spent on this visit reviewing previous notes including inpatient ED/Consult notes, chart review of ambulatory records in [facility] and OSH records in [facility], counseling the patient on substance use cessation, ordering tests and add on labs, medication reconciliation and adjusting meds, and documenting the findings in the note.         Derrick Piña MD

## 2024-12-21 NOTE — SIGNIFICANT EVENT

## 2024-12-21 NOTE — CARE PLAN
The patient's goals for the shift include      The clinical goals for the shift include Patient to remain free from a fall or injury during this shift    Over the shift, the patient did not make progress toward the following goals. Barriers to progression include . Recommendations to address these barriers include   Problem: Fall/Injury  Goal: Not fall by end of shift  Outcome: Progressing  Goal: Be free from injury by end of the shift  Outcome: Progressing  Goal: Verbalize understanding of personal risk factors for fall in the hospital  Outcome: Progressing  Goal: Verbalize understanding of risk factor reduction measures to prevent injury from fall in the home  Outcome: Progressing  Goal: Use assistive devices by end of the shift  Outcome: Progressing  Goal: Pace activities to prevent fatigue by end of the shift  Outcome: Progressing   .

## 2024-12-21 NOTE — PROGRESS NOTES
7:52 PM I received this patient in signout from previous YUNIOR.  Please see their note for full H&P.    In brief summary this is a 69-year-old male presented to ED for lower back pain.  Patient was seen at [facility] yesterday and was discharged to alcohol rehab however when he was brought there was noted to have significant difficulty with ambulation secondary to his back pain and therefore was brought to this emergency department for further evaluation and possible SNF placement.  As patient likely will not be able to get approved for SNF placement until after the weekend will require admission until then.    Patient did require basic lab work prior to being accepted for admission here in the emergency department.  CMP grossly unremarkable with mild hypokalemia at 3.2 which will be ordered to be replaced here.  Lipase within normal limits at 47.  CBC with no significant leukocytosis or anemia.  Alcohol within normal limits.  With this in mind results were relayed to admitting team who accepted the patient.    Westley Montoya PA-C  12/20/24

## 2024-12-22 ENCOUNTER — APPOINTMENT (OUTPATIENT)
Dept: CARDIOLOGY | Facility: HOSPITAL | Age: 69
End: 2024-12-22
Payer: MEDICARE

## 2024-12-22 VITALS
TEMPERATURE: 97.9 F | HEART RATE: 69 BPM | DIASTOLIC BLOOD PRESSURE: 96 MMHG | OXYGEN SATURATION: 98 % | SYSTOLIC BLOOD PRESSURE: 158 MMHG | RESPIRATION RATE: 18 BRPM | WEIGHT: 186 LBS | BODY MASS INDEX: 25.19 KG/M2 | HEIGHT: 72 IN

## 2024-12-22 LAB
ALBUMIN SERPL BCP-MCNC: 4 G/DL (ref 3.4–5)
ANION GAP SERPL CALC-SCNC: 24 MMOL/L (ref 10–20)
BASOPHILS # BLD AUTO: 0.09 X10*3/UL (ref 0–0.1)
BASOPHILS NFR BLD AUTO: 1.1 %
BUN SERPL-MCNC: 31 MG/DL (ref 6–23)
CALCIUM SERPL-MCNC: 9.8 MG/DL (ref 8.6–10.6)
CHLORIDE SERPL-SCNC: 103 MMOL/L (ref 98–107)
CO2 SERPL-SCNC: 20 MMOL/L (ref 21–32)
CREAT SERPL-MCNC: 0.68 MG/DL (ref 0.5–1.3)
EGFRCR SERPLBLD CKD-EPI 2021: >90 ML/MIN/1.73M*2
EOSINOPHIL # BLD AUTO: 0.17 X10*3/UL (ref 0–0.7)
EOSINOPHIL NFR BLD AUTO: 2.2 %
ERYTHROCYTE [DISTWIDTH] IN BLOOD BY AUTOMATED COUNT: 13.1 % (ref 11.5–14.5)
GLUCOSE BLD MANUAL STRIP-MCNC: 73 MG/DL (ref 74–99)
GLUCOSE SERPL-MCNC: 55 MG/DL (ref 74–99)
HCT VFR BLD AUTO: 36.1 % (ref 41–52)
HGB BLD-MCNC: 12.4 G/DL (ref 13.5–17.5)
IMM GRANULOCYTES # BLD AUTO: 0.07 X10*3/UL (ref 0–0.7)
IMM GRANULOCYTES NFR BLD AUTO: 0.9 % (ref 0–0.9)
LYMPHOCYTES # BLD AUTO: 1.73 X10*3/UL (ref 1.2–4.8)
LYMPHOCYTES NFR BLD AUTO: 21.9 %
MAGNESIUM SERPL-MCNC: 1.61 MG/DL (ref 1.6–2.4)
MCH RBC QN AUTO: 34.2 PG (ref 26–34)
MCHC RBC AUTO-ENTMCNC: 34.3 G/DL (ref 32–36)
MCV RBC AUTO: 99 FL (ref 80–100)
MONOCYTES # BLD AUTO: 1.86 X10*3/UL (ref 0.1–1)
MONOCYTES NFR BLD AUTO: 23.6 %
NEUTROPHILS # BLD AUTO: 3.97 X10*3/UL (ref 1.2–7.7)
NEUTROPHILS NFR BLD AUTO: 50.3 %
NRBC BLD-RTO: 0 /100 WBCS (ref 0–0)
PHOSPHATE SERPL-MCNC: 5.1 MG/DL (ref 2.5–4.9)
PLATELET # BLD AUTO: 76 X10*3/UL (ref 150–450)
POTASSIUM SERPL-SCNC: 4.6 MMOL/L (ref 3.5–5.3)
RBC # BLD AUTO: 3.63 X10*6/UL (ref 4.5–5.9)
SODIUM SERPL-SCNC: 142 MMOL/L (ref 136–145)
WBC # BLD AUTO: 7.9 X10*3/UL (ref 4.4–11.3)

## 2024-12-22 PROCEDURE — 93005 ELECTROCARDIOGRAM TRACING: CPT

## 2024-12-22 PROCEDURE — 99233 SBSQ HOSP IP/OBS HIGH 50: CPT | Performed by: STUDENT IN AN ORGANIZED HEALTH CARE EDUCATION/TRAINING PROGRAM

## 2024-12-22 PROCEDURE — 2500000002 HC RX 250 W HCPCS SELF ADMINISTERED DRUGS (ALT 637 FOR MEDICARE OP, ALT 636 FOR OP/ED): Performed by: NURSE PRACTITIONER

## 2024-12-22 PROCEDURE — 93010 ELECTROCARDIOGRAM REPORT: CPT | Performed by: INTERNAL MEDICINE

## 2024-12-22 PROCEDURE — 82947 ASSAY GLUCOSE BLOOD QUANT: CPT

## 2024-12-22 PROCEDURE — 1100000001 HC PRIVATE ROOM DAILY

## 2024-12-22 PROCEDURE — 2500000001 HC RX 250 WO HCPCS SELF ADMINISTERED DRUGS (ALT 637 FOR MEDICARE OP): Performed by: NURSE PRACTITIONER

## 2024-12-22 PROCEDURE — 85025 COMPLETE CBC W/AUTO DIFF WBC: CPT | Performed by: NURSE PRACTITIONER

## 2024-12-22 PROCEDURE — 36415 COLL VENOUS BLD VENIPUNCTURE: CPT | Performed by: NURSE PRACTITIONER

## 2024-12-22 PROCEDURE — 2500000004 HC RX 250 GENERAL PHARMACY W/ HCPCS (ALT 636 FOR OP/ED): Performed by: STUDENT IN AN ORGANIZED HEALTH CARE EDUCATION/TRAINING PROGRAM

## 2024-12-22 PROCEDURE — 80069 RENAL FUNCTION PANEL: CPT | Performed by: NURSE PRACTITIONER

## 2024-12-22 PROCEDURE — 83735 ASSAY OF MAGNESIUM: CPT | Performed by: NURSE PRACTITIONER

## 2024-12-22 RX ORDER — DEXTROSE 50 % IN WATER (D50W) INTRAVENOUS SYRINGE
12.5
Status: DISCONTINUED | OUTPATIENT
Start: 2024-12-22 | End: 2024-12-27 | Stop reason: HOSPADM

## 2024-12-22 RX ORDER — DEXTROSE 50 % IN WATER (D50W) INTRAVENOUS SYRINGE
25
Status: DISCONTINUED | OUTPATIENT
Start: 2024-12-22 | End: 2024-12-27 | Stop reason: HOSPADM

## 2024-12-22 RX ORDER — OLANZAPINE 2.5 MG/1
2.5 TABLET ORAL EVERY 6 HOURS PRN
Status: DISCONTINUED | OUTPATIENT
Start: 2024-12-22 | End: 2024-12-27 | Stop reason: HOSPADM

## 2024-12-22 RX ORDER — OLANZAPINE 10 MG/2ML
2.5 INJECTION, POWDER, FOR SOLUTION INTRAMUSCULAR EVERY 6 HOURS PRN
Status: DISCONTINUED | OUTPATIENT
Start: 2024-12-22 | End: 2024-12-27 | Stop reason: HOSPADM

## 2024-12-22 RX ADMIN — FOLIC ACID 1 MG: 1 TABLET ORAL at 08:40

## 2024-12-22 RX ADMIN — LORAZEPAM 2 MG: 1 TABLET ORAL at 10:37

## 2024-12-22 RX ADMIN — LORAZEPAM 2 MG: 1 TABLET ORAL at 08:40

## 2024-12-22 RX ADMIN — THIAMINE HYDROCHLORIDE 500 MG: 100 INJECTION, SOLUTION INTRAMUSCULAR; INTRAVENOUS at 16:48

## 2024-12-22 RX ADMIN — LORAZEPAM 2 MG: 1 TABLET ORAL at 14:06

## 2024-12-22 RX ADMIN — ESCITALOPRAM OXALATE 10 MG: 10 TABLET ORAL at 08:40

## 2024-12-22 RX ADMIN — LEVOTHYROXINE SODIUM 75 MCG: 75 TABLET ORAL at 05:06

## 2024-12-22 RX ADMIN — THIAMINE HYDROCHLORIDE 500 MG: 100 INJECTION, SOLUTION INTRAMUSCULAR; INTRAVENOUS at 08:41

## 2024-12-22 RX ADMIN — BUPROPION 150 MG: 150 TABLET, EXTENDED RELEASE ORAL at 08:41

## 2024-12-22 RX ADMIN — Medication 1 TABLET: at 08:41

## 2024-12-22 RX ADMIN — LORAZEPAM 2 MG: 1 TABLET ORAL at 05:06

## 2024-12-22 ASSESSMENT — LIFESTYLE VARIABLES
AUDITORY DISTURBANCES: NOT PRESENT
VISUAL DISTURBANCES: MILD SENSITIVITY
HEADACHE, FULLNESS IN HEAD: NOT PRESENT
ORIENTATION AND CLOUDING OF SENSORIUM: DISORIENTED FOR DATE BY MORE THAN 2 CALENDAR DAYS
AGITATION: MODERATELY FIDGETY AND RESTLESS
VISUAL DISTURBANCES: MILD SENSITIVITY
HEADACHE, FULLNESS IN HEAD: NOT PRESENT
PAROXYSMAL SWEATS: NO SWEAT VISIBLE
AUDITORY DISTURBANCES: NOT PRESENT
AGITATION: SOMEWHAT MORE THAN NORMAL ACTIVITY
TOTAL SCORE: 16
NAUSEA AND VOMITING: NO NAUSEA AND NO VOMITING
TREMOR: 6
TOTAL SCORE: 12
ANXIETY: 2
PAROXYSMAL SWEATS: NO SWEAT VISIBLE
NAUSEA AND VOMITING: NO NAUSEA AND NO VOMITING
ANXIETY: 3
TREMOR: 3
ANXIETY: 2
PAROXYSMAL SWEATS: BARELY PERCEPTIBLE SWEATING, PALMS MOIST
VISUAL DISTURBANCES: NOT PRESENT
AUDITORY DISTURBANCES: NOT PRESENT
TACTILE DISTURBANCES: VERY MILD ITCHING, PINS AND NEEDLES, BURNING OR NUMBNESS
HEADACHE, FULLNESS IN HEAD: NOT PRESENT
ORIENTATION AND CLOUDING OF SENSORIUM: DISORIENTED FOR DATE BY MORE THAN 2 CALENDAR DAYS
HEADACHE, FULLNESS IN HEAD: NOT PRESENT
AGITATION: NORMAL ACTIVITY
ANXIETY: 2
ORIENTATION AND CLOUDING OF SENSORIUM: DISORIENTED FOR DATE BY MORE THAN 2 CALENDAR DAYS
TOTAL SCORE: 10
NAUSEA AND VOMITING: NO NAUSEA AND NO VOMITING
PAROXYSMAL SWEATS: NO SWEAT VISIBLE
AGITATION: SOMEWHAT MORE THAN NORMAL ACTIVITY
TREMOR: MODERATE, WITH PATIENT'S ARMS EXTENDED
AUDITORY DISTURBANCES: NOT PRESENT
NAUSEA AND VOMITING: NO NAUSEA AND NO VOMITING
TOTAL SCORE: 10
TREMOR: MODERATE, WITH PATIENT'S ARMS EXTENDED
VISUAL DISTURBANCES: NOT PRESENT
ORIENTATION AND CLOUDING OF SENSORIUM: CANNOT DO SERIAL ADDITIONS OR IS UNCERTAIN ABOUT DATE

## 2024-12-22 ASSESSMENT — PAIN - FUNCTIONAL ASSESSMENT: PAIN_FUNCTIONAL_ASSESSMENT: 0-10

## 2024-12-22 ASSESSMENT — PAIN SCALES - GENERAL: PAINLEVEL_OUTOF10: 0 - NO PAIN

## 2024-12-22 NOTE — PROGRESS NOTES
Humble Banks is a 69 y.o. male on day 2 of admission presenting with Unable to ambulate.      Subjective   Seen and evaluated at bedside, patient had a code violet this morning oriented to self only had to be redirected by staff team did not require pharmacologic PRNs.  When I saw him this morning he was internally stimulated speaking to himself did not respond to me.       Objective     Last Recorded Vitals  /88   Pulse (!) 49 Comment: MD notified  Temp 36.1 °C (97 °F)   Resp 18   Wt 84.4 kg (186 lb)   SpO2 98%   Intake/Output last 3 Shifts:    Intake/Output Summary (Last 24 hours) at 12/22/2024 0858  Last data filed at 12/22/2024 0200  Gross per 24 hour   Intake 360 ml   Output --   Net 360 ml       Admission Weight  Weight: 84.4 kg (186 lb) (12/20/24 1329)    Daily Weight  12/20/24 : 84.4 kg (186 lb)    General Exam  General Appearance: Patient is awake, alert and cooperative, and appears to be in no acute distress.  Head: NCAT  Eyes: PERRL, EOMI. vision is grossly intact intermittent vertical nystagmus  Cardiac: Normal S1 and S2. No murmurs. Rhythm is regular.  Lungs: CTAB  Musculoskeletal: Range of motion in all extremities intact, decreased strength in all extremities 4/5 strength  Extremities: Trace bilateral lower extremity edema, bilateral tremors of the hand and upper extremities.   Neuro: CN II-XII grossly intact.   Skin: Hyperhidrosis.  Psych: Alert and oriented to person only, not place or time    Image Results  XR chest 2 views  Narrative: STUDY:  Chest Radiographs;  12/19/2024 at 10:55.  INDICATION:  Generalized weakness.  COMPARISON:  Chest, single portable view obtained on 03/29/2024 at 22:05 hours.  ACCESSION NUMBER(S):  [id number]  ORDERING CLINICIAN:  SOLITARIO LAZARO  TECHNIQUE:  Frontal and lateral chest.   FINDINGS:  CARDIOMEDIASTINAL SILHOUETTE:  Cardiomediastinal silhouette is likely within normal limits  considering technique and depth of inspiration.      LUNGS:  Accentuation the lung markings from hypoventilatory changes and  techniques.  No consolidation.  No pulmonary edema.  No pleural  effusion or pneumothorax.     ABDOMEN:  No evidence of free air below the diaphragm..     BONES:  Grossly intact.  Impression: No consolidation or pulmonary edema.  Signed by Faisal Paniagua,   CT cervical spine wo IV contrast  Narrative: Interpreted By:  Tesha Mejia,   STUDY:  CT CERVICAL SPINE WO IV CONTRAST;  12/19/2024 10:58 am      INDICATION:  Signs/Symptoms:intox fall?.          COMPARISON:  09/30/2024      ACCESSION NUMBER(S):  [id number]      ORDERING CLINICIAN:  SOLITARIO LAZARO      TECHNIQUE:  Axial CT images of the cervical spine are obtained. Axial, coronal  and sagittal reconstructions are provided for review.      FINDINGS:  There is mild anterior osteophytic spurring at C5-6.  There is marked anterior osteophytic spurring and mild posterior  osteophytic spurring with disc space narrowing at C6-7.      Fractures: There is no evidence for an acute fracture of the cervical  spine.      Vertebral Alignment: Within normal limits.      Craniocervical Junction: The odontoid process and craniocervical  junction are intact.      Vertebrae/Disc Spaces:  The cervical vertebral body heights are  intact and the disc spaces are preserved.      Prevertebral/Paraspinal Soft Tissues: The prevertebral and paraspinal  soft tissues are unremarkable.          Impression: No evidence for an acute fracture or subluxation of the cervical  spine.      MACRO:  None      Signed by: Tesha Mejia 12/19/2024 11:16 AM  Dictation workstation:   [id number]  CT head wo IV contrast  Narrative: Interpreted By:  Tesha Mejia,   STUDY:  CT HEAD WO IV CONTRAST;  12/19/2024 10:58 am      INDICATION:  Signs/Symptoms:intox fall?.      COMPARISON:  09/30/2024      ACCESSION NUMBER(S):  [id number]      ORDERING CLINICIAN:  SOLITARIO LAZARO      TECHNIQUE:  Examination was performed in  the axial plane with sagittal and  coronal reconstructions.      FINDINGS:  INTRACRANIAL:  There is prominence of the ventricular system and cerebral sulci  consistent with cerebral atrophy. No mass or mass effect is  identified. There is no hemorrhage or subdural fluid collection.  There is no acute infarct.  There is no fracture of the calvarium.      EXTRACRANIAL:  Visualized paranasal sinuses and mastoids are clear. There is  thickening of the wall of the right maxillary sinus, likely sequela  of chronic sinusitis. There is mild mucosal thickening of the ethmoid  air cells.      Impression: No acute intracranial pathology.      MACRO:  None      Signed by: Tesha Mejia 12/19/2024 11:14 AM  Dictation workstation:   [id number]      Physical Exam    Relevant Results      No results found for this or any previous visit (from the past 24 hours).    Results for orders placed or performed during the hospital encounter of 12/20/24 (from the past 24 hours)   Magnesium   Result Value Ref Range    Magnesium 1.61 1.60 - 2.40 mg/dL   CBC and Auto Differential   Result Value Ref Range    WBC 7.9 4.4 - 11.3 x10*3/uL    nRBC 0.0 0.0 - 0.0 /100 WBCs    RBC 3.63 (L) 4.50 - 5.90 x10*6/uL    Hemoglobin 12.4 (L) 13.5 - 17.5 g/dL    Hematocrit 36.1 (L) 41.0 - 52.0 %    MCV 99 80 - 100 fL    MCH 34.2 (H) 26.0 - 34.0 pg    MCHC 34.3 32.0 - 36.0 g/dL    RDW 13.1 11.5 - 14.5 %    Platelets 76 (L) 150 - 450 x10*3/uL    Neutrophils % 50.3 40.0 - 80.0 %    Immature Granulocytes %, Automated 0.9 0.0 - 0.9 %    Lymphocytes % 21.9 13.0 - 44.0 %    Monocytes % 23.6 2.0 - 10.0 %    Eosinophils % 2.2 0.0 - 6.0 %    Basophils % 1.1 0.0 - 2.0 %    Neutrophils Absolute 3.97 1.20 - 7.70 x10*3/uL    Immature Granulocytes Absolute, Automated 0.07 0.00 - 0.70 x10*3/uL    Lymphocytes Absolute 1.73 1.20 - 4.80 x10*3/uL    Monocytes Absolute 1.86 (H) 0.10 - 1.00 x10*3/uL    Eosinophils Absolute 0.17 0.00 - 0.70 x10*3/uL    Basophils Absolute 0.09 0.00  - 0.10 x10*3/uL   Renal function panel   Result Value Ref Range    Glucose 55 (LL) 74 - 99 mg/dL    Sodium 142 136 - 145 mmol/L    Potassium 4.6 3.5 - 5.3 mmol/L    Chloride 103 98 - 107 mmol/L    Bicarbonate 20 (L) 21 - 32 mmol/L    Anion Gap 24 (H) 10 - 20 mmol/L    Urea Nitrogen 31 (H) 6 - 23 mg/dL    Creatinine 0.68 0.50 - 1.30 mg/dL    eGFR >90 >60 mL/min/1.73m*2    Calcium 9.8 8.6 - 10.6 mg/dL    Phosphorus 5.1 (H) 2.5 - 4.9 mg/dL    Albumin 4.0 3.4 - 5.0 g/dL   POCT GLUCOSE   Result Value Ref Range    POCT Glucose 73 (L) 74 - 99 mg/dL              Assessment/Plan                  Assessment & Plan  Unable to ambulate    69-year-old M with history most notable for alcohol use disorder with severe dependence presenting with recurrent falls likely in the setting of acute alcoholic intoxication and presumed Wernicke encephalopathy.      #Alcohol withdrawal syndrome with delirium   #Alcohol use disorder, with severe dependence  #Wernicke encephalopathy  #Electrolyte abnormalities  -ethanol level: 431 on 12/19-> <10 on 12/20  -K 3.2, Mg2+ 1.14 on admit, electrolyte imbalance likely d/t alcohol use  -Continue CIWA protocol using oral Lorazepam  -High-dose thiamine for 3 days followed by p.o. thiamine  -MVI, and folic acid 1 mg PO every day  -Agitation PRNs ordered Zyprexa IM and p.o. every 6 hours as needed    # Hypoglycemia  -Likely secondary to recent alcohol intoxication and also decreased p.o. intake as patient currently withdrawing  -Lost IV access, awaiting IV team to attempt to obtain additional access  -Hypoglycemic protocol ordered     #left sided sciatica (chronic)  #unable to ambulate  -PT/OT consulted and SNF placement recommended  -falls precautions  -pain regimen: Ibuprofen 600 mg q6h PRN, Lidocaine patch and Robaxin 500 mg q6h PRN  -heat packs as needed     #Anemia  #Thrombocytopenia (chronic)  -likely 2/2 alcohol use  -Hgb 11.8 on admit  -PLT 73 on admit , PLT 67 on 12/19  -per chart review  thrombocytopenia has been present since 7/2022  -continue to monitor and transfuse as needed  -CBC ordered for AM, awaiting AM labs 12/22/24     #HTN  -Continue home propranolol 40 mg PO BID     #CHINEDU  -Continue home Lexaprol 10 mg PO every day and Wellbutrin  mg PO every day     #Hypothyroidism  -Continue home Synthroid 75 mcg PO every day     #Nicotine use disorder  -cessation recommended  -Nicotine patch ordered     F: None  E: Replete as needed  N: Regular  GI: None  DVT: Lovenox  CODE STATUS: Full code  Capacity eval: Does not have capacity, will place separate capacity evaluation significant event note  Emergency contact/healthcare power of  Earnest Youssef [phone]     Disposition: Pending improvement from Wernicke's and alcohol withdrawal.  Patient Receiving detox services and rehab.  Rehab facility of [facility].  Would recommend thrive reaching out to [facility] as well, early next week to coordinate rehab/detox dispo          A total of > 50 minutes was spent on this visit reviewing previous notes including inpatient ED/Consult notes, chart review of ambulatory records in [facility] and OSH records in [facility], counseling the patient on substance use cessation, ordering tests and add on labs, medication reconciliation and adjusting meds, and documenting the findings in the note.          Derrick Piña MD

## 2024-12-22 NOTE — NURSING NOTE
"ELIANE Progress Note:     ELIANE services following patient post code violet 12/22/24 @ 0502. Per orders and chart review, patient is q2 CIWA using PO Lorazepam. Per nursing, pt has been restless with increased tremors and AVH (see CIWA flowsheets). No agitation noted but he is unable to follow safety commands by kicking his feet over the side rails, attempting to get OOB. Interventions include redirection, distraction, pain management, and delirium protocol.     [facility]E services available to patient and staff 24/7 throughout hospitalization. ELIANE will continue to perform q12 hr rounding to ensure safety of patient and staff. Please secure chat \"ELIANE\" with any questions or concerns.   "

## 2024-12-22 NOTE — SIGNIFICANT EVENT
12/22/24 0539   Code Beatrice   Code Beatrice Initated by CAITY Fernandez   Pager Date 12/22/24   Pager Time [phone]   Individual Involved Patient   Location/Room  2009   Arrival Date 12/22/24   Arrival Time 0505   Visit Reason Code Violet   Present at Code Nurse;Police services;Rapid Response nurse   Primary Reason for Call Non re-directable   Interventions Distraction;PRN medication;Quiet milieu;Stimulus reduciton   Description of Event Code Violet called due to agitatation and restlessness. Upon arrival patient was naked with legs hanging off the side of the bed. Patient appeared to have removed gown, brief, and blankets. Bed sheet visably soiled with blood due to patient removing dressing from a skin tear. Patient was oriented to self only. ELIANE assisted in verbally redirecting and repositioning patinet in bed. Soiled linens removed, new gown and blankets provided. Patient was medicated per MAR by bedside RN. New dressing applied to skin tear. Stimulus reduction provided by closing blinds, turning off TV, and dimming lights. Patient resting comfortably in bed.   Medications Administered Medications administered (see MAR)   PRN Medication Available Yes   Physical Contact To Patient  (Repositioning in bed)   Plan and Interventions Going Forward Continue to provide stimulus reduction and quiet milieu.   Plan Reviewed with patient   Outcome Situation resolved   Event End Date 12/22/24   Event End Time 0515        Authored by Resident/PA/NP

## 2024-12-22 NOTE — CARE PLAN
The patient's goals for the shift include MARLENI.     The clinical goals for the shift include Pt will be free from falls or injury by the end of this shift.    Over the shift, the patient did make progress toward the following goals. Barriers to progression include patient being confused, restless, and not following safety instructions as directed by healthcare staff. Recommendations to address these barriers include continuing to monitor patient for Q2 CIWA protocol, redirecting the patient, and maintaining the bed alarm to help prevent falls.      Problem: Fall/Injury  Goal: Not fall by end of shift  Outcome: Progressing  Goal: Be free from injury by end of the shift  Outcome: Progressing  Goal: Verbalize understanding of personal risk factors for fall in the hospital  Outcome: Progressing  Goal: Verbalize understanding of risk factor reduction measures to prevent injury from fall in the home  Outcome: Progressing  Goal: Use assistive devices by end of the shift  Outcome: Progressing  Goal: Pace activities to prevent fatigue by end of the shift  Outcome: Progressing

## 2024-12-22 NOTE — CARE PLAN
The patient's goals for the shift include      The clinical goals for the shift include pt will remain safe this shift -tr    Over the shift, the patient did not make progress toward the following goals. Barriers to progression include   . Recommendations to address these barriers include     Problem: Fall/Injury  Goal: Not fall by end of shift  Outcome: Progressing  Goal: Be free from injury by end of the shift  Outcome: Progressing  Goal: Verbalize understanding of personal risk factors for fall in the hospital  Outcome: Progressing  Goal: Verbalize understanding of risk factor reduction measures to prevent injury from fall in the home  Outcome: Progressing  Goal: Use assistive devices by end of the shift  Outcome: Progressing  Goal: Pace activities to prevent fatigue by end of the shift  Outcome: Progressing   .

## 2024-12-23 LAB
ALBUMIN SERPL BCP-MCNC: 3.8 G/DL (ref 3.4–5)
ALBUMIN SERPL BCP-MCNC: 4 G/DL (ref 3.4–5)
ANION GAP SERPL CALC-SCNC: 14 MMOL/L (ref 10–20)
ANION GAP SERPL CALC-SCNC: 17 MMOL/L (ref 10–20)
BASOPHILS # BLD AUTO: 0.06 X10*3/UL (ref 0–0.1)
BASOPHILS NFR BLD AUTO: 1 %
BUN SERPL-MCNC: 29 MG/DL (ref 6–23)
BUN SERPL-MCNC: 32 MG/DL (ref 6–23)
CALCIUM SERPL-MCNC: 9.4 MG/DL (ref 8.6–10.6)
CALCIUM SERPL-MCNC: 9.8 MG/DL (ref 8.6–10.6)
CHLORIDE SERPL-SCNC: 104 MMOL/L (ref 98–107)
CHLORIDE SERPL-SCNC: 104 MMOL/L (ref 98–107)
CO2 SERPL-SCNC: 23 MMOL/L (ref 21–32)
CO2 SERPL-SCNC: 27 MMOL/L (ref 21–32)
CREAT SERPL-MCNC: 0.66 MG/DL (ref 0.5–1.3)
CREAT SERPL-MCNC: 0.89 MG/DL (ref 0.5–1.3)
EGFRCR SERPLBLD CKD-EPI 2021: >90 ML/MIN/1.73M*2
EGFRCR SERPLBLD CKD-EPI 2021: >90 ML/MIN/1.73M*2
EOSINOPHIL # BLD AUTO: 0.18 X10*3/UL (ref 0–0.7)
EOSINOPHIL NFR BLD AUTO: 2.9 %
ERYTHROCYTE [DISTWIDTH] IN BLOOD BY AUTOMATED COUNT: 13.2 % (ref 11.5–14.5)
GLUCOSE BLD MANUAL STRIP-MCNC: 160 MG/DL (ref 74–99)
GLUCOSE SERPL-MCNC: 104 MG/DL (ref 74–99)
GLUCOSE SERPL-MCNC: 126 MG/DL (ref 74–99)
HCT VFR BLD AUTO: 36.4 % (ref 41–52)
HGB BLD-MCNC: 12.6 G/DL (ref 13.5–17.5)
IMM GRANULOCYTES # BLD AUTO: 0.05 X10*3/UL (ref 0–0.7)
IMM GRANULOCYTES NFR BLD AUTO: 0.8 % (ref 0–0.9)
LYMPHOCYTES # BLD AUTO: 1.71 X10*3/UL (ref 1.2–4.8)
LYMPHOCYTES NFR BLD AUTO: 27.7 %
MAGNESIUM SERPL-MCNC: 1.58 MG/DL (ref 1.6–2.4)
MCH RBC QN AUTO: 33.9 PG (ref 26–34)
MCHC RBC AUTO-ENTMCNC: 34.6 G/DL (ref 32–36)
MCV RBC AUTO: 98 FL (ref 80–100)
MONOCYTES # BLD AUTO: 1.21 X10*3/UL (ref 0.1–1)
MONOCYTES NFR BLD AUTO: 19.6 %
NEUTROPHILS # BLD AUTO: 2.96 X10*3/UL (ref 1.2–7.7)
NEUTROPHILS NFR BLD AUTO: 48 %
NRBC BLD-RTO: 0 /100 WBCS (ref 0–0)
PHOSPHATE SERPL-MCNC: 3.1 MG/DL (ref 2.5–4.9)
PHOSPHATE SERPL-MCNC: 3.3 MG/DL (ref 2.5–4.9)
PLATELET # BLD AUTO: 99 X10*3/UL (ref 150–450)
POTASSIUM SERPL-SCNC: 3.2 MMOL/L (ref 3.5–5.3)
POTASSIUM SERPL-SCNC: 3.6 MMOL/L (ref 3.5–5.3)
RBC # BLD AUTO: 3.72 X10*6/UL (ref 4.5–5.9)
SODIUM SERPL-SCNC: 141 MMOL/L (ref 136–145)
SODIUM SERPL-SCNC: 141 MMOL/L (ref 136–145)
WBC # BLD AUTO: 6.2 X10*3/UL (ref 4.4–11.3)

## 2024-12-23 PROCEDURE — 80069 RENAL FUNCTION PANEL: CPT | Performed by: STUDENT IN AN ORGANIZED HEALTH CARE EDUCATION/TRAINING PROGRAM

## 2024-12-23 PROCEDURE — 2500000004 HC RX 250 GENERAL PHARMACY W/ HCPCS (ALT 636 FOR OP/ED): Performed by: NURSE PRACTITIONER

## 2024-12-23 PROCEDURE — 2500000002 HC RX 250 W HCPCS SELF ADMINISTERED DRUGS (ALT 637 FOR MEDICARE OP, ALT 636 FOR OP/ED): Performed by: STUDENT IN AN ORGANIZED HEALTH CARE EDUCATION/TRAINING PROGRAM

## 2024-12-23 PROCEDURE — S4991 NICOTINE PATCH NONLEGEND: HCPCS | Performed by: NURSE PRACTITIONER

## 2024-12-23 PROCEDURE — 99233 SBSQ HOSP IP/OBS HIGH 50: CPT | Performed by: STUDENT IN AN ORGANIZED HEALTH CARE EDUCATION/TRAINING PROGRAM

## 2024-12-23 PROCEDURE — 83735 ASSAY OF MAGNESIUM: CPT | Performed by: STUDENT IN AN ORGANIZED HEALTH CARE EDUCATION/TRAINING PROGRAM

## 2024-12-23 PROCEDURE — 36415 COLL VENOUS BLD VENIPUNCTURE: CPT | Performed by: STUDENT IN AN ORGANIZED HEALTH CARE EDUCATION/TRAINING PROGRAM

## 2024-12-23 PROCEDURE — 2500000002 HC RX 250 W HCPCS SELF ADMINISTERED DRUGS (ALT 637 FOR MEDICARE OP, ALT 636 FOR OP/ED): Performed by: NURSE PRACTITIONER

## 2024-12-23 PROCEDURE — 1100000001 HC PRIVATE ROOM DAILY

## 2024-12-23 PROCEDURE — 85025 COMPLETE CBC W/AUTO DIFF WBC: CPT | Performed by: STUDENT IN AN ORGANIZED HEALTH CARE EDUCATION/TRAINING PROGRAM

## 2024-12-23 PROCEDURE — 2500000001 HC RX 250 WO HCPCS SELF ADMINISTERED DRUGS (ALT 637 FOR MEDICARE OP): Performed by: NURSE PRACTITIONER

## 2024-12-23 PROCEDURE — 2500000001 HC RX 250 WO HCPCS SELF ADMINISTERED DRUGS (ALT 637 FOR MEDICARE OP): Performed by: STUDENT IN AN ORGANIZED HEALTH CARE EDUCATION/TRAINING PROGRAM

## 2024-12-23 PROCEDURE — 2500000004 HC RX 250 GENERAL PHARMACY W/ HCPCS (ALT 636 FOR OP/ED): Performed by: STUDENT IN AN ORGANIZED HEALTH CARE EDUCATION/TRAINING PROGRAM

## 2024-12-23 PROCEDURE — 82947 ASSAY GLUCOSE BLOOD QUANT: CPT

## 2024-12-23 RX ORDER — MAGNESIUM SULFATE HEPTAHYDRATE 40 MG/ML
2 INJECTION, SOLUTION INTRAVENOUS ONCE
Status: COMPLETED | OUTPATIENT
Start: 2024-12-23 | End: 2024-12-23

## 2024-12-23 RX ORDER — POTASSIUM CHLORIDE 20 MEQ/1
40 TABLET, EXTENDED RELEASE ORAL ONCE
Status: COMPLETED | OUTPATIENT
Start: 2024-12-23 | End: 2024-12-23

## 2024-12-23 RX ORDER — LANOLIN ALCOHOL/MO/W.PET/CERES
400 CREAM (GRAM) TOPICAL DAILY
Status: DISCONTINUED | OUTPATIENT
Start: 2024-12-24 | End: 2024-12-27 | Stop reason: HOSPADM

## 2024-12-23 RX ORDER — LANOLIN ALCOHOL/MO/W.PET/CERES
100 CREAM (GRAM) TOPICAL DAILY
Status: DISCONTINUED | OUTPATIENT
Start: 2024-12-25 | End: 2024-12-27 | Stop reason: HOSPADM

## 2024-12-23 RX ADMIN — PROPRANOLOL HYDROCHLORIDE 40 MG: 40 TABLET ORAL at 20:08

## 2024-12-23 RX ADMIN — LEVOTHYROXINE SODIUM 75 MCG: 75 TABLET ORAL at 06:43

## 2024-12-23 RX ADMIN — THIAMINE HYDROCHLORIDE 500 MG: 100 INJECTION, SOLUTION INTRAMUSCULAR; INTRAVENOUS at 20:05

## 2024-12-23 RX ADMIN — Medication 1 TABLET: at 08:56

## 2024-12-23 RX ADMIN — LORAZEPAM 2 MG: 1 TABLET ORAL at 18:00

## 2024-12-23 RX ADMIN — POLYETHYLENE GLYCOL 3350 17 G: 17 POWDER, FOR SOLUTION ORAL at 09:07

## 2024-12-23 RX ADMIN — Medication 5 MG: at 20:08

## 2024-12-23 RX ADMIN — ESCITALOPRAM OXALATE 10 MG: 10 TABLET ORAL at 08:56

## 2024-12-23 RX ADMIN — PROPRANOLOL HYDROCHLORIDE 40 MG: 40 TABLET ORAL at 09:03

## 2024-12-23 RX ADMIN — POTASSIUM CHLORIDE 40 MEQ: 1500 TABLET, EXTENDED RELEASE ORAL at 14:00

## 2024-12-23 RX ADMIN — MAGNESIUM SULFATE HEPTAHYDRATE 2 G: 2 INJECTION, SOLUTION INTRAVENOUS at 14:00

## 2024-12-23 RX ADMIN — ENOXAPARIN SODIUM 40 MG: 100 INJECTION SUBCUTANEOUS at 01:54

## 2024-12-23 RX ADMIN — THIAMINE HYDROCHLORIDE 500 MG: 100 INJECTION, SOLUTION INTRAMUSCULAR; INTRAVENOUS at 09:01

## 2024-12-23 RX ADMIN — LORAZEPAM 0.5 MG: 0.5 TABLET ORAL at 14:30

## 2024-12-23 RX ADMIN — LORAZEPAM 2 MG: 1 TABLET ORAL at 08:55

## 2024-12-23 RX ADMIN — LORAZEPAM 0.5 MG: 0.5 TABLET ORAL at 02:10

## 2024-12-23 RX ADMIN — BUPROPION 150 MG: 150 TABLET, EXTENDED RELEASE ORAL at 08:56

## 2024-12-23 RX ADMIN — NICOTINE 1 PATCH: 14 PATCH, EXTENDED RELEASE TRANSDERMAL at 01:49

## 2024-12-23 RX ADMIN — METHOCARBAMOL TABLETS 500 MG: 500 TABLET, COATED ORAL at 08:56

## 2024-12-23 RX ADMIN — PROPRANOLOL HYDROCHLORIDE 40 MG: 40 TABLET ORAL at 01:57

## 2024-12-23 RX ADMIN — THIAMINE HYDROCHLORIDE 500 MG: 100 INJECTION, SOLUTION INTRAMUSCULAR; INTRAVENOUS at 01:55

## 2024-12-23 RX ADMIN — FOLIC ACID 1 MG: 1 TABLET ORAL at 08:56

## 2024-12-23 RX ADMIN — NICOTINE 1 PATCH: 14 PATCH, EXTENDED RELEASE TRANSDERMAL at 20:08

## 2024-12-23 RX ADMIN — IBUPROFEN 600 MG: 600 TABLET, FILM COATED ORAL at 08:56

## 2024-12-23 RX ADMIN — THIAMINE HYDROCHLORIDE 500 MG: 100 INJECTION, SOLUTION INTRAMUSCULAR; INTRAVENOUS at 17:00

## 2024-12-23 RX ADMIN — POLYETHYLENE GLYCOL 3350 17 G: 17 POWDER, FOR SOLUTION ORAL at 01:49

## 2024-12-23 ASSESSMENT — LIFESTYLE VARIABLES
HEADACHE, FULLNESS IN HEAD: NOT PRESENT
TOTAL SCORE: 10
AGITATION: NORMAL ACTIVITY
HEADACHE, FULLNESS IN HEAD: NOT PRESENT
ORIENTATION AND CLOUDING OF SENSORIUM: CANNOT DO SERIAL ADDITIONS OR IS UNCERTAIN ABOUT DATE
ORIENTATION AND CLOUDING OF SENSORIUM: CANNOT DO SERIAL ADDITIONS OR IS UNCERTAIN ABOUT DATE
TOTAL SCORE: 3
AUDITORY DISTURBANCES: NOT PRESENT
PAROXYSMAL SWEATS: NO SWEAT VISIBLE
VISUAL DISTURBANCES: NOT PRESENT
PAROXYSMAL SWEATS: NO SWEAT VISIBLE
ORIENTATION AND CLOUDING OF SENSORIUM: CANNOT DO SERIAL ADDITIONS OR IS UNCERTAIN ABOUT DATE
TOTAL SCORE: 5
HEADACHE, FULLNESS IN HEAD: NOT PRESENT
NAUSEA AND VOMITING: NO NAUSEA AND NO VOMITING
AUDITORY DISTURBANCES: NOT PRESENT
NAUSEA AND VOMITING: NO NAUSEA AND NO VOMITING
AGITATION: 3
AGITATION: SOMEWHAT MORE THAN NORMAL ACTIVITY
PAROXYSMAL SWEATS: BARELY PERCEPTIBLE SWEATING, PALMS MOIST
VISUAL DISTURBANCES: NOT PRESENT
ANXIETY: NO ANXIETY, AT EASE
ORIENTATION AND CLOUDING OF SENSORIUM: CANNOT DO SERIAL ADDITIONS OR IS UNCERTAIN ABOUT DATE
NAUSEA AND VOMITING: NO NAUSEA AND NO VOMITING
PAROXYSMAL SWEATS: NO SWEAT VISIBLE
HEADACHE, FULLNESS IN HEAD: NOT PRESENT
VISUAL DISTURBANCES: NOT PRESENT
AUDITORY DISTURBANCES: NOT PRESENT
NAUSEA AND VOMITING: NO NAUSEA AND NO VOMITING
HEADACHE, FULLNESS IN HEAD: NOT PRESENT
VISUAL DISTURBANCES: NOT PRESENT
PAROXYSMAL SWEATS: NO SWEAT VISIBLE
TOTAL SCORE: 10
AUDITORY DISTURBANCES: NOT PRESENT
AUDITORY DISTURBANCES: NOT PRESENT
TACTILE DISTURBANCES: VERY MILD ITCHING, PINS AND NEEDLES, BURNING OR NUMBNESS
PAROXYSMAL SWEATS: BARELY PERCEPTIBLE SWEATING, PALMS MOIST
VISUAL DISTURBANCES: NOT PRESENT
TREMOR: MODERATE, WITH PATIENT'S ARMS EXTENDED
TOTAL SCORE: 14
ORIENTATION AND CLOUDING OF SENSORIUM: CANNOT DO SERIAL ADDITIONS OR IS UNCERTAIN ABOUT DATE
TREMOR: 6
NAUSEA AND VOMITING: NO NAUSEA AND NO VOMITING
NAUSEA AND VOMITING: NO NAUSEA AND NO VOMITING
ANXIETY: NO ANXIETY, AT EASE
VISUAL DISTURBANCES: NOT PRESENT
AGITATION: 3
NAUSEA AND VOMITING: NO NAUSEA AND NO VOMITING
AUDITORY DISTURBANCES: NOT PRESENT
VISUAL DISTURBANCES: NOT PRESENT
TREMOR: 6
ANXIETY: NO ANXIETY, AT EASE
NAUSEA AND VOMITING: NO NAUSEA AND NO VOMITING
TREMOR: NOT VISIBLE, BUT CAN BE FELT FINGERTIP TO FINGERTIP
ANXIETY: 2
VISUAL DISTURBANCES: NOT PRESENT
ORIENTATION AND CLOUDING OF SENSORIUM: ORIENTED AND CAN DO SERIAL ADDITIONS
TREMOR: SEVERE, EVEN WITH ARMS NOT EXTENDED
TREMOR: 6
VISUAL DISTURBANCES: NOT PRESENT
ORIENTATION AND CLOUDING OF SENSORIUM: ORIENTED AND CAN DO SERIAL ADDITIONS
ORIENTATION AND CLOUDING OF SENSORIUM: CANNOT DO SERIAL ADDITIONS OR IS UNCERTAIN ABOUT DATE
ANXIETY: MILDLY ANXIOUS
PAROXYSMAL SWEATS: NO SWEAT VISIBLE
TOTAL SCORE: 5
AGITATION: NORMAL ACTIVITY
HEADACHE, FULLNESS IN HEAD: NOT PRESENT
TOTAL SCORE: 11
BLOOD PRESSURE: 136/84
HEADACHE, FULLNESS IN HEAD: MODERATE
PAROXYSMAL SWEATS: NO SWEAT VISIBLE
HEADACHE, FULLNESS IN HEAD: NOT PRESENT
ORIENTATION AND CLOUDING OF SENSORIUM: CANNOT DO SERIAL ADDITIONS OR IS UNCERTAIN ABOUT DATE
ANXIETY: MILDLY ANXIOUS
AGITATION: NORMAL ACTIVITY
AGITATION: SOMEWHAT MORE THAN NORMAL ACTIVITY
ANXIETY: NO ANXIETY, AT EASE
VISUAL DISTURBANCES: NOT PRESENT
AUDITORY DISTURBANCES: NOT PRESENT
AUDITORY DISTURBANCES: NOT PRESENT
HEADACHE, FULLNESS IN HEAD: NOT PRESENT
TOTAL SCORE: 1
ORIENTATION AND CLOUDING OF SENSORIUM: CANNOT DO SERIAL ADDITIONS OR IS UNCERTAIN ABOUT DATE
AGITATION: SOMEWHAT MORE THAN NORMAL ACTIVITY
AUDITORY DISTURBANCES: NOT PRESENT
ANXIETY: 2
AGITATION: SOMEWHAT MORE THAN NORMAL ACTIVITY
TREMOR: 6
TREMOR: 3
ANXIETY: NO ANXIETY, AT EASE
AUDITORY DISTURBANCES: NOT PRESENT
NAUSEA AND VOMITING: NO NAUSEA AND NO VOMITING
HEADACHE, FULLNESS IN HEAD: NOT PRESENT
TREMOR: MODERATE, WITH PATIENT'S ARMS EXTENDED
PAROXYSMAL SWEATS: NO SWEAT VISIBLE
AGITATION: NORMAL ACTIVITY
TREMOR: 6
TOTAL SCORE: 10
ANXIETY: NO ANXIETY, AT EASE
PAROXYSMAL SWEATS: NO SWEAT VISIBLE
PULSE: 63
TOTAL SCORE: 10
NAUSEA AND VOMITING: NO NAUSEA AND NO VOMITING

## 2024-12-23 ASSESSMENT — PAIN SCALES - GENERAL
PAINLEVEL_OUTOF10: 0 - NO PAIN
PAINLEVEL_OUTOF10: 4

## 2024-12-23 ASSESSMENT — PAIN SCALES - WONG BAKER: WONGBAKER_NUMERICALRESPONSE: NO HURT

## 2024-12-23 ASSESSMENT — PAIN - FUNCTIONAL ASSESSMENT
PAIN_FUNCTIONAL_ASSESSMENT: WONG-BAKER FACES
PAIN_FUNCTIONAL_ASSESSMENT: 0-10

## 2024-12-23 ASSESSMENT — PAIN DESCRIPTION - DESCRIPTORS: DESCRIPTORS: TINGLING

## 2024-12-23 NOTE — NURSING NOTE
0700 -1900      Patient medicated per [facility] protocol throughout shift and effective.      Severe tremors and anxiety noted when patient transferring from bed to BSC.       Patient refuses bedpan, preferring to use BSC.      Patient is a heavy (2) assist to transfer as his tremors are severe with movement.      Remains calm and coopertive otherwise.  Appetite also improved.

## 2024-12-23 NOTE — CONSULTS
Wound Care Consult     Visit Date: 12/23/2024      Patient Name: Humble Banks         MRN: [id number]           YOB: 1955     Reason for Consult: Skin tear Right posterior elbow       Wound Assessment:   12/23/24 1300   Wound 12/21/24 Arm Dorsal;Lower;Proximal;Right     Date First Assessed: 12/21/24   Location: Arm  Wound Location Orientation: Dorsal;Lower;Proximal;Right   Wound Length (cm) 2 cm   Wound Width (cm) 0.8 cm   Wound Surface Area (cm^2) 1.6 cm^2   Margins Attached edges   Drainage Description Serosanguineous   Drainage Amount Scant   Dressing Xeroform;Silicone border dressing   Dressing Changed New   Dressing Status Clean;Dry;Occlusive   Wound location: Right elbow   Recommendations: DAILY Clean with normal saline or Vashe wound cleanser ([employer] order # [id number]). Apply Xeroform dressing to open wound bed Cover with Mepilex border dressing.    Wound Team Plan:  Primary provider please review the wound care consult note and pending wound care order. If you agree with orders please file in EMR.      While inpatient, Secure chat with questions or reconsult wound care if condition worsens or changes. For urgent communications please message the group through [email or web address] at: [facility] Wound Care Team, Thank you.      Shama Chavez RN, XIMENA  12/23/2024  3:59 PM

## 2024-12-23 NOTE — PROGRESS NOTES
Humble Banks is a 69 y.o. y.o. male on day 3 of admission presenting with Unable to ambulate [R26.2].     Subjective   Received update that patient is not medically ready for discharge; pending improvement in his Wernicke's and alcohol withdrawal. ADOD 12/27. Patient will discharge to [facility] once medically cleared; patient will require precert.    SW will continue to follow.    - Keily ENGLE, MA, LSW  Care Transitions   [facility] Secure Chat or [phone]

## 2024-12-23 NOTE — PROGRESS NOTES
Humble Banks is a 69 y.o. male on day 3 of admission presenting with Unable to ambulate.      Subjective   Seen and evaluated at bedside, no acute events overnight.  Still pretty confused.  Thought I was his son who passed away years ago.  Intermittently agitated.       Objective     Last Recorded Vitals  BP (!) 144/96 (BP Location: Right arm, Patient Position: Lying)   Pulse 69   Temp 36.4 °C (97.5 °F) (Temporal)   Resp 18   Wt 84.4 kg (186 lb)   SpO2 97%   Intake/Output last 3 Shifts:    Intake/Output Summary (Last 24 hours) at 12/23/2024 1031  Last data filed at 12/23/2024 0930  Gross per 24 hour   Intake 240 ml   Output --   Net 240 ml       Admission Weight  Weight: 84.4 kg (186 lb) (12/20/24 1329)    Daily Weight  12/20/24 : 84.4 kg (186 lb)    General Exam  General Appearance: Patient is awake, alert and cooperative, and appears to be in no acute distress.  Head: NCAT  Eyes: PERRL, EOMI. vision is grossly intact intermittent vertical nystagmus  Cardiac: Normal S1 and S2. No murmurs. Rhythm is regular.  Lungs: CTAB  Musculoskeletal: Range of motion in all extremities intact, decreased strength in all extremities 4/5 strength  Extremities: Trace bilateral lower extremity edema, bilateral tremors of the hand and upper extremities.   Neuro: CN II-XII grossly intact.   Skin: Hyperhidrosis.  Psych: Alert and oriented to person only, not place or time    Image Results  ECG 12 lead  Sinus rhythm with occasional Premature ventricular complexes  Prolonged QT  Abnormal ECG  When compared with ECG of 30-SEP-2024 08:14,  Premature ventricular complexes are now Present  Nonspecific T wave abnormality no longer evident in Anterior leads  QT has lengthened      Physical Exam    Relevant Results      Results for orders placed or performed during the hospital encounter of 12/20/24 (from the past 24 hours)   CBC and Auto Differential   Result Value Ref Range    WBC 6.2 4.4 - 11.3 x10*3/uL    nRBC 0.0 0.0 - 0.0 /100  WBCs    RBC 3.72 (L) 4.50 - 5.90 x10*6/uL    Hemoglobin 12.6 (L) 13.5 - 17.5 g/dL    Hematocrit 36.4 (L) 41.0 - 52.0 %    MCV 98 80 - 100 fL    MCH 33.9 26.0 - 34.0 pg    MCHC 34.6 32.0 - 36.0 g/dL    RDW 13.2 11.5 - 14.5 %    Platelets 99 (L) 150 - 450 x10*3/uL    Neutrophils % 48.0 40.0 - 80.0 %    Immature Granulocytes %, Automated 0.8 0.0 - 0.9 %    Lymphocytes % 27.7 13.0 - 44.0 %    Monocytes % 19.6 2.0 - 10.0 %    Eosinophils % 2.9 0.0 - 6.0 %    Basophils % 1.0 0.0 - 2.0 %    Neutrophils Absolute 2.96 1.20 - 7.70 x10*3/uL    Immature Granulocytes Absolute, Automated 0.05 0.00 - 0.70 x10*3/uL    Lymphocytes Absolute 1.71 1.20 - 4.80 x10*3/uL    Monocytes Absolute 1.21 (H) 0.10 - 1.00 x10*3/uL    Eosinophils Absolute 0.18 0.00 - 0.70 x10*3/uL    Basophils Absolute 0.06 0.00 - 0.10 x10*3/uL   Magnesium   Result Value Ref Range    Magnesium 1.58 (L) 1.60 - 2.40 mg/dL   Renal Function Panel   Result Value Ref Range    Glucose 104 (H) 74 - 99 mg/dL    Sodium 141 136 - 145 mmol/L    Potassium 3.2 (L) 3.5 - 5.3 mmol/L    Chloride 104 98 - 107 mmol/L    Bicarbonate 23 21 - 32 mmol/L    Anion Gap 17 10 - 20 mmol/L    Urea Nitrogen 29 (H) 6 - 23 mg/dL    Creatinine 0.66 0.50 - 1.30 mg/dL    eGFR >90 >60 mL/min/1.73m*2    Calcium 9.4 8.6 - 10.6 mg/dL    Phosphorus 3.3 2.5 - 4.9 mg/dL    Albumin 3.8 3.4 - 5.0 g/dL   POCT GLUCOSE   Result Value Ref Range    POCT Glucose 160 (H) 74 - 99 mg/dL                Assessment/Plan                  Assessment & Plan  Unable to ambulate      69-year-old M with history most notable for alcohol use disorder with severe dependence presenting with recurrent falls likely in the setting of acute alcoholic intoxication and presumed Wernicke encephalopathy.      #Alcohol withdrawal syndrome with delirium   #Alcohol use disorder, with severe dependence  #Wernicke encephalopathy  #Electrolyte abnormalities (K and Mg)  -ethanol level: 431 on 12/19-> <10 on 12/20  -K 3.2, Mg2+ 1.14 on admit,  electrolyte imbalance likely d/t alcohol use  -Potassium and magnesium low again this morning, will replete and start on p.o. mag oxide  -Continue CIWA protocol using oral Lorazepam, CIWA 14 this morning still requiring PRNs  -High-dose thiamine for 3 days followed by p.o. thiamine, 100 mg p.o. starts tomorrow  -MVI, and folic acid 1 mg PO every day  -Agitation PRNs ordered Zyprexa IM and p.o. every 6 hours as needed     # Hypoglycemia (resolved)  -Likely secondary to recent alcohol intoxication and also decreased p.o. intake as patient currently withdrawing  -Lost IV access, awaiting IV team to attempt to obtain additional access  -Hypoglycemic protocol ordered     #left sided sciatica (chronic)  #unable to ambulate  -PT/OT consulted and SNF placement recommended  -falls precautions  -pain regimen: Ibuprofen 600 mg q6h PRN, Lidocaine patch and Robaxin 500 mg q6h PRN  -heat packs as needed     #Anemia (stable)  #Thrombocytopenia (chronic)  -Hgb 11.8 on admit, 12.6 today  -PLT 73 on admit , PLT 99 today  -per chart review thrombocytopenia has been present since 7/2022  -continue to monitor and transfuse as needed     #HTN  -Continue home propranolol 40 mg PO BID with holding parameters     #CHINEDU  -Continue home Lexaprol 10 mg PO every day and Wellbutrin  mg PO every day     #Hypothyroidism  -Continue home Synthroid 75 mcg PO every day     #Nicotine use disorder  -cessation recommended  -Nicotine patch ordered     F: None  E: Replete as needed  N: Regular  GI: None  DVT: Lovenox  CODE STATUS: Full code  Capacity eval: Does not have capacity capacity evaluation significant event note 12/21/24  Emergency contact/healthcare power of  Earnest Youssef [phone] (wants to be contacted regularly with updates)     Disposition: Pending improvement from Wernicke's and alcohol withdrawal.  Patient Receiving detox services and rehab.  Rehab facility of [facility].  Would recommend thrive  [facility], early next week to coordinate rehab/detox dispo. ADOD 12/27       A total of > 50 minutes was spent on this visit reviewing previous notes including inpatient ED/Consult notes, chart review of ambulatory records in [facility] and OSH records in [facility], counseling the patient on substance use cessation, ordering tests and add on labs, medication reconciliation and adjusting meds, and documenting the findings in the note.          Derrick Piña MD

## 2024-12-23 NOTE — CARE PLAN
The patient's goals for the shift include      The clinical goals for the shift include Pt will be free from falls and/or injury during this shift      Problem: Fall/Injury  Goal: Not fall by end of shift  Outcome: Progressing  Goal: Be free from injury by end of the shift  Outcome: Progressing  Goal: Verbalize understanding of personal risk factors for fall in the hospital  Outcome: Progressing  Goal: Verbalize understanding of risk factor reduction measures to prevent injury from fall in the home  Outcome: Progressing  Goal: Use assistive devices by end of the shift  Outcome: Progressing  Goal: Pace activities to prevent fatigue by end of the shift  Outcome: Progressing     Problem: Pain - Adult  Goal: Verbalizes/displays adequate comfort level or baseline comfort level  Outcome: Progressing     Problem: Safety - Adult  Goal: Free from fall injury  Outcome: Progressing     Problem: Discharge Planning  Goal: Discharge to home or other facility with appropriate resources  Outcome: Progressing     Problem: Chronic Conditions and Co-morbidities  Goal: Patient's chronic conditions and co-morbidity symptoms are monitored and maintained or improved  Outcome: Progressing     Problem: Skin  Goal: Decreased wound size/increased tissue granulation at next dressing change  Outcome: Progressing  Goal: Participates in plan/prevention/treatment measures  12/23/2024 0629 by Tootie Carlson RN  Outcome: Progressing  12/23/2024 0628 by Tootie Carlson RN  Flowsheets (Taken 12/23/2024 0628)  Participates in plan/prevention/treatment measures: Increase activity/out of bed for meals  Goal: Prevent/manage excess moisture  Outcome: Progressing  Goal: Prevent/minimize sheer/friction injuries  Outcome: Progressing  Goal: Promote/optimize nutrition  Outcome: Progressing  Goal: Promote skin healing  Outcome: Progressing

## 2024-12-24 LAB
ALBUMIN SERPL BCP-MCNC: 3.6 G/DL (ref 3.4–5)
ANION GAP SERPL CALC-SCNC: 15 MMOL/L (ref 10–20)
BASOPHILS # BLD AUTO: 0.08 X10*3/UL (ref 0–0.1)
BASOPHILS NFR BLD AUTO: 1.2 %
BUN SERPL-MCNC: 27 MG/DL (ref 6–23)
CALCIUM SERPL-MCNC: 9.3 MG/DL (ref 8.6–10.6)
CHLORIDE SERPL-SCNC: 107 MMOL/L (ref 98–107)
CO2 SERPL-SCNC: 25 MMOL/L (ref 21–32)
CREAT SERPL-MCNC: 0.65 MG/DL (ref 0.5–1.3)
EGFRCR SERPLBLD CKD-EPI 2021: >90 ML/MIN/1.73M*2
EOSINOPHIL # BLD AUTO: 0.13 X10*3/UL (ref 0–0.7)
EOSINOPHIL NFR BLD AUTO: 2 %
ERYTHROCYTE [DISTWIDTH] IN BLOOD BY AUTOMATED COUNT: 13.3 % (ref 11.5–14.5)
GLUCOSE SERPL-MCNC: 117 MG/DL (ref 74–99)
HCT VFR BLD AUTO: 35 % (ref 41–52)
HGB BLD-MCNC: 12.1 G/DL (ref 13.5–17.5)
IMM GRANULOCYTES # BLD AUTO: 0.04 X10*3/UL (ref 0–0.7)
IMM GRANULOCYTES NFR BLD AUTO: 0.6 % (ref 0–0.9)
LYMPHOCYTES # BLD AUTO: 1.6 X10*3/UL (ref 1.2–4.8)
LYMPHOCYTES NFR BLD AUTO: 24.9 %
MAGNESIUM SERPL-MCNC: 1.81 MG/DL (ref 1.6–2.4)
MCH RBC QN AUTO: 34.4 PG (ref 26–34)
MCHC RBC AUTO-ENTMCNC: 34.6 G/DL (ref 32–36)
MCV RBC AUTO: 99 FL (ref 80–100)
MONOCYTES # BLD AUTO: 1.5 X10*3/UL (ref 0.1–1)
MONOCYTES NFR BLD AUTO: 23.3 %
NEUTROPHILS # BLD AUTO: 3.08 X10*3/UL (ref 1.2–7.7)
NEUTROPHILS NFR BLD AUTO: 48 %
NRBC BLD-RTO: 0 /100 WBCS (ref 0–0)
PHOSPHATE SERPL-MCNC: 2.7 MG/DL (ref 2.5–4.9)
PLATELET # BLD AUTO: 121 X10*3/UL (ref 150–450)
POTASSIUM SERPL-SCNC: 3.7 MMOL/L (ref 3.5–5.3)
RBC # BLD AUTO: 3.52 X10*6/UL (ref 4.5–5.9)
SODIUM SERPL-SCNC: 143 MMOL/L (ref 136–145)
WBC # BLD AUTO: 6.4 X10*3/UL (ref 4.4–11.3)

## 2024-12-24 PROCEDURE — 83735 ASSAY OF MAGNESIUM: CPT | Performed by: STUDENT IN AN ORGANIZED HEALTH CARE EDUCATION/TRAINING PROGRAM

## 2024-12-24 PROCEDURE — 85025 COMPLETE CBC W/AUTO DIFF WBC: CPT | Performed by: STUDENT IN AN ORGANIZED HEALTH CARE EDUCATION/TRAINING PROGRAM

## 2024-12-24 PROCEDURE — 2500000005 HC RX 250 GENERAL PHARMACY W/O HCPCS: Performed by: NURSE PRACTITIONER

## 2024-12-24 PROCEDURE — 99233 SBSQ HOSP IP/OBS HIGH 50: CPT | Performed by: INTERNAL MEDICINE

## 2024-12-24 PROCEDURE — 2500000002 HC RX 250 W HCPCS SELF ADMINISTERED DRUGS (ALT 637 FOR MEDICARE OP, ALT 636 FOR OP/ED): Performed by: NURSE PRACTITIONER

## 2024-12-24 PROCEDURE — 36415 COLL VENOUS BLD VENIPUNCTURE: CPT | Performed by: STUDENT IN AN ORGANIZED HEALTH CARE EDUCATION/TRAINING PROGRAM

## 2024-12-24 PROCEDURE — 2500000001 HC RX 250 WO HCPCS SELF ADMINISTERED DRUGS (ALT 637 FOR MEDICARE OP): Performed by: STUDENT IN AN ORGANIZED HEALTH CARE EDUCATION/TRAINING PROGRAM

## 2024-12-24 PROCEDURE — S4991 NICOTINE PATCH NONLEGEND: HCPCS | Performed by: NURSE PRACTITIONER

## 2024-12-24 PROCEDURE — 80069 RENAL FUNCTION PANEL: CPT | Performed by: STUDENT IN AN ORGANIZED HEALTH CARE EDUCATION/TRAINING PROGRAM

## 2024-12-24 PROCEDURE — 2500000001 HC RX 250 WO HCPCS SELF ADMINISTERED DRUGS (ALT 637 FOR MEDICARE OP): Performed by: NURSE PRACTITIONER

## 2024-12-24 PROCEDURE — 2500000004 HC RX 250 GENERAL PHARMACY W/ HCPCS (ALT 636 FOR OP/ED): Performed by: STUDENT IN AN ORGANIZED HEALTH CARE EDUCATION/TRAINING PROGRAM

## 2024-12-24 PROCEDURE — 1100000001 HC PRIVATE ROOM DAILY

## 2024-12-24 RX ADMIN — ESCITALOPRAM OXALATE 10 MG: 10 TABLET ORAL at 08:30

## 2024-12-24 RX ADMIN — ENOXAPARIN SODIUM 40 MG: 100 INJECTION SUBCUTANEOUS at 21:28

## 2024-12-24 RX ADMIN — Medication 1 TABLET: at 08:30

## 2024-12-24 RX ADMIN — THIAMINE HYDROCHLORIDE 500 MG: 100 INJECTION, SOLUTION INTRAMUSCULAR; INTRAVENOUS at 08:31

## 2024-12-24 RX ADMIN — MAGNESIUM OXIDE TAB 400 MG (241.3 MG ELEMENTAL MG) 400 MG: 400 (241.3 MG) TAB at 08:30

## 2024-12-24 RX ADMIN — LEVOTHYROXINE SODIUM 75 MCG: 75 TABLET ORAL at 05:41

## 2024-12-24 RX ADMIN — NICOTINE 1 PATCH: 14 PATCH, EXTENDED RELEASE TRANSDERMAL at 21:27

## 2024-12-24 RX ADMIN — LIDOCAINE 1 PATCH: 4 PATCH TOPICAL at 08:30

## 2024-12-24 RX ADMIN — FOLIC ACID 1 MG: 1 TABLET ORAL at 08:30

## 2024-12-24 RX ADMIN — BUPROPION 150 MG: 150 TABLET, EXTENDED RELEASE ORAL at 08:30

## 2024-12-24 RX ADMIN — PROPRANOLOL HYDROCHLORIDE 40 MG: 40 TABLET ORAL at 21:28

## 2024-12-24 ASSESSMENT — LIFESTYLE VARIABLES
AUDITORY DISTURBANCES: NOT PRESENT
NAUSEA AND VOMITING: NO NAUSEA AND NO VOMITING
VISUAL DISTURBANCES: NOT PRESENT
PULSE: 53
AGITATION: NORMAL ACTIVITY
BLOOD PRESSURE: 143/80
ORIENTATION AND CLOUDING OF SENSORIUM: CANNOT DO SERIAL ADDITIONS OR IS UNCERTAIN ABOUT DATE
AUDITORY DISTURBANCES: NOT PRESENT
TOTAL SCORE: 5
AUDITORY DISTURBANCES: NOT PRESENT
AUDITORY DISTURBANCES: NOT PRESENT
PAROXYSMAL SWEATS: NO SWEAT VISIBLE
VISUAL DISTURBANCES: NOT PRESENT
NAUSEA AND VOMITING: NO NAUSEA AND NO VOMITING
AUDITORY DISTURBANCES: NOT PRESENT
ANXIETY: NO ANXIETY, AT EASE
PULSE: 39
TREMOR: NOT VISIBLE, BUT CAN BE FELT FINGERTIP TO FINGERTIP
PULSE: 40
ORIENTATION AND CLOUDING OF SENSORIUM: CANNOT DO SERIAL ADDITIONS OR IS UNCERTAIN ABOUT DATE
BLOOD PRESSURE: 138/82
TOTAL SCORE: 2
ORIENTATION AND CLOUDING OF SENSORIUM: CANNOT DO SERIAL ADDITIONS OR IS UNCERTAIN ABOUT DATE
TREMOR: MODERATE, WITH PATIENT'S ARMS EXTENDED
ANXIETY: NO ANXIETY, AT EASE
VISUAL DISTURBANCES: NOT PRESENT
ANXIETY: NO ANXIETY, AT EASE
BLOOD PRESSURE: 146/84
NAUSEA AND VOMITING: NO NAUSEA AND NO VOMITING
HEADACHE, FULLNESS IN HEAD: NOT PRESENT
AUDITORY DISTURBANCES: NOT PRESENT
HEADACHE, FULLNESS IN HEAD: NOT PRESENT
TOTAL SCORE: 5
ORIENTATION AND CLOUDING OF SENSORIUM: CANNOT DO SERIAL ADDITIONS OR IS UNCERTAIN ABOUT DATE
HEADACHE, FULLNESS IN HEAD: NOT PRESENT
PULSE: 48
ANXIETY: NO ANXIETY, AT EASE
AGITATION: NORMAL ACTIVITY
PAROXYSMAL SWEATS: NO SWEAT VISIBLE
PAROXYSMAL SWEATS: NO SWEAT VISIBLE
AUDITORY DISTURBANCES: NOT PRESENT
TOTAL SCORE: 5
ORIENTATION AND CLOUDING OF SENSORIUM: CANNOT DO SERIAL ADDITIONS OR IS UNCERTAIN ABOUT DATE
HEADACHE, FULLNESS IN HEAD: NOT PRESENT
TREMOR: MODERATE, WITH PATIENT'S ARMS EXTENDED
TREMOR: MODERATE, WITH PATIENT'S ARMS EXTENDED
VISUAL DISTURBANCES: NOT PRESENT
BLOOD PRESSURE: 144/85
NAUSEA AND VOMITING: NO NAUSEA AND NO VOMITING
ORIENTATION AND CLOUDING OF SENSORIUM: CANNOT DO SERIAL ADDITIONS OR IS UNCERTAIN ABOUT DATE
HEADACHE, FULLNESS IN HEAD: NOT PRESENT
AGITATION: NORMAL ACTIVITY
AGITATION: NORMAL ACTIVITY
TREMOR: MODERATE, WITH PATIENT'S ARMS EXTENDED
ANXIETY: NO ANXIETY, AT EASE
ANXIETY: NO ANXIETY, AT EASE
VISUAL DISTURBANCES: NOT PRESENT
TREMOR: MODERATE, WITH PATIENT'S ARMS EXTENDED
AGITATION: NORMAL ACTIVITY
NAUSEA AND VOMITING: NO NAUSEA AND NO VOMITING
TOTAL SCORE: 5
PAROXYSMAL SWEATS: NO SWEAT VISIBLE
TREMOR: MODERATE, WITH PATIENT'S ARMS EXTENDED
AGITATION: NORMAL ACTIVITY
HEADACHE, FULLNESS IN HEAD: NOT PRESENT
ANXIETY: NO ANXIETY, AT EASE
VISUAL DISTURBANCES: NOT PRESENT
TOTAL SCORE: 5
TOTAL SCORE: 5
AGITATION: NORMAL ACTIVITY
PAROXYSMAL SWEATS: NO SWEAT VISIBLE
NAUSEA AND VOMITING: NO NAUSEA AND NO VOMITING
ORIENTATION AND CLOUDING OF SENSORIUM: CANNOT DO SERIAL ADDITIONS OR IS UNCERTAIN ABOUT DATE
HEADACHE, FULLNESS IN HEAD: NOT PRESENT
PAROXYSMAL SWEATS: NO SWEAT VISIBLE
NAUSEA AND VOMITING: NO NAUSEA AND NO VOMITING
VISUAL DISTURBANCES: NOT PRESENT
PAROXYSMAL SWEATS: NO SWEAT VISIBLE

## 2024-12-24 ASSESSMENT — PAIN SCALES - GENERAL: PAINLEVEL_OUTOF10: 0 - NO PAIN

## 2024-12-24 NOTE — NURSING NOTE
"ELIANE Progress Note:     ELIANE services following patient due to Code Violet on 12/22 at 0502. Please see Code Beatrice note for further details. Per bedside nurse, patient has required verbal redirection to not get out of bed without assistance. Per report, patient remains on q2 CIWA monitoring. Lorazepam 0.5 mg last given 12/23 1430 and 2 mg last given 12/23 at 1800.     CIWA-Ar Total Flowsheet History  12/19/2024 1827 - 12/24/2024 0824    Date/Time CIWA-Ar Total   12/24/24 0824 5   12/24/24 0549 5   12/24/24 0200 2   12/24/24 0030 5   12/23/24 2200 5   12/23/24 2000 5   12/23/24 1800 10   12/23/24 1600 10   12/23/24 1420 11   12/23/24 1200 10   12/23/24 1000 10   12/23/24 0800 14   12/23/24 0442 1   12/23/24 0157 3   12/22/24 1400 10   12/22/24 1004 12   12/22/24 0837 10   12/22/24 0503 16   12/21/24 2229 13   12/21/24 2000 17   12/21/24 1702 7   12/21/24 1000 7   12/21/24 0526 5   12/21/24 0200 0   12/21/24 0000 0   12/20/24 2225 0   12/19/24 1827 1     On assessment, patient is sitting at the side of the bed. Patient is calm and cooperative. Patient has visible bilateral upper and lower extremity tremors, patient states he has had tremors for approx 6 months. Patient states his PCP prescribed him medication for tremors which he's found therapeutic, patient unsure which medication. Denies any concerns/needs at this time.    ELIANE services available to patient and staff 24/7 throughout hospitalization. ELIANE will continue to perform q12 hr rounding to ensure safety of patient and staff. Please secure chat \"ELIANE\" with any questions or concerns.   "

## 2024-12-24 NOTE — PROGRESS NOTES
Humble Banks is a 69 y.o. male on day 4 of admission presenting with Unable to ambulate.      Subjective   Patient sleeping, easily arousable.  After waking up has noticeable tremors.  Denies any other symptoms.    Objective     Last Recorded Vitals  /84   Pulse (!) 48   Temp 36 °C (96.8 °F) (Temporal)   Resp 18   Wt 84.4 kg (186 lb)   SpO2 95% on room air.  Intake/Output last 3 Shifts:    Intake/Output Summary (Last 24 hours) at 12/24/2024 1603  Last data filed at 12/24/2024 0800  Gross per 24 hour   Intake 480 ml   Output --   Net 480 ml       Admission Weight  Weight: 84.4 kg (186 lb) (12/20/24 1329)    Daily Weight  12/20/24 : 84.4 kg (186 lb)    General Exam  General Appearance: Lying in bed without distress, cooperative.  Head: NCAT  Eyes: PERRL, EOMI. sclera is white.  Cardiac: Normal S1 and S2. No murmurs. Rhythm is regular.  Lungs: Clear to auscultation bilaterally, no wheezing or crackles, no accessory muscle use at rest.  Musculoskeletal: Range of motion in all extremities intact, decreased strength in all extremities 4/5 strength  Extremities: Trace bilateral lower extremity edema, bilateral tremors of the hand and upper extremities.   Neuro: Alert and order x 3, no focal deficits.  Skin: Hyperhidrosis.  Psych: Alert and oriented to person only, not place or time    Relevant Results  Results for orders placed or performed during the hospital encounter of 12/20/24 (from the past 24 hours)   Renal Function Panel   Result Value Ref Range    Glucose 126 (H) 74 - 99 mg/dL    Sodium 141 136 - 145 mmol/L    Potassium 3.6 3.5 - 5.3 mmol/L    Chloride 104 98 - 107 mmol/L    Bicarbonate 27 21 - 32 mmol/L    Anion Gap 14 10 - 20 mmol/L    Urea Nitrogen 32 (H) 6 - 23 mg/dL    Creatinine 0.89 0.50 - 1.30 mg/dL    eGFR >90 >60 mL/min/1.73m*2    Calcium 9.8 8.6 - 10.6 mg/dL    Phosphorus 3.1 2.5 - 4.9 mg/dL    Albumin 4.0 3.4 - 5.0 g/dL   CBC and Auto Differential   Result Value Ref Range    WBC 6.4 4.4  - 11.3 x10*3/uL    nRBC 0.0 0.0 - 0.0 /100 WBCs    RBC 3.52 (L) 4.50 - 5.90 x10*6/uL    Hemoglobin 12.1 (L) 13.5 - 17.5 g/dL    Hematocrit 35.0 (L) 41.0 - 52.0 %    MCV 99 80 - 100 fL    MCH 34.4 (H) 26.0 - 34.0 pg    MCHC 34.6 32.0 - 36.0 g/dL    RDW 13.3 11.5 - 14.5 %    Platelets 121 (L) 150 - 450 x10*3/uL    Neutrophils % 48.0 40.0 - 80.0 %    Immature Granulocytes %, Automated 0.6 0.0 - 0.9 %    Lymphocytes % 24.9 13.0 - 44.0 %    Monocytes % 23.3 2.0 - 10.0 %    Eosinophils % 2.0 0.0 - 6.0 %    Basophils % 1.2 0.0 - 2.0 %    Neutrophils Absolute 3.08 1.20 - 7.70 x10*3/uL    Immature Granulocytes Absolute, Automated 0.04 0.00 - 0.70 x10*3/uL    Lymphocytes Absolute 1.60 1.20 - 4.80 x10*3/uL    Monocytes Absolute 1.50 (H) 0.10 - 1.00 x10*3/uL    Eosinophils Absolute 0.13 0.00 - 0.70 x10*3/uL    Basophils Absolute 0.08 0.00 - 0.10 x10*3/uL   Magnesium   Result Value Ref Range    Magnesium 1.81 1.60 - 2.40 mg/dL   Renal Function Panel   Result Value Ref Range    Glucose 117 (H) 74 - 99 mg/dL    Sodium 143 136 - 145 mmol/L    Potassium 3.7 3.5 - 5.3 mmol/L    Chloride 107 98 - 107 mmol/L    Bicarbonate 25 21 - 32 mmol/L    Anion Gap 15 10 - 20 mmol/L    Urea Nitrogen 27 (H) 6 - 23 mg/dL    Creatinine 0.65 0.50 - 1.30 mg/dL    eGFR >90 >60 mL/min/1.73m*2    Calcium 9.3 8.6 - 10.6 mg/dL    Phosphorus 2.7 2.5 - 4.9 mg/dL    Albumin 3.6 3.4 - 5.0 g/dL       Hospital course:  Humble Banks is a 69 y.o. male with a PMH of alcohol abuse, DDD of L spine, CHINEDU, HLD, HTN, hypothyroidism, sciatica of left side of back, and L inguinal hernia. Mr. Banks presented to ED from residential alcohol rehab facility for further evaluation due to inability to ambulate in setting of left sided lower back pain from sciatica.  CT imaging did not show any acute findings.  Patient noted to have acute alcohol withdrawal.  Patient also presumed to have Wernicke encephalopathy.      Assessment & Plan       Alcohol withdrawal syndrome with  delirium   Alcohol use disorder, with severe dependence  Wernicke encephalopathy  Electrolyte abnormalities (K and Mg)  -ethanol level: 431 on 12/19-> <10 on 12/20  -Hypokalemia and hypomagnesemia resolved.  -Continue CIWA protocol using oral Lorazepam, CIWA has been 5 throughout the day.  -High-dose thiamine for 3 days followed by p.o. thiamine, 100 mg p.o. starts tomorrow  -MVI, and folic acid 1 mg PO every day  -Agitation PRNs ordered Zyprexa IM and p.o. every 6 hours as needed     Hypoglycemia  -Likely secondary to recent alcohol intoxication and also decreased p.o. intake as patient currently withdrawing  -Resolved.     Chronic left sided sciatica  unable to ambulate  -PT/OT consulted and SNF placement recommended  -falls precautions  -pain regimen: Ibuprofen 600 mg q6h PRN, Lidocaine patch and Robaxin 500 mg q6h PRN  -heat packs as needed     Anemia (stable)  Thrombocytopenia (chronic)  -Hgb 11.8 on admit, last hemoglobin 12.1.  -PLT 73 on admit ,  today  -per chart review thrombocytopenia has been present since 7/2022  -Likely associated with alcohol use.     HTN  -Holding home propranolol 40 mg PO BID due to bradycardia.    Bradycardia  -Asymptomatic.  Likely related to beta-blocker usage.  Will continue to monitor.     CHINEDU  -Continue home Lexaprol 10 mg PO every day and Wellbutrin  mg PO every day     Hypothyroidism  -Continue home Synthroid 75 mcg PO every day     Nicotine use disorder  -cessation recommended  -Nicotine patch ordered       DVT: Lovenox  CODE STATUS: Full code    Capacity eval: Does not have capacity capacity evaluation significant event note 12/21/24  Emergency contact/healthcare power of  Earnest Youssef [phone] (wants to be contacted regularly with updates)     Disposition: Continue to monitor alcohol withdrawal.  Overall plan to skilled nursing facility when ready for discharge.  Suspect patient will be ready potentially by tomorrow.      Garfield Norman,  MD

## 2024-12-24 NOTE — PROGRESS NOTES
12/24/24 1626 Transitional Care Coordinator Notes:    Transitional Care Coordination Progress Note:  Patient discussed during interdisciplinary rounds.   Team members present: MD and TCC  Plan per Medical/Surgical team: Per MD, patient should be ready in 1-2 days. PT/OT Column updated for therapy notes. Once notes are in, will ask the direct precert team to initiate precert.  Payor: [employer]  Discharge disposition: SNF  Potential Barriers: none  ADOD: 1-3 days                     Assessment/Plan   Assessment & Plan  Unable to ambulate               Jenny Swan RN

## 2024-12-24 NOTE — CARE PLAN
The clinical goals for the shift include Patient will remain free from falls, by the end of this shift.    Patient remained free from injuries during this shift.    Patient is in bed, Hob elevated, call light within reach, bed alarm active and audible.    On this shift, patients CIWA score never went above 5.     Plan of care ongoing.

## 2024-12-25 LAB
ATRIAL RATE: 68 BPM
P AXIS: 49 DEGREES
P OFFSET: 145 MS
P ONSET: 115 MS
PR INTERVAL: 184 MS
Q ONSET: 207 MS
QRS COUNT: 11 BEATS
QRS DURATION: 92 MS
QT INTERVAL: 506 MS
QTC CALCULATION(BAZETT): 538 MS
QTC FREDERICIA: 527 MS
R AXIS: -5 DEGREES
T AXIS: 11 DEGREES
T OFFSET: 460 MS
VENTRICULAR RATE: 68 BPM

## 2024-12-25 PROCEDURE — 2500000001 HC RX 250 WO HCPCS SELF ADMINISTERED DRUGS (ALT 637 FOR MEDICARE OP): Performed by: STUDENT IN AN ORGANIZED HEALTH CARE EDUCATION/TRAINING PROGRAM

## 2024-12-25 PROCEDURE — 2500000004 HC RX 250 GENERAL PHARMACY W/ HCPCS (ALT 636 FOR OP/ED): Performed by: NURSE PRACTITIONER

## 2024-12-25 PROCEDURE — S4991 NICOTINE PATCH NONLEGEND: HCPCS | Performed by: NURSE PRACTITIONER

## 2024-12-25 PROCEDURE — 2500000002 HC RX 250 W HCPCS SELF ADMINISTERED DRUGS (ALT 637 FOR MEDICARE OP, ALT 636 FOR OP/ED): Performed by: NURSE PRACTITIONER

## 2024-12-25 PROCEDURE — 99233 SBSQ HOSP IP/OBS HIGH 50: CPT | Performed by: INTERNAL MEDICINE

## 2024-12-25 PROCEDURE — 1100000001 HC PRIVATE ROOM DAILY

## 2024-12-25 PROCEDURE — 2500000001 HC RX 250 WO HCPCS SELF ADMINISTERED DRUGS (ALT 637 FOR MEDICARE OP): Performed by: NURSE PRACTITIONER

## 2024-12-25 PROCEDURE — 2500000004 HC RX 250 GENERAL PHARMACY W/ HCPCS (ALT 636 FOR OP/ED): Performed by: STUDENT IN AN ORGANIZED HEALTH CARE EDUCATION/TRAINING PROGRAM

## 2024-12-25 PROCEDURE — 2500000005 HC RX 250 GENERAL PHARMACY W/O HCPCS: Performed by: NURSE PRACTITIONER

## 2024-12-25 PROCEDURE — 93005 ELECTROCARDIOGRAM TRACING: CPT

## 2024-12-25 RX ADMIN — NICOTINE 1 PATCH: 14 PATCH, EXTENDED RELEASE TRANSDERMAL at 20:27

## 2024-12-25 RX ADMIN — BUPROPION 150 MG: 150 TABLET, EXTENDED RELEASE ORAL at 10:10

## 2024-12-25 RX ADMIN — Medication 1 TABLET: at 10:10

## 2024-12-25 RX ADMIN — LEVOTHYROXINE SODIUM 75 MCG: 75 TABLET ORAL at 06:38

## 2024-12-25 RX ADMIN — ENOXAPARIN SODIUM 40 MG: 100 INJECTION SUBCUTANEOUS at 20:27

## 2024-12-25 RX ADMIN — POLYETHYLENE GLYCOL 3350 17 G: 17 POWDER, FOR SOLUTION ORAL at 10:11

## 2024-12-25 RX ADMIN — MAGNESIUM OXIDE TAB 400 MG (241.3 MG ELEMENTAL MG) 400 MG: 400 (241.3 MG) TAB at 10:10

## 2024-12-25 RX ADMIN — THIAMINE HCL TAB 100 MG 100 MG: 100 TAB at 10:10

## 2024-12-25 RX ADMIN — FOLIC ACID 1 MG: 1 TABLET ORAL at 10:10

## 2024-12-25 RX ADMIN — ESCITALOPRAM OXALATE 10 MG: 10 TABLET ORAL at 10:10

## 2024-12-25 ASSESSMENT — LIFESTYLE VARIABLES
VISUAL DISTURBANCES: NOT PRESENT
HEADACHE, FULLNESS IN HEAD: NOT PRESENT
ANXIETY: NO ANXIETY, AT EASE
BLOOD PRESSURE: 149/89
ORIENTATION AND CLOUDING OF SENSORIUM: CANNOT DO SERIAL ADDITIONS OR IS UNCERTAIN ABOUT DATE
AGITATION: NORMAL ACTIVITY
PAROXYSMAL SWEATS: NO SWEAT VISIBLE
AUDITORY DISTURBANCES: NOT PRESENT
VISUAL DISTURBANCES: NOT PRESENT
VISUAL DISTURBANCES: NOT PRESENT
AUDITORY DISTURBANCES: NOT PRESENT
VISUAL DISTURBANCES: NOT PRESENT
TOTAL SCORE: 1
AGITATION: NORMAL ACTIVITY
TOTAL SCORE: 1
NAUSEA AND VOMITING: NO NAUSEA AND NO VOMITING
ORIENTATION AND CLOUDING OF SENSORIUM: CANNOT DO SERIAL ADDITIONS OR IS UNCERTAIN ABOUT DATE
AUDITORY DISTURBANCES: NOT PRESENT
PAROXYSMAL SWEATS: NO SWEAT VISIBLE
NAUSEA AND VOMITING: NO NAUSEA AND NO VOMITING
AUDITORY DISTURBANCES: NOT PRESENT
AGITATION: NORMAL ACTIVITY
ANXIETY: NO ANXIETY, AT EASE
TOTAL SCORE: 6
TREMOR: NOT VISIBLE, BUT CAN BE FELT FINGERTIP TO FINGERTIP
BLOOD PRESSURE: 135/86
TREMOR: NO TREMOR
TREMOR: 5
HEADACHE, FULLNESS IN HEAD: NOT PRESENT
TOTAL SCORE: 1
HEADACHE, FULLNESS IN HEAD: NOT PRESENT
AUDITORY DISTURBANCES: NOT PRESENT
HEADACHE, FULLNESS IN HEAD: NOT PRESENT
TOTAL SCORE: 2
HEADACHE, FULLNESS IN HEAD: NOT PRESENT
ORIENTATION AND CLOUDING OF SENSORIUM: CANNOT DO SERIAL ADDITIONS OR IS UNCERTAIN ABOUT DATE
VISUAL DISTURBANCES: NOT PRESENT
NAUSEA AND VOMITING: NO NAUSEA AND NO VOMITING
NAUSEA AND VOMITING: NO NAUSEA AND NO VOMITING
ANXIETY: NO ANXIETY, AT EASE
TREMOR: NO TREMOR
PAROXYSMAL SWEATS: NO SWEAT VISIBLE
PULSE: 41
PAROXYSMAL SWEATS: NO SWEAT VISIBLE
TREMOR: NO TREMOR
AGITATION: NORMAL ACTIVITY
ORIENTATION AND CLOUDING OF SENSORIUM: CANNOT DO SERIAL ADDITIONS OR IS UNCERTAIN ABOUT DATE
PAROXYSMAL SWEATS: NO SWEAT VISIBLE
PAROXYSMAL SWEATS: NO SWEAT VISIBLE
ORIENTATION AND CLOUDING OF SENSORIUM: CANNOT DO SERIAL ADDITIONS OR IS UNCERTAIN ABOUT DATE
AUDITORY DISTURBANCES: NOT PRESENT
ORIENTATION AND CLOUDING OF SENSORIUM: CANNOT DO SERIAL ADDITIONS OR IS UNCERTAIN ABOUT DATE
ANXIETY: NO ANXIETY, AT EASE
AGITATION: NORMAL ACTIVITY
TREMOR: NO TREMOR
HEADACHE, FULLNESS IN HEAD: NOT PRESENT
VISUAL DISTURBANCES: NOT PRESENT
AGITATION: NORMAL ACTIVITY
TOTAL SCORE: 1
NAUSEA AND VOMITING: NO NAUSEA AND NO VOMITING
NAUSEA AND VOMITING: NO NAUSEA AND NO VOMITING

## 2024-12-25 ASSESSMENT — COGNITIVE AND FUNCTIONAL STATUS - GENERAL
DRESSING REGULAR LOWER BODY CLOTHING: A LITTLE
DAILY ACTIVITIY SCORE: 23
MOVING TO AND FROM BED TO CHAIR: A LITTLE
WALKING IN HOSPITAL ROOM: A LITTLE
CLIMB 3 TO 5 STEPS WITH RAILING: A LOT
STANDING UP FROM CHAIR USING ARMS: A LITTLE
MOBILITY SCORE: 19

## 2024-12-25 ASSESSMENT — PAIN SCALES - GENERAL
PAINLEVEL_OUTOF10: 0 - NO PAIN
PAINLEVEL_OUTOF10: 0 - NO PAIN

## 2024-12-25 NOTE — PROGRESS NOTES
Humble Banks is a 69 y.o. male on day 5 of admission presenting with Unable to ambulate.      Subjective   Patient sitting up at bedside.  Feels a little better today.  Less tremors.    Objective     Last Recorded Vitals  /83 (BP Location: Right arm, Patient Position: Lying)   Pulse 67   Temp 36.4 °C (97.5 °F) (Temporal)   Resp 17   Wt 84.4 kg (186 lb)   SpO2 99% on room air.  Intake/Output last 3 Shifts:    Intake/Output Summary (Last 24 hours) at 12/25/2024 1225  Last data filed at 12/25/2024 0900  Gross per 24 hour   Intake 240 ml   Output --   Net 240 ml       Admission Weight  Weight: 84.4 kg (186 lb) (12/20/24 1329)    Daily Weight  12/20/24 : 84.4 kg (186 lb)    General Exam  General Appearance: Lying in bed without distress, cooperative.  Head: NCAT  Eyes: PERRL, EOMI. sclera is white.  Cardiac: Normal S1 and S2. No murmurs. Rhythm is regular.  Lungs: Clear to auscultation bilaterally, no wheezing or crackles, no accessory muscle use at rest.  Musculoskeletal: Range of motion in all extremities intact, decreased strength in all extremities 4/5 strength  Extremities: Trace bilateral lower extremity edema, bilateral tremors of the hand and upper extremities improved today.   Neuro: Alert and order x 3, no focal deficits.  Skin: Diaphoresis appears to have improved.  Psych: Alert and oriented to person only, not place or time    Relevant Results  No results found for this or any previous visit (from the past 24 hours).      Hospital course:  Humble Banks is a 69 y.o. male with a PMH of alcohol abuse, DDD of L spine, CHINEDU, HLD, HTN, hypothyroidism, sciatica of left side of back, and L inguinal hernia. Mr. Banks presented to ED from residential alcohol rehab facility for further evaluation due to inability to ambulate in setting of left sided lower back pain from sciatica.  CT imaging did not show any acute findings.  Patient noted to have acute alcohol withdrawal.  Patient also presumed to  have Wernicke encephalopathy.  Patient placed on Ativan per CIWA scale.  PT/OT saw the patient and recommend skilled nursing facility.    Assessment & Plan       Alcohol withdrawal syndrome with delirium   Alcohol use disorder, with severe dependence  Wernicke encephalopathy  Electrolyte abnormalities (K and Mg)  -Hypokalemia and hypomagnesemia resolved.  -Continue CIWA protocol using oral Lorazepam, CIWA has been 1 most of the day so far.  -High-dose thiamine for 3 days followed by p.o. thiamine, 100 mg p.o. daily.  -MVI, and folic acid 1 mg PO every day  -Agitation PRNs ordered Zyprexa IM and p.o. every 6 hours as needed     Hypoglycemia  -Likely secondary to recent alcohol intoxication and also decreased p.o. intake as patient currently withdrawing  -Resolved.     Chronic left sided sciatica  unable to ambulate  -PT/OT consulted and SNF placement recommended  -falls precautions  -pain regimen: Ibuprofen 600 mg q6h PRN, Lidocaine patch and Robaxin 500 mg q6h PRN  -heat packs as needed     Anemia (stable)  Thrombocytopenia (chronic)  -Hgb 11.8 on admit, last hemoglobin 12.1.  -PLT 73 on admit ,  today  -per chart review thrombocytopenia has been present since 7/2022  -Likely associated with alcohol use.     HTN  -Holding home propranolol 40 mg PO BID due to bradycardia.    Sinus bradycardia  -Asymptomatic.  Likely related to beta-blocker usage.  -Propranolol on hold.  Last heart rate 60s.  When patient gets propranolol he seems to drop into the 40s.     CHINEDU  -Continue home Lexaprol 10 mg PO every day and Wellbutrin  mg PO every day     Hypothyroidism  -Continue home Synthroid 75 mcg PO every day     Nicotine use disorder  -cessation recommended  -Nicotine patch ordered       DVT: Lovenox  CODE STATUS: Full code    Capacity eval: Does not have capacity, capacity evaluation significant event note 12/21/24  Emergency contact/healthcare power of  Earnest Youssef [phone] (wants to be contacted  regularly with updates)     Disposition: Alcohol withdrawal appears to be resolving well.  Working on placement to skilled nursing facility.      Garfield Norman MD

## 2024-12-25 NOTE — CARE PLAN
The patient's goals for the shift include      The clinical goals for the shift include Pt will remain free from falls and/or injury by the end of the shift      Problem: Fall/Injury  Goal: Not fall by end of shift  Outcome: Progressing  Goal: Be free from injury by end of the shift  Outcome: Progressing  Goal: Verbalize understanding of personal risk factors for fall in the hospital  Outcome: Progressing  Goal: Verbalize understanding of risk factor reduction measures to prevent injury from fall in the home  Outcome: Progressing  Goal: Use assistive devices by end of the shift  Outcome: Progressing  Goal: Pace activities to prevent fatigue by end of the shift  Outcome: Progressing     Problem: Pain - Adult  Goal: Verbalizes/displays adequate comfort level or baseline comfort level  Outcome: Progressing     Problem: Safety - Adult  Goal: Free from fall injury  Outcome: Progressing     Problem: Discharge Planning  Goal: Discharge to home or other facility with appropriate resources  Outcome: Progressing     Problem: Chronic Conditions and Co-morbidities  Goal: Patient's chronic conditions and co-morbidity symptoms are monitored and maintained or improved  Outcome: Progressing     Problem: Skin  Goal: Decreased wound size/increased tissue granulation at next dressing change  Outcome: Progressing  Goal: Participates in plan/prevention/treatment measures  Outcome: Progressing  Flowsheets (Taken 12/25/2024 0225)  Participates in plan/prevention/treatment measures:   Discuss with provider PT/OT consult   Increase activity/out of bed for meals   Elevate heels  Goal: Prevent/manage excess moisture  Outcome: Progressing  Goal: Prevent/minimize sheer/friction injuries  Outcome: Progressing  Goal: Promote/optimize nutrition  Outcome: Progressing  Goal: Promote skin healing  Outcome: Progressing

## 2024-12-25 NOTE — PROGRESS NOTES
Humble Banks is a 69 y.o. y.o. male on day 5 of admission presenting with Unable to ambulate [R26.2].     Subjective   Received update from MD that patient is not medically ready for discharge; ADOD 1-2 days. Patient is pending EKG & improvement in CIWA scores. PT/OT recommending moderate intensity therapy and POA in agreement for [facility]. Updated therapy notes requested to start precert.    SW will continue to follow.    - Keily ENGLE, MA, LSW  Care Transitions   [facility] Secure Chat or [phone]

## 2024-12-26 ENCOUNTER — APPOINTMENT (OUTPATIENT)
Dept: CARDIOLOGY | Facility: HOSPITAL | Age: 69
End: 2024-12-26
Payer: MEDICARE

## 2024-12-26 PROCEDURE — 2500000001 HC RX 250 WO HCPCS SELF ADMINISTERED DRUGS (ALT 637 FOR MEDICARE OP): Performed by: NURSE PRACTITIONER

## 2024-12-26 PROCEDURE — 2500000001 HC RX 250 WO HCPCS SELF ADMINISTERED DRUGS (ALT 637 FOR MEDICARE OP): Performed by: STUDENT IN AN ORGANIZED HEALTH CARE EDUCATION/TRAINING PROGRAM

## 2024-12-26 PROCEDURE — S4991 NICOTINE PATCH NONLEGEND: HCPCS | Performed by: NURSE PRACTITIONER

## 2024-12-26 PROCEDURE — 2500000001 HC RX 250 WO HCPCS SELF ADMINISTERED DRUGS (ALT 637 FOR MEDICARE OP): Performed by: INTERNAL MEDICINE

## 2024-12-26 PROCEDURE — 97165 OT EVAL LOW COMPLEX 30 MIN: CPT | Mod: GO

## 2024-12-26 PROCEDURE — 2500000002 HC RX 250 W HCPCS SELF ADMINISTERED DRUGS (ALT 637 FOR MEDICARE OP, ALT 636 FOR OP/ED): Performed by: NURSE PRACTITIONER

## 2024-12-26 PROCEDURE — 97110 THERAPEUTIC EXERCISES: CPT | Mod: GP,CQ

## 2024-12-26 PROCEDURE — 99232 SBSQ HOSP IP/OBS MODERATE 35: CPT | Performed by: INTERNAL MEDICINE

## 2024-12-26 PROCEDURE — 1100000001 HC PRIVATE ROOM DAILY

## 2024-12-26 PROCEDURE — 97116 GAIT TRAINING THERAPY: CPT | Mod: GP,CQ

## 2024-12-26 PROCEDURE — 2500000004 HC RX 250 GENERAL PHARMACY W/ HCPCS (ALT 636 FOR OP/ED): Performed by: STUDENT IN AN ORGANIZED HEALTH CARE EDUCATION/TRAINING PROGRAM

## 2024-12-26 RX ORDER — AMLODIPINE BESYLATE 5 MG/1
5 TABLET ORAL DAILY
Start: 2024-12-27

## 2024-12-26 RX ORDER — AMLODIPINE BESYLATE 5 MG/1
5 TABLET ORAL DAILY
Status: DISCONTINUED | OUTPATIENT
Start: 2024-12-26 | End: 2024-12-27 | Stop reason: HOSPADM

## 2024-12-26 RX ORDER — FOLIC ACID 1 MG/1
1 TABLET ORAL DAILY
Start: 2024-12-27

## 2024-12-26 RX ORDER — LIDOCAINE 560 MG/1
1 PATCH PERCUTANEOUS; TOPICAL; TRANSDERMAL DAILY
Start: 2024-12-27

## 2024-12-26 RX ORDER — METHOCARBAMOL 500 MG/1
500 TABLET, FILM COATED ORAL EVERY 6 HOURS PRN
Start: 2024-12-26

## 2024-12-26 RX ORDER — LANOLIN ALCOHOL/MO/W.PET/CERES
100 CREAM (GRAM) TOPICAL DAILY
Start: 2024-12-27

## 2024-12-26 RX ADMIN — BUPROPION 150 MG: 150 TABLET, EXTENDED RELEASE ORAL at 08:22

## 2024-12-26 RX ADMIN — NICOTINE 1 PATCH: 14 PATCH, EXTENDED RELEASE TRANSDERMAL at 20:46

## 2024-12-26 RX ADMIN — ENOXAPARIN SODIUM 40 MG: 100 INJECTION SUBCUTANEOUS at 20:45

## 2024-12-26 RX ADMIN — THIAMINE HCL TAB 100 MG 100 MG: 100 TAB at 08:22

## 2024-12-26 RX ADMIN — MAGNESIUM OXIDE TAB 400 MG (241.3 MG ELEMENTAL MG) 400 MG: 400 (241.3 MG) TAB at 08:22

## 2024-12-26 RX ADMIN — LEVOTHYROXINE SODIUM 75 MCG: 75 TABLET ORAL at 06:33

## 2024-12-26 RX ADMIN — FOLIC ACID 1 MG: 1 TABLET ORAL at 08:22

## 2024-12-26 RX ADMIN — Medication 1 TABLET: at 08:22

## 2024-12-26 RX ADMIN — AMLODIPINE BESYLATE 5 MG: 5 TABLET ORAL at 13:55

## 2024-12-26 RX ADMIN — ESCITALOPRAM OXALATE 10 MG: 10 TABLET ORAL at 08:22

## 2024-12-26 ASSESSMENT — LIFESTYLE VARIABLES
NAUSEA AND VOMITING: NO NAUSEA AND NO VOMITING
ORIENTATION AND CLOUDING OF SENSORIUM: CANNOT DO SERIAL ADDITIONS OR IS UNCERTAIN ABOUT DATE
NAUSEA AND VOMITING: NO NAUSEA AND NO VOMITING
ANXIETY: NO ANXIETY, AT EASE
HEADACHE, FULLNESS IN HEAD: NOT PRESENT
AUDITORY DISTURBANCES: NOT PRESENT
HEADACHE, FULLNESS IN HEAD: NOT PRESENT
TREMOR: NO TREMOR
VISUAL DISTURBANCES: NOT PRESENT
VISUAL DISTURBANCES: NOT PRESENT
PAROXYSMAL SWEATS: NO SWEAT VISIBLE
ORIENTATION AND CLOUDING OF SENSORIUM: CANNOT DO SERIAL ADDITIONS OR IS UNCERTAIN ABOUT DATE
NAUSEA AND VOMITING: NO NAUSEA AND NO VOMITING
TOTAL SCORE: 1
AUDITORY DISTURBANCES: NOT PRESENT
ORIENTATION AND CLOUDING OF SENSORIUM: CANNOT DO SERIAL ADDITIONS OR IS UNCERTAIN ABOUT DATE
TREMOR: NO TREMOR
ANXIETY: NO ANXIETY, AT EASE
TREMOR: NO TREMOR
HEADACHE, FULLNESS IN HEAD: NOT PRESENT
TREMOR: NO TREMOR
TOTAL SCORE: 1
TOTAL SCORE: 1
AGITATION: NORMAL ACTIVITY
AUDITORY DISTURBANCES: NOT PRESENT
AUDITORY DISTURBANCES: NOT PRESENT
AGITATION: NORMAL ACTIVITY
PAROXYSMAL SWEATS: NO SWEAT VISIBLE
NAUSEA AND VOMITING: NO NAUSEA AND NO VOMITING
TOTAL SCORE: 1
PAROXYSMAL SWEATS: NO SWEAT VISIBLE
PAROXYSMAL SWEATS: NO SWEAT VISIBLE
ANXIETY: NO ANXIETY, AT EASE
AGITATION: NORMAL ACTIVITY
HEADACHE, FULLNESS IN HEAD: NOT PRESENT
AGITATION: NORMAL ACTIVITY
VISUAL DISTURBANCES: NOT PRESENT
ANXIETY: NO ANXIETY, AT EASE
VISUAL DISTURBANCES: NOT PRESENT
ORIENTATION AND CLOUDING OF SENSORIUM: CANNOT DO SERIAL ADDITIONS OR IS UNCERTAIN ABOUT DATE

## 2024-12-26 ASSESSMENT — COGNITIVE AND FUNCTIONAL STATUS - GENERAL
HELP NEEDED FOR BATHING: A LITTLE
MOBILITY SCORE: 15
MOVING FROM LYING ON BACK TO SITTING ON SIDE OF FLAT BED WITH BEDRAILS: A LITTLE
STANDING UP FROM CHAIR USING ARMS: A LITTLE
DRESSING REGULAR UPPER BODY CLOTHING: A LITTLE
DRESSING REGULAR LOWER BODY CLOTHING: A LOT
MOBILITY SCORE: 15
MOBILITY SCORE: 15
WALKING IN HOSPITAL ROOM: A LOT
DAILY ACTIVITIY SCORE: 19
CLIMB 3 TO 5 STEPS WITH RAILING: TOTAL
DAILY ACTIVITIY SCORE: 19
MOVING FROM LYING ON BACK TO SITTING ON SIDE OF FLAT BED WITH BEDRAILS: A LITTLE
TURNING FROM BACK TO SIDE WHILE IN FLAT BAD: A LITTLE
MOVING FROM LYING ON BACK TO SITTING ON SIDE OF FLAT BED WITH BEDRAILS: A LITTLE
MOVING TO AND FROM BED TO CHAIR: A LITTLE
DRESSING REGULAR LOWER BODY CLOTHING: A LITTLE
TURNING FROM BACK TO SIDE WHILE IN FLAT BAD: A LITTLE
STANDING UP FROM CHAIR USING ARMS: A LITTLE
CLIMB 3 TO 5 STEPS WITH RAILING: A LOT
TURNING FROM BACK TO SIDE WHILE IN FLAT BAD: A LITTLE
PERSONAL GROOMING: A LITTLE
EATING MEALS: A LITTLE
PERSONAL GROOMING: A LITTLE
WALKING IN HOSPITAL ROOM: A LOT
DRESSING REGULAR LOWER BODY CLOTHING: A LITTLE
CLIMB 3 TO 5 STEPS WITH RAILING: A LOT
TOILETING: A LITTLE
STANDING UP FROM CHAIR USING ARMS: A LOT
EATING MEALS: A LITTLE
DAILY ACTIVITIY SCORE: 17
MOVING TO AND FROM BED TO CHAIR: A LOT
TOILETING: A LITTLE
PERSONAL GROOMING: A LITTLE
WALKING IN HOSPITAL ROOM: A LITTLE
MOVING TO AND FROM BED TO CHAIR: A LOT
HELP NEEDED FOR BATHING: A LOT
HELP NEEDED FOR BATHING: A LITTLE
TOILETING: A LITTLE

## 2024-12-26 ASSESSMENT — ACTIVITIES OF DAILY LIVING (ADL)
ADL_ASSISTANCE: INDEPENDENT
BATHING_ASSISTANCE: MODERATE

## 2024-12-26 ASSESSMENT — PAIN SCALES - GENERAL
PAINLEVEL_OUTOF10: 0 - NO PAIN

## 2024-12-26 ASSESSMENT — PAIN - FUNCTIONAL ASSESSMENT
PAIN_FUNCTIONAL_ASSESSMENT: 0-10
PAIN_FUNCTIONAL_ASSESSMENT: 0-10

## 2024-12-26 NOTE — PROGRESS NOTES
Humble Banks is a 69 y.o. male on day 6 of admission presenting with Unable to ambulate.      Subjective   Patient lying in bed without distress.  No new complaints.    Objective     Last Recorded Vitals  BP (!) 133/95 (BP Location: Right arm, Patient Position: Lying)   Pulse 71   Temp 36.8 °C (98.2 °F) (Temporal)   Resp 18   Wt 84.4 kg (186 lb)   SpO2 98% on room air.  Intake/Output last 3 Shifts:    Intake/Output Summary (Last 24 hours) at 12/26/2024 1313  Last data filed at 12/26/2024 1147  Gross per 24 hour   Intake 480 ml   Output 300 ml   Net 180 ml       Admission Weight  Weight: 84.4 kg (186 lb) (12/20/24 1329)    Daily Weight  12/20/24 : 84.4 kg (186 lb)    General Exam  General Appearance: Lying in bed without distress, cooperative.  Head: NCAT  Eyes: PERRL, EOMI. sclera is white.  Cardiac: Normal S1 and S2. No murmurs. Rhythm is regular.  Lungs: Clear to auscultation bilaterally, no wheezing or crackles, no accessory muscle use at rest.  Musculoskeletal: Range of motion in all extremities intact, decreased strength in all extremities 4/5 strength  Extremities: Trace bilateral lower extremity edema, bilateral tremors of the hand and upper extremities continues to improve.  Neuro: Alert and order x 3, no focal deficits.  Skin: Diaphoresis appears to have improved.  Psych: Alert and oriented to person only, not place or time    Relevant Results  No results found for this or any previous visit (from the past 24 hours).      Hospital course:  Humble Banks is a 69 y.o. male with a PMH of alcohol abuse, DDD of L spine, CHINEDU, HLD, HTN, hypothyroidism, sciatica of left side of back, and L inguinal hernia. Mr. Banks presented to ED from residential alcohol rehab facility for further evaluation due to inability to ambulate in setting of left sided lower back pain from sciatica.  CT imaging did not show any acute findings.  Patient noted to have acute alcohol withdrawal.  Patient also presumed to have  Wernicke encephalopathy.  Patient placed on Ativan per CIWA scale.  PT/OT saw the patient and recommend skilled nursing facility.    Assessment and plan:    Alcohol withdrawal syndrome with delirium   Alcohol use disorder, with severe dependence  Wernicke encephalopathy  Electrolyte abnormalities (K and Mg)  -Hypokalemia and hypomagnesemia resolved.  -Continue CIWA protocol using oral Lorazepam, CIWA has been 1 last 2 days.  -Thiamine 100 mg daily.  -MVI, and folic acid 1 mg PO every day  -Agitation PRNs ordered Zyprexa IM and p.o. every 6 hours as needed     Hypoglycemia  -Likely secondary to recent alcohol intoxication and also decreased p.o. intake as patient currently withdrawing  -Resolved.     Chronic left sided sciatica  unable to ambulate  -PT/OT consulted and SNF placement recommended  -falls precautions  -pain regimen: Ibuprofen 600 mg q6h PRN, Lidocaine patch and Robaxin 500 mg q6h PRN  -heat packs as needed     Anemia (stable)  Thrombocytopenia (chronic)  -Hgb 11.8 on admit, last hemoglobin 12.1.  -PLT 73 on admit ,  today  -per chart review thrombocytopenia has been present since 7/2022  -Likely associated with alcohol use.     HTN  -Holding home propranolol 40 mg PO BID due to bradycardia.  -Systolic blood pressure 130-160s  -Will start amlodipine 5 mg daily..    Sinus bradycardia  -Asymptomatic.  Likely related to beta-blocker usage.  -Propranolol on hold.  Since holding the beta-blocker her heart rate has been in the 50s to 60s.  Recommend continue holding beta-blocker which is likely causing the sinus bradycardia.     CHINEDU  -Continue home Lexaprol 10 mg PO every day and Wellbutrin  mg PO every day     Hypothyroidism  -Continue home Synthroid 75 mcg PO every day     Nicotine use disorder  -cessation recommended  -Nicotine patch ordered       DVT: Lovenox  CODE STATUS: Full code    Capacity eval: Does not have capacity, capacity evaluation significant event note 12/21/24  Emergency  contact/healthcare power of  Earnest Youssef [phone] (wants to be contacted regularly with updates)     Disposition: Medically ready for discharge, waiting on placement to skilled nursing facility.      Garfield Norman MD

## 2024-12-26 NOTE — PROGRESS NOTES
Physical Therapy    Physical Therapy Treatment    Patient Name: Humble Banks  MRN: [id number]  Department: [place] [id number]  Room: 2009/2009-A  Today's Date: 12/26/2024  Time Calculation  Start Time: 0916  Stop Time: 0939  Time Calculation (min): 23 min         Assessment/Plan   PT Assessment  Barriers to Discharge Home: Caregiver assistance, Cognition needs, Physical needs  Caregiver Assistance: Caregiver assistance needed per identified barriers - however, level of patient's required assistance exceeds assistance available at home  Cognition Needs: 24hr supervision for safety awareness needed, Insight of patient limited regarding functional ability/needs, Cognition-related high falls risk  Physical Needs: Stair navigation into home limited by function/safety, Ambulating household distances limited by function/safety, High falls risk due to function or environment  End of Session Communication: Bedside nurse  Assessment Comment: Pt tolerated PT session well, able to complete bed level exercises, transfers and ambulation however continues to require assist and increased verbal cues for safety awareness. Pt continues to remain appropriate for Moderate intensity PT upon D/C from hospital.  End of Session Patient Position: Bed, 3 rail up, Alarm off, not on at start of session  PT Plan  Inpatient/Swing Bed or Outpatient: Inpatient  PT Plan  Treatment/Interventions: Bed mobility, Transfer training, Gait training, Stair training, Balance training, Strengthening, Therapeutic exercise, Therapeutic activity  PT Plan: Ongoing PT  PT Frequency: 3 times per week  PT Discharge Recommendations: Moderate intensity level of continued care  PT Recommended Transfer Status: Assist x1, Assistive device  PT - OK to Discharge: Yes (POC/goals/discharge rec intensity created)      General Visit Information:   PT  Visit  PT Received On: 12/26/24  General  Missed Visit Reason:  (n/a)  Family/Caregiver Present: No  Co-Treatment: PT  Co-Treatment  Reason: Partial co-tx with OT to maximize pt safety and expedite D/C planning.  Prior to Session Communication: Bedside nurse  Patient Position Received: Bed, 3 rail up, Alarm off, not on at start of session  General Comment: Pt supine in bed, sleeping on arrival, agreeable to work with therapy.    Subjective   Precautions:  Precautions  Medical Precautions: Fall precautions       12/26/24 0917   Vital Signs   Vitals Session During PT   Heart Rate 71   SpO2 98 %     Objective   Pain:  Pain Assessment  Pain Assessment: 0-10  0-10 (Numeric) Pain Score: 0 - No pain  Cognition:  Cognition  Overall Cognitive Status: Impaired  Orientation Level: Disoriented to situation  Insight: Mild  Impulsive: Mildly    Activity Tolerance:  Activity Tolerance  Endurance: Tolerates 10 - 20 min exercise with multiple rests  Treatments:  Therapeutic Exercise  Therapeutic Exercise Performed: Yes  Therapeutic Exercise Activity 1: Supine: HS, Hip ABD, SLR, SAQ x10 BLE  Therapeutic Exercise Activity 2: Seated: Heel/Toe Raises, LAQ, Hip Flexion, Hip ABD x10 BLE    Bed Mobility  Bed Mobility: Yes  Bed Mobility 1  Bed Mobility 1: Supine to sitting  Level of Assistance 1: Contact guard  Bed Mobility Comments 1: HOB elevated  Bed Mobility 2  Bed Mobility  2: Scooting  Level of Assistance 2: Contact guard  Bed Mobility Comments 2: Lateral scooting EOB, verbal cues for sequencing.    Ambulation/Gait Training  Ambulation/Gait Training Performed: Yes  Ambulation/Gait Training 1  Surface 1: Level tile  Device 1: Rolling walker  Assistance 1: Minimum assistance, Minimal verbal cues  Quality of Gait 1: Narrow base of support, Decreased step length (Decreased tressa, FF posture,)  Comments/Distance (ft) 1: x30ft, pt denied further d/t LE fatigue and weakness.    Transfers  Transfer: Yes  Transfer 1  Transfer From 1: Bed to  Transfer to 1: Stand  Technique 1: Sit to stand  Transfer Device 1: Walker  Transfer Level of Assistance 1: Minimum assistance,  Minimal verbal cues  Trials/Comments 1: x1 trial, cues for safe hand placement to avoid pulling up on FWW.  Transfers 2  Transfer From 2: Stand to  Transfer to 2: Sit  Technique 2: Stand to sit  Transfer Device 2: Walker  Transfer Level of Assistance 2: Moderate assistance, Minimal verbal cues  Trials/Comments 2: x1 trial, pt with decreased eccentric control of descent and awareness of walker placement. increased cues and assistance for safe transition to seated position.    Stairs  Stairs: No    Outcome Measures:  [facility] Basic Mobility  Turning from your back to your side while in a flat bed without using bedrails: A little  Moving from lying on your back to sitting on the side of a flat bed without using bedrails: A little  Moving to and from bed to chair (including a wheelchair): A little  Standing up from a chair using your arms (e.g. wheelchair or bedside chair): A lot  To walk in hospital room: A little  Climbing 3-5 steps with railing: Total  Basic Mobility - Total Score: 15    Education Documentation  Home Exercise Program, taught by Tona Reilly PTA at 12/26/2024 12:27 PM.  Learner: Patient  Readiness: Acceptance  Method: Explanation  Response: Verbalizes Understanding, Needs Reinforcement  Comment: Cues for increased safety awareness with FWW and transfers. HEP    Mobility Training, taught by Tona Reilly PTA at 12/26/2024 12:27 PM.  Learner: Patient  Readiness: Acceptance  Method: Explanation  Response: Verbalizes Understanding, Needs Reinforcement  Comment: Cues for increased safety awareness with FWW and transfers. HEP    Education Comments  No comments found.        OP EDUCATION:       Encounter Problems       Encounter Problems (Active)       Balance       Patient will maintain balance to allow for safe mobility with CGA and whw.  (Progressing)       Start:  12/20/24    Expected End:  01/03/25               Mobility       STG - Patient will ambulate with whw, CGA, 25 ft x 4 (Progressing)        Start:  12/20/24    Expected End:  01/03/25            as feasibly safe to attempt to simulate home setup, will complete 3 steps with mod Ax1.  (Progressing)       Start:  12/20/24    Expected End:  01/03/25               PT Transfers       STG - Patient will transfer sit to and from stand with CGA.  (Progressing)       Start:  12/20/24    Expected End:  01/03/25               Pain - Adult            12/26/24 at 12:29 PM   Tona Reilly [facility]   Rehab Office: [phone]

## 2024-12-26 NOTE — CARE PLAN
The patient's goals for the shift include      The clinical goals for the shift include pt will remain free from fall this shift -tr    Over the shift, the patient did not make progress toward the following goals. Barriers to progression include   . Recommendations to address these barriers include   .

## 2024-12-26 NOTE — PROGRESS NOTES
Occupational Therapy    Evaluation    Patient Name: Humble Banks  MRN: [id number]  Department: [facility] 20  Room: 2009/2009-A  Today's Date: 12/26/2024  Time Calculation  Start Time: 0916  Stop Time: 0926  Time Calculation (min): 10 min        Assessment:  Prognosis: Good  Barriers to Discharge Home: Physical needs  Physical Needs: 24hr mobility assistance needed, 24hr ADL assistance needed, High falls risk due to function or environment  Evaluation/Treatment Tolerance: Patient tolerated treatment well  Medical Staff Made Aware: Yes  End of Session Patient Position: Bed, 3 rail up, Alarm off, caregiver present (PTA present in room)  OT Assessment Results: Decreased ADL status, Decreased upper extremity strength, Decreased endurance, Decreased functional mobility  Prognosis: Good  Barriers to Discharge: None  Evaluation/Treatment Tolerance: Patient tolerated treatment well  Medical Staff Made Aware: Yes  Strengths: Attitude of self  Barriers to Participation: Comorbidities, Insight into problems  Plan:  Treatment Interventions: ADL retraining, Functional transfer training, Endurance training, Patient/family training, Equipment evaluation/education, Compensatory technique education  OT Frequency: 3 times per week  OT Discharge Recommendations: Moderate intensity level of continued care  Equipment Recommended upon Discharge:  (TBD)  OT Recommended Transfer Status: Assist of 1  OT - OK to Discharge:  (eval complete)  Treatment Interventions: ADL retraining, Functional transfer training, Endurance training, Patient/family training, Equipment evaluation/education, Compensatory technique education    Subjective   Current Problem:  1. Unable to ambulate          General:  General  Reason for Referral: falls, difficulty amb  Past Medical History Relevant to Rehab: alcohol abuse, HTN, anxiety, Wernicke encephalopathy?  Family/Caregiver Present: No  Co-Treatment: PT  Co-Treatment Reason: Co-evaluation with PT for pt safety and  to optimize pt's therapeutic potential  Prior to Session Communication: Bedside nurse  Patient Position Received: Bed, 3 rail up, Alarm on  Preferred Learning Style: visual, verbal  General Comment: Pt pleasant and agreeable to therapy  Precautions:  Medical Precautions: Fall precautions    Vital Signs Comment: Vitals signs WNL during session, however pt observed to have increased SOB with activity     Pain:  Pain Assessment  Pain Assessment: 0-10  0-10 (Numeric) Pain Score: 0 - No pain    Objective   Cognition:  Overall Cognitive Status: Impaired  Arousal/Alertness: Appropriate responses to stimuli  Orientation Level: Disoriented to situation  Following Commands: Follows one step commands without difficulty  Safety Judgment: Decreased awareness of need for safety precautions  Problem Solving: Assistance required to identify errors made  Cognition Comments: Pt appears more alert this date, some insight and problem-solving deficits noted  Insight: Mild  Impulsive: Mildly           Home Living:  Type of Home: House  Lives With: Friends  Home Adaptive Equipment: Walker rolling or standard  Home Access: Stairs to enter with rails  Entrance Stairs-Number of Steps: 3  Home Living Comments: was in [place] ER yesterday for falls, was discharged to [facility] inpatient rehab center but could not ambulate there, sent to [facility] ED  Prior Function:  Level of [place]: Independent with ADLs and functional transfers, Independent with homemaking with ambulation  ADL Assistance: Independent  Homemaking Assistance: Independent  Ambulatory Assistance: Independent (rollator)  Prior Function Comments: Was in hospital in Oct for falls/weakness with discharge to SNF    ADL:  Eating Assistance: Independent  Grooming Assistance: Stand by  Grooming Deficit: Setup (anticipated)  Bathing Assistance: Moderate  Bathing Deficit:  (anticipated)  UE Dressing Assistance: Stand by  UE Dressing Deficit:  (gown management)  LE Dressing Assistance:  Moderate  LE Dressing Deficit:  (don pants with increased time)  Toileting Assistance with Device:  (CGA)  Toileting Deficit:  (anticipated)  Activity Tolerance:  Endurance: Tolerates 10 - 20 min exercise with multiple rests  Bed Mobility/Transfers: Bed Mobility  Bed Mobility: Yes  Bed Mobility 1  Bed Mobility 1: Supine to sitting  Level of Assistance 1: Contact guard  Bed Mobility Comments 1: HOB elevated  Bed Mobility 2  Bed Mobility  2: Scooting  Level of Assistance 2: Contact guard  Bed Mobility Comments 2: Lateral scooting along EOB toward HOB wtih verbal cueing for mechanics    Transfers  Transfer: Yes  Transfer 1  Transfer From 1: Bed to  Transfer to 1: Stand  Technique 1: Sit to stand  Transfer Device 1: Walker  Transfer Level of Assistance 1: Minimum assistance  Trials/Comments 1: Verbal cueing for hand placement  Transfers 2  Transfer From 2: Stand to  Transfer to 2: Sit  Technique 2: Stand to sit  Transfer Device 2: Walker  Transfer Level of Assistance 2: Moderate assistance  Trials/Comments 2: Decreased controlled descent and awareness of walker, increased physical assist to guide and control seated descent      Functional Mobility:  Functional Mobility  Functional Mobility Performed: Yes  Functional Mobility 1  Comments 1: Pt completed funtional distance with min A +FWW, increased cueng for mobility sequence, walker management, and step length  Sitting Balance:  Static Sitting Balance  Static Sitting-Balance Support: No upper extremity supported  Static Sitting-Level of Assistance: Distant supervision  Dynamic Sitting Balance  Dynamic Sitting-Balance Support: No upper extremity supported  Dynamic Sitting-Level of Assistance: Close supervision  Standing Balance:  Static Standing Balance  Static Standing-Balance Support: Bilateral upper extremity supported  Static Standing-Level of Assistance: Minimum assistance  Dynamic Standing Balance  Dynamic Standing-Balance Support: Bilateral upper extremity  supported  Dynamic Standing-Level of Assistance: Minimum assistance     Vision:Vision - Basic Assessment  Current Vision: No visual deficits  Sensation:  Light Touch:  (pt reports sensation deficits in BLEs at baseline)  Strength:  Strength Comments: BUE strength grossly 4/5  Perception:  Inattention/Neglect: Appears intact  Initiation: Appears intact  Motor Planning: Appears intact  Perseveration: Not present  Coordination:  Movements are Fluid and Coordinated: Yes   Hand Function:  Gross Grasp: Functional  Coordination: Functional  Extremities: RUE   RUE : Within Functional Limits and LUE   LUE: Within Functional Limits    Outcome Measures:[facility] Daily Activity  Putting on and taking off regular lower body clothing: A lot  Bathing (including washing, rinsing, drying): A lot  Putting on and taking off regular upper body clothing: A little  Toileting, which includes using toilet, bedpan or urinal: A little  Taking care of personal grooming such as brushing teeth: A little  Eating Meals: None  Daily Activity - Total Score: 17    Brief Confusion Assessment Method (bCAM)  Feature 1: Altered Mental Status or Fluctuating Course: No  CAM Result: CAM -    Education Documentation  Body Mechanics, taught by Rachelle Ogden OT at 12/26/2024 10:51 AM.  Learner: Patient  Readiness: Acceptance  Method: Explanation, Demonstration  Response: Verbalizes Understanding, Demonstrated Understanding, Needs Reinforcement    ADL Training, taught by Rachelle Ogden OT at 12/26/2024 10:51 AM.  Learner: Patient  Readiness: Acceptance  Method: Explanation, Demonstration  Response: Verbalizes Understanding, Demonstrated Understanding, Needs Reinforcement    EDUCATION:  Education  Individual(s) Educated: Patient  Education Provided: Diagnosis & Precautions, Fall precautons, Risk and benefits of OT discussed with patient or other, POC discussed and agreed upon  Patient Response to Education: Patient/Caregiver Verbalized Understanding of  Information    Goals:  Encounter Problems       Encounter Problems (Active)       ADLs       Patient with complete lower body dressing with modified independent level of assistance donning and doffing all LE clothes  with PRN adaptive equipment (Progressing)       Start:  12/26/24    Expected End:  01/09/25            Patient will complete daily grooming tasks with modified independent level of assistance and PRN adaptive equipment while standing. (Progressing)       Start:  12/26/24    Expected End:  01/09/25            Patient will complete toileting including hygiene clothing management/hygiene with independent level of assistance. (Progressing)       Start:  12/26/24    Expected End:  01/09/25               COGNITION/SAFETY       Patient will score WFL on standardized cognitive assessment within reasonable time frame (Progressing)       Start:  12/26/24    Expected End:  01/09/25               MOBILITY       Patient will perform Functional mobility Household distances/Community Distances with modified independent level of assistance and least restrictive device in order to improve safety and functional mobility. (Progressing)       Start:  12/26/24    Expected End:  01/09/25               TRANSFERS       Patient will perform bed mobility modified independent level of assistance in order to improve safety and independence with mobility (Progressing)       Start:  12/26/24    Expected End:  01/09/25            Patient will complete functional transfer to chair/commode with least restrictive device with modified independent level of assistance. (Progressing)       Start:  12/26/24    Expected End:  01/09/25

## 2024-12-26 NOTE — PROGRESS NOTES
Humble Banks is a 69 y.o. y.o. male on day 6 of admission presenting with Unable to ambulate [R26.2].     Subjective   Patient is medically ready for discharge and will go to [facility]. Awaiting updated PT/OT notes to initiate precert. 7000 completed in [facility].    SW will continue to follow.    UPDATE 1240 Message sent to direct submit team to initiate precert for [facility] [place]; SW will continue to follow.    - Keily ENGLE, MA, LSW  Care Transitions   [facility] Secure Chat or [phone]

## 2024-12-26 NOTE — NURSING NOTE
"ELIANE Progress Note:    ELIANE services following patient due to Code Violet 12/22 @ 0502. Per chart review, pt is q8 CIWA score, scoring 1 @ 0802 12/26/24. Per nursing pt is calm and cooperative with POC, no acute events overnight. On assessment, pt is resting quietly in bed, in no signs of distress. 2mg Lorazepam last given 12/23/24 @ 1800 per MAR.    Per , awaiting updated PT/OT notes to initiate precert to [place] at [facility].     ELIANE services available to patient and staff 24/7 throughout hospitalization. ELIANE will continue to perform q12 hr rounding to ensure safety of patient and staff. Please secure chat \"ELIANE\" with any questions or concerns.   "

## 2024-12-27 VITALS
WEIGHT: 186 LBS | TEMPERATURE: 97 F | BODY MASS INDEX: 25.19 KG/M2 | SYSTOLIC BLOOD PRESSURE: 142 MMHG | DIASTOLIC BLOOD PRESSURE: 83 MMHG | RESPIRATION RATE: 16 BRPM | HEIGHT: 72 IN | HEART RATE: 67 BPM | OXYGEN SATURATION: 96 %

## 2024-12-27 LAB
ANION GAP SERPL CALC-SCNC: 17 MMOL/L (ref 10–20)
BUN SERPL-MCNC: 23 MG/DL (ref 6–23)
CALCIUM SERPL-MCNC: 9.7 MG/DL (ref 8.6–10.6)
CHLORIDE SERPL-SCNC: 104 MMOL/L (ref 98–107)
CO2 SERPL-SCNC: 25 MMOL/L (ref 21–32)
CREAT SERPL-MCNC: 0.64 MG/DL (ref 0.5–1.3)
EGFRCR SERPLBLD CKD-EPI 2021: >90 ML/MIN/1.73M*2
GLUCOSE SERPL-MCNC: 103 MG/DL (ref 74–99)
POTASSIUM SERPL-SCNC: 3.5 MMOL/L (ref 3.5–5.3)
SODIUM SERPL-SCNC: 142 MMOL/L (ref 136–145)

## 2024-12-27 PROCEDURE — 2500000002 HC RX 250 W HCPCS SELF ADMINISTERED DRUGS (ALT 637 FOR MEDICARE OP, ALT 636 FOR OP/ED): Performed by: NURSE PRACTITIONER

## 2024-12-27 PROCEDURE — 2500000001 HC RX 250 WO HCPCS SELF ADMINISTERED DRUGS (ALT 637 FOR MEDICARE OP): Performed by: STUDENT IN AN ORGANIZED HEALTH CARE EDUCATION/TRAINING PROGRAM

## 2024-12-27 PROCEDURE — 2500000004 HC RX 250 GENERAL PHARMACY W/ HCPCS (ALT 636 FOR OP/ED): Performed by: STUDENT IN AN ORGANIZED HEALTH CARE EDUCATION/TRAINING PROGRAM

## 2024-12-27 PROCEDURE — 99239 HOSP IP/OBS DSCHRG MGMT >30: CPT | Performed by: INTERNAL MEDICINE

## 2024-12-27 PROCEDURE — 36415 COLL VENOUS BLD VENIPUNCTURE: CPT | Performed by: INTERNAL MEDICINE

## 2024-12-27 PROCEDURE — 2500000001 HC RX 250 WO HCPCS SELF ADMINISTERED DRUGS (ALT 637 FOR MEDICARE OP): Performed by: INTERNAL MEDICINE

## 2024-12-27 PROCEDURE — 80048 BASIC METABOLIC PNL TOTAL CA: CPT | Performed by: INTERNAL MEDICINE

## 2024-12-27 PROCEDURE — 2500000001 HC RX 250 WO HCPCS SELF ADMINISTERED DRUGS (ALT 637 FOR MEDICARE OP): Performed by: NURSE PRACTITIONER

## 2024-12-27 RX ADMIN — Medication 1 TABLET: at 08:24

## 2024-12-27 RX ADMIN — THIAMINE HCL TAB 100 MG 100 MG: 100 TAB at 08:24

## 2024-12-27 RX ADMIN — AMLODIPINE BESYLATE 5 MG: 5 TABLET ORAL at 08:24

## 2024-12-27 RX ADMIN — ESCITALOPRAM OXALATE 10 MG: 10 TABLET ORAL at 08:24

## 2024-12-27 RX ADMIN — FOLIC ACID 1 MG: 1 TABLET ORAL at 08:24

## 2024-12-27 RX ADMIN — BUPROPION 150 MG: 150 TABLET, EXTENDED RELEASE ORAL at 08:24

## 2024-12-27 RX ADMIN — MAGNESIUM OXIDE TAB 400 MG (241.3 MG ELEMENTAL MG) 400 MG: 400 (241.3 MG) TAB at 08:24

## 2024-12-27 RX ADMIN — LEVOTHYROXINE SODIUM 75 MCG: 75 TABLET ORAL at 05:55

## 2024-12-27 ASSESSMENT — LIFESTYLE VARIABLES
AGITATION: NORMAL ACTIVITY
ORIENTATION AND CLOUDING OF SENSORIUM: CANNOT DO SERIAL ADDITIONS OR IS UNCERTAIN ABOUT DATE
PAROXYSMAL SWEATS: NO SWEAT VISIBLE
AUDITORY DISTURBANCES: NOT PRESENT
TREMOR: NO TREMOR
TREMOR: NO TREMOR
VISUAL DISTURBANCES: NOT PRESENT
PAROXYSMAL SWEATS: NO SWEAT VISIBLE
HEADACHE, FULLNESS IN HEAD: NOT PRESENT
TOTAL SCORE: 1
VISUAL DISTURBANCES: NOT PRESENT
ANXIETY: NO ANXIETY, AT EASE
ORIENTATION AND CLOUDING OF SENSORIUM: CANNOT DO SERIAL ADDITIONS OR IS UNCERTAIN ABOUT DATE
AUDITORY DISTURBANCES: NOT PRESENT
HEADACHE, FULLNESS IN HEAD: NOT PRESENT
TREMOR: NO TREMOR
AGITATION: NORMAL ACTIVITY
AGITATION: NORMAL ACTIVITY
NAUSEA AND VOMITING: NO NAUSEA AND NO VOMITING
NAUSEA AND VOMITING: NO NAUSEA AND NO VOMITING
ORIENTATION AND CLOUDING OF SENSORIUM: CANNOT DO SERIAL ADDITIONS OR IS UNCERTAIN ABOUT DATE
HEADACHE, FULLNESS IN HEAD: NOT PRESENT
ANXIETY: NO ANXIETY, AT EASE
NAUSEA AND VOMITING: NO NAUSEA AND NO VOMITING
ANXIETY: NO ANXIETY, AT EASE
PAROXYSMAL SWEATS: NO SWEAT VISIBLE
AUDITORY DISTURBANCES: NOT PRESENT
TOTAL SCORE: 1
VISUAL DISTURBANCES: NOT PRESENT
TOTAL SCORE: 1

## 2024-12-27 ASSESSMENT — COGNITIVE AND FUNCTIONAL STATUS - GENERAL
PERSONAL GROOMING: A LITTLE
DAILY ACTIVITIY SCORE: 19
WALKING IN HOSPITAL ROOM: A LOT
TOILETING: A LITTLE
CLIMB 3 TO 5 STEPS WITH RAILING: A LOT
MOBILITY SCORE: 15
TURNING FROM BACK TO SIDE WHILE IN FLAT BAD: A LITTLE
EATING MEALS: A LITTLE
STANDING UP FROM CHAIR USING ARMS: A LITTLE
DRESSING REGULAR LOWER BODY CLOTHING: A LITTLE
HELP NEEDED FOR BATHING: A LITTLE
MOVING TO AND FROM BED TO CHAIR: A LOT
MOVING FROM LYING ON BACK TO SITTING ON SIDE OF FLAT BED WITH BEDRAILS: A LITTLE

## 2024-12-27 ASSESSMENT — PAIN SCALES - GENERAL: PAINLEVEL_OUTOF10: 0 - NO PAIN

## 2024-12-27 NOTE — DISCHARGE SUMMARY
Discharge Diagnosis  Alcohol abuse with alcohol withdrawal  Physical deconditioning with ambulatory dysfunction    Issues Requiring Follow-Up  Patient to be sent to skilled nursing facility.    Discharge Meds     Medication List      START taking these medications     amLODIPine 5 mg tablet; Commonly known as: Norvasc; Take 1 tablet (5 mg)   by mouth once daily.   folic acid 1 mg tablet; Commonly known as: Folvite; Take 1 tablet (1 mg)   by mouth once daily.   lidocaine 4 % patch; Place 1 patch over 12 hours on the skin once daily.   Remove & discard patch within 12 hours or as directed by MD. Apply to   back.   methocarbamol 500 mg tablet; Commonly known as: Robaxin; Take 1 tablet   (500 mg) by mouth every 6 hours if needed for muscle spasms.   thiamine 100 mg tablet; Commonly known as: Vitamin B-1; Take 1 tablet   (100 mg) by mouth once daily.     CONTINUE taking these medications     buPROPion  mg 24 hr tablet; Commonly known as: Wellbutrin XL; Take   1 tablet (150 mg) by mouth once daily for 14 days.   escitalopram 10 mg tablet; Commonly known as: Lexapro; Take 1 tablet (10   mg) by mouth once daily for 14 days.   ibuprofen 600 mg tablet   levothyroxine 75 mcg tablet; Commonly known as: Synthroid, Levoxyl; Take   1 tablet (75 mcg) by mouth once daily for 14 days.   multivitamin with minerals tablet; Take 1 tablet by mouth once daily for   14 days.   ondansetron 4 mg tablet; Commonly known as: Zofran     STOP taking these medications     propranolol 40 mg tablet; Commonly known as: Inderal       Test Results Pending At Discharge  None    Hospital Course  Edsouth Banks is a 69 y.o. male with a PMH of alcohol abuse, DDD of L spine, CHINEDU, HLD, HTN, hypothyroidism, sciatica of left side of back, and L inguinal hernia. Mr. Banks presented to ED from residential alcohol rehab facility for further evaluation due to inability to ambulate in setting of left sided lower back pain from sciatica.  CT imaging did  not show any acute findings.  Patient noted to have acute alcohol withdrawal.  Patient also presumed to have Wernicke encephalopathy.  Patient placed on Ativan per CIWA scale.  PT/OT saw the patient and recommend skilled nursing facility.  Patient monitored for several days until withdrawal appeared resolved.  On the day of discharge patient had not required any Ativan for 48 hours.  Patient agreeable to skilled nursing facility.     Assessment and plan:     Alcohol withdrawal syndrome with delirium   Alcohol use disorder, with severe dependence  Wernicke encephalopathy  Electrolyte abnormalities (K and Mg)  -Hypokalemia and hypomagnesemia resolved.  -Patient has not required any treatment with Ativan for the last 48 hours.  -Thiamine 100 mg daily.  -MVI, and folic acid 1 mg PO every day     Hypoglycemia  -Resolved.     Chronic left sided sciatica  unable to ambulate  -PT/OT consulted and SNF placement recommended  -falls precautions  -pain regimen: Ibuprofen 600 mg q6h PRN, Lidocaine patch and Robaxin 500 mg q6h PRN  -heat packs as needed     Anemia (stable)  Thrombocytopenia (chronic)  -Hgb 11.8 on admit, last hemoglobin 12.1.  -PLT 73 on admit ,  today  -per chart review thrombocytopenia has been present since 7/2022  -Likely associated with alcohol use.     HTN  -Holding home propranolol 40 mg PO BID due to bradycardia.    -Systolic blood pressure 130-160s  -Discharged on amlodipine 5 mg daily.     Sinus bradycardia  -Asymptomatic.  Likely related to beta-blocker usage.  -Propranolol on hold.  After patient received medication he will drop into the 40s.  Without medication patient is 50s and 60s.     CHINEDU  -Continue home Lexaprol 10 mg PO every day and Wellbutrin  mg PO every day     Hypothyroidism  -Continue home Synthroid 75 mcg PO every day     Nicotine use disorder  -cessation recommended  -Nicotine patch ordered    Time spent caring for patient and coordinating discharge total 35  minutes.    Pertinent Physical Exam At Time of Discharge  General Appearance: Lying in bed without distress, cooperative.  Head: NCAT  Eyes: PERRL, EOMI. sclera is white.  Cardiac: Normal S1 and S2. No murmurs. Rhythm is regular.  Lungs: Clear to auscultation bilaterally, no wheezing or crackles, no accessory muscle use at rest.  Musculoskeletal: Range of motion in all extremities intact, decreased strength in all extremities 4/5 strength  Extremities: Trace bilateral lower extremity edema, bilateral tremors of the hand and upper extremities continues to improve.  Neuro: Alert and order x 3, no focal deficits.  Skin: Diaphoresis appears to have improved.  Psych: Alert and oriented to person only, not place or time    Outpatient Follow-Up  No future appointments.      Garfield Norman MD

## 2024-12-27 NOTE — NURSING NOTE
"Due to patient presentation, ELIANE services no longer deemed necessary. Verified appropriateness of discontinuation with staff. Please contact \"ELIANE\" via secure chat to resume services or if any questions/concerns arise. Consult appreciated.    "

## 2024-12-27 NOTE — PROGRESS NOTES
Humble Banks is a 69 y.o. y.o. male on day 7 of admission presenting with Unable to ambulate [R26.2].     Subjective      12/27/24 0927   Discharge Planning   Home or Post Acute Services Post acute facilities (Rehab/SNF/etc)   Type of Post Acute Facility Services Skilled nursing  ([facility]: [phone])   Expected Discharge Disposition SNF   Does the patient need discharge transport arranged? Yes   RoundTrip coordination needed? Yes   Has discharge transport been arranged? Yes   What day is the transport expected? 12/27/24   What time is the transport expected? 1400  (via [facility]- stretcher)     Received update that patient has authorization to admit to [facility] and confirmed that patient is medically ready for discharge. Transportation requested in Roundtrip and received confirmed  time of 2:00pm with [facility]. Completed [place]/AVS uploaded via [employer] & 7000 previously completed in [facility]. Blue slip provided to unit secretary and bedside RN updated of discharge.     Call placed to patients VALERIY Tinoco ([phone]) and provided update on discharge today- IMM letter was reviewed and he denied any questions/concerns.    - Keily SCHUMACHERA, MA, LSW  Care Transitions   [facility] Secure Chat or [phone]

## 2024-12-31 LAB
ATRIAL RATE: 74 BPM
P AXIS: 14 DEGREES
P OFFSET: 162 MS
P ONSET: 117 MS
PR INTERVAL: 182 MS
Q ONSET: 208 MS
QRS COUNT: 12 BEATS
QRS DURATION: 88 MS
QT INTERVAL: 464 MS
QTC CALCULATION(BAZETT): 515 MS
QTC FREDERICIA: 498 MS
R AXIS: -26 DEGREES
T AXIS: 0 DEGREES
T OFFSET: 440 MS
VENTRICULAR RATE: 74 BPM

## 2025-04-14 ENCOUNTER — APPOINTMENT (OUTPATIENT)
Dept: RADIOLOGY | Facility: HOSPITAL | Age: 70
DRG: 897 | End: 2025-04-14
Payer: MEDICARE

## 2025-04-14 ENCOUNTER — HOSPITAL ENCOUNTER (INPATIENT)
Facility: HOSPITAL | Age: 70
DRG: 897 | End: 2025-04-14
Attending: STUDENT IN AN ORGANIZED HEALTH CARE EDUCATION/TRAINING PROGRAM | Admitting: INTERNAL MEDICINE
Payer: MEDICARE

## 2025-04-14 ENCOUNTER — APPOINTMENT (OUTPATIENT)
Dept: CARDIOLOGY | Facility: HOSPITAL | Age: 70
DRG: 897 | End: 2025-04-14
Payer: MEDICARE

## 2025-04-14 DIAGNOSIS — T14.8XXA ABRASION: ICD-10-CM
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
## 2025-04-14 DIAGNOSIS — F10.920 ALCOHOLIC INTOXICATION WITHOUT COMPLICATION: Primary | ICD-10-CM

## 2025-04-14 DIAGNOSIS — F10.939 ALCOHOL WITHDRAWAL SYNDROME WITH COMPLICATION (MULTI): ICD-10-CM

## 2025-04-14 DIAGNOSIS — R42 DIZZINESS: ICD-10-CM

## 2025-04-14 LAB
ALBUMIN SERPL BCP-MCNC: 4.1 G/DL (ref 3.4–5)
ALP SERPL-CCNC: 71 U/L (ref 33–136)
ALT SERPL W P-5'-P-CCNC: 58 U/L (ref 10–52)
ANION GAP SERPL CALC-SCNC: 17 MMOL/L (ref 10–20)
APTT PPP: 32 SECONDS (ref 26–36)
AST SERPL W P-5'-P-CCNC: 120 U/L (ref 9–39)
BASOPHILS # BLD AUTO: 0.08 X10*3/UL (ref 0–0.1)
BASOPHILS NFR BLD AUTO: 1.8 %
BILIRUB SERPL-MCNC: 0.7 MG/DL (ref 0–1.2)
BUN SERPL-MCNC: 10 MG/DL (ref 6–23)
CALCIUM SERPL-MCNC: 8.9 MG/DL (ref 8.6–10.3)
CARDIAC TROPONIN I PNL SERPL HS: 11 NG/L (ref 0–20)
CARDIAC TROPONIN I PNL SERPL HS: 9 NG/L (ref 0–20)
CHLORIDE SERPL-SCNC: 103 MMOL/L (ref 98–107)
CO2 SERPL-SCNC: 26 MMOL/L (ref 21–32)
CREAT SERPL-MCNC: 0.61 MG/DL (ref 0.5–1.3)
EGFRCR SERPLBLD CKD-EPI 2021: >90 ML/MIN/1.73M*2
EOSINOPHIL # BLD AUTO: 0.22 X10*3/UL (ref 0–0.7)
EOSINOPHIL NFR BLD AUTO: 4.8 %
ERYTHROCYTE [DISTWIDTH] IN BLOOD BY AUTOMATED COUNT: 13.2 % (ref 11.5–14.5)
ETHANOL SERPL-MCNC: 394 MG/DL
GLUCOSE SERPL-MCNC: 86 MG/DL (ref 74–99)
HCT VFR BLD AUTO: 36.3 % (ref 41–52)
HGB BLD-MCNC: 12 G/DL (ref 13.5–17.5)
IMM GRANULOCYTES # BLD AUTO: 0.02 X10*3/UL (ref 0–0.7)
IMM GRANULOCYTES NFR BLD AUTO: 0.4 % (ref 0–0.9)
INR PPP: 1 (ref 0.9–1.1)
LYMPHOCYTES # BLD AUTO: 1.97 X10*3/UL (ref 1.2–4.8)
LYMPHOCYTES NFR BLD AUTO: 43.3 %
MAGNESIUM SERPL-MCNC: 1.58 MG/DL (ref 1.6–2.4)
MCH RBC QN AUTO: 33.7 PG (ref 26–34)
MCHC RBC AUTO-ENTMCNC: 33.1 G/DL (ref 32–36)
MCV RBC AUTO: 102 FL (ref 80–100)
MONOCYTES # BLD AUTO: 0.75 X10*3/UL (ref 0.1–1)
MONOCYTES NFR BLD AUTO: 16.5 %
NEUTROPHILS # BLD AUTO: 1.51 X10*3/UL (ref 1.2–7.7)
NEUTROPHILS NFR BLD AUTO: 33.2 %
NRBC BLD-RTO: 0 /100 WBCS (ref 0–0)
PHOSPHATE SERPL-MCNC: 4 MG/DL (ref 2.5–4.9)
PLATELET # BLD AUTO: 49 X10*3/UL (ref 150–450)
POTASSIUM SERPL-SCNC: 3.6 MMOL/L (ref 3.5–5.3)
PROT SERPL-MCNC: 7.1 G/DL (ref 6.4–8.2)
PROTHROMBIN TIME: 11 SECONDS (ref 9.8–12.4)
RBC # BLD AUTO: 3.56 X10*6/UL (ref 4.5–5.9)
RBC MORPH BLD: NORMAL
SODIUM SERPL-SCNC: 142 MMOL/L (ref 136–145)
TARGETS BLD QL SMEAR: NORMAL
WBC # BLD AUTO: 4.6 X10*3/UL (ref 4.4–11.3)

## 2025-04-14 PROCEDURE — 84484 ASSAY OF TROPONIN QUANT: CPT | Performed by: STUDENT IN AN ORGANIZED HEALTH CARE EDUCATION/TRAINING PROGRAM

## 2025-04-14 PROCEDURE — 70450 CT HEAD/BRAIN W/O DYE: CPT

## 2025-04-14 PROCEDURE — 70450 CT HEAD/BRAIN W/O DYE: CPT | Performed by: STUDENT IN AN ORGANIZED HEALTH CARE EDUCATION/TRAINING PROGRAM

## 2025-04-14 PROCEDURE — 85060 BLOOD SMEAR INTERPRETATION: CPT | Performed by: PATHOLOGY

## 2025-04-14 PROCEDURE — 2500000004 HC RX 250 GENERAL PHARMACY W/ HCPCS (ALT 636 FOR OP/ED)

## 2025-04-14 PROCEDURE — 96375 TX/PRO/DX INJ NEW DRUG ADDON: CPT

## 2025-04-14 PROCEDURE — 36415 COLL VENOUS BLD VENIPUNCTURE: CPT | Performed by: STUDENT IN AN ORGANIZED HEALTH CARE EDUCATION/TRAINING PROGRAM

## 2025-04-14 PROCEDURE — 84075 ASSAY ALKALINE PHOSPHATASE: CPT | Performed by: STUDENT IN AN ORGANIZED HEALTH CARE EDUCATION/TRAINING PROGRAM

## 2025-04-14 PROCEDURE — 85025 COMPLETE CBC W/AUTO DIFF WBC: CPT | Performed by: STUDENT IN AN ORGANIZED HEALTH CARE EDUCATION/TRAINING PROGRAM

## 2025-04-14 PROCEDURE — 96366 THER/PROPH/DIAG IV INF ADDON: CPT

## 2025-04-14 PROCEDURE — 71045 X-RAY EXAM CHEST 1 VIEW: CPT

## 2025-04-14 PROCEDURE — 96376 TX/PRO/DX INJ SAME DRUG ADON: CPT

## 2025-04-14 PROCEDURE — 96361 HYDRATE IV INFUSION ADD-ON: CPT

## 2025-04-14 PROCEDURE — 85730 THROMBOPLASTIN TIME PARTIAL: CPT | Performed by: STUDENT IN AN ORGANIZED HEALTH CARE EDUCATION/TRAINING PROGRAM

## 2025-04-14 PROCEDURE — G0378 HOSPITAL OBSERVATION PER HR: HCPCS

## 2025-04-14 PROCEDURE — 2500000001 HC RX 250 WO HCPCS SELF ADMINISTERED DRUGS (ALT 637 FOR MEDICARE OP)

## 2025-04-14 PROCEDURE — 96365 THER/PROPH/DIAG IV INF INIT: CPT

## 2025-04-14 PROCEDURE — 84100 ASSAY OF PHOSPHORUS: CPT

## 2025-04-14 PROCEDURE — 2500000004 HC RX 250 GENERAL PHARMACY W/ HCPCS (ALT 636 FOR OP/ED): Performed by: STUDENT IN AN ORGANIZED HEALTH CARE EDUCATION/TRAINING PROGRAM

## 2025-04-14 PROCEDURE — 85610 PROTHROMBIN TIME: CPT | Performed by: STUDENT IN AN ORGANIZED HEALTH CARE EDUCATION/TRAINING PROGRAM

## 2025-04-14 PROCEDURE — 93005 ELECTROCARDIOGRAM TRACING: CPT

## 2025-04-14 PROCEDURE — 72125 CT NECK SPINE W/O DYE: CPT

## 2025-04-14 PROCEDURE — 71045 X-RAY EXAM CHEST 1 VIEW: CPT | Performed by: RADIOLOGY

## 2025-04-14 PROCEDURE — 99285 EMERGENCY DEPT VISIT HI MDM: CPT | Mod: 25 | Performed by: STUDENT IN AN ORGANIZED HEALTH CARE EDUCATION/TRAINING PROGRAM

## 2025-04-14 PROCEDURE — 2500000001 HC RX 250 WO HCPCS SELF ADMINISTERED DRUGS (ALT 637 FOR MEDICARE OP): Performed by: STUDENT IN AN ORGANIZED HEALTH CARE EDUCATION/TRAINING PROGRAM

## 2025-04-14 PROCEDURE — 82077 ASSAY SPEC XCP UR&BREATH IA: CPT | Performed by: STUDENT IN AN ORGANIZED HEALTH CARE EDUCATION/TRAINING PROGRAM

## 2025-04-14 PROCEDURE — 72125 CT NECK SPINE W/O DYE: CPT | Performed by: STUDENT IN AN ORGANIZED HEALTH CARE EDUCATION/TRAINING PROGRAM

## 2025-04-14 PROCEDURE — 83735 ASSAY OF MAGNESIUM: CPT | Performed by: STUDENT IN AN ORGANIZED HEALTH CARE EDUCATION/TRAINING PROGRAM

## 2025-04-14 RX ORDER — LORAZEPAM 2 MG/ML
2 INJECTION INTRAMUSCULAR EVERY 2 HOUR PRN
Status: DISCONTINUED | OUTPATIENT
Start: 2025-04-14 | End: 2025-04-16

## 2025-04-14 RX ORDER — PROPRANOLOL HYDROCHLORIDE 40 MG/1
1 TABLET ORAL 2 TIMES DAILY
COMMUNITY

## 2025-04-14 RX ORDER — THIAMINE HYDROCHLORIDE 100 MG/ML
100 INJECTION, SOLUTION INTRAMUSCULAR; INTRAVENOUS ONCE
Status: COMPLETED | OUTPATIENT
Start: 2025-04-14 | End: 2025-04-14

## 2025-04-14 RX ORDER — ROPINIROLE 1 MG/1
1 TABLET, FILM COATED ORAL NIGHTLY
COMMUNITY

## 2025-04-14 RX ORDER — LEVOTHYROXINE SODIUM 75 UG/1
75 TABLET ORAL DAILY
Status: DISCONTINUED | OUTPATIENT
Start: 2025-04-15 | End: 2025-04-22 | Stop reason: HOSPADM

## 2025-04-14 RX ORDER — ONDANSETRON 4 MG/1
4 TABLET, FILM COATED ORAL EVERY 8 HOURS PRN
Status: DISCONTINUED | OUTPATIENT
Start: 2025-04-14 | End: 2025-04-22 | Stop reason: HOSPADM

## 2025-04-14 RX ORDER — BUPROPION HYDROCHLORIDE 150 MG/1
150 TABLET ORAL DAILY
Status: DISCONTINUED | OUTPATIENT
Start: 2025-04-15 | End: 2025-04-22 | Stop reason: HOSPADM

## 2025-04-14 RX ORDER — LEVOTHYROXINE SODIUM 75 UG/1
1 TABLET ORAL DAILY
COMMUNITY

## 2025-04-14 RX ORDER — MULTIVIT-MIN/IRON FUM/FOLIC AC 7.5 MG-4
1 TABLET ORAL DAILY
Status: DISCONTINUED | OUTPATIENT
Start: 2025-04-15 | End: 2025-04-22 | Stop reason: HOSPADM

## 2025-04-14 RX ORDER — FOLIC ACID 1 MG/1
1 TABLET ORAL ONCE
Status: COMPLETED | OUTPATIENT
Start: 2025-04-14 | End: 2025-04-14

## 2025-04-14 RX ORDER — MECLIZINE HYDROCHLORIDE 25 MG/1
1 TABLET ORAL 3 TIMES DAILY PRN
COMMUNITY

## 2025-04-14 RX ORDER — ONDANSETRON HYDROCHLORIDE 2 MG/ML
4 INJECTION, SOLUTION INTRAVENOUS EVERY 8 HOURS PRN
Status: DISCONTINUED | OUTPATIENT
Start: 2025-04-14 | End: 2025-04-22 | Stop reason: HOSPADM

## 2025-04-14 RX ORDER — LORAZEPAM 2 MG/ML
1 INJECTION INTRAMUSCULAR EVERY 2 HOUR PRN
Status: DISCONTINUED | OUTPATIENT
Start: 2025-04-14 | End: 2025-04-16

## 2025-04-14 RX ORDER — MAGNESIUM SULFATE HEPTAHYDRATE 40 MG/ML
4 INJECTION, SOLUTION INTRAVENOUS ONCE
Status: COMPLETED | OUTPATIENT
Start: 2025-04-14 | End: 2025-04-15

## 2025-04-14 RX ORDER — LORAZEPAM 2 MG/ML
0.5 INJECTION INTRAMUSCULAR EVERY 2 HOUR PRN
Status: DISCONTINUED | OUTPATIENT
Start: 2025-04-14 | End: 2025-04-16

## 2025-04-14 RX ORDER — ACETAMINOPHEN 160 MG/5ML
650 SOLUTION ORAL EVERY 4 HOURS PRN
Status: DISCONTINUED | OUTPATIENT
Start: 2025-04-14 | End: 2025-04-22 | Stop reason: HOSPADM

## 2025-04-14 RX ORDER — LANOLIN ALCOHOL/MO/W.PET/CERES
100 CREAM (GRAM) TOPICAL DAILY
Status: DISCONTINUED | OUTPATIENT
Start: 2025-04-17 | End: 2025-04-16

## 2025-04-14 RX ORDER — FOLIC ACID 1 MG/1
1 TABLET ORAL DAILY
Status: DISCONTINUED | OUTPATIENT
Start: 2025-04-15 | End: 2025-04-22 | Stop reason: HOSPADM

## 2025-04-14 RX ORDER — MULTIVIT-MIN/IRON FUM/FOLIC AC 7.5 MG-4
1 TABLET ORAL ONCE
Status: COMPLETED | OUTPATIENT
Start: 2025-04-14 | End: 2025-04-14

## 2025-04-14 RX ORDER — ESCITALOPRAM OXALATE 10 MG/1
10 TABLET ORAL DAILY
Status: DISCONTINUED | OUTPATIENT
Start: 2025-04-15 | End: 2025-04-22 | Stop reason: HOSPADM

## 2025-04-14 RX ORDER — POLYETHYLENE GLYCOL 3350 17 G/17G
17 POWDER, FOR SOLUTION ORAL DAILY
Status: DISCONTINUED | OUTPATIENT
Start: 2025-04-14 | End: 2025-04-22 | Stop reason: HOSPADM

## 2025-04-14 RX ORDER — MECLIZINE HYDROCHLORIDE 25 MG/1
25 TABLET ORAL 3 TIMES DAILY PRN
Status: DISCONTINUED | OUTPATIENT
Start: 2025-04-14 | End: 2025-04-22 | Stop reason: HOSPADM

## 2025-04-14 RX ORDER — ESCITALOPRAM OXALATE 10 MG/1
1 TABLET ORAL DAILY
COMMUNITY

## 2025-04-14 RX ORDER — ROPINIROLE 1 MG/1
1 TABLET, FILM COATED ORAL NIGHTLY
Status: DISCONTINUED | OUTPATIENT
Start: 2025-04-14 | End: 2025-04-22 | Stop reason: HOSPADM

## 2025-04-14 RX ORDER — BUPROPION HYDROCHLORIDE 150 MG/1
1 TABLET ORAL DAILY
COMMUNITY

## 2025-04-14 RX ORDER — THIAMINE HYDROCHLORIDE 100 MG/ML
500 INJECTION, SOLUTION INTRAMUSCULAR; INTRAVENOUS EVERY 8 HOURS SCHEDULED
Status: COMPLETED | OUTPATIENT
Start: 2025-04-14 | End: 2025-04-17

## 2025-04-14 RX ORDER — ACETAMINOPHEN 500 MG
5 TABLET ORAL NIGHTLY PRN
Status: DISCONTINUED | OUTPATIENT
Start: 2025-04-14 | End: 2025-04-22 | Stop reason: HOSPADM

## 2025-04-14 RX ORDER — PROPRANOLOL HYDROCHLORIDE 40 MG/1
40 TABLET ORAL 2 TIMES DAILY
Status: DISCONTINUED | OUTPATIENT
Start: 2025-04-14 | End: 2025-04-22 | Stop reason: HOSPADM

## 2025-04-14 RX ORDER — ACETAMINOPHEN 650 MG/1
650 SUPPOSITORY RECTAL EVERY 4 HOURS PRN
Status: DISCONTINUED | OUTPATIENT
Start: 2025-04-14 | End: 2025-04-22 | Stop reason: HOSPADM

## 2025-04-14 RX ORDER — ACETAMINOPHEN 325 MG/1
650 TABLET ORAL EVERY 4 HOURS PRN
Status: DISCONTINUED | OUTPATIENT
Start: 2025-04-14 | End: 2025-04-22 | Stop reason: HOSPADM

## 2025-04-14 RX ORDER — GUAIFENESIN/DEXTROMETHORPHAN 100-10MG/5
5 SYRUP ORAL EVERY 4 HOURS PRN
Status: DISCONTINUED | OUTPATIENT
Start: 2025-04-14 | End: 2025-04-22 | Stop reason: HOSPADM

## 2025-04-14 RX ADMIN — FOLIC ACID 1 MG: 1 TABLET ORAL at 18:29

## 2025-04-14 RX ADMIN — Medication 1 TABLET: at 18:29

## 2025-04-14 RX ADMIN — THIAMINE HYDROCHLORIDE 100 MG: 100 INJECTION, SOLUTION INTRAMUSCULAR; INTRAVENOUS at 18:29

## 2025-04-14 RX ADMIN — THIAMINE HYDROCHLORIDE 500 MG: 100 INJECTION, SOLUTION INTRAMUSCULAR; INTRAVENOUS at 21:58

## 2025-04-14 RX ADMIN — ROPINIROLE HYDROCHLORIDE 1 MG: 1 TABLET, FILM COATED ORAL at 22:01

## 2025-04-14 RX ADMIN — SODIUM CHLORIDE 1000 ML: 9 INJECTION, SOLUTION INTRAVENOUS at 18:34

## 2025-04-14 RX ADMIN — POLYETHYLENE GLYCOL 3350 17 G: 17 POWDER, FOR SOLUTION ORAL at 23:34

## 2025-04-14 RX ADMIN — MAGNESIUM SULFATE HEPTAHYDRATE 4 G: 40 INJECTION, SOLUTION INTRAVENOUS at 21:56

## 2025-04-14 ASSESSMENT — LIFESTYLE VARIABLES
TREMOR: NOT VISIBLE, BUT CAN BE FELT FINGERTIP TO FINGERTIP
VISUAL DISTURBANCES: NOT PRESENT
AGITATION: SOMEWHAT MORE THAN NORMAL ACTIVITY
TOTAL SCORE: 7
NAUSEA AND VOMITING: NO NAUSEA AND NO VOMITING
PAROXYSMAL SWEATS: BARELY PERCEPTIBLE SWEATING, PALMS MOIST
HAVE YOU EVER FELT YOU SHOULD CUT DOWN ON YOUR DRINKING: NO
ANXIETY: NO ANXIETY, AT EASE
ANXIETY: MILDLY ANXIOUS
ORIENTATION AND CLOUDING OF SENSORIUM: ORIENTED AND CAN DO SERIAL ADDITIONS
EVER HAD A DRINK FIRST THING IN THE MORNING TO STEADY YOUR NERVES TO GET RID OF A HANGOVER: NO
TOTAL SCORE: 0
AGITATION: 3
HAVE PEOPLE ANNOYED YOU BY CRITICIZING YOUR DRINKING: NO
TREMOR: 2
PAROXYSMAL SWEATS: BARELY PERCEPTIBLE SWEATING, PALMS MOIST
HEADACHE, FULLNESS IN HEAD: NOT PRESENT
TACTILE DISTURBANCES: VERY MILD ITCHING, PINS AND NEEDLES, BURNING OR NUMBNESS
NAUSEA AND VOMITING: NO NAUSEA AND NO VOMITING
AUDITORY DISTURBANCES: NOT PRESENT
TOTAL SCORE: 4
PULSE: 65
VISUAL DISTURBANCES: NOT PRESENT
BLOOD PRESSURE: 134/80
HEADACHE, FULLNESS IN HEAD: NOT PRESENT
ORIENTATION AND CLOUDING OF SENSORIUM: ORIENTED AND CAN DO SERIAL ADDITIONS
AUDITORY DISTURBANCES: NOT PRESENT
EVER FELT BAD OR GUILTY ABOUT YOUR DRINKING: NO

## 2025-04-14 ASSESSMENT — PAIN SCALES - GENERAL: PAINLEVEL_OUTOF10: 5 - MODERATE PAIN

## 2025-04-14 ASSESSMENT — PAIN - FUNCTIONAL ASSESSMENT: PAIN_FUNCTIONAL_ASSESSMENT: 0-10

## 2025-04-14 NOTE — PROGRESS NOTES
Pharmacy Medication History Review    Humble Banks is a 69 y.o. male admitted for No Principal Problem: There is no principal problem currently on the Problem List. Please update the Problem List and refresh.. Pharmacy reviewed the patient's [id number] medications and allergies for accuracy.    The list below reflectives the updated PTA list. Please review each medication in order reconciliation for additional clarification and justification.  Prior to Admission medications    Medication Sig Start Date End Date Authorizing Provider   meclizine (Antivert) 25 mg tablet Take 1 tablet (25 mg) by mouth 3 times a day as needed for dizziness.   Historical Provider, MD   propranolol (Inderal) 40 mg tablet Take 1 tablet (40 mg) by mouth 2 times a day.   Historical Provider, MD   rOPINIRole (Requip) 1 mg tablet Take 1 tablet (1 mg) by mouth once daily at bedtime.   Historical Provider, MD   buPROPion XL (Wellbutrin XL) 150 mg 24 hr tablet Take 1 tablet (150 mg) by mouth once daily.   Historical Provider, MD   escitalopram (Lexapro) 10 mg tablet Take 1 tablet (10 mg) by mouth once daily.   Historical Provider, MD   levothyroxine (Synthroid, Levoxyl) 75 mcg tablet Take 1 tablet (75 mcg) by mouth early in the morning..   Historical Provider, MD        The list below reflectives the updated allergy list. Please review each documented allergy for additional clarification and justification.  Allergies  Reviewed by Alphonse Garnica RN on 4/14/2025   No Known Allergies         Below are additional concerns with the patient's PTA list.    Updated with pharmacy fill history    Jolynn Mascorro

## 2025-04-14 NOTE — H&P
History Of Present Illness  Humble Banks is a 69 y.o. male with a past medical history of alcohol abuse disorder, suspected Warnicke encephalopathy, generalized anxiety disorder, depression, dyslipidemia, hypertension, hypothyroidism, presented to [facility] found on the ground by roommate Earnest.  Patient says he got up from bed in the middle of the night to go to the bathroom, he says he felt lightheaded and felt as if his legs were giving away and fell.  Patient says he does not completely remember the event.  He is unsure if he blacked out.  He says his friend found him facedown on the ground and called the EMS. Patient consumes alcohol on a regular basis.  He says he drank a beer and a shot of whiskey yesterday evening watching a sports game.  He says his alcohol consumption increases during sports months, as he watches TV and drinks. He usually drinks a beer and about 2 shots of whiskey most days.  He does not endorse any nausea or vomiting.  He endorses sharp stabbing sensations in his left thigh area.  He also says he has had chronic tingling on his lower extremities.  He does not endorse any constipation or diarrhea, fever or chills.  He does not endorse dysuria urgency or frequency.  He does not endorse any chest pain or shortness of breath.    ED course:  Vitals on admission: 115/70, pulse 65, respiratory rate 18, SpO2 98%, room air  Glucose 86, sodium 142, potassium 3.6, bicarbonate 22, creatinine 0.61, BUN 10  ALP 71, ALT 58, , total bilirubin 0.7, magnesium 1.58, troponin 9  PT 11, INR 1, APTT 32  No leukocytosis was noted, mild anemia hemoglobin 12 which is baseline, , platelets 49  Alcohol toxicology blood 394  CXR negative for acute cardiopulmonary pathology  CT scan of the head negative for any acute intracranial pathology, CT scan of the cervical spine was negative for any acute fracture or malalignment of the cervical spine, chronic spondylotic changes of cervical spine noted  In  the ED patient received 1 mg folate, multivitamin, 1 L bolus and B1 injection  Patient was admitted to general medicine service for further management    Past Medical History  He has a past medical history of Alcohol abuse, DDD (degenerative disc disease), lumbar, Generalized anxiety disorder, HLD (hyperlipidemia), Hypertension, Hypothyroidism, Left inguinal hernia, Sciatica of left side, and Tobacco use disorder.    Surgical History  He has no past surgical history on file.     Social History  He reports that he has been smoking cigarettes. He uses smokeless tobacco. He reports current alcohol use. He reports that he does not use drugs.    Family History  Family History   Problem Relation Name Age of Onset    No Known Problems Mother      No Known Problems Father          Allergies  Patient has no known allergies.    Review of Systems     Physical Exam  HENT:      Head: Normocephalic.      Nose:      Comments: Laceration on nose, blood crusted in nostril  Eyes:      Conjunctiva/sclera: Conjunctivae normal.   Cardiovascular:      Rate and Rhythm: Normal rate.   Pulmonary:      Breath sounds: Normal breath sounds.   Abdominal:      Palpations: Abdomen is soft.   Musculoskeletal:         General: Normal range of motion.   Neurological:      Mental Status: He is alert and oriented to person, place, and time.   Psychiatric:         Mood and Affect: Mood normal.          Last Recorded Vitals  /74   Pulse 70   Temp 36 °C (96.8 °F)   Resp 16   SpO2 98%        Assessment/Plan   Humble Banks is a 69 y.o. male with a past medical history of alcohol abuse disorder, suspected Warnicke encephalopathy, generalized anxiety disorder, depression, dyslipidemia, hypertension, hypothyroidism, presented to [facility] found on the ground by roommate Earnest.     Acute medical conditions:  #Acute alcohol intoxication  # Acute alcoholic hepatitis (AST: ALT ratio greater than 2)  #Fall  #Syncope  :: Status post 1 mg folate,  multivitamin, 1 L bolus and B1 injection.  PAWS score 4, high risk  :: Blood alcohol level 394, AST: ALT ratio for its alcoholic pattern  :: CT scan of the head negative for any acute intracranial pathology, neuroexam nonfocal grossly.  Fall was likely in setting of syncope likely from alcohol intoxication.  However we will place patient on telemetry to look for any arrhythmias.  -We will place CIWA protocol to monitor for alcohol withdrawal with as needed Ativan  -Place patient on continuous telemetry monitoring to look for any arrhythmias, orthostatic vitals  #Acute on chronic thrombocytopenia  :: Thrombocytopenia likely in setting of alcohol abuse from chronic bone marrow suppression  -We will hold DVT prophylaxis as platelets are 49, continue SCD  #Chronic macrocytic anemia  -Continue multivitamin, folate thiamine.  Will order folate levels  #Hypomagnesemia  -We will order 2 mg magnesium to replete levels    Chronic medical conditions:  #Depression  #Anxiety  #Hypothyroidism  #Restless leg syndrome  -Continue bupropion, escitalopram, levothyroxine, ropinirole, hold propranolol to unmask tachycardia for CIWA assessment    DVT prophylaxis: SCDs  GI prophylaxis: None  Consults: None  Diet: Regular  CODE STATUS: Full code    Jennifer Grissom MD  PGY1 IM

## 2025-04-14 NOTE — ED TRIAGE NOTES
Pt was found on the ground  by room mate. Pt states he was drinking alcohol and fell onto the ground when he got up to pee, he got dizzy and fell. No loc or blood thinners.

## 2025-04-15 LAB
ALBUMIN SERPL BCP-MCNC: 4 G/DL (ref 3.4–5)
ALP SERPL-CCNC: 67 U/L (ref 33–136)
ALT SERPL W P-5'-P-CCNC: 50 U/L (ref 10–52)
ANION GAP SERPL CALC-SCNC: 17 MMOL/L (ref 10–20)
AST SERPL W P-5'-P-CCNC: 93 U/L (ref 9–39)
BILIRUB SERPL-MCNC: 0.8 MG/DL (ref 0–1.2)
BUN SERPL-MCNC: 9 MG/DL (ref 6–23)
CALCIUM SERPL-MCNC: 8.6 MG/DL (ref 8.6–10.3)
CHLORIDE SERPL-SCNC: 106 MMOL/L (ref 98–107)
CO2 SERPL-SCNC: 24 MMOL/L (ref 21–32)
CREAT SERPL-MCNC: 0.63 MG/DL (ref 0.5–1.3)
EGFRCR SERPLBLD CKD-EPI 2021: >90 ML/MIN/1.73M*2
ERYTHROCYTE [DISTWIDTH] IN BLOOD BY AUTOMATED COUNT: 13.2 % (ref 11.5–14.5)
FOLATE SERPL-MCNC: 7.8 NG/ML
GLUCOSE SERPL-MCNC: 88 MG/DL (ref 74–99)
HCT VFR BLD AUTO: 34.5 % (ref 41–52)
HGB BLD-MCNC: 11.4 G/DL (ref 13.5–17.5)
MAGNESIUM SERPL-MCNC: 2.67 MG/DL (ref 1.6–2.4)
MCH RBC QN AUTO: 33.3 PG (ref 26–34)
MCHC RBC AUTO-ENTMCNC: 33 G/DL (ref 32–36)
MCV RBC AUTO: 101 FL (ref 80–100)
NRBC BLD-RTO: 0 /100 WBCS (ref 0–0)
PATH REVIEW-CBC DIFFERENTIAL: NORMAL
PLATELET # BLD AUTO: 46 X10*3/UL (ref 150–450)
POTASSIUM SERPL-SCNC: 3.8 MMOL/L (ref 3.5–5.3)
PROT SERPL-MCNC: 6.9 G/DL (ref 6.4–8.2)
RBC # BLD AUTO: 3.42 X10*6/UL (ref 4.5–5.9)
SODIUM SERPL-SCNC: 143 MMOL/L (ref 136–145)
WBC # BLD AUTO: 5.5 X10*3/UL (ref 4.4–11.3)

## 2025-04-15 PROCEDURE — 96366 THER/PROPH/DIAG IV INF ADDON: CPT

## 2025-04-15 PROCEDURE — 97161 PT EVAL LOW COMPLEX 20 MIN: CPT | Mod: GP

## 2025-04-15 PROCEDURE — 84075 ASSAY ALKALINE PHOSPHATASE: CPT

## 2025-04-15 PROCEDURE — 96376 TX/PRO/DX INJ SAME DRUG ADON: CPT

## 2025-04-15 PROCEDURE — 2500000002 HC RX 250 W HCPCS SELF ADMINISTERED DRUGS (ALT 637 FOR MEDICARE OP, ALT 636 FOR OP/ED)

## 2025-04-15 PROCEDURE — 2500000001 HC RX 250 WO HCPCS SELF ADMINISTERED DRUGS (ALT 637 FOR MEDICARE OP)

## 2025-04-15 PROCEDURE — 97166 OT EVAL MOD COMPLEX 45 MIN: CPT | Mod: GO

## 2025-04-15 PROCEDURE — 2500000004 HC RX 250 GENERAL PHARMACY W/ HCPCS (ALT 636 FOR OP/ED)

## 2025-04-15 PROCEDURE — 1200000002 HC GENERAL ROOM WITH TELEMETRY DAILY

## 2025-04-15 PROCEDURE — 36415 COLL VENOUS BLD VENIPUNCTURE: CPT

## 2025-04-15 PROCEDURE — 82746 ASSAY OF FOLIC ACID SERUM: CPT | Mod: PARLAB

## 2025-04-15 PROCEDURE — 83735 ASSAY OF MAGNESIUM: CPT

## 2025-04-15 PROCEDURE — 85027 COMPLETE CBC AUTOMATED: CPT

## 2025-04-15 RX ORDER — PHENOBARBITAL SODIUM 65 MG/ML
65 INJECTION, SOLUTION INTRAMUSCULAR; INTRAVENOUS EVERY 6 HOURS PRN
Status: CANCELLED | OUTPATIENT
Start: 2025-04-15

## 2025-04-15 RX ORDER — PHENOBARBITAL SODIUM 65 MG/ML
3 INJECTION, SOLUTION INTRAMUSCULAR; INTRAVENOUS
Status: CANCELLED | OUTPATIENT
Start: 2025-04-15 | End: 2025-04-15

## 2025-04-15 RX ORDER — PHENOBARBITAL SODIUM 65 MG/ML
97.5 INJECTION, SOLUTION INTRAMUSCULAR; INTRAVENOUS EVERY 8 HOURS
Status: CANCELLED | OUTPATIENT
Start: 2025-04-15 | End: 2025-04-17

## 2025-04-15 RX ORDER — PHENOBARBITAL SODIUM 65 MG/ML
65 INJECTION, SOLUTION INTRAMUSCULAR; INTRAVENOUS EVERY 8 HOURS
Status: CANCELLED | OUTPATIENT
Start: 2025-04-17 | End: 2025-04-19

## 2025-04-15 RX ORDER — PHENOBARBITAL SODIUM 65 MG/ML
32.5 INJECTION, SOLUTION INTRAMUSCULAR; INTRAVENOUS EVERY 8 HOURS
Status: CANCELLED | OUTPATIENT
Start: 2025-04-19 | End: 2025-04-21

## 2025-04-15 RX ORDER — AMLODIPINE BESYLATE 5 MG/1
5 TABLET ORAL DAILY
Status: DISCONTINUED | OUTPATIENT
Start: 2025-04-15 | End: 2025-04-22 | Stop reason: HOSPADM

## 2025-04-15 RX ORDER — HYDRALAZINE HYDROCHLORIDE 20 MG/ML
10 INJECTION INTRAMUSCULAR; INTRAVENOUS EVERY 4 HOURS PRN
Status: DISCONTINUED | OUTPATIENT
Start: 2025-04-15 | End: 2025-04-22 | Stop reason: HOSPADM

## 2025-04-15 RX ORDER — PHENOBARBITAL SODIUM 65 MG/ML
4 INJECTION, SOLUTION INTRAMUSCULAR; INTRAVENOUS ONCE
Status: CANCELLED | OUTPATIENT
Start: 2025-04-15 | End: 2025-04-15

## 2025-04-15 RX ADMIN — LEVOTHYROXINE SODIUM 75 MCG: 0.07 TABLET ORAL at 06:42

## 2025-04-15 RX ADMIN — THIAMINE HYDROCHLORIDE 500 MG: 100 INJECTION, SOLUTION INTRAMUSCULAR; INTRAVENOUS at 06:42

## 2025-04-15 RX ADMIN — FOLIC ACID 1 MG: 1 TABLET ORAL at 10:03

## 2025-04-15 RX ADMIN — ESCITALOPRAM OXALATE 10 MG: 10 TABLET ORAL at 10:03

## 2025-04-15 RX ADMIN — BUPROPION HYDROCHLORIDE 150 MG: 150 TABLET, EXTENDED RELEASE ORAL at 10:03

## 2025-04-15 RX ADMIN — ROPINIROLE HYDROCHLORIDE 1 MG: 1 TABLET, FILM COATED ORAL at 20:15

## 2025-04-15 RX ADMIN — THIAMINE HYDROCHLORIDE 500 MG: 100 INJECTION, SOLUTION INTRAMUSCULAR; INTRAVENOUS at 21:16

## 2025-04-15 RX ADMIN — THIAMINE HYDROCHLORIDE 500 MG: 100 INJECTION, SOLUTION INTRAMUSCULAR; INTRAVENOUS at 16:38

## 2025-04-15 RX ADMIN — Medication 1 TABLET: at 10:03

## 2025-04-15 RX ADMIN — LORAZEPAM 1 MG: 2 INJECTION INTRAMUSCULAR; INTRAVENOUS at 18:15

## 2025-04-15 RX ADMIN — AMLODIPINE BESYLATE 5 MG: 5 TABLET ORAL at 16:38

## 2025-04-15 SDOH — SOCIAL STABILITY: SOCIAL INSECURITY: HAS ANYONE EVER THREATENED TO HURT YOUR FAMILY OR YOUR PETS?: NO

## 2025-04-15 SDOH — ECONOMIC STABILITY: HOUSING INSECURITY: IN THE LAST 12 MONTHS, WAS THERE A TIME WHEN YOU WERE NOT ABLE TO PAY THE MORTGAGE OR RENT ON TIME?: NO

## 2025-04-15 SDOH — SOCIAL STABILITY: SOCIAL INSECURITY: ARE THERE ANY APPARENT SIGNS OF INJURIES/BEHAVIORS THAT COULD BE RELATED TO ABUSE/NEGLECT?: NO

## 2025-04-15 SDOH — SOCIAL STABILITY: SOCIAL INSECURITY: ARE YOU OR HAVE YOU BEEN THREATENED OR ABUSED PHYSICALLY, EMOTIONALLY, OR SEXUALLY BY ANYONE?: NO

## 2025-04-15 SDOH — SOCIAL STABILITY: SOCIAL INSECURITY: HAVE YOU HAD THOUGHTS OF HARMING ANYONE ELSE?: NO

## 2025-04-15 SDOH — ECONOMIC STABILITY: FOOD INSECURITY: WITHIN THE PAST 12 MONTHS, YOU WORRIED THAT YOUR FOOD WOULD RUN OUT BEFORE YOU GOT THE MONEY TO BUY MORE.: NEVER TRUE

## 2025-04-15 SDOH — ECONOMIC STABILITY: FOOD INSECURITY: WITHIN THE PAST 12 MONTHS, THE FOOD YOU BOUGHT JUST DIDN'T LAST AND YOU DIDN'T HAVE MONEY TO GET MORE.: NEVER TRUE

## 2025-04-15 SDOH — SOCIAL STABILITY: SOCIAL INSECURITY: WITHIN THE LAST YEAR, HAVE YOU BEEN HUMILIATED OR EMOTIONALLY ABUSED IN OTHER WAYS BY YOUR PARTNER OR EX-PARTNER?: NO

## 2025-04-15 SDOH — SOCIAL STABILITY: SOCIAL INSECURITY: DO YOU FEEL ANYONE HAS EXPLOITED OR TAKEN ADVANTAGE OF YOU FINANCIALLY OR OF YOUR PERSONAL PROPERTY?: NO

## 2025-04-15 SDOH — ECONOMIC STABILITY: FOOD INSECURITY: HOW HARD IS IT FOR YOU TO PAY FOR THE VERY BASICS LIKE FOOD, HOUSING, MEDICAL CARE, AND HEATING?: NOT VERY HARD

## 2025-04-15 SDOH — ECONOMIC STABILITY: HOUSING INSECURITY: AT ANY TIME IN THE PAST 12 MONTHS, WERE YOU HOMELESS OR LIVING IN A SHELTER (INCLUDING NOW)?: NO

## 2025-04-15 SDOH — SOCIAL STABILITY: SOCIAL INSECURITY: WITHIN THE LAST YEAR, HAVE YOU BEEN AFRAID OF YOUR PARTNER OR EX-PARTNER?: NO

## 2025-04-15 SDOH — ECONOMIC STABILITY: INCOME INSECURITY: IN THE PAST 12 MONTHS HAS THE ELECTRIC, GAS, OIL, OR WATER COMPANY THREATENED TO SHUT OFF SERVICES IN YOUR HOME?: NO

## 2025-04-15 SDOH — ECONOMIC STABILITY: HOUSING INSECURITY: IN THE PAST 12 MONTHS, HOW MANY TIMES HAVE YOU MOVED WHERE YOU WERE LIVING?: 1

## 2025-04-15 SDOH — SOCIAL STABILITY: SOCIAL INSECURITY: DO YOU FEEL UNSAFE GOING BACK TO THE PLACE WHERE YOU ARE LIVING?: NO

## 2025-04-15 SDOH — SOCIAL STABILITY: SOCIAL INSECURITY: WERE YOU ABLE TO COMPLETE ALL THE BEHAVIORAL HEALTH SCREENINGS?: YES

## 2025-04-15 SDOH — SOCIAL STABILITY: SOCIAL INSECURITY: DOES ANYONE TRY TO KEEP YOU FROM HAVING/CONTACTING OTHER FRIENDS OR DOING THINGS OUTSIDE YOUR HOME?: NO

## 2025-04-15 SDOH — ECONOMIC STABILITY: TRANSPORTATION INSECURITY: IN THE PAST 12 MONTHS, HAS LACK OF TRANSPORTATION KEPT YOU FROM MEDICAL APPOINTMENTS OR FROM GETTING MEDICATIONS?: NO

## 2025-04-15 SDOH — SOCIAL STABILITY: SOCIAL INSECURITY: HAVE YOU HAD ANY THOUGHTS OF HARMING ANYONE ELSE?: NO

## 2025-04-15 SDOH — SOCIAL STABILITY: SOCIAL INSECURITY: ABUSE: ADULT

## 2025-04-15 ASSESSMENT — LIFESTYLE VARIABLES
ORIENTATION AND CLOUDING OF SENSORIUM: ORIENTED AND CAN DO SERIAL ADDITIONS
NAUSEA AND VOMITING: NO NAUSEA AND NO VOMITING
BLOOD PRESSURE: 153/96
ORIENTATION AND CLOUDING OF SENSORIUM: ORIENTED AND CAN DO SERIAL ADDITIONS
AGITATION: NORMAL ACTIVITY
AUDITORY DISTURBANCES: NOT PRESENT
ORIENTATION AND CLOUDING OF SENSORIUM: ORIENTED AND CAN DO SERIAL ADDITIONS
HEADACHE, FULLNESS IN HEAD: NOT PRESENT
ORIENTATION AND CLOUDING OF SENSORIUM: ORIENTED AND CAN DO SERIAL ADDITIONS
NAUSEA AND VOMITING: NO NAUSEA AND NO VOMITING
AUDITORY DISTURBANCES: NOT PRESENT
SKIP TO QUESTIONS 9-10: 0
TREMOR: MODERATE, WITH PATIENT'S ARMS EXTENDED
NAUSEA AND VOMITING: NO NAUSEA AND NO VOMITING
TREMOR: MODERATE, WITH PATIENT'S ARMS EXTENDED
HOW OFTEN DURING THE LAST YEAR HAVE YOU HAD A FEELING OF GUILT OR REMORSE AFTER DRINKING: LESS THAN MONTHLY
TREMOR: 3
HOW OFTEN DO YOU HAVE A DRINK CONTAINING ALCOHOL: 4 OR MORE TIMES A WEEK
ANXIETY: NO ANXIETY, AT EASE
AGITATION: NORMAL ACTIVITY
VISUAL DISTURBANCES: NOT PRESENT
HEADACHE, FULLNESS IN HEAD: NOT PRESENT
TREMOR: MODERATE, WITH PATIENT'S ARMS EXTENDED
AUDITORY DISTURBANCES: NOT PRESENT
VISUAL DISTURBANCES: NOT PRESENT
PULSE: 63
AGITATION: NORMAL ACTIVITY
VISUAL DISTURBANCES: NOT PRESENT
VISUAL DISTURBANCES: NOT PRESENT
TOTAL SCORE: 2
NAUSEA AND VOMITING: NO NAUSEA AND NO VOMITING
HOW OFTEN DURING THE LAST YEAR HAVE YOU BEEN UNABLE TO REMEMBER WHAT HAPPENED THE NIGHT BEFORE BECAUSE YOU HAD BEEN DRINKING: NEVER
AGITATION: NORMAL ACTIVITY
TREMOR: 2
TREMOR: MODERATE, WITH PATIENT'S ARMS EXTENDED
TOTAL SCORE: 5
TOTAL SCORE: 4
ANXIETY: NO ANXIETY, AT EASE
HAVE YOU OR SOMEONE ELSE BEEN INJURED AS A RESULT OF YOUR DRINKING: YES, DURING THE LAST YEAR
PAROXYSMAL SWEATS: NO SWEAT VISIBLE
TOTAL SCORE: 4
HOW MANY STANDARD DRINKS CONTAINING ALCOHOL DO YOU HAVE ON A TYPICAL DAY: 3 OR 4
AGITATION: NORMAL ACTIVITY
NAUSEA AND VOMITING: 2
PAROXYSMAL SWEATS: NO SWEAT VISIBLE
ANXIETY: NO ANXIETY, AT EASE
VISUAL DISTURBANCES: NOT PRESENT
AUDIT TOTAL SCORE: 8
AGITATION: NORMAL ACTIVITY
VISUAL DISTURBANCES: NOT PRESENT
AUDITORY DISTURBANCES: NOT PRESENT
PRESCIPTION_ABUSE_PAST_12_MONTHS: NO
HEADACHE, FULLNESS IN HEAD: NOT PRESENT
ANXIETY: NO ANXIETY, AT EASE
PAROXYSMAL SWEATS: NO SWEAT VISIBLE
AUDITORY DISTURBANCES: NOT PRESENT
HOW OFTEN DURING THE LAST YEAR HAVE YOU FAILED TO DO WHAT WAS NORMALLY EXPECTED FROM YOU BECAUSE OF DRINKING: LESS THAN MONTHLY
HEADACHE, FULLNESS IN HEAD: NOT PRESENT
AUDIT TOTAL SCORE: 14
ANXIETY: NO ANXIETY, AT EASE
PAROXYSMAL SWEATS: NO SWEAT VISIBLE
HOW OFTEN DURING THE LAST YEAR HAVE YOU FOUND THAT YOU WERE NOT ABLE TO STOP DRINKING ONCE YOU HAD STARTED: LESS THAN MONTHLY
PULSE: 63
TOTAL SCORE: 8
HEADACHE, FULLNESS IN HEAD: NOT PRESENT
TOTAL SCORE: 4
PAROXYSMAL SWEATS: NO SWEAT VISIBLE
ANXIETY: NO ANXIETY, AT EASE
AGITATION: NORMAL ACTIVITY
AUDITORY DISTURBANCES: NOT PRESENT
ANXIETY: NO ANXIETY, AT EASE
HAS A RELATIVE, FRIEND, DOCTOR, OR ANOTHER HEALTH PROFESSIONAL EXPRESSED CONCERN ABOUT YOUR DRINKING OR SUGGESTED YOU CUT DOWN: NO
TREMOR: MODERATE, WITH PATIENT'S ARMS EXTENDED
AUDIT-C TOTAL SCORE: 6
HOW OFTEN DURING THE LAST YEAR HAVE YOU NEEDED AN ALCOHOLIC DRINK FIRST THING IN THE MORNING TO GET YOURSELF GOING AFTER A NIGHT OF HEAVY DRINKING: LESS THAN MONTHLY
AGITATION: NORMAL ACTIVITY
VISUAL DISTURBANCES: NOT PRESENT
NAUSEA AND VOMITING: NO NAUSEA AND NO VOMITING
ANXIETY: NO ANXIETY, AT EASE
HOW OFTEN DO YOU HAVE 6 OR MORE DRINKS ON ONE OCCASION: LESS THAN MONTHLY
HEADACHE, FULLNESS IN HEAD: NOT PRESENT
AUDIT-C TOTAL SCORE: 6
PAROXYSMAL SWEATS: NO SWEAT VISIBLE
ORIENTATION AND CLOUDING OF SENSORIUM: ORIENTED AND CAN DO SERIAL ADDITIONS
HEADACHE, FULLNESS IN HEAD: NOT PRESENT
VISUAL DISTURBANCES: NOT PRESENT
SUBSTANCE_ABUSE_PAST_12_MONTHS: NO
AUDITORY DISTURBANCES: NOT PRESENT
HEADACHE, FULLNESS IN HEAD: NOT PRESENT
ORIENTATION AND CLOUDING OF SENSORIUM: ORIENTED AND CAN DO SERIAL ADDITIONS
PAROXYSMAL SWEATS: NO SWEAT VISIBLE
TOTAL SCORE: 4
ORIENTATION AND CLOUDING OF SENSORIUM: ORIENTED AND CAN DO SERIAL ADDITIONS
AUDITORY DISTURBANCES: NOT PRESENT
NAUSEA AND VOMITING: INTERMITTENT NAUSEA WITH DRY HEAVES
ORIENTATION AND CLOUDING OF SENSORIUM: ORIENTED AND CAN DO SERIAL ADDITIONS
PAROXYSMAL SWEATS: NO SWEAT VISIBLE
TOTAL SCORE: 4
NAUSEA AND VOMITING: NO NAUSEA AND NO VOMITING
TREMOR: MODERATE, WITH PATIENT'S ARMS EXTENDED

## 2025-04-15 ASSESSMENT — ACTIVITIES OF DAILY LIVING (ADL)
HEARING - LEFT EAR: FUNCTIONAL
WALKS IN HOME: NEEDS ASSISTANCE
PATIENT'S MEMORY ADEQUATE TO SAFELY COMPLETE DAILY ACTIVITIES?: YES
HEARING - RIGHT EAR: FUNCTIONAL
FEEDING YOURSELF: INDEPENDENT
JUDGMENT_ADEQUATE_SAFELY_COMPLETE_DAILY_ACTIVITIES: YES
DRESSING YOURSELF: INDEPENDENT
ASSISTIVE_DEVICE: WALKER
BATHING: NEEDS ASSISTANCE
LACK_OF_TRANSPORTATION: NO
LACK_OF_TRANSPORTATION: NO
TOILETING: NEEDS ASSISTANCE
GROOMING: INDEPENDENT
ADEQUATE_TO_COMPLETE_ADL: YES

## 2025-04-15 ASSESSMENT — COGNITIVE AND FUNCTIONAL STATUS - GENERAL
WALKING IN HOSPITAL ROOM: A LOT
TOILETING: A LITTLE
DRESSING REGULAR LOWER BODY CLOTHING: A LOT
TOILETING: A LITTLE
CLIMB 3 TO 5 STEPS WITH RAILING: TOTAL
DRESSING REGULAR LOWER BODY CLOTHING: A LITTLE
DAILY ACTIVITIY SCORE: 21
PATIENT BASELINE BEDBOUND: NO
DRESSING REGULAR LOWER BODY CLOTHING: A LITTLE
TURNING FROM BACK TO SIDE WHILE IN FLAT BAD: A LITTLE
DAILY ACTIVITIY SCORE: 21
TURNING FROM BACK TO SIDE WHILE IN FLAT BAD: A LITTLE
MOVING TO AND FROM BED TO CHAIR: A LITTLE
TOILETING: A LITTLE
MOBILITY SCORE: 17
PERSONAL GROOMING: A LOT
STANDING UP FROM CHAIR USING ARMS: A LITTLE
MOVING TO AND FROM BED TO CHAIR: A LITTLE
MOVING TO AND FROM BED TO CHAIR: TOTAL
HELP NEEDED FOR BATHING: A LOT
WALKING IN HOSPITAL ROOM: TOTAL
MOBILITY SCORE: 9
TURNING FROM BACK TO SIDE WHILE IN FLAT BAD: A LOT
STANDING UP FROM CHAIR USING ARMS: A LITTLE
DAILY ACTIVITIY SCORE: 14
CLIMB 3 TO 5 STEPS WITH RAILING: A LOT
TURNING FROM BACK TO SIDE WHILE IN FLAT BAD: A LITTLE
TOILETING: A LOT
MOVING TO AND FROM BED TO CHAIR: A LITTLE
HELP NEEDED FOR BATHING: A LITTLE
MOBILITY SCORE: 17
STANDING UP FROM CHAIR USING ARMS: A LITTLE
HELP NEEDED FOR BATHING: A LITTLE
HELP NEEDED FOR BATHING: A LITTLE
WALKING IN HOSPITAL ROOM: A LOT
DAILY ACTIVITIY SCORE: 21
CLIMB 3 TO 5 STEPS WITH RAILING: A LOT
DRESSING REGULAR LOWER BODY CLOTHING: A LITTLE
DRESSING REGULAR UPPER BODY CLOTHING: A LOT
STANDING UP FROM CHAIR USING ARMS: TOTAL
MOBILITY SCORE: 17
WALKING IN HOSPITAL ROOM: A LOT
CLIMB 3 TO 5 STEPS WITH RAILING: A LOT
MOVING FROM LYING ON BACK TO SITTING ON SIDE OF FLAT BED WITH BEDRAILS: A LITTLE

## 2025-04-15 ASSESSMENT — PAIN - FUNCTIONAL ASSESSMENT
PAIN_FUNCTIONAL_ASSESSMENT: 0-10

## 2025-04-15 ASSESSMENT — PAIN SCALES - GENERAL
PAINLEVEL_OUTOF10: 0 - NO PAIN

## 2025-04-15 ASSESSMENT — COLUMBIA-SUICIDE SEVERITY RATING SCALE - C-SSRS
2. HAVE YOU ACTUALLY HAD ANY THOUGHTS OF KILLING YOURSELF?: NO
1. IN THE PAST MONTH, HAVE YOU WISHED YOU WERE DEAD OR WISHED YOU COULD GO TO SLEEP AND NOT WAKE UP?: NO
6. HAVE YOU EVER DONE ANYTHING, STARTED TO DO ANYTHING, OR PREPARED TO DO ANYTHING TO END YOUR LIFE?: NO

## 2025-04-15 NOTE — ED PROVIDER NOTES
HPI   Chief Complaint   Patient presents with   • Fall   • Dizziness       70 y/o M comes in to the emergency department after a fall. Pt with PMHX of alcohol abuse and he reports that he stood up to go to the restroom when he started feeling dizzy and he fell. Pt hit his head but denied any LOC. Pt stated his tetanus shot is UTD. Pt denied any PC, SOB, cough, dysuria, hematuria, abd pain, SI, fever or any other complains.              Patient History   Past Medical History:   Diagnosis Date   • Alcohol abuse    • DDD (degenerative disc disease), lumbar    • Generalized anxiety disorder     Anxiety, generalized   • HLD (hyperlipidemia)    • Hypertension    • Hypothyroidism    • Left inguinal hernia    • Sciatica of left side    • Tobacco use disorder      History reviewed. No pertinent surgical history.  Family History   Problem Relation Name Age of Onset   • No Known Problems Mother     • No Known Problems Father       Social History     Tobacco Use   • Smoking status: Every Day     Types: Cigarettes   • Smokeless tobacco: Current   Substance Use Topics   • Alcohol use: Yes   • Drug use: Never       Physical Exam   ED Triage Vitals [04/14/25 1650]   Temperature Heart Rate Respirations BP   36 °C (96.8 °F) 65 18 115/70      Pulse Ox Temp src Heart Rate Source Patient Position   98 % -- -- --      BP Location FiO2 (%)     Left arm --       Physical Exam  Vitals and nursing note reviewed.   Constitutional:       General: He is not in acute distress.     Appearance: He is toxic-appearing.   HENT:      Head:      Comments: Nasal abrasion with no active bleeding or tenderness     Nose: Nose normal.      Comments: No septal hematoma     Mouth/Throat:      Mouth: Mucous membranes are moist.   Eyes:      Extraocular Movements: Extraocular movements intact.      Conjunctiva/sclera: Conjunctivae normal.      Pupils: Pupils are equal, round, and reactive to light.   Cardiovascular:      Rate and Rhythm: Normal rate and regular  rhythm.      Pulses: Normal pulses.   Pulmonary:      Effort: Pulmonary effort is normal.      Breath sounds: Normal breath sounds.   Abdominal:      General: Abdomen is flat. Bowel sounds are normal.      Palpations: Abdomen is soft.   Musculoskeletal:         General: No swelling or deformity. Normal range of motion.      Cervical back: Normal range of motion and neck supple. No rigidity.   Skin:     General: Skin is warm.      Capillary Refill: Capillary refill takes less than 2 seconds.   Neurological:      General: No focal deficit present.      Mental Status: He is alert and oriented to person, place, and time.      Cranial Nerves: No cranial nerve deficit.      Sensory: No sensory deficit.      Motor: Weakness (generalized) present.      Coordination: Coordination normal.      Gait: Gait abnormal.      Comments: No racccoons eyes, no gastelum sign.   Psychiatric:         Mood and Affect: Mood normal.         Labs Reviewed   CBC WITH AUTO DIFFERENTIAL - Abnormal       Result Value    WBC 4.6      nRBC 0.0      RBC 3.56 (*)     Hemoglobin 12.0 (*)     Hematocrit 36.3 (*)      (*)     MCH 33.7      MCHC 33.1      RDW 13.2      Platelets 49 (*)     Neutrophils % 33.2      Immature Granulocytes %, Automated 0.4      Lymphocytes % 43.3      Monocytes % 16.5      Eosinophils % 4.8      Basophils % 1.8      Neutrophils Absolute 1.51      Immature Granulocytes Absolute, Automated 0.02      Lymphocytes Absolute 1.97      Monocytes Absolute 0.75      Eosinophils Absolute 0.22      Basophils Absolute 0.08     COMPREHENSIVE METABOLIC PANEL - Abnormal    Glucose 86      Sodium 142      Potassium 3.6      Chloride 103      Bicarbonate 26      Anion Gap 17      Urea Nitrogen 10      Creatinine 0.61      eGFR >90      Calcium 8.9      Albumin 4.1      Alkaline Phosphatase 71      Total Protein 7.1       (*)     Bilirubin, Total 0.7      ALT 58 (*)    MAGNESIUM - Abnormal    Magnesium 1.58 (*)    ALCOHOL - Abnormal     Alcohol 394 (*)    APTT - Normal    aPTT 32      Narrative:     The APTT is no longer used for monitoring Unfractionated Heparin Therapy. For monitoring Heparin Therapy, use the Heparin Assay.   PROTIME-INR - Normal    Protime 11.0      INR 1.0     SERIAL TROPONIN-INITIAL - Normal    Troponin I, High Sensitivity 9      Narrative:     Less than 99th percentile of normal range cutoff-  Female and children under 18 years old <14 ng/L; Male <21 ng/L: Negative  Repeat testing should be performed if clinically indicated.     Female and children under 18 years old 14-50 ng/L; Male 21-50 ng/L:  Consistent with possible cardiac damage and possible increased clinical   risk. Serial measurements may help to assess extent of myocardial damage.     >50 ng/L: Consistent with cardiac damage, increased clinical risk and  myocardial infarction. Serial measurements may help assess extent of   myocardial damage.      NOTE: Children less than 1 year old may have higher baseline troponin   levels and results should be interpreted in conjunction with the overall   clinical context.     NOTE: Troponin I testing is performed using a different   testing methodology at [facility] than at other   [facility]. Direct result comparisons should only   be made within the same method.   SERIAL TROPONIN, 1 HOUR - Normal    Troponin I, High Sensitivity 11      Narrative:     Less than 99th percentile of normal range cutoff-  Female and children under 18 years old <14 ng/L; Male <21 ng/L: Negative  Repeat testing should be performed if clinically indicated.     Female and children under 18 years old 14-50 ng/L; Male 21-50 ng/L:  Consistent with possible cardiac damage and possible increased clinical   risk. Serial measurements may help to assess extent of myocardial damage.     >50 ng/L: Consistent with cardiac damage, increased clinical risk and  myocardial infarction. Serial measurements may help assess extent of   myocardial  damage.      NOTE: Children less than 1 year old may have higher baseline troponin   levels and results should be interpreted in conjunction with the overall   clinical context.     NOTE: Troponin I testing is performed using a different   testing methodology at [facility] than at other   [facility] hospitals. Direct result comparisons should only   be made within the same method.   PHOSPHORUS - Normal    Phosphorus 4.0     TROPONIN SERIES- (INITIAL, 1 HR)    Narrative:     The following orders were created for panel order Troponin I Series, High Sensitivity (0, 1 HR).  Procedure                               Abnormality         Status                     ---------                               -----------         ------                     Troponin I, High Sensiti...[[id number]]  Normal              Final result               Troponin, High Sensitivi...[[id number]]  Normal              Final result                 Please view results for these tests on the individual orders.   CBC   COMPREHENSIVE METABOLIC PANEL   MAGNESIUM   FOLATE   MORPHOLOGY    RBC Morphology See Below      Target Cells Few     PATH REVIEW-CBC DIFFERENTIAL        CT head wo IV contrast   Final Result   CT HEAD:   1. No acute intracranial abnormality or calvarial fracture.                  CT CERVICAL SPINE:   1. No acute fracture or traumatic malalignment of the cervical spine.   2. Spondylotic changes of the cervical spine as detailed above.        MACRO:   None.        Signed by: Ronald Fischer 4/14/2025 6:12 PM   Dictation workstation:   [id number]      CT cervical spine wo IV contrast   Final Result   CT HEAD:   1. No acute intracranial abnormality or calvarial fracture.                  CT CERVICAL SPINE:   1. No acute fracture or traumatic malalignment of the cervical spine.   2. Spondylotic changes of the cervical spine as detailed above.        MACRO:   None.        Signed by: Ronald Fischer 4/14/2025 6:12 PM   Dictation  workstation:   [id number]      XR chest 1 view   Final Result   1.  No evidence of acute cardiopulmonary process.                  MACRO:   None        Signed by: Rossi Robbins 4/14/2025 5:53 PM   Dictation workstation:   [id number]           Medications   rOPINIRole (Requip) tablet 1 mg (1 mg oral Given 4/14/25 2201)   propranolol (Inderal) tablet 40 mg ( oral Dose Auto Held 4/18/25 2100)   meclizine (Antivert) tablet 25 mg (has no administration in time range)   levothyroxine (Synthroid, Levoxyl) tablet 75 mcg (has no administration in time range)   escitalopram (Lexapro) tablet 10 mg (has no administration in time range)   buPROPion XL (Wellbutrin XL) 24 hr tablet 150 mg (has no administration in time range)   acetaminophen (Tylenol) tablet 650 mg (has no administration in time range)     Or   acetaminophen (Tylenol) oral liquid 650 mg (has no administration in time range)     Or   acetaminophen (Tylenol) suppository 650 mg (has no administration in time range)   polyethylene glycol (Glycolax, Miralax) packet 17 g (has no administration in time range)   dextromethorphan-guaifenesin (Robitussin DM)  mg/5 mL oral liquid 5 mL (has no administration in time range)   melatonin tablet 5 mg (has no administration in time range)   ondansetron (Zofran) tablet 4 mg (has no administration in time range)     Or   ondansetron (Zofran) injection 4 mg (has no administration in time range)   magnesium sulfate 4 g in sterile water for injection 100 mL (4 g intravenous New Bag 4/14/25 1782)   folic acid (Folvite) tablet 1 mg (has no administration in time range)   multivitamin with minerals 1 tablet (has no administration in time range)   LORazepam (Ativan) injection 0.5 mg (has no administration in time range)     Or   LORazepam (Ativan) injection 1 mg (has no administration in time range)     Or   LORazepam (Ativan) injection 2 mg (has no administration in time range)   thiamine (Vitamin B1) injection 500 mg (500 mg  intravenous Given 4/14/25 2158)   thiamine (Vitamin B-1) tablet 100 mg (has no administration in time range)   thiamine (Vitamin B1) injection 100 mg (100 mg intravenous Given 4/14/25 1829)   folic acid (Folvite) tablet 1 mg (1 mg oral Given 4/14/25 1829)   multivitamin with minerals 1 tablet (1 tablet oral Given 4/14/25 1829)   sodium chloride 0.9 % bolus 1,000 mL (0 mL intravenous Stopped 4/14/25 2106)      ED Course & MDM   ED Course as of 04/14/25 2335   Mon Apr 14, 2025 1907 Results were discussed with pt. He is with ataxia an unable to ambulate in the ED without assistance. Pt is not clinically sober. Will plan on admission. Rsults discussed with pt and he agreed [AM]   1930 Dr Sheth called back. Case was discussed with him. He agreed with admission under his care. [AM]      ED Course User Index  [AM] Shannan Garcia MD         Diagnoses as of 04/14/25 2335   Alcoholic intoxication without complication   Dizziness   Fall, initial encounter   Abrasion                 No data recorded     Shelley Coma Scale Score: 15 (04/14/25 1812 : Alphonse Garnica, CAITY)                           Medical Decision Making      Procedure  Procedures     Shannan Garcia MD  04/14/25 3699

## 2025-04-15 NOTE — PROGRESS NOTES
Physical Therapy    Physical Therapy Evaluation    Patient Name: Humble Banks  MRN: [id number]  Today's Date: 4/15/2025   Time Calculation  Start Time: 0922  Stop Time: 0940  Time Calculation (min): 18 min  AC01/AC01    Assessment/Plan   PT Assessment  PT Assessment Results: Decreased strength, Decreased endurance, Impaired balance, Decreased mobility, Decreased safety awareness  Rehab Prognosis: Good  Barriers to Discharge Home: Caregiver assistance, Physical needs  Caregiver Assistance: Caregiver assistance needed per identified barriers - however, level of patient's required assistance exceeds assistance available at home  Physical Needs: Ambulating household distances limited by function/safety, 24hr mobility assistance needed  Evaluation/Treatment Tolerance: Patient limited by fatigue (marked bodily tremors)  End of Session Communication: Bedside nurse  Assessment Comment: Continued skilled PT intervention indicated to facilitate increased strength, balance & gait stability  End of Session Patient Position: On cart, Alarm off, not on at start of session (hob elevated to comfort, call light in reach)  IP OR SWING BED PT PLAN  Inpatient or Swing Bed: Inpatient  PT Plan  Treatment/Interventions: Bed mobility, Transfer training, Gait training, Stair training, Balance training, Therapeutic exercise, Therapeutic activity  PT Plan: Ongoing PT  PT Frequency: 3 times per week  PT Discharge Recommendations: Low intensity level of continued care, Moderate intensity level of continued care (pending progress during acute los, anticipate improved mobility & safety as termors subside)  PT Recommended Transfer Status: Total assist  PT - OK to Discharge: Yes (to next level of care when cleared by medical team)    Subjective     Current Problem:  1. Alcoholic intoxication without complication        2. Dizziness        3. Fall, initial encounter        4. Abrasion          Patient Active Problem List   Diagnosis    Abnormal  gait    Age-related nuclear cataract of both eyes    Agitation    Alcohol withdrawal (Multi)    Asymmetric SNHL (sensorineural hearing loss)    Benign essential hypertension    Bilateral presbyopia    Alcohol dependence    Cigarette nicotine dependence    Dizziness    Fatigue    Imbalance    Inguinal hernia, left    Lower extremity weakness    Memory loss    Mixed hyperlipidemia    PVD (posterior vitreous detachment), both eyes    Scotoma    Subjective tinnitus of both ears    Syncope    Transaminitis    Tremor    Lumbar radiculopathy    Posterior vitreous detachment    Rib fractures    Tobacco use disorder    Left sided sciatica    Generalized anxiety disorder    DDD (degenerative disc disease), lumbar    Unable to ambulate    Alcoholic intoxication without complication       General Visit Information:  General  Reason for Referral: PT eval & treat/impaired mobility DX: etoh intoxication, transaminitis, fall, syncope; 4/14/25 lightheaded/le's gave way/fell, room mate found him face down on floor; ct head (-)  Referred By: Karen Grissom MD  Caregiver Feedback: Per conference w/ RN patient stable to participate in therapy  Co-Treatment: OT  Co-Treatment Reason: to maximie pt safety & mobility  Patient Position Received: On cart, Alarm off, not on at start of session  General Comment: Pleasant & cooperative, receptive to mobility& instructions but participation in mobility limited by bodily tremors; note facial abrasions    Home Living:  Home Living  Home Living Comments: lives w/ roommate Earnest ; 3steps w/o rail to enter house w/ basement laundry otherwise 1st fl set up; tub shower w/ grab bar, transfer bench, hand held shower hose; standard ht toilet, standard bed    Prior Level of Function:  Prior Function Per Pt/Caregiver Report  Hand Dominance: Right  Prior Function Comments: independent mobility w/ use of rollator prn when le's are feeling weak; h/o 2other falls in past 6months which he attributes to dizziness  when turning around too fast; independent adl/iadl/+drives    Precautions:  Precautions  Precautions Comment: fall, ciwa, bodily tremors  Pain:  Pain Assessment  Pain Assessment: 0-10  0-10 (Numeric) Pain Score: 0 - No pain    Cognition:  Cognition  Overall Cognitive Status: Within Functional Limits    General Assessments:      Activity Tolerance  Endurance: Decreased tolerance for upright activites  Sensation  Sensation Comment: chronic intermittent numnbess/tingling lt 3rd & 4th digits      Static Sitting Balance  Static Sitting-Balance Support:  (mod assist x1 sitting edge of bed [id number], initial cues & assist for hands to bed/feet to floor for support; leans rt & is tremulous limiting safety in upright position)   Functional Assessments:     Bed Mobility  Bed Mobility:  (mod assist x2 supine>sit; mod assist x1 sit>supine; mod assist x1 seated scoot 4x rt toward hob)  Transfers  Transfer:  (unable/unsafe, mobility progression to standing limited by marked bodily tremors)     Extremity/Trunk Assessments:        RLE   RLE :  (end rom tightness heelcord & hamstrings but arom grossly wfl ; strength grossly 4/5)  LLE   LLE :  (end rom tightness heelcord & hamstrings but arom grossly wfl ; strength grossly 4/5)    Outcome Measures:     [facility] Basic Mobility  Turning from your back to your side while in a flat bed without using bedrails: A little  Moving from lying on your back to sitting on the side of a flat bed without using bedrails: A lot  Moving to and from bed to chair (including a wheelchair): Total  Standing up from a chair using your arms (e.g. wheelchair or bedside chair): Total  To walk in hospital room: Total  Climbing 3-5 steps with railing: Total  Basic Mobility - Total Score: 9   Goals:  Encounter Problems       Encounter Problems (Active)       PT Problem       STG - Pt will transition supine <> sitting with sba  (Progressing)       Start:  04/15/25    Expected End:  04/29/25            STG - Pt will  transfer STS with sba  (Progressing)       Start:  04/15/25    Expected End:  04/29/25            STG - Pt will amb >=40' using ww with cga  (Progressing)       Start:  04/15/25    Expected End:  04/29/25            STG -  Pt will navigate >=3stairs   with cga  (Progressing)       Start:  04/15/25    Expected End:  04/29/25            STG - Pt will perform 2-3 sets of BLE therex x10 to maximize functional strength and independence  (Progressing)       Start:  04/15/25    Expected End:  04/29/25                 Education Documentation  Mobility Training, taught by Ivy Copeland, PT at 4/15/2025 11:03 AM.  Learner: Patient  Readiness: Acceptance  Method: Explanation  Response: Needs Reinforcement  Comment: safety, activity progression

## 2025-04-15 NOTE — PROGRESS NOTES
Occupational Therapy    Evaluation    Patient Name: Humble Banks  MRN: [id number]  Today's Date: 4/15/2025  Time Calculation  Start Time: 0922  Stop Time: 0940  Time Calculation (min): 18 min  AC01/AC01    Assessment  IP OT Assessment  OT Assessment: This hospitalization 4/14/2025: presented to [facility] found on the ground by roommate Earnest; got up from bed in the middle of the night to go to the bathroom, felt lightheaded and felt as if his legs were giving away and fell. Patient would benefit from further OT to address ADL's and functional transfers/mobility due to high risk for further falls and decline in baseline function.  Prognosis: Good  End of Session Communication: Bedside nurse  End of Session Patient Position: Alarm off, not on at start of session (2 rail up); call-light within reach    Plan:  Treatment Interventions: ADL retraining, Functional transfer training, Patient/family training, Equipment evaluation/education, Compensatory technique education, Endurance training (energy conservation / diaphragmatic breathing techniques)  OT Frequency: 3 times per week  OT Discharge Recommendations: Low intensity level of continued care, Moderate intensity level of continued care (pending functional progress)  OT - OK to Discharge: Yes to next level of care when medically cleared by physician/medical team    Subjective   Current Problem:  1. Alcoholic intoxication without complication        2. Dizziness        3. Fall, initial encounter        4. Abrasion          General:  General  Reason for Referral: ADL, safety assessment  Referred By: Shannan Garcia MD  Past Medical History Relevant to Rehab: alcohol abuse disorder, suspected Warnicke encephalopathy, generalized anxiety disorder, depression, dyslipidemia, hypertension, hypothyroidism  Co-Treatment: PT Co-eval to maximize safety during mobility tasks  Prior to Session Communication: Bedside nurse who confirmed that patient is medically stable to  participate in this OT session  Patient Position Received: On cart (2 rail up)  General Comment: Patient seen bedside; cooperative, motivated, restless in cart    Precautions:  Medical Precautions: Fall precautions  Precautions Comment: CIWA; bodily tremors/tremulous    Pain:  Pain Assessment  Pain Assessment: 0-10  0-10 (Numeric) Pain Score: 0 - No pain    Objective   Cognition:  Overall Cognitive Status: Within Functional Limits  Safety/Judgement:  (impaired)  Insight:  (impaired)    Home Living:  Type of Home: House  Lives With:  (Earnest roommate)  Home Layout: One level, Laundry in basement  Home Access: Stairs to enter without rails (3 at front entrace)  Bathroom Shower/Tub: Tub/shower unit  Bathroom Toilet: Standard  Bathroom Equipment: Grab bars in shower, Tub transfer bench, Hand-held shower hose  Home Living Comments: sleeps in regular bed     Prior Function:  Level of [place]: Independent with ADLs and functional transfers, Independent with homemaking with ambulation  Ambulatory Assistance: Independent (rollator as needed such as when legs are weak)  Hand Dominance: Right  Prior Function Comments: drives, shops; stated history of 3 falls including hospitalization within 6 months    ADL:  LE Dressing Assistance: Total (estimate due to tremulous, generalized weakness)  ADL Comments: To further address self-care tasks in OT sessions using assistive techniques/adaptive equipment to promote indep. and ease in performance    Activity Tolerance:  Endurance: Decreased tolerance for upright activites    Bed Mobility/Transfers:  Required step-by-step instructions/cues for all mobility tasks to promote safety due to limited insight and awareness    Bed Mobility  Bed Mobility: Yes  Bed Mobility 1  Bed Mobility 1: Supine to sitting  Level of Assistance 1: Moderate assistance, +2, Maximum verbal cues, Maximum tactile cues  Bed Mobility Comments 1: HOB elevated  Bed Mobility 2  Bed Mobility  2: Sitting to  supine  Level of Assistance 2: Moderate assistance, Maximum verbal cues, Maximum tactile cues  Bed Mobility 3  Bed Mobility 3: Scooting  Level of Assistance 3: Moderate assistance, Moderate verbal cues  Transfers  Transfer: No (unable to progress further in mobility due to bodily tremors)    Sitting Balance:  Static Sitting Balance  Static Sitting-Balance Support: Bilateral upper extremity supported  Static Sitting-Level of Assistance: Moderate assistance  Static Sitting-Comment/Number of Minutes: ~one min     Sensation:  Light Touch: No apparent deficits    Extremities: RUE   RUE : Within Functional Limits (grossly observed during functional tasks) and LUE   LUE: Within Functional Limits (grossly observed during functional tasks)    Outcome Measures: [facility] Daily Activity  Putting on and taking off regular lower body clothing: A lot  Bathing (including washing, rinsing, drying): A lot  Putting on and taking off regular upper body clothing: A lot  Toileting, which includes using toilet, bedpan or urinal: A lot  Taking care of personal grooming such as brushing teeth: A lot  Eating Meals: None  Daily Activity - Total Score: 14     EDUCATION:  Education Documentation  Mobility Training, taught by Mei Hilario, OT at 4/15/2025 10:48 AM.  Learner: Patient  Readiness: Acceptance  Method: Explanation  Response: Needs Reinforcement    Goals:   Encounter Problems       Encounter Problems (Active)       OT Goals       Patient will complete upper and lower body bathing/dressing; toileting with minimal assist using assistive techniques/adaptive equipment as needed  (Progressing)       Start:  04/15/25    Expected End:  04/29/25            Patient will perform bed mobility and progress to functional transfers safely with minimal assist: bed, chair, commode using DME as needed  (Progressing)       Start:  04/15/25    Expected End:  04/29/25            Patient will tolerate sitting for 10 mins. and show overall good (-)  balance during ADL's and functional transfers/mobility  (Progressing)       Start:  04/15/25    Expected End:  04/29/25            Patient will apply energy conservation/diaphragmatic breathing techniques to ADL's and functional transfers with minimal cues  (Progressing)       Start:  04/15/25    Expected End:  04/29/25

## 2025-04-15 NOTE — CARE PLAN
"The patient's goals for the shift include  \"to get out of here\"    The clinical goals for the shift include patient will be safe and free from falls during shift        "

## 2025-04-15 NOTE — PROGRESS NOTES
Internal Medicine Progress Note         Humble Banks is a 69 y.o. male on day 0 of admission presenting with Alcoholic intoxication without complication.    SUBJECTIVE    Patient seen and examined at bedside.  Patient feeling fine during this time.  Denies any vision changes or headaches.  Denies feeling anxious or overly tremulous.  Patient has tremors at baseline.  He complains of sciatic pain on right knee pain.  OBJECTIVE    Vitals:    04/15/25 1102 04/15/25 1151 04/15/25 1200 04/15/25 1457   BP: (!) 153/96 (!) 166/101  (!) 146/105   BP Location:  Left arm  Left arm   Patient Position:  Lying  Lying   Pulse: 63 63 63 66   Resp:  18  18   Temp:       TempSrc:       SpO2:  94%  96%   Weight:       Height:          Results from last 7 days   Lab Units 04/15/25  0505 04/14/25  1735   WBC AUTO x10*3/uL 5.5 4.6   HEMOGLOBIN g/dL 11.4* 12.0*   HEMATOCRIT % 34.5* 36.3*   PLATELETS AUTO x10*3/uL 46* 49*   NEUTROS PCT AUTO %  --  33.2   LYMPHS PCT AUTO %  --  43.3   MONOS PCT AUTO %  --  16.5   EOS PCT AUTO %  --  4.8     Results from last 7 days   Lab Units 04/15/25  0505   SODIUM mmol/L 143   POTASSIUM mmol/L 3.8   CHLORIDE mmol/L 106   CO2 mmol/L 24   BUN mg/dL 9   CREATININE mg/dL 0.63   CALCIUM mg/dL 8.6   PROTEIN TOTAL g/dL 6.9   BILIRUBIN TOTAL mg/dL 0.8   ALK PHOS U/L 67   ALT U/L 50   AST U/L 93*   GLUCOSE mg/dL 88       Scheduled Medications  amLODIPine, 5 mg, oral, Daily  buPROPion XL, 150 mg, oral, Daily  escitalopram, 10 mg, oral, Daily  folic acid, 1 mg, oral, Daily  levothyroxine, 75 mcg, oral, Daily  multivitamin with minerals, 1 tablet, oral, Daily  pneumoc 20-shonda conj-dip cr(PF), 0.5 mL, intramuscular, During hospitalization  polyethylene glycol, 17 g, oral, Daily  [Held by provider] propranolol, 40 mg, oral, BID  rOPINIRole, 1 mg, oral, Nightly  [START ON 4/17/2025] thiamine, 100 mg, oral, Daily  thiamine, 500 mg, intravenous, q8h  LUCY           Physical Exam  General: No acute distress  HEENT: EOMI  CV: Regular rate and rhythm  Pulm: Clear to auscultation bilaterally, no wheezings, rales or rhonchi  Abd: Nondistended, nontender  Ext: no cyanosis or edema. Right lateral leg tender to palpation.   Skin: No rashes, warm and dry. Bruising over left eye. Abrasions on nose.   Neuro: at baseline. Tremors.        ASSESSMENT & PLAN  Humble Banks is a 69 y.o. male with a past medical history of alcohol abuse disorder, suspected Warnicke encephalopathy, generalized anxiety disorder, depression, dyslipidemia, hypertension, hypothyroidism, presented to [facility] found on the ground by his roommate.     #Acute alcohol withdrawal   #Hx alcohol use disorder  #Acute alcoholic hepatitis (AST: ALT ratio greater than 2)  #Fall  #Syncope  -Blood alcohol level 394, AST: ALT ratio for its alcoholic pattern  -CT scan of the head negative for any acute intracranial pathology, neuroexam nonfocal grossly.  Fall was likely in setting of syncope likely from alcohol intoxication.   PLAN:  -continue CIWA protocol  -continue thiamine, folate, multivitamin  -encouraged alcohol cessation   -Place patient on continuous telemetry monitoring to look for any arrhythmias, orthostatic vitals    #Chronic macrocytic anemia  #Acute on chronic thrombocytopenia  -Thrombocytopenia likely in setting of alcohol abuse from chronic bone marrow suppression  PLAN:  -hold DVT prophylaxis  -continue SCDs  -Trend CBC  -continue supplements    #Hypomagnesemia  -trend BMP  -Replete as necessary      Chronic medical conditions:  #Depression  #Anxiety  #Hypothyroidism  #Restless leg syndrome  -Continue bupropion, escitalopram, levothyroxine, ropinirole, amlodipine   -holding propanolol in setting of HR in the 60s     DVT prophylaxis: SCDs  GI prophylaxis: None  Consults: None  Diet: Regular  CODE STATUS: Full code    Mai Rashid DO PGY-1 Internal Medicine  Please SecureChat for any further  questions  This is a preliminary note, please await attending attestation for final A/P

## 2025-04-16 ENCOUNTER — APPOINTMENT (OUTPATIENT)
Dept: RADIOLOGY | Facility: HOSPITAL | Age: 70
DRG: 897 | End: 2025-04-16
Payer: MEDICARE

## 2025-04-16 LAB
ANION GAP SERPL CALC-SCNC: 14 MMOL/L (ref 10–20)
ATRIAL RATE: 56 BPM
BUN SERPL-MCNC: 16 MG/DL (ref 6–23)
CALCIUM SERPL-MCNC: 9.5 MG/DL (ref 8.6–10.3)
CHLORIDE SERPL-SCNC: 102 MMOL/L (ref 98–107)
CO2 SERPL-SCNC: 26 MMOL/L (ref 21–32)
CREAT SERPL-MCNC: 0.66 MG/DL (ref 0.5–1.3)
EGFRCR SERPLBLD CKD-EPI 2021: >90 ML/MIN/1.73M*2
ERYTHROCYTE [DISTWIDTH] IN BLOOD BY AUTOMATED COUNT: 13 % (ref 11.5–14.5)
GLUCOSE SERPL-MCNC: 128 MG/DL (ref 74–99)
HCT VFR BLD AUTO: 35.1 % (ref 41–52)
HGB BLD-MCNC: 11.6 G/DL (ref 13.5–17.5)
MAGNESIUM SERPL-MCNC: 1.68 MG/DL (ref 1.6–2.4)
MCH RBC QN AUTO: 33.4 PG (ref 26–34)
MCHC RBC AUTO-ENTMCNC: 33 G/DL (ref 32–36)
MCV RBC AUTO: 101 FL (ref 80–100)
NRBC BLD-RTO: 0 /100 WBCS (ref 0–0)
P OFFSET: 155 MS
P ONSET: 105 MS
PLATELET # BLD AUTO: 46 X10*3/UL (ref 150–450)
POTASSIUM SERPL-SCNC: 3.4 MMOL/L (ref 3.5–5.3)
PR INTERVAL: 206 MS
Q ONSET: 208 MS
QRS COUNT: 9 BEATS
QRS DURATION: 98 MS
QT INTERVAL: 468 MS
QTC CALCULATION(BAZETT): 451 MS
QTC FREDERICIA: 457 MS
R AXIS: 180 DEGREES
RBC # BLD AUTO: 3.47 X10*6/UL (ref 4.5–5.9)
SODIUM SERPL-SCNC: 139 MMOL/L (ref 136–145)
T AXIS: 156 DEGREES
T OFFSET: 442 MS
VENTRICULAR RATE: 56 BPM
WBC # BLD AUTO: 5.9 X10*3/UL (ref 4.4–11.3)

## 2025-04-16 PROCEDURE — 83735 ASSAY OF MAGNESIUM: CPT

## 2025-04-16 PROCEDURE — 85027 COMPLETE CBC AUTOMATED: CPT

## 2025-04-16 PROCEDURE — 2500000001 HC RX 250 WO HCPCS SELF ADMINISTERED DRUGS (ALT 637 FOR MEDICARE OP)

## 2025-04-16 PROCEDURE — 2500000004 HC RX 250 GENERAL PHARMACY W/ HCPCS (ALT 636 FOR OP/ED)

## 2025-04-16 PROCEDURE — 2060000001 HC INTERMEDIATE ICU ROOM DAILY

## 2025-04-16 PROCEDURE — 73502 X-RAY EXAM HIP UNI 2-3 VIEWS: CPT | Mod: RIGHT SIDE | Performed by: STUDENT IN AN ORGANIZED HEALTH CARE EDUCATION/TRAINING PROGRAM

## 2025-04-16 PROCEDURE — 36415 COLL VENOUS BLD VENIPUNCTURE: CPT

## 2025-04-16 PROCEDURE — 2500000004 HC RX 250 GENERAL PHARMACY W/ HCPCS (ALT 636 FOR OP/ED): Mod: JZ

## 2025-04-16 PROCEDURE — 73502 X-RAY EXAM HIP UNI 2-3 VIEWS: CPT | Mod: RT

## 2025-04-16 PROCEDURE — 2500000002 HC RX 250 W HCPCS SELF ADMINISTERED DRUGS (ALT 637 FOR MEDICARE OP, ALT 636 FOR OP/ED)

## 2025-04-16 PROCEDURE — 80048 BASIC METABOLIC PNL TOTAL CA: CPT

## 2025-04-16 RX ORDER — PHENOBARBITAL SODIUM 65 MG/ML
32.5 INJECTION, SOLUTION INTRAMUSCULAR; INTRAVENOUS EVERY 8 HOURS
Status: DISCONTINUED | OUTPATIENT
Start: 2025-04-20 | End: 2025-04-18

## 2025-04-16 RX ORDER — PHENOBARBITAL SODIUM 65 MG/ML
65 INJECTION, SOLUTION INTRAMUSCULAR; INTRAVENOUS EVERY 8 HOURS
Status: DISCONTINUED | OUTPATIENT
Start: 2025-04-18 | End: 2025-04-18

## 2025-04-16 RX ORDER — LANOLIN ALCOHOL/MO/W.PET/CERES
100 CREAM (GRAM) TOPICAL DAILY
Status: DISCONTINUED | OUTPATIENT
Start: 2025-04-16 | End: 2025-04-22 | Stop reason: HOSPADM

## 2025-04-16 RX ORDER — PHENOBARBITAL 32.4 MG/1
64.8 TABLET ORAL 3 TIMES DAILY
Status: DISCONTINUED | OUTPATIENT
Start: 2025-04-18 | End: 2025-04-16

## 2025-04-16 RX ORDER — LORAZEPAM 2 MG/ML
1 INJECTION INTRAMUSCULAR EVERY 2 HOUR PRN
Status: DISCONTINUED | OUTPATIENT
Start: 2025-04-16 | End: 2025-04-18

## 2025-04-16 RX ORDER — PHENOBARBITAL SODIUM 65 MG/ML
97.5 INJECTION, SOLUTION INTRAMUSCULAR; INTRAVENOUS EVERY 8 HOURS
Status: DISCONTINUED | OUTPATIENT
Start: 2025-04-16 | End: 2025-04-18

## 2025-04-16 RX ORDER — PHENOBARBITAL 32.4 MG/1
32.4 TABLET ORAL 3 TIMES DAILY
Status: DISCONTINUED | OUTPATIENT
Start: 2025-04-18 | End: 2025-04-16

## 2025-04-16 RX ORDER — PHENOBARBITAL SODIUM 65 MG/ML
130 INJECTION, SOLUTION INTRAMUSCULAR; INTRAVENOUS ONCE
Status: COMPLETED | OUTPATIENT
Start: 2025-04-16 | End: 2025-04-16

## 2025-04-16 RX ORDER — PHENOBARBITAL 32.4 MG/1
65 TABLET ORAL EVERY 6 HOURS PRN
Status: DISCONTINUED | OUTPATIENT
Start: 2025-04-16 | End: 2025-04-16

## 2025-04-16 RX ORDER — PHENOBARBITAL 32.4 MG/1
2.4 TABLET ORAL ONCE
Status: COMPLETED | OUTPATIENT
Start: 2025-04-16 | End: 2025-04-16

## 2025-04-16 RX ORDER — LORAZEPAM 2 MG/ML
2 INJECTION INTRAMUSCULAR ONCE
Status: COMPLETED | OUTPATIENT
Start: 2025-04-16 | End: 2025-04-16

## 2025-04-16 RX ORDER — LORAZEPAM 2 MG/ML
0.5 INJECTION INTRAMUSCULAR EVERY 2 HOUR PRN
Status: DISCONTINUED | OUTPATIENT
Start: 2025-04-16 | End: 2025-04-18

## 2025-04-16 RX ORDER — POTASSIUM CHLORIDE 20 MEQ/1
20 TABLET, EXTENDED RELEASE ORAL ONCE
Status: COMPLETED | OUTPATIENT
Start: 2025-04-16 | End: 2025-04-16

## 2025-04-16 RX ORDER — PHENOBARBITAL 32.4 MG/1
32.4 TABLET ORAL 3 TIMES DAILY
Status: DISCONTINUED | OUTPATIENT
Start: 2025-04-20 | End: 2025-04-16

## 2025-04-16 RX ORDER — PHENOBARBITAL 32.4 MG/1
64.8 TABLET ORAL 3 TIMES DAILY
Status: DISCONTINUED | OUTPATIENT
Start: 2025-04-16 | End: 2025-04-16

## 2025-04-16 RX ORDER — PHENOBARBITAL 32.4 MG/1
1.8 TABLET ORAL
Status: DISCONTINUED | OUTPATIENT
Start: 2025-04-16 | End: 2025-04-16

## 2025-04-16 RX ORDER — PHENOBARBITAL 32.4 MG/1
97.2 TABLET ORAL 3 TIMES DAILY
Status: DISCONTINUED | OUTPATIENT
Start: 2025-04-16 | End: 2025-04-16

## 2025-04-16 RX ORDER — LORAZEPAM 2 MG/ML
2 INJECTION INTRAMUSCULAR EVERY 2 HOUR PRN
Status: DISCONTINUED | OUTPATIENT
Start: 2025-04-16 | End: 2025-04-18

## 2025-04-16 RX ADMIN — LORAZEPAM 2 MG: 2 INJECTION, SOLUTION INTRAMUSCULAR; INTRAVENOUS at 10:41

## 2025-04-16 RX ADMIN — ESCITALOPRAM OXALATE 10 MG: 10 TABLET ORAL at 08:41

## 2025-04-16 RX ADMIN — LEVOTHYROXINE SODIUM 75 MCG: 0.07 TABLET ORAL at 06:08

## 2025-04-16 RX ADMIN — Medication 1 TABLET: at 08:41

## 2025-04-16 RX ADMIN — POTASSIUM CHLORIDE 20 MEQ: 1500 TABLET, EXTENDED RELEASE ORAL at 12:55

## 2025-04-16 RX ADMIN — LORAZEPAM 2 MG: 2 INJECTION INTRAMUSCULAR; INTRAVENOUS at 20:06

## 2025-04-16 RX ADMIN — THIAMINE HYDROCHLORIDE 500 MG: 100 INJECTION, SOLUTION INTRAMUSCULAR; INTRAVENOUS at 13:29

## 2025-04-16 RX ADMIN — BUPROPION HYDROCHLORIDE 150 MG: 150 TABLET, EXTENDED RELEASE ORAL at 08:41

## 2025-04-16 RX ADMIN — PHENOBARBITAL SODIUM 130 MG: 65 INJECTION INTRAMUSCULAR; INTRAVENOUS at 17:19

## 2025-04-16 RX ADMIN — THIAMINE HYDROCHLORIDE 500 MG: 100 INJECTION, SOLUTION INTRAMUSCULAR; INTRAVENOUS at 23:17

## 2025-04-16 RX ADMIN — THIAMINE HCL TAB 100 MG 100 MG: 100 TAB at 12:55

## 2025-04-16 RX ADMIN — LORAZEPAM 1 MG: 2 INJECTION INTRAMUSCULAR; INTRAVENOUS at 05:09

## 2025-04-16 RX ADMIN — AMLODIPINE BESYLATE 5 MG: 5 TABLET ORAL at 08:41

## 2025-04-16 RX ADMIN — FOLIC ACID 1 MG: 1 TABLET ORAL at 08:41

## 2025-04-16 RX ADMIN — LORAZEPAM 2 MG: 2 INJECTION INTRAMUSCULAR; INTRAVENOUS at 14:50

## 2025-04-16 RX ADMIN — PHENOBARBITAL SODIUM 97.5 MG: 65 INJECTION INTRAMUSCULAR at 18:16

## 2025-04-16 RX ADMIN — THIAMINE HYDROCHLORIDE 500 MG: 100 INJECTION, SOLUTION INTRAMUSCULAR; INTRAVENOUS at 06:08

## 2025-04-16 RX ADMIN — LORAZEPAM 1 MG: 2 INJECTION INTRAMUSCULAR; INTRAVENOUS at 08:41

## 2025-04-16 RX ADMIN — LORAZEPAM 0.5 MG: 2 INJECTION INTRAMUSCULAR; INTRAVENOUS at 22:10

## 2025-04-16 RX ADMIN — PHENOBARBITAL 145.8 MG: 32.4 TABLET ORAL at 12:55

## 2025-04-16 ASSESSMENT — LIFESTYLE VARIABLES
TOTAL SCORE: 8
TREMOR: MODERATE, WITH PATIENT'S ARMS EXTENDED
AGITATION: SOMEWHAT MORE THAN NORMAL ACTIVITY
NAUSEA AND VOMITING: NO NAUSEA AND NO VOMITING
VISUAL DISTURBANCES: MILD SENSITIVITY
TOTAL SCORE: 15
TACTILE DISTURBANCES: MILD ITCHING, PINS AND NEEDLES, BURNING OR NUMBNESS
PAROXYSMAL SWEATS: NO SWEAT VISIBLE
TREMOR: 6
PULSE: 110
TREMOR: SEVERE, EVEN WITH ARMS NOT EXTENDED
TREMOR: 2
HEADACHE, FULLNESS IN HEAD: NOT PRESENT
PAROXYSMAL SWEATS: NO SWEAT VISIBLE
PAROXYSMAL SWEATS: NO SWEAT VISIBLE
VISUAL DISTURBANCES: NOT PRESENT
TOTAL SCORE: 14
VISUAL DISTURBANCES: VERY MILD SENSITIVITY
PAROXYSMAL SWEATS: NO SWEAT VISIBLE
ANXIETY: 2
ANXIETY: NO ANXIETY, AT EASE
TREMOR: MODERATE, WITH PATIENT'S ARMS EXTENDED
TACTILE DISTURBANCES: VERY MILD ITCHING, PINS AND NEEDLES, BURNING OR NUMBNESS
ORIENTATION AND CLOUDING OF SENSORIUM: ORIENTED AND CAN DO SERIAL ADDITIONS
ORIENTATION AND CLOUDING OF SENSORIUM: DISORIENTED FOR DATE BY MORE THAN 2 CALENDAR DAYS
PAROXYSMAL SWEATS: NO SWEAT VISIBLE
ANXIETY: MILDLY ANXIOUS
AUDITORY DISTURBANCES: NOT PRESENT
AUDITORY DISTURBANCES: NOT PRESENT
NAUSEA AND VOMITING: NO NAUSEA AND NO VOMITING
AUDITORY DISTURBANCES: NOT PRESENT
AUDITORY DISTURBANCES: NOT PRESENT
ANXIETY: NO ANXIETY, AT EASE
AUDITORY DISTURBANCES: NOT PRESENT
AUDITORY DISTURBANCES: VERY MILD HARSHNESS OR ABILITY TO FRIGHTEN
TREMOR: MODERATE, WITH PATIENT'S ARMS EXTENDED
TREMOR: MODERATE, WITH PATIENT'S ARMS EXTENDED
NAUSEA AND VOMITING: NO NAUSEA AND NO VOMITING
AGITATION: NORMAL ACTIVITY
NAUSEA AND VOMITING: NO NAUSEA AND NO VOMITING
ANXIETY: MILDLY ANXIOUS
HEADACHE, FULLNESS IN HEAD: NOT PRESENT
PULSE: 115
HEADACHE, FULLNESS IN HEAD: NOT PRESENT
TACTILE DISTURBANCES: VERY MILD ITCHING, PINS AND NEEDLES, BURNING OR NUMBNESS
ANXIETY: 2
PAROXYSMAL SWEATS: NO SWEAT VISIBLE
ANXIETY: MILDLY ANXIOUS
HEADACHE, FULLNESS IN HEAD: NOT PRESENT
VISUAL DISTURBANCES: MILD SENSITIVITY
NAUSEA AND VOMITING: NO NAUSEA AND NO VOMITING
HEADACHE, FULLNESS IN HEAD: NOT PRESENT
TREMOR: MODERATE, WITH PATIENT'S ARMS EXTENDED
TACTILE DISTURBANCES: VERY MILD ITCHING, PINS AND NEEDLES, BURNING OR NUMBNESS
AGITATION: NORMAL ACTIVITY
TOTAL SCORE: 5
TOTAL SCORE: 5
VISUAL DISTURBANCES: NOT PRESENT
VISUAL DISTURBANCES: NOT PRESENT
ANXIETY: NO ANXIETY, AT EASE
PAROXYSMAL SWEATS: NO SWEAT VISIBLE
VISUAL DISTURBANCES: VERY MILD SENSITIVITY
PAROXYSMAL SWEATS: NO SWEAT VISIBLE
ORIENTATION AND CLOUDING OF SENSORIUM: ORIENTED AND CAN DO SERIAL ADDITIONS
ORIENTATION AND CLOUDING OF SENSORIUM: ORIENTED AND CAN DO SERIAL ADDITIONS
ANXIETY: MILDLY ANXIOUS
ORIENTATION AND CLOUDING OF SENSORIUM: DISORIENTED FOR PLACE OR PERSON
ANXIETY: MILDLY ANXIOUS
AGITATION: 3
AGITATION: SOMEWHAT MORE THAN NORMAL ACTIVITY
ANXIETY: 2
VISUAL DISTURBANCES: VERY MILD SENSITIVITY
ORIENTATION AND CLOUDING OF SENSORIUM: CANNOT DO SERIAL ADDITIONS OR IS UNCERTAIN ABOUT DATE
HEADACHE, FULLNESS IN HEAD: NOT PRESENT
AGITATION: NORMAL ACTIVITY
NAUSEA AND VOMITING: NO NAUSEA AND NO VOMITING
ORIENTATION AND CLOUDING OF SENSORIUM: CANNOT DO SERIAL ADDITIONS OR IS UNCERTAIN ABOUT DATE
AGITATION: NORMAL ACTIVITY
NAUSEA AND VOMITING: NO NAUSEA AND NO VOMITING
TREMOR: 3
AUDITORY DISTURBANCES: VERY MILD HARSHNESS OR ABILITY TO FRIGHTEN
HEADACHE, FULLNESS IN HEAD: NOT PRESENT
AGITATION: 2
PAROXYSMAL SWEATS: NO SWEAT VISIBLE
HEADACHE, FULLNESS IN HEAD: NOT PRESENT
TOTAL SCORE: 13
AUDITORY DISTURBANCES: VERY MILD HARSHNESS OR ABILITY TO FRIGHTEN
AUDITORY DISTURBANCES: NOT PRESENT
AUDITORY DISTURBANCES: VERY MILD HARSHNESS OR ABILITY TO FRIGHTEN
TOTAL SCORE: 7
NAUSEA AND VOMITING: NO NAUSEA AND NO VOMITING
NAUSEA AND VOMITING: NO NAUSEA AND NO VOMITING
ORIENTATION AND CLOUDING OF SENSORIUM: DISORIENTED FOR PLACE OR PERSON
PULSE: 79
ORIENTATION AND CLOUDING OF SENSORIUM: DISORIENTED FOR PLACE OR PERSON
PULSE: 76
VISUAL DISTURBANCES: MODERATE SENSITIVITY
TREMOR: 2
ORIENTATION AND CLOUDING OF SENSORIUM: DISORIENTED FOR PLACE OR PERSON
PULSE: 119
HEADACHE, FULLNESS IN HEAD: NOT PRESENT
TOTAL SCORE: 11
NAUSEA AND VOMITING: NO NAUSEA AND NO VOMITING
HEADACHE, FULLNESS IN HEAD: NOT PRESENT
TOTAL SCORE: 11
TREMOR: MODERATE, WITH PATIENT'S ARMS EXTENDED
TOTAL SCORE: 9
AGITATION: MODERATELY FIDGETY AND RESTLESS
HEADACHE, FULLNESS IN HEAD: NOT PRESENT
AUDITORY DISTURBANCES: NOT PRESENT
ORIENTATION AND CLOUDING OF SENSORIUM: DISORIENTED FOR DATE BY NO MORE THAN 2 CALENDAR DAYS
VISUAL DISTURBANCES: VERY MILD SENSITIVITY
VISUAL DISTURBANCES: NOT PRESENT
TOTAL SCORE: 17
PAROXYSMAL SWEATS: NO SWEAT VISIBLE
NAUSEA AND VOMITING: NO NAUSEA AND NO VOMITING
AGITATION: MODERATELY FIDGETY AND RESTLESS
PAROXYSMAL SWEATS: NO SWEAT VISIBLE
AGITATION: 2

## 2025-04-16 ASSESSMENT — PAIN SCALES - GENERAL
PAINLEVEL_OUTOF10: 0 - NO PAIN

## 2025-04-16 ASSESSMENT — COGNITIVE AND FUNCTIONAL STATUS - GENERAL
CLIMB 3 TO 5 STEPS WITH RAILING: TOTAL
PERSONAL GROOMING: TOTAL
MOBILITY SCORE: 8
DRESSING REGULAR UPPER BODY CLOTHING: TOTAL
DRESSING REGULAR UPPER BODY CLOTHING: A LITTLE
STANDING UP FROM CHAIR USING ARMS: TOTAL
TURNING FROM BACK TO SIDE WHILE IN FLAT BAD: A LITTLE
CLIMB 3 TO 5 STEPS WITH RAILING: A LOT
TOILETING: TOTAL
DAILY ACTIVITIY SCORE: 6
EATING MEALS: A LITTLE
WALKING IN HOSPITAL ROOM: A LOT
PERSONAL GROOMING: A LITTLE
DRESSING REGULAR LOWER BODY CLOTHING: TOTAL
STANDING UP FROM CHAIR USING ARMS: A LOT
EATING MEALS: TOTAL
MOVING TO AND FROM BED TO CHAIR: TOTAL
HELP NEEDED FOR BATHING: A LOT
TURNING FROM BACK TO SIDE WHILE IN FLAT BAD: TOTAL
HELP NEEDED FOR BATHING: TOTAL
TOILETING: A LOT
WALKING IN HOSPITAL ROOM: TOTAL
DAILY ACTIVITIY SCORE: 16
DRESSING REGULAR LOWER BODY CLOTHING: A LITTLE
MOVING FROM LYING ON BACK TO SITTING ON SIDE OF FLAT BED WITH BEDRAILS: A LITTLE
MOBILITY SCORE: 14
MOVING TO AND FROM BED TO CHAIR: A LOT
MOVING FROM LYING ON BACK TO SITTING ON SIDE OF FLAT BED WITH BEDRAILS: A LITTLE

## 2025-04-16 ASSESSMENT — PAIN - FUNCTIONAL ASSESSMENT
PAIN_FUNCTIONAL_ASSESSMENT: 0-10

## 2025-04-16 NOTE — NURSING NOTE
~0455- Pt was found on his knees stating he was trying to get to his chair, but his SCDs was still connected. Pt assisted to chair, and then to bed. Pt states he did not hit his head, but his knees hurts. Code Latoya was called and pt ws assessed. Pt CIWA is 8 and 1mg Ativan was given. Pt's call light and urinal is within reach. Bed alarm is on. Pt is A&Ox4 and respirations unlabored and even.

## 2025-04-16 NOTE — CARE PLAN
The patient's goals for the shift include  no falls.    The clinical goals for the shift include safety    Over the shift, the patient did not make progress toward the following goals. Barriers to progression include fall at home. Recommendations to address these barriers include PT OT medicine.

## 2025-04-16 NOTE — SIGNIFICANT EVENT
Jose Klein was called on this patient at around 5 AM.  Patient was laying in bed, he got up to use the commode, he says he tripped on the SCD tubing, leading him to fall on the ground.  First point of contact with the ground was the hands, he did not hit his head or lose consciousness.  He is ANO x 3.  He has a superficial abrasion to the right knee.  No other bruises or abrasions, no obvious deformities/tenderness on any other parts of the body.  He is able to extend and flex his right knee without any pain.  He was able to walk back to the bed with assistance.    Physical Exam:   GENERAL: Awake/alert, cooperative  HEAD:  Normocephalic, atraumatic.  ENT:  No nasal discharge  NECK:  Atraumatic  CARDIOVASCULAR: S1-S2 appreciated  RESPIRATORY: Breath sounds clear to auscultation bilaterally  ABDOMEN:  Soft, non distended  EXTREMITIES:  No peripheral edema  SKIN:  Warm and dry  NEUROLOGICAL: Tremors noted in bilateral upper extremities

## 2025-04-16 NOTE — PROGRESS NOTES
04/16/25 1119   Discharge Planning   Living Arrangements Friends   Support Systems Friends/neighbors   Assistance Needed IADLs   Type of Residence Private residence   Number of Stairs to Enter Residence 4   Number of Stairs Within Residence 9   Do you have animals or pets at home? No   Who is requesting discharge planning? Provider   Home or Post Acute Services Community services;Post acute facilities (Rehab/SNF/etc)   Type of Post Acute Facility Services Skilled nursing   Expected Discharge Disposition SNF   Does the patient need discharge transport arranged? Yes   RoundTrip coordination needed? Yes     TCC note: Attempted to meet with pt however, was informed by RN that he was just medicated and will attempt to return again this afternoon. Amaya Marrero, MSN ,RN, TCC.

## 2025-04-16 NOTE — PROGRESS NOTES
"                                                                Internal Medicine Progress Note         Humble Banks is a 69 y.o. male on day 1 of admission presenting with Alcoholic intoxication without complication.    SUBJECTIVE    Patient seen and examined at bedside. Overnight, patient fell. He denies hitting his head. Reports he fell to his knees after tripping on the SCDs.  Patient was noted to be trying to get out of bed.  He states \"I have paperwork to finish. 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 He was alert and oriented x 4. Per nursing patient scored CIWA 9 and received ativan.   OBJECTIVE    Vitals:    04/16/25 0800 04/16/25 1037 04/16/25 1100 04/16/25 1300   BP:  137/80     BP Location:  Left arm     Patient Position:  Lying     Pulse: (!) 115 70 110 (!) 119   Resp:  19     Temp:  36.4 °C (97.5 °F)     TempSrc:  Temporal     SpO2:  97%     Weight:       Height:          Results from last 7 days   Lab Units 04/16/25  0726 04/15/25  0505 04/14/25  1735   WBC AUTO x10*3/uL 5.9   < > 4.6   HEMOGLOBIN g/dL 11.6*   < > 12.0*   HEMATOCRIT % 35.1*   < > 36.3*   PLATELETS AUTO x10*3/uL 46*   < > 49*   NEUTROS PCT AUTO %  --   --  33.2   LYMPHS PCT AUTO %  --   --  43.3   MONOS PCT AUTO %  --   --  16.5   EOS PCT AUTO %  --   --  4.8    < > = values in this interval not displayed.     Results from last 7 days   Lab Units 04/16/25  0726 04/15/25  0505   SODIUM mmol/L 139 143   POTASSIUM mmol/L 3.4* 3.8   CHLORIDE mmol/L 102 106   CO2 mmol/L 26 24   BUN mg/dL 16 9   CREATININE mg/dL 0.66 0.63   CALCIUM mg/dL 9.5 8.6   PROTEIN TOTAL g/dL  --  6.9   BILIRUBIN TOTAL mg/dL  --  0.8   ALK PHOS U/L  --  67   ALT U/L  --  50   AST U/L  --  93*   GLUCOSE mg/dL 128* 88       Scheduled Medications  Scheduled Medications[1]       Physical Exam  General: No acute distress. Restless in bed.   HEENT: EOMI. Moist mucous membranes.   CV: tachycardic  Pulm: Clear to auscultation bilaterally, no wheezings, rales or rhonchi  Abd: " Nondistended, nontender  Ext: no tenderness to palpation. Moving extremities appropriately   Skin: Bruising over left face, warm and dry.   Neuro: no focal deficit. Tremor present.        ASSESSMENT & PLAN  Humble Banks is a 69 y.o. male with a past medical history of alcohol abuse disorder, suspected Warnicke encephalopathy, generalized anxiety disorder, depression, dyslipidemia, hypertension, hypothyroidism, presented to [facility] found on the ground by his roommate.      #Acute alcohol withdrawal   #Hx alcohol use disorder  #Acute alcoholic hepatitis (AST: ALT ratio greater than 2)  #Fall  #Syncope  PLAN:  -continue CIWA protocol with ativan  -phenobarbital taper started  -patient will be transferred to step down for monitoring   -continue thiamine, folate, multivitamin  -encouraged alcohol cessation   -continue tele monitoring      #Chronic macrocytic anemia  #Acute on chronic thrombocytopenia  -Thrombocytopenia likely in setting of alcohol abuse from chronic bone marrow suppression  PLAN:  -hold DVT prophylaxis  -continue SCDs  -Trend CBC  -continue supplements     #Hypomagnesemia  -trend BMP  -Replete as necessary      Chronic medical conditions:  #Depression  #Anxiety  #Hypothyroidism  #Restless leg syndrome  -Continue bupropion, escitalopram, levothyroxine, ropinirole, amlodipine   -holding propanolol in setting of HR in the 60s     DVT prophylaxis: SCDs  GI prophylaxis: None  Consults: None  Diet: Regular  CODE STATUS: Full code    Mai Rashid DO PGY-1 Internal Medicine  Please SecureChat for any further questions  This is a preliminary note, please await attending attestation for final A/P            [1] amLODIPine, 5 mg, oral, Daily  buPROPion XL, 150 mg, oral, Daily  escitalopram, 10 mg, oral, Daily  folic acid, 1 mg, oral, Daily  levothyroxine, 75 mcg, oral, Daily  multivitamin with minerals, 1 tablet, oral, Daily  pneumoc 20-shonda conj-dip cr(PF), 0.5 mL, intramuscular, During  hospitalization  polyethylene glycol, 17 g, oral, Daily  [Held by provider] propranolol, 40 mg, oral, BID  rOPINIRole, 1 mg, oral, Nightly  thiamine, 100 mg, oral, Daily  thiamine, 500 mg, intravenous, q8h LUCY

## 2025-04-16 NOTE — CARE PLAN
The patient's goals for the shift include  Rest    The clinical goals for the shift include Patient will remain free from falls/injuries during this shift.

## 2025-04-17 LAB
ANION GAP SERPL CALC-SCNC: 15 MMOL/L (ref 10–20)
BUN SERPL-MCNC: 16 MG/DL (ref 6–23)
CALCIUM SERPL-MCNC: 9 MG/DL (ref 8.6–10.3)
CHLORIDE SERPL-SCNC: 102 MMOL/L (ref 98–107)
CO2 SERPL-SCNC: 22 MMOL/L (ref 21–32)
CREAT SERPL-MCNC: 0.5 MG/DL (ref 0.5–1.3)
EGFRCR SERPLBLD CKD-EPI 2021: >90 ML/MIN/1.73M*2
ERYTHROCYTE [DISTWIDTH] IN BLOOD BY AUTOMATED COUNT: 12.7 % (ref 11.5–14.5)
GLUCOSE BLD MANUAL STRIP-MCNC: 138 MG/DL (ref 74–99)
GLUCOSE BLD MANUAL STRIP-MCNC: 146 MG/DL (ref 74–99)
GLUCOSE BLD MANUAL STRIP-MCNC: 88 MG/DL (ref 74–99)
GLUCOSE SERPL-MCNC: 96 MG/DL (ref 74–99)
HCT VFR BLD AUTO: 35.6 % (ref 41–52)
HGB BLD-MCNC: 11.9 G/DL (ref 13.5–17.5)
MAGNESIUM SERPL-MCNC: 1.63 MG/DL (ref 1.6–2.4)
MCH RBC QN AUTO: 33.5 PG (ref 26–34)
MCHC RBC AUTO-ENTMCNC: 33.4 G/DL (ref 32–36)
MCV RBC AUTO: 100 FL (ref 80–100)
NRBC BLD-RTO: 0 /100 WBCS (ref 0–0)
PHOSPHATE SERPL-MCNC: 3.4 MG/DL (ref 2.5–4.9)
PLATELET # BLD AUTO: 45 X10*3/UL (ref 150–450)
POTASSIUM SERPL-SCNC: 3.1 MMOL/L (ref 3.5–5.3)
RBC # BLD AUTO: 3.55 X10*6/UL (ref 4.5–5.9)
SODIUM SERPL-SCNC: 136 MMOL/L (ref 136–145)
WBC # BLD AUTO: 8.6 X10*3/UL (ref 4.4–11.3)

## 2025-04-17 PROCEDURE — 2500000004 HC RX 250 GENERAL PHARMACY W/ HCPCS (ALT 636 FOR OP/ED): Mod: JZ

## 2025-04-17 PROCEDURE — 36415 COLL VENOUS BLD VENIPUNCTURE: CPT

## 2025-04-17 PROCEDURE — 85027 COMPLETE CBC AUTOMATED: CPT

## 2025-04-17 PROCEDURE — 2060000001 HC INTERMEDIATE ICU ROOM DAILY

## 2025-04-17 PROCEDURE — 84100 ASSAY OF PHOSPHORUS: CPT

## 2025-04-17 PROCEDURE — 2500000001 HC RX 250 WO HCPCS SELF ADMINISTERED DRUGS (ALT 637 FOR MEDICARE OP)

## 2025-04-17 PROCEDURE — 2500000004 HC RX 250 GENERAL PHARMACY W/ HCPCS (ALT 636 FOR OP/ED)

## 2025-04-17 PROCEDURE — 82947 ASSAY GLUCOSE BLOOD QUANT: CPT

## 2025-04-17 PROCEDURE — 83735 ASSAY OF MAGNESIUM: CPT

## 2025-04-17 PROCEDURE — 80048 BASIC METABOLIC PNL TOTAL CA: CPT

## 2025-04-17 RX ORDER — POTASSIUM CHLORIDE 14.9 MG/ML
20 INJECTION INTRAVENOUS
Status: COMPLETED | OUTPATIENT
Start: 2025-04-17 | End: 2025-04-17

## 2025-04-17 RX ORDER — MAGNESIUM SULFATE HEPTAHYDRATE 40 MG/ML
2 INJECTION, SOLUTION INTRAVENOUS ONCE
Status: COMPLETED | OUTPATIENT
Start: 2025-04-17 | End: 2025-04-17

## 2025-04-17 RX ORDER — SODIUM CHLORIDE, SODIUM LACTATE, POTASSIUM CHLORIDE, CALCIUM CHLORIDE 600; 310; 30; 20 MG/100ML; MG/100ML; MG/100ML; MG/100ML
100 INJECTION, SOLUTION INTRAVENOUS CONTINUOUS
Status: DISCONTINUED | OUTPATIENT
Start: 2025-04-17 | End: 2025-04-18

## 2025-04-17 RX ADMIN — LORAZEPAM 2 MG: 2 INJECTION INTRAMUSCULAR; INTRAVENOUS at 03:58

## 2025-04-17 RX ADMIN — SODIUM CHLORIDE, SODIUM LACTATE, POTASSIUM CHLORIDE, AND CALCIUM CHLORIDE 100 ML/HR: .6; .31; .03; .02 INJECTION, SOLUTION INTRAVENOUS at 17:52

## 2025-04-17 RX ADMIN — LORAZEPAM 1 MG: 2 INJECTION INTRAMUSCULAR; INTRAVENOUS at 00:04

## 2025-04-17 RX ADMIN — THIAMINE HYDROCHLORIDE 500 MG: 100 INJECTION, SOLUTION INTRAMUSCULAR; INTRAVENOUS at 06:16

## 2025-04-17 RX ADMIN — PHENOBARBITAL SODIUM 97.5 MG: 65 INJECTION INTRAMUSCULAR at 19:48

## 2025-04-17 RX ADMIN — THIAMINE HYDROCHLORIDE 500 MG: 100 INJECTION, SOLUTION INTRAMUSCULAR; INTRAVENOUS at 14:05

## 2025-04-17 RX ADMIN — POTASSIUM CHLORIDE 20 MEQ: 14.9 INJECTION, SOLUTION INTRAVENOUS at 10:04

## 2025-04-17 RX ADMIN — PHENOBARBITAL SODIUM 97.5 MG: 65 INJECTION INTRAMUSCULAR at 02:00

## 2025-04-17 RX ADMIN — ROPINIROLE HYDROCHLORIDE 1 MG: 1 TABLET, FILM COATED ORAL at 20:04

## 2025-04-17 RX ADMIN — POTASSIUM CHLORIDE 20 MEQ: 14.9 INJECTION, SOLUTION INTRAVENOUS at 08:57

## 2025-04-17 RX ADMIN — MAGNESIUM SULFATE HEPTAHYDRATE 2 G: 40 INJECTION, SOLUTION INTRAVENOUS at 09:55

## 2025-04-17 ASSESSMENT — LIFESTYLE VARIABLES
HEADACHE, FULLNESS IN HEAD: NOT PRESENT
BLOOD PRESSURE: 144/89
TOTAL SCORE: 9
TREMOR: NOT VISIBLE, BUT CAN BE FELT FINGERTIP TO FINGERTIP
TREMOR: NOT VISIBLE, BUT CAN BE FELT FINGERTIP TO FINGERTIP
VISUAL DISTURBANCES: NOT PRESENT
VISUAL DISTURBANCES: MILD SENSITIVITY
TREMOR: NO TREMOR
PAROXYSMAL SWEATS: NO SWEAT VISIBLE
TREMOR: MODERATE, WITH PATIENT'S ARMS EXTENDED
VISUAL DISTURBANCES: NOT PRESENT
TOTAL SCORE: 3
PAROXYSMAL SWEATS: NO SWEAT VISIBLE
TREMOR: 3
VISUAL DISTURBANCES: NOT PRESENT
NAUSEA AND VOMITING: NO NAUSEA AND NO VOMITING
HEADACHE, FULLNESS IN HEAD: NOT PRESENT
AGITATION: NORMAL ACTIVITY
VISUAL DISTURBANCES: NOT PRESENT
ANXIETY: MILDLY ANXIOUS
ORIENTATION AND CLOUDING OF SENSORIUM: CANNOT DO SERIAL ADDITIONS OR IS UNCERTAIN ABOUT DATE
HEADACHE, FULLNESS IN HEAD: NOT PRESENT
NAUSEA AND VOMITING: NO NAUSEA AND NO VOMITING
ORIENTATION AND CLOUDING OF SENSORIUM: CANNOT DO SERIAL ADDITIONS OR IS UNCERTAIN ABOUT DATE
ORIENTATION AND CLOUDING OF SENSORIUM: DISORIENTED FOR PLACE OR PERSON
AGITATION: SOMEWHAT MORE THAN NORMAL ACTIVITY
AUDITORY DISTURBANCES: NOT PRESENT
NAUSEA AND VOMITING: NO NAUSEA AND NO VOMITING
NAUSEA AND VOMITING: NO NAUSEA AND NO VOMITING
PULSE: 89
AGITATION: MODERATELY FIDGETY AND RESTLESS
PAROXYSMAL SWEATS: NO SWEAT VISIBLE
HEADACHE, FULLNESS IN HEAD: NOT PRESENT
ORIENTATION AND CLOUDING OF SENSORIUM: CANNOT DO SERIAL ADDITIONS OR IS UNCERTAIN ABOUT DATE
TOTAL SCORE: 14
TOTAL SCORE: 0
NAUSEA AND VOMITING: NO NAUSEA AND NO VOMITING
PAROXYSMAL SWEATS: NO SWEAT VISIBLE
NAUSEA AND VOMITING: NO NAUSEA AND NO VOMITING
ANXIETY: NO ANXIETY, AT EASE
ANXIETY: NO ANXIETY, AT EASE
HEADACHE, FULLNESS IN HEAD: NOT PRESENT
AGITATION: NORMAL ACTIVITY
AGITATION: NORMAL ACTIVITY
ANXIETY: MILDLY ANXIOUS
HEADACHE, FULLNESS IN HEAD: NOT PRESENT
ANXIETY: NO ANXIETY, AT EASE
HEADACHE, FULLNESS IN HEAD: NOT PRESENT
VISUAL DISTURBANCES: NOT PRESENT
PAROXYSMAL SWEATS: NO SWEAT VISIBLE
VISUAL DISTURBANCES: NOT PRESENT
ORIENTATION AND CLOUDING OF SENSORIUM: CANNOT DO SERIAL ADDITIONS OR IS UNCERTAIN ABOUT DATE
TOTAL SCORE: 5
ANXIETY: MILDLY ANXIOUS
NAUSEA AND VOMITING: NO NAUSEA AND NO VOMITING
AUDITORY DISTURBANCES: VERY MILD HARSHNESS OR ABILITY TO FRIGHTEN
TOTAL SCORE: 5
AUDITORY DISTURBANCES: NOT PRESENT
ORIENTATION AND CLOUDING OF SENSORIUM: ORIENTED AND CAN DO SERIAL ADDITIONS
ANXIETY: MILDLY ANXIOUS
TACTILE DISTURBANCES: VERY MILD ITCHING, PINS AND NEEDLES, BURNING OR NUMBNESS
ORIENTATION AND CLOUDING OF SENSORIUM: ORIENTED AND CAN DO SERIAL ADDITIONS
AGITATION: NORMAL ACTIVITY
AUDITORY DISTURBANCES: NOT PRESENT
VISUAL DISTURBANCES: NOT PRESENT
VISUAL DISTURBANCES: NOT PRESENT
PAROXYSMAL SWEATS: NO SWEAT VISIBLE
ANXIETY: NO ANXIETY, AT EASE
AUDITORY DISTURBANCES: NOT PRESENT
ANXIETY: MILDLY ANXIOUS
AGITATION: SOMEWHAT MORE THAN NORMAL ACTIVITY
PAROXYSMAL SWEATS: NO SWEAT VISIBLE
ANXIETY: NO ANXIETY, AT EASE
HEADACHE, FULLNESS IN HEAD: NOT PRESENT
NAUSEA AND VOMITING: NO NAUSEA AND NO VOMITING
TREMOR: NOT VISIBLE, BUT CAN BE FELT FINGERTIP TO FINGERTIP
PAROXYSMAL SWEATS: NO SWEAT VISIBLE
PULSE: 78
AUDITORY DISTURBANCES: NOT PRESENT
PAROXYSMAL SWEATS: NO SWEAT VISIBLE
HEADACHE, FULLNESS IN HEAD: NOT PRESENT
ORIENTATION AND CLOUDING OF SENSORIUM: ORIENTED AND CAN DO SERIAL ADDITIONS
AUDITORY DISTURBANCES: VERY MILD HARSHNESS OR ABILITY TO FRIGHTEN
HEADACHE, FULLNESS IN HEAD: NOT PRESENT
ORIENTATION AND CLOUDING OF SENSORIUM: CANNOT DO SERIAL ADDITIONS OR IS UNCERTAIN ABOUT DATE
TOTAL SCORE: 8
TACTILE DISTURBANCES: VERY MILD ITCHING, PINS AND NEEDLES, BURNING OR NUMBNESS
ORIENTATION AND CLOUDING OF SENSORIUM: CANNOT DO SERIAL ADDITIONS OR IS UNCERTAIN ABOUT DATE
NAUSEA AND VOMITING: NO NAUSEA AND NO VOMITING
TOTAL SCORE: 4
NAUSEA AND VOMITING: NO NAUSEA AND NO VOMITING
PULSE: 86
TOTAL SCORE: 4
AUDITORY DISTURBANCES: NOT PRESENT
TACTILE DISTURBANCES: VERY MILD ITCHING, PINS AND NEEDLES, BURNING OR NUMBNESS
AUDITORY DISTURBANCES: NOT PRESENT
NAUSEA AND VOMITING: NO NAUSEA AND NO VOMITING
TOTAL SCORE: 2
AGITATION: NORMAL ACTIVITY
AGITATION: MODERATELY FIDGETY AND RESTLESS
AGITATION: NORMAL ACTIVITY
PULSE: 87
TOTAL SCORE: 2
PULSE: 80
HEADACHE, FULLNESS IN HEAD: NOT PRESENT
TREMOR: 2
TREMOR: 2
AUDITORY DISTURBANCES: NOT PRESENT
VISUAL DISTURBANCES: VERY MILD SENSITIVITY
TREMOR: 2
AGITATION: NORMAL ACTIVITY
TREMOR: NO TREMOR
TREMOR: 3
VISUAL DISTURBANCES: MILD SENSITIVITY
PAROXYSMAL SWEATS: NO SWEAT VISIBLE
ANXIETY: MILDLY ANXIOUS
PAROXYSMAL SWEATS: NO SWEAT VISIBLE
ORIENTATION AND CLOUDING OF SENSORIUM: CANNOT DO SERIAL ADDITIONS OR IS UNCERTAIN ABOUT DATE
AUDITORY DISTURBANCES: NOT PRESENT

## 2025-04-17 ASSESSMENT — COGNITIVE AND FUNCTIONAL STATUS - GENERAL
DRESSING REGULAR LOWER BODY CLOTHING: TOTAL
HELP NEEDED FOR BATHING: TOTAL
MOVING TO AND FROM BED TO CHAIR: TOTAL
PERSONAL GROOMING: TOTAL
TURNING FROM BACK TO SIDE WHILE IN FLAT BAD: TOTAL
DAILY ACTIVITIY SCORE: 6
STANDING UP FROM CHAIR USING ARMS: TOTAL
MOVING FROM LYING ON BACK TO SITTING ON SIDE OF FLAT BED WITH BEDRAILS: A LITTLE
TOILETING: TOTAL
MOBILITY SCORE: 8
CLIMB 3 TO 5 STEPS WITH RAILING: TOTAL
WALKING IN HOSPITAL ROOM: TOTAL
EATING MEALS: TOTAL
DRESSING REGULAR UPPER BODY CLOTHING: TOTAL

## 2025-04-17 ASSESSMENT — PAIN SCALES - GENERAL
PAINLEVEL_OUTOF10: 0 - NO PAIN
PAINLEVEL_OUTOF10: 0 - NO PAIN

## 2025-04-17 NOTE — CARE PLAN
The patient's goals for the shift include  promoting safety and preventing falls    The clinical goals for the shift include safety    Problem: Nutrition  Goal: Nutrient intake appropriate for maintaining nutritional needs  Outcome: Not Progressing  Note: Patient is confused     Problem: Skin  Goal: Promote/optimize nutrition  Outcome: Not Progressing  Note: Patient is confused     Problem: Safety - Adult  Goal: Free from fall injury  4/16/2025 2120 by Faisal Reed RN  Outcome: Progressing  Note: Hourly rounding done  Side rails raised  Activated bed alarm  4/16/2025 2119 by Faisal Reed RN  Outcome: Progressing  Note: Hourly rounding     Problem: Fall/Injury  Goal: Not fall by end of shift  Outcome: Progressing  Goal: Be free from injury by end of the shift  Outcome: Progressing     Problem: Skin  Goal: Decreased wound size/increased tissue granulation at next dressing change  Outcome: Progressing  Flowsheets (Taken 4/16/2025 2129)  Decreased wound size/increased tissue granulation at next dressing change: Promote sleep for wound healing  Goal: Participates in plan/prevention/treatment measures  Outcome: Progressing  Flowsheets (Taken 4/16/2025 2129)  Participates in plan/prevention/treatment measures: Elevate heels  Goal: Prevent/manage excess moisture  Outcome: Progressing  Flowsheets (Taken 4/16/2025 2129)  Prevent/manage excess moisture: Cleanse incontinence/protect with barrier cream  Goal: Prevent/minimize sheer/friction injuries  Outcome: Progressing  Flowsheets (Taken 4/16/2025 2129)  Prevent/minimize sheer/friction injuries:   Turn/reposition every 2 hours/use positioning/transfer devices   HOB 30 degrees or less  Goal: Promote skin healing  Outcome: Progressing  Flowsheets (Taken 4/16/2025 2129)  Promote skin healing: Turn/reposition every 2 hours/use positioning/transfer devices  Note: Able to turn himself       Problem: Nutrition  Goal: Nutrient intake appropriate for maintaining nutritional  needs  Outcome: Not Progressing  Note: Patient is confused     Problem: Skin  Goal: Promote/optimize nutrition  Outcome: Not Progressing  Note: Patient is confused

## 2025-04-17 NOTE — CARE PLAN
Problem: Pain - Adult  Goal: Verbalizes/displays adequate comfort level or baseline comfort level  Outcome: Progressing     Problem: Safety - Adult  Goal: Free from fall injury  Outcome: Progressing     Problem: Discharge Planning  Goal: Discharge to home or other facility with appropriate resources  Outcome: Progressing     Problem: Chronic Conditions and Co-morbidities  Goal: Patient's chronic conditions and co-morbidity symptoms are monitored and maintained or improved  Outcome: Progressing     Problem: Nutrition  Goal: Nutrient intake appropriate for maintaining nutritional needs  Outcome: Progressing     Problem: Fall/Injury  Goal: Not fall by end of shift  Outcome: Progressing  Goal: Be free from injury by end of the shift  Outcome: Progressing  Goal: Verbalize understanding of personal risk factors for fall in the hospital  Outcome: Progressing  Goal: Verbalize understanding of risk factor reduction measures to prevent injury from fall in the home  Outcome: Progressing  Goal: Use assistive devices by end of the shift  Outcome: Progressing  Goal: Pace activities to prevent fatigue by end of the shift  Outcome: Progressing     Problem: Skin  Goal: Decreased wound size/increased tissue granulation at next dressing change  Outcome: Progressing  Goal: Participates in plan/prevention/treatment measures  Outcome: Progressing  Goal: Prevent/manage excess moisture  Outcome: Progressing  Goal: Prevent/minimize sheer/friction injuries  Outcome: Progressing  Goal: Promote/optimize nutrition  Outcome: Progressing  Goal: Promote skin healing  Outcome: Progressing

## 2025-04-17 NOTE — PROGRESS NOTES
Internal Medicine Progress Note         Humble Banks is a 69 y.o. male on day 2 of admission presenting with Alcoholic intoxication without complication.    SUBJECTIVE    Patient seen and examined at bedside.  Patient sleeping during examination.  Snoring loudly.  Patient did not arouse to voice.  Oxygen saturation and heart rate are within normal limits.  OBJECTIVE    Vitals:    04/17/25 0600 04/17/25 0744 04/17/25 0937 04/17/25 1150   BP:  130/84 (!) 151/96 (!) 142/96   BP Location:  Left arm Left arm    Patient Position:  Lying Lying    Pulse: 89 (!) 48 52    Resp:  19 20    Temp:  36.2 °C (97.2 °F) 36.4 °C (97.5 °F)    TempSrc:  Temporal Temporal    SpO2:  97% 97%    Weight:       Height:          Results from last 7 days   Lab Units 04/17/25  0519 04/15/25  0505 04/14/25  1735   WBC AUTO x10*3/uL 8.6   < > 4.6   HEMOGLOBIN g/dL 11.9*   < > 12.0*   HEMATOCRIT % 35.6*   < > 36.3*   PLATELETS AUTO x10*3/uL 45*   < > 49*   NEUTROS PCT AUTO %  --   --  33.2   LYMPHS PCT AUTO %  --   --  43.3   MONOS PCT AUTO %  --   --  16.5   EOS PCT AUTO %  --   --  4.8    < > = values in this interval not displayed.     Results from last 7 days   Lab Units 04/17/25  0519 04/16/25  0726 04/15/25  0505   SODIUM mmol/L 136   < > 143   POTASSIUM mmol/L 3.1*   < > 3.8   CHLORIDE mmol/L 102   < > 106   CO2 mmol/L 22   < > 24   BUN mg/dL 16   < > 9   CREATININE mg/dL 0.50   < > 0.63   CALCIUM mg/dL 9.0   < > 8.6   PROTEIN TOTAL g/dL  --   --  6.9   BILIRUBIN TOTAL mg/dL  --   --  0.8   ALK PHOS U/L  --   --  67   ALT U/L  --   --  50   AST U/L  --   --  93*   GLUCOSE mg/dL 96   < > 88    < > = values in this interval not displayed.       Scheduled Medications  Scheduled Medications[1]       Physical Exam  General: No acute distress  HEENT: EOMI  CV: Regular rate and rhythm, normal S1 and S2, no murmurs  Pulm: Clear to auscultation bilaterally, no wheezings, rales or  rhonchi  Abd: Nondistended, nontender  Ext: No cyanosis or edema.   Skin: No rashes, warm and dry.   Neuro: no focal deficit       ASSESSMENT & PLAN  Humble Banks is a 69 y.o. male with a past medical history of alcohol abuse disorder, suspected Warnicke encephalopathy, generalized anxiety disorder, depression, dyslipidemia, hypertension, hypothyroidism, presented to [facility] found on the ground by his roommate.      #Acute alcohol withdrawal   #Hx alcohol use disorder  #Acute alcoholic hepatitis (AST: ALT ratio greater than 2 on admission)  #Fall  #Syncope  PLAN:  -continue CIWA protocol with ativan  -continue phenobarbital taper   -continue thiamine, folate, multivitamin  -encouraged alcohol cessation   -continue tele monitoring   -fluids started, patient is unable to tolerate oral intake at this time     #Chronic macrocytic anemia  #Acute on chronic thrombocytopenia  -Thrombocytopenia likely in setting of alcohol abuse from chronic bone marrow suppression  PLAN:  -hold DVT prophylaxis  -continue SCDs  -Trend CBC  -continue supplements     #Hypomagnesemia, improved  -trend RFP  -Replete as necessary      Chronic medical conditions:  #Depression  #Anxiety  #Hypothyroidism  #Restless leg syndrome  -Continue bupropion, escitalopram, levothyroxine, ropinirole, amlodipine   -holding propanolol in setting of HR in the 60s     DVT prophylaxis: SCDs  GI prophylaxis: None  Consults: None  Diet: Regular  CODE STATUS: Full code    Mai Rashid DO PGY-1 Internal Medicine  Please SecureChat for any further questions  This is a preliminary note, please await attending attestation for final A/P            [1] amLODIPine, 5 mg, oral, Daily  buPROPion XL, 150 mg, oral, Daily  escitalopram, 10 mg, oral, Daily  folic acid, 1 mg, oral, Daily  levothyroxine, 75 mcg, oral, Daily  multivitamin with minerals, 1 tablet, oral, Daily  PHENobarbital, 97.5 mg, intravenous, q8h   Followed by  [START ON 4/18/2025] PHENobarbital, 65 mg,  intravenous, q8h   Followed by  [START ON 4/20/2025] PHENobarbital, 32.5 mg, intravenous, q8h  pneumoc 20-shonda conj-dip cr(PF), 0.5 mL, intramuscular, During hospitalization  polyethylene glycol, 17 g, oral, Daily  [Held by provider] propranolol, 40 mg, oral, BID  rOPINIRole, 1 mg, oral, Nightly  thiamine, 100 mg, oral, Daily  thiamine, 500 mg, intravenous, q8h LUCY

## 2025-04-18 LAB
ALBUMIN SERPL BCP-MCNC: 3.6 G/DL (ref 3.4–5)
ANION GAP SERPL CALC-SCNC: 14 MMOL/L (ref 10–20)
BUN SERPL-MCNC: 23 MG/DL (ref 6–23)
CALCIUM SERPL-MCNC: 8.7 MG/DL (ref 8.6–10.3)
CHLORIDE SERPL-SCNC: 103 MMOL/L (ref 98–107)
CO2 SERPL-SCNC: 23 MMOL/L (ref 21–32)
CREAT SERPL-MCNC: 0.65 MG/DL (ref 0.5–1.3)
EGFRCR SERPLBLD CKD-EPI 2021: >90 ML/MIN/1.73M*2
ERYTHROCYTE [DISTWIDTH] IN BLOOD BY AUTOMATED COUNT: 12.6 % (ref 11.5–14.5)
GLUCOSE BLD MANUAL STRIP-MCNC: 102 MG/DL (ref 74–99)
GLUCOSE BLD MANUAL STRIP-MCNC: 116 MG/DL (ref 74–99)
GLUCOSE BLD MANUAL STRIP-MCNC: 117 MG/DL (ref 74–99)
GLUCOSE SERPL-MCNC: 95 MG/DL (ref 74–99)
HCT VFR BLD AUTO: 35.5 % (ref 41–52)
HGB BLD-MCNC: 12 G/DL (ref 13.5–17.5)
MCH RBC QN AUTO: 33.8 PG (ref 26–34)
MCHC RBC AUTO-ENTMCNC: 33.8 G/DL (ref 32–36)
MCV RBC AUTO: 100 FL (ref 80–100)
NRBC BLD-RTO: 0 /100 WBCS (ref 0–0)
PHOSPHATE SERPL-MCNC: 3.5 MG/DL (ref 2.5–4.9)
PLATELET # BLD AUTO: 55 X10*3/UL (ref 150–450)
POTASSIUM SERPL-SCNC: 3.2 MMOL/L (ref 3.5–5.3)
RBC # BLD AUTO: 3.55 X10*6/UL (ref 4.5–5.9)
SODIUM SERPL-SCNC: 137 MMOL/L (ref 136–145)
WBC # BLD AUTO: 7.2 X10*3/UL (ref 4.4–11.3)

## 2025-04-18 PROCEDURE — 2500000004 HC RX 250 GENERAL PHARMACY W/ HCPCS (ALT 636 FOR OP/ED)

## 2025-04-18 PROCEDURE — 2500000001 HC RX 250 WO HCPCS SELF ADMINISTERED DRUGS (ALT 637 FOR MEDICARE OP)

## 2025-04-18 PROCEDURE — 2500000002 HC RX 250 W HCPCS SELF ADMINISTERED DRUGS (ALT 637 FOR MEDICARE OP, ALT 636 FOR OP/ED)

## 2025-04-18 PROCEDURE — 82947 ASSAY GLUCOSE BLOOD QUANT: CPT

## 2025-04-18 PROCEDURE — 2060000001 HC INTERMEDIATE ICU ROOM DAILY

## 2025-04-18 PROCEDURE — 36415 COLL VENOUS BLD VENIPUNCTURE: CPT

## 2025-04-18 PROCEDURE — 85027 COMPLETE CBC AUTOMATED: CPT

## 2025-04-18 PROCEDURE — 84100 ASSAY OF PHOSPHORUS: CPT

## 2025-04-18 RX ORDER — PHENOBARBITAL 32.4 MG/1
97.2 TABLET ORAL 3 TIMES DAILY
Status: COMPLETED | OUTPATIENT
Start: 2025-04-18 | End: 2025-04-18

## 2025-04-18 RX ORDER — PHENOBARBITAL 32.4 MG/1
32.4 TABLET ORAL 3 TIMES DAILY
Status: DISCONTINUED | OUTPATIENT
Start: 2025-04-21 | End: 2025-04-22 | Stop reason: HOSPADM

## 2025-04-18 RX ORDER — PHENOBARBITAL 32.4 MG/1
64.8 TABLET ORAL 3 TIMES DAILY
Status: COMPLETED | OUTPATIENT
Start: 2025-04-19 | End: 2025-04-20

## 2025-04-18 RX ORDER — PHENOBARBITAL 32.4 MG/1
65 TABLET ORAL EVERY 6 HOURS PRN
Status: DISCONTINUED | OUTPATIENT
Start: 2025-04-18 | End: 2025-04-18

## 2025-04-18 RX ORDER — POTASSIUM CHLORIDE 20 MEQ/1
40 TABLET, EXTENDED RELEASE ORAL ONCE
Status: COMPLETED | OUTPATIENT
Start: 2025-04-18 | End: 2025-04-18

## 2025-04-18 RX ADMIN — FOLIC ACID 1 MG: 1 TABLET ORAL at 08:40

## 2025-04-18 RX ADMIN — AMLODIPINE BESYLATE 5 MG: 5 TABLET ORAL at 08:41

## 2025-04-18 RX ADMIN — POLYETHYLENE GLYCOL 3350 17 G: 17 POWDER, FOR SOLUTION ORAL at 08:41

## 2025-04-18 RX ADMIN — PHENOBARBITAL SODIUM 97.5 MG: 65 INJECTION INTRAMUSCULAR at 12:00

## 2025-04-18 RX ADMIN — BUPROPION HYDROCHLORIDE 150 MG: 150 TABLET, EXTENDED RELEASE ORAL at 08:41

## 2025-04-18 RX ADMIN — ROPINIROLE HYDROCHLORIDE 1 MG: 1 TABLET, FILM COATED ORAL at 20:47

## 2025-04-18 RX ADMIN — THIAMINE HCL TAB 100 MG 100 MG: 100 TAB at 08:41

## 2025-04-18 RX ADMIN — LEVOTHYROXINE SODIUM 75 MCG: 0.07 TABLET ORAL at 05:18

## 2025-04-18 RX ADMIN — ESCITALOPRAM OXALATE 10 MG: 10 TABLET ORAL at 08:41

## 2025-04-18 RX ADMIN — Medication 1 TABLET: at 08:40

## 2025-04-18 RX ADMIN — PHENOBARBITAL SODIUM 97.5 MG: 65 INJECTION INTRAMUSCULAR at 03:15

## 2025-04-18 RX ADMIN — POTASSIUM CHLORIDE 40 MEQ: 1500 TABLET, EXTENDED RELEASE ORAL at 11:59

## 2025-04-18 RX ADMIN — PHENOBARBITAL 97.2 MG: 32.4 TABLET ORAL at 20:47

## 2025-04-18 ASSESSMENT — COGNITIVE AND FUNCTIONAL STATUS - GENERAL
STANDING UP FROM CHAIR USING ARMS: A LOT
DRESSING REGULAR UPPER BODY CLOTHING: A LOT
DRESSING REGULAR LOWER BODY CLOTHING: A LOT
TURNING FROM BACK TO SIDE WHILE IN FLAT BAD: A LITTLE
CLIMB 3 TO 5 STEPS WITH RAILING: A LOT
STANDING UP FROM CHAIR USING ARMS: A LOT
MOBILITY SCORE: 15
HELP NEEDED FOR BATHING: A LOT
DAILY ACTIVITIY SCORE: 14
MOBILITY SCORE: 15
TOILETING: A LOT
DRESSING REGULAR LOWER BODY CLOTHING: A LOT
MOVING TO AND FROM BED TO CHAIR: A LOT
PERSONAL GROOMING: A LOT
DAILY ACTIVITIY SCORE: 14
WALKING IN HOSPITAL ROOM: A LOT
TOILETING: A LOT
HELP NEEDED FOR BATHING: A LOT
PERSONAL GROOMING: A LOT
TURNING FROM BACK TO SIDE WHILE IN FLAT BAD: A LITTLE
DRESSING REGULAR UPPER BODY CLOTHING: A LOT
WALKING IN HOSPITAL ROOM: A LOT
MOVING TO AND FROM BED TO CHAIR: A LOT
CLIMB 3 TO 5 STEPS WITH RAILING: A LOT

## 2025-04-18 ASSESSMENT — LIFESTYLE VARIABLES
PAROXYSMAL SWEATS: NO SWEAT VISIBLE
TREMOR: 3
AUDITORY DISTURBANCES: NOT PRESENT
TREMOR: 3
ANXIETY: NO ANXIETY, AT EASE
NAUSEA AND VOMITING: NO NAUSEA AND NO VOMITING
ANXIETY: NO ANXIETY, AT EASE
HEADACHE, FULLNESS IN HEAD: NOT PRESENT
NAUSEA AND VOMITING: NO NAUSEA AND NO VOMITING
HEADACHE, FULLNESS IN HEAD: NOT PRESENT
PAROXYSMAL SWEATS: NO SWEAT VISIBLE
TOTAL SCORE: 3
VISUAL DISTURBANCES: NOT PRESENT
AGITATION: NORMAL ACTIVITY
TOTAL SCORE: 3
AUDITORY DISTURBANCES: NOT PRESENT
VISUAL DISTURBANCES: NOT PRESENT
HEADACHE, FULLNESS IN HEAD: NOT PRESENT
NAUSEA AND VOMITING: NO NAUSEA AND NO VOMITING
AUDITORY DISTURBANCES: NOT PRESENT
VISUAL DISTURBANCES: NOT PRESENT
AUDITORY DISTURBANCES: NOT PRESENT
ORIENTATION AND CLOUDING OF SENSORIUM: ORIENTED AND CAN DO SERIAL ADDITIONS
ANXIETY: NO ANXIETY, AT EASE
TREMOR: 3
HEADACHE, FULLNESS IN HEAD: NOT PRESENT
TOTAL SCORE: 3
TOTAL SCORE: 3
ANXIETY: NO ANXIETY, AT EASE
NAUSEA AND VOMITING: NO NAUSEA AND NO VOMITING
NAUSEA AND VOMITING: NO NAUSEA AND NO VOMITING
HEADACHE, FULLNESS IN HEAD: NOT PRESENT
ANXIETY: NO ANXIETY, AT EASE
PAROXYSMAL SWEATS: NO SWEAT VISIBLE
TOTAL SCORE: 3
ORIENTATION AND CLOUDING OF SENSORIUM: ORIENTED AND CAN DO SERIAL ADDITIONS
AUDITORY DISTURBANCES: NOT PRESENT
TREMOR: 3
AGITATION: NORMAL ACTIVITY
ORIENTATION AND CLOUDING OF SENSORIUM: ORIENTED AND CAN DO SERIAL ADDITIONS
ORIENTATION AND CLOUDING OF SENSORIUM: ORIENTED AND CAN DO SERIAL ADDITIONS
PAROXYSMAL SWEATS: NO SWEAT VISIBLE
AGITATION: NORMAL ACTIVITY
TREMOR: 3
AGITATION: NORMAL ACTIVITY
PAROXYSMAL SWEATS: NO SWEAT VISIBLE
ORIENTATION AND CLOUDING OF SENSORIUM: ORIENTED AND CAN DO SERIAL ADDITIONS
AGITATION: NORMAL ACTIVITY
VISUAL DISTURBANCES: NOT PRESENT
VISUAL DISTURBANCES: NOT PRESENT

## 2025-04-18 ASSESSMENT — PAIN SCALES - GENERAL
PAINLEVEL_OUTOF10: 0 - NO PAIN
PAINLEVEL_OUTOF10: 0 - NO PAIN

## 2025-04-18 NOTE — CARE PLAN
The patient's goals for the shift include  rest comfortably    The clinical goals for the shift include monitor resp. function. monitor CIWA

## 2025-04-18 NOTE — PROGRESS NOTES
Internal Medicine Progress Note         Humble Banks is a 69 y.o. male on day 3 of admission presenting with Alcoholic intoxication without complication.    SUBJECTIVE    Patient seen and examined at bedside. He was observed to be eating breakfast this morning. He feels fine and has been scoring 3 on CIWA.   OBJECTIVE    Vitals:    04/17/25 2352 04/18/25 0300 04/18/25 0730 04/18/25 1125   BP: 123/73 152/74 (!) 159/104 132/82   BP Location:   Right arm Right arm   Patient Position:   Lying Lying   Pulse: (!) 43 67  78   Resp: 18 18 18 19   Temp: 36.8 °C (98.2 °F) 36.9 °C (98.4 °F) 36.8 °C (98.2 °F) 35.9 °C (96.6 °F)   TempSrc:   Temporal Temporal   SpO2: 97% 94% 94% 96%   Weight:       Height:          Results from last 7 days   Lab Units 04/18/25  0529 04/15/25  0505 04/14/25  1735   WBC AUTO x10*3/uL 7.2   < > 4.6   HEMOGLOBIN g/dL 12.0*   < > 12.0*   HEMATOCRIT % 35.5*   < > 36.3*   PLATELETS AUTO x10*3/uL 55*   < > 49*   NEUTROS PCT AUTO %  --   --  33.2   LYMPHS PCT AUTO %  --   --  43.3   MONOS PCT AUTO %  --   --  16.5   EOS PCT AUTO %  --   --  4.8    < > = values in this interval not displayed.     Results from last 7 days   Lab Units 04/18/25  0529 04/16/25  0726 04/15/25  0505   SODIUM mmol/L 137   < > 143   POTASSIUM mmol/L 3.2*   < > 3.8   CHLORIDE mmol/L 103   < > 106   CO2 mmol/L 23   < > 24   BUN mg/dL 23   < > 9   CREATININE mg/dL 0.65   < > 0.63   CALCIUM mg/dL 8.7   < > 8.6   PROTEIN TOTAL g/dL  --   --  6.9   BILIRUBIN TOTAL mg/dL  --   --  0.8   ALK PHOS U/L  --   --  67   ALT U/L  --   --  50   AST U/L  --   --  93*   GLUCOSE mg/dL 95   < > 88    < > = values in this interval not displayed.       Scheduled Medications  Scheduled Medications[1]       Physical Exam  General: No acute distress  HEENT: EOMI  CV: Tachycardic.   Pulm: Clear to auscultation bilaterally, no wheezings, rales or rhonchi  Abd: Nondistended,  nontender  Skin: No rashes, warm and dry.   Ext: no cyanosis or edema  Neuro: at baseline. Tremor present.        ASSESSMENT & PLAN  Humble Banks is a 69 y.o. male with a past medical history of alcohol abuse disorder, suspected Warnicke encephalopathy, generalized anxiety disorder, depression, dyslipidemia, hypertension, hypothyroidism, presented to [facility] found on the ground by his roommate.      #Acute alcohol withdrawal   #Hx alcohol use disorder  #Acute alcoholic hepatitis (AST: ALT ratio greater than 2 on admission)  #Mechanical fall vs. Syncope   PLAN:  -continue CIWA protocol, discontinue PRN ativan  -switched phenobarbital taper to oral   -continue thiamine, folate, multivitamin  -encouraged alcohol cessation   -continue tele monitoring   -consulted PT/OT  -consulted TCC for discharge planning      #Chronic macrocytic anemia  #Acute on chronic thrombocytopenia  -Thrombocytopenia likely in setting of alcohol abuse from chronic bone marrow suppression  PLAN:  -hold DVT prophylaxis  -continue SCDs  -Trend CBC  -continue supplements     #Hypomagnesemia, improved  #Hypokalemia  PLAN:  -trend RFP  -Replete as necessary      Chronic medical conditions:  #Depression  #Anxiety  #Hypothyroidism  #Restless leg syndrome  -Continue bupropion, escitalopram, levothyroxine, ropinirole, amlodipine   -holding propanolol      DVT prophylaxis: SCDs  GI prophylaxis: None  Consults: None  Diet: Regular  CODE STATUS: Full code    Mai Rashid DO PGY-1 Internal Medicine  Please SecureChat for any further questions  This is a preliminary note, please await attending attestation for final A/P            [1] amLODIPine, 5 mg, oral, Daily  buPROPion XL, 150 mg, oral, Daily  escitalopram, 10 mg, oral, Daily  folic acid, 1 mg, oral, Daily  levothyroxine, 75 mcg, oral, Daily  multivitamin with minerals, 1 tablet, oral, Daily  PHENobarbital, 97.2 mg, oral, TID   Followed by  [START ON 4/19/2025] PHENobarbital, 64.8 mg, oral, TID    Followed by  [START ON 4/21/2025] PHENobarbital, 32.4 mg, oral, TID  pneumoc 20-shonda conj-dip cr(PF), 0.5 mL, intramuscular, During hospitalization  polyethylene glycol, 17 g, oral, Daily  [Held by provider] propranolol, 40 mg, oral, BID  rOPINIRole, 1 mg, oral, Nightly  thiamine, 100 mg, oral, Daily

## 2025-04-18 NOTE — PROGRESS NOTES
04/18/25 1348   Discharge Planning   Living Arrangements Friends   Support Systems Friends/neighbors   Assistance Needed Independent, uses walker   Type of Residence Private residence  (one story home)   Number of Stairs to Enter Residence 3   Home or Post Acute Services Post acute facilities (Rehab/SNF/etc)   Type of Post Acute Facility Services Skilled nursing   Expected Discharge Disposition SNF   Does the patient need discharge transport arranged? Yes   RoundTrip coordination needed? Yes   Has discharge transport been arranged? No   Patient Choice   Patient / Family choosing to utilize agency / facility established prior to hospitalization Yes   Intensity of Service   Intensity of Service 0-30 min     Met with patient at bedside, introduced self and role on care transitions team. Admission assessment completed with patient. Address, insurance and contact information verified. Patient lives at home with his roommate Earnest. [facility] PT: 9 OT: 11. Discussed skilled nursing facility at discharge. Patient states he is agreeable to SNF. Patient provided preference of [facility], patient states he was there in the past. Patient requested that TCC call his roommate/POA Earnest to update, confirmed preference for [facility] at discharge. Requested for [facility] to send referral.     PCP: Dr. Sheth     Addendum 1514: [facility] can accept. Patient will need pre-cet.

## 2025-04-18 NOTE — CARE PLAN
The patient's goals for the shift include      The clinical goals for the shift include remain HDS      Problem: Pain - Adult  Goal: Verbalizes/displays adequate comfort level or baseline comfort level  Outcome: Progressing     Problem: Safety - Adult  Goal: Free from fall injury  Outcome: Progressing     Problem: Discharge Planning  Goal: Discharge to home or other facility with appropriate resources  Outcome: Progressing     Problem: Chronic Conditions and Co-morbidities  Goal: Patient's chronic conditions and co-morbidity symptoms are monitored and maintained or improved  Outcome: Progressing     Problem: Nutrition  Goal: Nutrient intake appropriate for maintaining nutritional needs  Outcome: Progressing     Problem: Fall/Injury  Goal: Not fall by end of shift  Outcome: Progressing  Goal: Be free from injury by end of the shift  Outcome: Progressing  Goal: Verbalize understanding of personal risk factors for fall in the hospital  Outcome: Progressing  Goal: Verbalize understanding of risk factor reduction measures to prevent injury from fall in the home  Outcome: Progressing  Goal: Use assistive devices by end of the shift  Outcome: Progressing  Goal: Pace activities to prevent fatigue by end of the shift  Outcome: Progressing     Problem: Skin  Goal: Decreased wound size/increased tissue granulation at next dressing change  Outcome: Progressing  Goal: Participates in plan/prevention/treatment measures  Outcome: Progressing  Goal: Prevent/manage excess moisture  Outcome: Progressing  Goal: Prevent/minimize sheer/friction injuries  Outcome: Progressing  Goal: Promote/optimize nutrition  Outcome: Progressing  Goal: Promote skin healing  Outcome: Progressing

## 2025-04-19 LAB
ALBUMIN SERPL BCP-MCNC: 3.6 G/DL (ref 3.4–5)
ANION GAP SERPL CALC-SCNC: 12 MMOL/L (ref 10–20)
APPEARANCE UR: ABNORMAL
BILIRUB UR STRIP.AUTO-MCNC: NEGATIVE MG/DL
BUN SERPL-MCNC: 21 MG/DL (ref 6–23)
CALCIUM SERPL-MCNC: 8.6 MG/DL (ref 8.6–10.3)
CHLORIDE SERPL-SCNC: 104 MMOL/L (ref 98–107)
CO2 SERPL-SCNC: 24 MMOL/L (ref 21–32)
COLOR UR: YELLOW
CREAT SERPL-MCNC: 0.52 MG/DL (ref 0.5–1.3)
EGFRCR SERPLBLD CKD-EPI 2021: >90 ML/MIN/1.73M*2
ERYTHROCYTE [DISTWIDTH] IN BLOOD BY AUTOMATED COUNT: 12.5 % (ref 11.5–14.5)
GLUCOSE BLD MANUAL STRIP-MCNC: 120 MG/DL (ref 74–99)
GLUCOSE BLD MANUAL STRIP-MCNC: 130 MG/DL (ref 74–99)
GLUCOSE BLD MANUAL STRIP-MCNC: 141 MG/DL (ref 74–99)
GLUCOSE SERPL-MCNC: 108 MG/DL (ref 74–99)
GLUCOSE UR STRIP.AUTO-MCNC: NORMAL MG/DL
HCT VFR BLD AUTO: 32.9 % (ref 41–52)
HGB BLD-MCNC: 11.2 G/DL (ref 13.5–17.5)
KETONES UR STRIP.AUTO-MCNC: NEGATIVE MG/DL
LEUKOCYTE ESTERASE UR QL STRIP.AUTO: ABNORMAL
MAGNESIUM SERPL-MCNC: 1.63 MG/DL (ref 1.6–2.4)
MCH RBC QN AUTO: 33.7 PG (ref 26–34)
MCHC RBC AUTO-ENTMCNC: 34 G/DL (ref 32–36)
MCV RBC AUTO: 99 FL (ref 80–100)
MUCOUS THREADS #/AREA URNS AUTO: ABNORMAL /LPF
NITRITE UR QL STRIP.AUTO: NEGATIVE
NRBC BLD-RTO: 0 /100 WBCS (ref 0–0)
PH UR STRIP.AUTO: 8 [PH]
PHOSPHATE SERPL-MCNC: 3.3 MG/DL (ref 2.5–4.9)
PLATELET # BLD AUTO: 70 X10*3/UL (ref 150–450)
POTASSIUM SERPL-SCNC: 3.1 MMOL/L (ref 3.5–5.3)
PROT UR STRIP.AUTO-MCNC: ABNORMAL MG/DL
RBC # BLD AUTO: 3.32 X10*6/UL (ref 4.5–5.9)
RBC # UR STRIP.AUTO: ABNORMAL MG/DL
RBC #/AREA URNS AUTO: ABNORMAL /HPF
SODIUM SERPL-SCNC: 137 MMOL/L (ref 136–145)
SP GR UR STRIP.AUTO: 1.02
UROBILINOGEN UR STRIP.AUTO-MCNC: ABNORMAL MG/DL
WBC # BLD AUTO: 6.2 X10*3/UL (ref 4.4–11.3)
WBC #/AREA URNS AUTO: >50 /HPF

## 2025-04-19 PROCEDURE — 36415 COLL VENOUS BLD VENIPUNCTURE: CPT

## 2025-04-19 PROCEDURE — 2500000001 HC RX 250 WO HCPCS SELF ADMINISTERED DRUGS (ALT 637 FOR MEDICARE OP)

## 2025-04-19 PROCEDURE — 81001 URINALYSIS AUTO W/SCOPE: CPT

## 2025-04-19 PROCEDURE — 82947 ASSAY GLUCOSE BLOOD QUANT: CPT

## 2025-04-19 PROCEDURE — 83735 ASSAY OF MAGNESIUM: CPT

## 2025-04-19 PROCEDURE — 85027 COMPLETE CBC AUTOMATED: CPT

## 2025-04-19 PROCEDURE — 84100 ASSAY OF PHOSPHORUS: CPT

## 2025-04-19 PROCEDURE — 2500000002 HC RX 250 W HCPCS SELF ADMINISTERED DRUGS (ALT 637 FOR MEDICARE OP, ALT 636 FOR OP/ED)

## 2025-04-19 PROCEDURE — 2060000001 HC INTERMEDIATE ICU ROOM DAILY

## 2025-04-19 RX ORDER — POTASSIUM CHLORIDE 1.5 G/1.58G
40 POWDER, FOR SOLUTION ORAL 2 TIMES DAILY
Status: COMPLETED | OUTPATIENT
Start: 2025-04-19 | End: 2025-04-19

## 2025-04-19 RX ADMIN — Medication 1 TABLET: at 08:41

## 2025-04-19 RX ADMIN — AMLODIPINE BESYLATE 5 MG: 5 TABLET ORAL at 08:41

## 2025-04-19 RX ADMIN — LEVOTHYROXINE SODIUM 75 MCG: 0.07 TABLET ORAL at 05:49

## 2025-04-19 RX ADMIN — ESCITALOPRAM OXALATE 10 MG: 10 TABLET ORAL at 08:41

## 2025-04-19 RX ADMIN — PHENOBARBITAL 64.8 MG: 32.4 TABLET ORAL at 15:53

## 2025-04-19 RX ADMIN — FOLIC ACID 1 MG: 1 TABLET ORAL at 08:40

## 2025-04-19 RX ADMIN — POTASSIUM CHLORIDE 40 MEQ: 1.5 POWDER, FOR SOLUTION ORAL at 08:41

## 2025-04-19 RX ADMIN — ROPINIROLE HYDROCHLORIDE 1 MG: 1 TABLET, FILM COATED ORAL at 21:07

## 2025-04-19 RX ADMIN — THIAMINE HCL TAB 100 MG 100 MG: 100 TAB at 08:40

## 2025-04-19 RX ADMIN — POTASSIUM CHLORIDE 40 MEQ: 1.5 POWDER, FOR SOLUTION ORAL at 21:07

## 2025-04-19 RX ADMIN — PHENOBARBITAL 64.8 MG: 32.4 TABLET ORAL at 21:07

## 2025-04-19 RX ADMIN — PHENOBARBITAL 64.8 MG: 32.4 TABLET ORAL at 08:40

## 2025-04-19 RX ADMIN — BUPROPION HYDROCHLORIDE 150 MG: 150 TABLET, EXTENDED RELEASE ORAL at 08:41

## 2025-04-19 ASSESSMENT — LIFESTYLE VARIABLES
ANXIETY: NO ANXIETY, AT EASE
PAROXYSMAL SWEATS: NO SWEAT VISIBLE
VISUAL DISTURBANCES: NOT PRESENT
ORIENTATION AND CLOUDING OF SENSORIUM: ORIENTED AND CAN DO SERIAL ADDITIONS
HEADACHE, FULLNESS IN HEAD: NOT PRESENT
NAUSEA AND VOMITING: NO NAUSEA AND NO VOMITING
TREMOR: 2
ANXIETY: NO ANXIETY, AT EASE
PAROXYSMAL SWEATS: NO SWEAT VISIBLE
TREMOR: NO TREMOR
TREMOR: 2
AGITATION: NORMAL ACTIVITY
AGITATION: NORMAL ACTIVITY
PAROXYSMAL SWEATS: NO SWEAT VISIBLE
TOTAL SCORE: 2
TOTAL SCORE: 3
ORIENTATION AND CLOUDING OF SENSORIUM: ORIENTED AND CAN DO SERIAL ADDITIONS
TREMOR: NOT VISIBLE, BUT CAN BE FELT FINGERTIP TO FINGERTIP
HEADACHE, FULLNESS IN HEAD: NOT PRESENT
HEADACHE, FULLNESS IN HEAD: NOT PRESENT
ORIENTATION AND CLOUDING OF SENSORIUM: ORIENTED AND CAN DO SERIAL ADDITIONS
NAUSEA AND VOMITING: NO NAUSEA AND NO VOMITING
TOTAL SCORE: 0
HEADACHE, FULLNESS IN HEAD: NOT PRESENT
AGITATION: NORMAL ACTIVITY
ORIENTATION AND CLOUDING OF SENSORIUM: ORIENTED AND CAN DO SERIAL ADDITIONS
AUDITORY DISTURBANCES: NOT PRESENT
NAUSEA AND VOMITING: NO NAUSEA AND NO VOMITING
AGITATION: NORMAL ACTIVITY
ANXIETY: NO ANXIETY, AT EASE
TREMOR: 3
TOTAL SCORE: 1
VISUAL DISTURBANCES: NOT PRESENT
AUDITORY DISTURBANCES: NOT PRESENT
NAUSEA AND VOMITING: NO NAUSEA AND NO VOMITING
AUDITORY DISTURBANCES: NOT PRESENT
ANXIETY: NO ANXIETY, AT EASE
PAROXYSMAL SWEATS: NO SWEAT VISIBLE
NAUSEA AND VOMITING: NO NAUSEA AND NO VOMITING
ORIENTATION AND CLOUDING OF SENSORIUM: ORIENTED AND CAN DO SERIAL ADDITIONS
ANXIETY: NO ANXIETY, AT EASE
PAROXYSMAL SWEATS: NO SWEAT VISIBLE
AGITATION: NORMAL ACTIVITY
AUDITORY DISTURBANCES: NOT PRESENT
AUDITORY DISTURBANCES: NOT PRESENT
TOTAL SCORE: 2
HEADACHE, FULLNESS IN HEAD: NOT PRESENT
VISUAL DISTURBANCES: NOT PRESENT

## 2025-04-19 ASSESSMENT — PAIN SCALES - GENERAL
PAINLEVEL_OUTOF10: 0 - NO PAIN
PAINLEVEL_OUTOF10: 0 - NO PAIN

## 2025-04-19 NOTE — CARE PLAN
The patient's goals for the shift include  maintain safety     The clinical goals for the shift include patient will remain safe and will remain hemodynamically stable

## 2025-04-19 NOTE — PROGRESS NOTES
Internal Medicine Progress Note         Humble Banks is a 69 y.o. male on day 4 of admission presenting with Alcoholic intoxication without complication.    SUBJECTIVE    Seen and examined this morning.  Overnight he scored a 2 on the CIWA protocol.  OBJECTIVE    Vitals:    04/18/25 1525 04/18/25 1959 04/18/25 2317 04/19/25 0301   BP: 113/81 122/88 129/76 141/88   BP Location: Left arm      Patient Position: Lying      Pulse: 70 73 51 81   Resp: 19 18 18 18   Temp: 36.8 °C (98.2 °F) 36.6 °C (97.9 °F) 36.1 °C (97 °F) 36.4 °C (97.5 °F)   TempSrc: Temporal      SpO2: 95% 94% 95% 98%   Weight:       Height:          Results from last 7 days   Lab Units 04/19/25  0527 04/15/25  0505 04/14/25  1735   WBC AUTO x10*3/uL 6.2   < > 4.6   HEMOGLOBIN g/dL 11.2*   < > 12.0*   HEMATOCRIT % 32.9*   < > 36.3*   PLATELETS AUTO x10*3/uL 70*   < > 49*   NEUTROS PCT AUTO %  --   --  33.2   LYMPHS PCT AUTO %  --   --  43.3   MONOS PCT AUTO %  --   --  16.5   EOS PCT AUTO %  --   --  4.8    < > = values in this interval not displayed.     Results from last 7 days   Lab Units 04/19/25  0527 04/16/25  0726 04/15/25  0505   SODIUM mmol/L 137   < > 143   POTASSIUM mmol/L 3.1*   < > 3.8   CHLORIDE mmol/L 104   < > 106   CO2 mmol/L 24   < > 24   BUN mg/dL 21   < > 9   CREATININE mg/dL 0.52   < > 0.63   CALCIUM mg/dL 8.6   < > 8.6   PROTEIN TOTAL g/dL  --   --  6.9   BILIRUBIN TOTAL mg/dL  --   --  0.8   ALK PHOS U/L  --   --  67   ALT U/L  --   --  50   AST U/L  --   --  93*   GLUCOSE mg/dL 108*   < > 88    < > = values in this interval not displayed.       Scheduled Medications  Scheduled Medications[1]       Physical Exam  General: No acute distress  HEENT: EOMI  CV: Tachycardic.   Pulm: Clear to auscultation bilaterally, no wheezings, rales or rhonchi  Abd: Nondistended, nontender  Skin: No rashes, warm and dry.   Ext: no cyanosis or edema  Neuro: at baseline. Tremor  present.        ASSESSMENT & PLAN  Humble Banks is a 69 y.o. male with a past medical history of alcohol abuse disorder, suspected Warnicke encephalopathy, generalized anxiety disorder, depression, dyslipidemia, hypertension, hypothyroidism, presented to [facility] found on the ground by his roommate.      #Acute alcohol withdrawal   #Hx alcohol use disorder  #Acute alcoholic hepatitis (AST: ALT ratio greater than 2 on admission)  #Mechanical fall vs. Syncope   PLAN:  -continue CIWA protocol, discontinue PRN ativan  -switched phenobarbital taper to oral   -continue thiamine, folate, multivitamin  -encouraged alcohol cessation   -continue tele monitoring   -consulted PT/OT  -consulted TCC for discharge planning      #Chronic macrocytic anemia  #Acute on chronic thrombocytopenia  -Thrombocytopenia likely in setting of alcohol abuse from chronic bone marrow suppression  PLAN:  -hold DVT prophylaxis  -continue SCDs  -Trend CBC  -continue supplements     #Hypomagnesemia, improved  #Hypokalemia  PLAN:  -trend RFP  -Replete as necessary     #Hematuria  - Previous CT scans do show an obstructing stone  -UA ordered to further evaluate     Chronic medical conditions:  #Depression  #Anxiety  #Hypothyroidism  #Restless leg syndrome  -Continue bupropion, escitalopram, levothyroxine, ropinirole, amlodipine   -holding propanolol      DVT prophylaxis: SCDs  GI prophylaxis: None  Consults: None  Diet: Regular  CODE STATUS: Full code    Agusto Almanzar MD PGY-1 Internal Medicine  Please SecureChat for any further questions  This is a preliminary note, please await attending attestation for final A/P            [1] amLODIPine, 5 mg, oral, Daily  buPROPion XL, 150 mg, oral, Daily  escitalopram, 10 mg, oral, Daily  folic acid, 1 mg, oral, Daily  levothyroxine, 75 mcg, oral, Daily  multivitamin with minerals, 1 tablet, oral, Daily  PHENobarbital, 64.8 mg, oral, TID   Followed by  [START ON 4/21/2025] PHENobarbital, 32.4 mg, oral,  TID  pneumoc 20-shonda conj-dip cr(PF), 0.5 mL, intramuscular, During hospitalization  polyethylene glycol, 17 g, oral, Daily  [Held by provider] propranolol, 40 mg, oral, BID  rOPINIRole, 1 mg, oral, Nightly  thiamine, 100 mg, oral, Daily

## 2025-04-19 NOTE — CARE PLAN
The patient's goals for the shift include      The clinical goals for the shift include patient CIWA score will remain below 4

## 2025-04-20 VITALS
HEART RATE: 78 BPM | DIASTOLIC BLOOD PRESSURE: 88 MMHG | BODY MASS INDEX: 32.14 KG/M2 | SYSTOLIC BLOOD PRESSURE: 127 MMHG | TEMPERATURE: 97.5 F | OXYGEN SATURATION: 98 % | HEIGHT: 66 IN | RESPIRATION RATE: 20 BRPM | WEIGHT: 200 LBS

## 2025-04-20 LAB
ALBUMIN SERPL BCP-MCNC: 3.6 G/DL (ref 3.4–5)
ANION GAP SERPL CALC-SCNC: 13 MMOL/L (ref 10–20)
BUN SERPL-MCNC: 15 MG/DL (ref 6–23)
CALCIUM SERPL-MCNC: 8.6 MG/DL (ref 8.6–10.3)
CHLORIDE SERPL-SCNC: 103 MMOL/L (ref 98–107)
CO2 SERPL-SCNC: 24 MMOL/L (ref 21–32)
CREAT SERPL-MCNC: 0.56 MG/DL (ref 0.5–1.3)
EGFRCR SERPLBLD CKD-EPI 2021: >90 ML/MIN/1.73M*2
ERYTHROCYTE [DISTWIDTH] IN BLOOD BY AUTOMATED COUNT: 12.1 % (ref 11.5–14.5)
GLUCOSE BLD MANUAL STRIP-MCNC: 122 MG/DL (ref 74–99)
GLUCOSE BLD MANUAL STRIP-MCNC: 126 MG/DL (ref 74–99)
GLUCOSE BLD MANUAL STRIP-MCNC: 99 MG/DL (ref 74–99)
GLUCOSE SERPL-MCNC: 108 MG/DL (ref 74–99)
HCT VFR BLD AUTO: 34.3 % (ref 41–52)
HGB BLD-MCNC: 11 G/DL (ref 13.5–17.5)
MAGNESIUM SERPL-MCNC: 1.55 MG/DL (ref 1.6–2.4)
MCH RBC QN AUTO: 32.8 PG (ref 26–34)
MCHC RBC AUTO-ENTMCNC: 32.1 G/DL (ref 32–36)
MCV RBC AUTO: 102 FL (ref 80–100)
NRBC BLD-RTO: 0 /100 WBCS (ref 0–0)
PHOSPHATE SERPL-MCNC: 3 MG/DL (ref 2.5–4.9)
PLATELET # BLD AUTO: 102 X10*3/UL (ref 150–450)
POTASSIUM SERPL-SCNC: 3.5 MMOL/L (ref 3.5–5.3)
RBC # BLD AUTO: 3.35 X10*6/UL (ref 4.5–5.9)
SODIUM SERPL-SCNC: 136 MMOL/L (ref 136–145)
WBC # BLD AUTO: 6.4 X10*3/UL (ref 4.4–11.3)

## 2025-04-20 PROCEDURE — 2500000002 HC RX 250 W HCPCS SELF ADMINISTERED DRUGS (ALT 637 FOR MEDICARE OP, ALT 636 FOR OP/ED)

## 2025-04-20 PROCEDURE — 83735 ASSAY OF MAGNESIUM: CPT

## 2025-04-20 PROCEDURE — 2060000001 HC INTERMEDIATE ICU ROOM DAILY

## 2025-04-20 PROCEDURE — 2500000001 HC RX 250 WO HCPCS SELF ADMINISTERED DRUGS (ALT 637 FOR MEDICARE OP)

## 2025-04-20 PROCEDURE — 2500000004 HC RX 250 GENERAL PHARMACY W/ HCPCS (ALT 636 FOR OP/ED)

## 2025-04-20 PROCEDURE — 80069 RENAL FUNCTION PANEL: CPT

## 2025-04-20 PROCEDURE — 85027 COMPLETE CBC AUTOMATED: CPT

## 2025-04-20 PROCEDURE — 36415 COLL VENOUS BLD VENIPUNCTURE: CPT

## 2025-04-20 PROCEDURE — 82947 ASSAY GLUCOSE BLOOD QUANT: CPT

## 2025-04-20 RX ORDER — MAGNESIUM SULFATE HEPTAHYDRATE 40 MG/ML
2 INJECTION, SOLUTION INTRAVENOUS ONCE
Status: COMPLETED | OUTPATIENT
Start: 2025-04-20 | End: 2025-04-20

## 2025-04-20 RX ORDER — POTASSIUM CHLORIDE 20 MEQ/1
40 TABLET, EXTENDED RELEASE ORAL ONCE
Status: COMPLETED | OUTPATIENT
Start: 2025-04-20 | End: 2025-04-20

## 2025-04-20 RX ADMIN — FOLIC ACID 1 MG: 1 TABLET ORAL at 08:55

## 2025-04-20 RX ADMIN — PHENOBARBITAL 64.8 MG: 32.4 TABLET ORAL at 21:26

## 2025-04-20 RX ADMIN — ESCITALOPRAM OXALATE 10 MG: 10 TABLET ORAL at 08:55

## 2025-04-20 RX ADMIN — POTASSIUM CHLORIDE 40 MEQ: 1500 TABLET, EXTENDED RELEASE ORAL at 08:45

## 2025-04-20 RX ADMIN — BUPROPION HYDROCHLORIDE 150 MG: 150 TABLET, EXTENDED RELEASE ORAL at 08:55

## 2025-04-20 RX ADMIN — MAGNESIUM SULFATE HEPTAHYDRATE 2 G: 40 INJECTION, SOLUTION INTRAVENOUS at 08:45

## 2025-04-20 RX ADMIN — Medication 1 TABLET: at 08:54

## 2025-04-20 RX ADMIN — PHENOBARBITAL 64.8 MG: 32.4 TABLET ORAL at 08:54

## 2025-04-20 RX ADMIN — LEVOTHYROXINE SODIUM 75 MCG: 0.07 TABLET ORAL at 06:56

## 2025-04-20 RX ADMIN — PHENOBARBITAL 64.8 MG: 32.4 TABLET ORAL at 15:56

## 2025-04-20 RX ADMIN — AMLODIPINE BESYLATE 5 MG: 5 TABLET ORAL at 08:54

## 2025-04-20 RX ADMIN — THIAMINE HCL TAB 100 MG 100 MG: 100 TAB at 08:55

## 2025-04-20 RX ADMIN — ROPINIROLE HYDROCHLORIDE 1 MG: 1 TABLET, FILM COATED ORAL at 21:26

## 2025-04-20 ASSESSMENT — COGNITIVE AND FUNCTIONAL STATUS - GENERAL
TURNING FROM BACK TO SIDE WHILE IN FLAT BAD: A LITTLE
TOILETING: A LOT
DAILY ACTIVITIY SCORE: 12
PERSONAL GROOMING: A LOT
CLIMB 3 TO 5 STEPS WITH RAILING: A LITTLE
PERSONAL GROOMING: A LOT
DRESSING REGULAR UPPER BODY CLOTHING: A LOT
EATING MEALS: A LOT
DAILY ACTIVITIY SCORE: 14
HELP NEEDED FOR BATHING: A LOT
MOBILITY SCORE: 18
WALKING IN HOSPITAL ROOM: A LITTLE
DRESSING REGULAR UPPER BODY CLOTHING: A LOT
STANDING UP FROM CHAIR USING ARMS: A LITTLE
TOILETING: A LOT
MOVING FROM LYING ON BACK TO SITTING ON SIDE OF FLAT BED WITH BEDRAILS: A LITTLE
HELP NEEDED FOR BATHING: A LOT
DRESSING REGULAR LOWER BODY CLOTHING: A LOT
DRESSING REGULAR LOWER BODY CLOTHING: A LOT
MOVING TO AND FROM BED TO CHAIR: A LITTLE

## 2025-04-20 ASSESSMENT — LIFESTYLE VARIABLES
ANXIETY: NO ANXIETY, AT EASE
NAUSEA AND VOMITING: NO NAUSEA AND NO VOMITING
TOTAL SCORE: 1
TREMOR: NOT VISIBLE, BUT CAN BE FELT FINGERTIP TO FINGERTIP
ORIENTATION AND CLOUDING OF SENSORIUM: ORIENTED AND CAN DO SERIAL ADDITIONS
AUDITORY DISTURBANCES: NOT PRESENT
PAROXYSMAL SWEATS: NO SWEAT VISIBLE
ANXIETY: NO ANXIETY, AT EASE
ORIENTATION AND CLOUDING OF SENSORIUM: ORIENTED AND CAN DO SERIAL ADDITIONS
PAROXYSMAL SWEATS: NO SWEAT VISIBLE
HEADACHE, FULLNESS IN HEAD: NOT PRESENT
VISUAL DISTURBANCES: NOT PRESENT
VISUAL DISTURBANCES: NOT PRESENT
HEADACHE, FULLNESS IN HEAD: NOT PRESENT
NAUSEA AND VOMITING: NO NAUSEA AND NO VOMITING
ANXIETY: NO ANXIETY, AT EASE
NAUSEA AND VOMITING: NO NAUSEA AND NO VOMITING
TREMOR: NOT VISIBLE, BUT CAN BE FELT FINGERTIP TO FINGERTIP
TREMOR: NOT VISIBLE, BUT CAN BE FELT FINGERTIP TO FINGERTIP
AUDITORY DISTURBANCES: NOT PRESENT
VISUAL DISTURBANCES: NOT PRESENT
ANXIETY: NO ANXIETY, AT EASE
TOTAL SCORE: 1
TOTAL SCORE: 1
TREMOR: NOT VISIBLE, BUT CAN BE FELT FINGERTIP TO FINGERTIP
PAROXYSMAL SWEATS: NO SWEAT VISIBLE
ANXIETY: NO ANXIETY, AT EASE
AGITATION: NORMAL ACTIVITY
VISUAL DISTURBANCES: NOT PRESENT
AUDITORY DISTURBANCES: NOT PRESENT
AUDITORY DISTURBANCES: NOT PRESENT
AGITATION: NORMAL ACTIVITY
ORIENTATION AND CLOUDING OF SENSORIUM: ORIENTED AND CAN DO SERIAL ADDITIONS
HEADACHE, FULLNESS IN HEAD: NOT PRESENT
AGITATION: NORMAL ACTIVITY
NAUSEA AND VOMITING: NO NAUSEA AND NO VOMITING
TOTAL SCORE: 1
TOTAL SCORE: 1
HEADACHE, FULLNESS IN HEAD: NOT PRESENT
ORIENTATION AND CLOUDING OF SENSORIUM: ORIENTED AND CAN DO SERIAL ADDITIONS
AGITATION: NORMAL ACTIVITY
VISUAL DISTURBANCES: NOT PRESENT
NAUSEA AND VOMITING: NO NAUSEA AND NO VOMITING
PAROXYSMAL SWEATS: NO SWEAT VISIBLE
HEADACHE, FULLNESS IN HEAD: NOT PRESENT
TREMOR: NOT VISIBLE, BUT CAN BE FELT FINGERTIP TO FINGERTIP
PAROXYSMAL SWEATS: NO SWEAT VISIBLE
AUDITORY DISTURBANCES: NOT PRESENT
ORIENTATION AND CLOUDING OF SENSORIUM: ORIENTED AND CAN DO SERIAL ADDITIONS
AGITATION: NORMAL ACTIVITY

## 2025-04-20 ASSESSMENT — PAIN SCALES - GENERAL
PAINLEVEL_OUTOF10: 0 - NO PAIN
PAINLEVEL_OUTOF10: 0 - NO PAIN

## 2025-04-20 NOTE — PROGRESS NOTES
Internal Medicine Progress Note         Humble Banks is a 69 y.o. male on day 5 of admission presenting with Alcoholic intoxication without complication.    SUBJECTIVE    Seen and examined this morning.  Reports sleeping well, does not appreciate any signs of withdrawal.  Adequate appetite and denies any additional complaints. Would like to try and walk today.    OBJECTIVE    Vitals:    04/19/25 1719 04/19/25 2035 04/19/25 2327 04/20/25 0344   BP: 125/86 140/88 139/88 139/82   BP Location:  Right arm Right arm Right arm   Patient Position:  Lying Lying Lying   Pulse:  77 97 75   Resp:  20 20 20   Temp:  36.6 °C (97.9 °F) 36.5 °C (97.7 °F) 36 °C (96.8 °F)   TempSrc:  Temporal Temporal Temporal   SpO2:  95% 97% 96%   Weight:       Height:          Results from last 7 days   Lab Units 04/20/25  0544 04/15/25  0505 04/14/25  1735   WBC AUTO x10*3/uL 6.4   < > 4.6   HEMOGLOBIN g/dL 11.0*   < > 12.0*   HEMATOCRIT % 34.3*   < > 36.3*   PLATELETS AUTO x10*3/uL 102*   < > 49*   NEUTROS PCT AUTO %  --   --  33.2   LYMPHS PCT AUTO %  --   --  43.3   MONOS PCT AUTO %  --   --  16.5   EOS PCT AUTO %  --   --  4.8    < > = values in this interval not displayed.     Results from last 7 days   Lab Units 04/20/25  0544 04/16/25  0726 04/15/25  0505   SODIUM mmol/L 136   < > 143   POTASSIUM mmol/L 3.5   < > 3.8   CHLORIDE mmol/L 103   < > 106   CO2 mmol/L 24   < > 24   BUN mg/dL 15   < > 9   CREATININE mg/dL 0.56   < > 0.63   CALCIUM mg/dL 8.6   < > 8.6   PROTEIN TOTAL g/dL  --   --  6.9   BILIRUBIN TOTAL mg/dL  --   --  0.8   ALK PHOS U/L  --   --  67   ALT U/L  --   --  50   AST U/L  --   --  93*   GLUCOSE mg/dL 108*   < > 88    < > = values in this interval not displayed.       Scheduled Medications  Scheduled Medications[1]       Physical Exam  General: No acute distress  HEENT: EOMI  CV: Tachycardic.   Pulm: Clear to auscultation bilaterally, no wheezings, rales  or rhonchi  Abd: Nondistended, nontender  Skin: No rashes, warm and dry.   Ext: no cyanosis or edema  Neuro: at baseline. Tremor present.        ASSESSMENT & PLAN  Humble Banks is a 69 y.o. male with a past medical history of alcohol abuse disorder, suspected Warnicke encephalopathy, generalized anxiety disorder, depression, dyslipidemia, hypertension, hypothyroidism, presented to [facility] found on the ground by his roommate.      #Acute alcohol withdrawal   #Hx alcohol use disorder  #Acute alcoholic hepatitis (AST: ALT ratio greater than 2 on admission)  #Mechanical fall vs. Syncope   PLAN:  -continue CIWA protocol  - Continue phenobarb taper  -continue thiamine, folate, multivitamin  -encouraged alcohol cessation   -continue tele monitoring   -consulted PT/OT  -consulted TCC for discharge planning      #Chronic macrocytic anemia  #Acute on chronic thrombocytopenia, improving  -Thrombocytopenia likely in setting of alcohol abuse from chronic bone marrow suppression  PLAN:  -hold DVT prophylaxis  -continue SCDs  -Trend CBC  -continue supplements     #Hypokalemia, resolved  PLAN:  -trend RFP  -Replete as necessary     #Hematuria  - Previous CT scans do show an obstructing stone  -UA showing minimal blood, patient is asymptomatic      Chronic medical conditions:  #Depression  #Anxiety  #Hypothyroidism  #Restless leg syndrome  -Continue bupropion, escitalopram, levothyroxine, ropinirole, amlodipine   -holding propanolol      DVT prophylaxis: SCDs  GI prophylaxis: None  Consults: None  Diet: Regular  CODE STATUS: Full code    Agusto Almanzar MD PGY-1 Internal Medicine  Please SecureChat for any further questions  This is a preliminary note, please await attending attestation for final A/P            [1] amLODIPine, 5 mg, oral, Daily  buPROPion XL, 150 mg, oral, Daily  escitalopram, 10 mg, oral, Daily  folic acid, 1 mg, oral, Daily  levothyroxine, 75 mcg, oral, Daily  multivitamin with minerals, 1 tablet, oral,  Daily  PHENobarbital, 64.8 mg, oral, TID   Followed by  [START ON 4/21/2025] PHENobarbital, 32.4 mg, oral, TID  pneumoc 20-shonda conj-dip cr(PF), 0.5 mL, intramuscular, During hospitalization  [Held by provider] polyethylene glycol, 17 g, oral, Daily  [Held by provider] propranolol, 40 mg, oral, BID  rOPINIRole, 1 mg, oral, Nightly  thiamine, 100 mg, oral, Daily

## 2025-04-20 NOTE — CARE PLAN
The patient's goals for the shift include  rest    The clinical goals for the shift include remain hemodynamically stable      Problem: Chronic Conditions and Co-morbidities  Goal: Patient's chronic conditions and co-morbidity symptoms are monitored and maintained or improved  Outcome: Progressing     Problem: Fall/Injury  Goal: Be free from injury by end of the shift  Outcome: Progressing     Problem: Skin  Goal: Prevent/minimize sheer/friction injuries  Outcome: Progressing

## 2025-04-20 NOTE — CARE PLAN
The patient's goals for the shift include      The clinical goals for the shift include patient CIWA score will remain below 4      Problem: Safety - Adult  Goal: Free from fall injury  Outcome: Progressing     Problem: Fall/Injury  Goal: Verbalize understanding of personal risk factors for fall in the hospital  Outcome: Progressing

## 2025-04-21 LAB
ALBUMIN SERPL BCP-MCNC: 3.8 G/DL (ref 3.4–5)
ANION GAP SERPL CALC-SCNC: 11 MMOL/L (ref 10–20)
BUN SERPL-MCNC: 11 MG/DL (ref 6–23)
CALCIUM SERPL-MCNC: 8.9 MG/DL (ref 8.6–10.3)
CHLORIDE SERPL-SCNC: 103 MMOL/L (ref 98–107)
CO2 SERPL-SCNC: 22 MMOL/L (ref 21–32)
CREAT SERPL-MCNC: 0.55 MG/DL (ref 0.5–1.3)
EGFRCR SERPLBLD CKD-EPI 2021: >90 ML/MIN/1.73M*2
ERYTHROCYTE [DISTWIDTH] IN BLOOD BY AUTOMATED COUNT: 12.1 % (ref 11.5–14.5)
GLUCOSE BLD MANUAL STRIP-MCNC: 110 MG/DL (ref 74–99)
GLUCOSE BLD MANUAL STRIP-MCNC: 111 MG/DL (ref 74–99)
GLUCOSE BLD MANUAL STRIP-MCNC: 121 MG/DL (ref 74–99)
GLUCOSE SERPL-MCNC: 96 MG/DL (ref 74–99)
HCT VFR BLD AUTO: 34.7 % (ref 41–52)
HGB BLD-MCNC: 11.5 G/DL (ref 13.5–17.5)
MAGNESIUM SERPL-MCNC: 1.73 MG/DL (ref 1.6–2.4)
MCH RBC QN AUTO: 33.3 PG (ref 26–34)
MCHC RBC AUTO-ENTMCNC: 33.1 G/DL (ref 32–36)
MCV RBC AUTO: 101 FL (ref 80–100)
NRBC BLD-RTO: 0 /100 WBCS (ref 0–0)
PHOSPHATE SERPL-MCNC: 3.2 MG/DL (ref 2.5–4.9)
PLATELET # BLD AUTO: 122 X10*3/UL (ref 150–450)
POTASSIUM SERPL-SCNC: 3.9 MMOL/L (ref 3.5–5.3)
RBC # BLD AUTO: 3.45 X10*6/UL (ref 4.5–5.9)
SODIUM SERPL-SCNC: 132 MMOL/L (ref 136–145)
WBC # BLD AUTO: 6.9 X10*3/UL (ref 4.4–11.3)

## 2025-04-21 PROCEDURE — 2500000001 HC RX 250 WO HCPCS SELF ADMINISTERED DRUGS (ALT 637 FOR MEDICARE OP)

## 2025-04-21 PROCEDURE — 83735 ASSAY OF MAGNESIUM: CPT

## 2025-04-21 PROCEDURE — 97535 SELF CARE MNGMENT TRAINING: CPT | Mod: GO

## 2025-04-21 PROCEDURE — 80069 RENAL FUNCTION PANEL: CPT

## 2025-04-21 PROCEDURE — 82947 ASSAY GLUCOSE BLOOD QUANT: CPT

## 2025-04-21 PROCEDURE — 85027 COMPLETE CBC AUTOMATED: CPT

## 2025-04-21 PROCEDURE — 2500000002 HC RX 250 W HCPCS SELF ADMINISTERED DRUGS (ALT 637 FOR MEDICARE OP, ALT 636 FOR OP/ED)

## 2025-04-21 PROCEDURE — 2500000004 HC RX 250 GENERAL PHARMACY W/ HCPCS (ALT 636 FOR OP/ED): Mod: JZ

## 2025-04-21 PROCEDURE — 36415 COLL VENOUS BLD VENIPUNCTURE: CPT

## 2025-04-21 PROCEDURE — 2060000001 HC INTERMEDIATE ICU ROOM DAILY

## 2025-04-21 RX ORDER — ENOXAPARIN SODIUM 100 MG/ML
40 INJECTION SUBCUTANEOUS EVERY 24 HOURS
Status: DISCONTINUED | OUTPATIENT
Start: 2025-04-21 | End: 2025-04-22 | Stop reason: HOSPADM

## 2025-04-21 RX ADMIN — ROPINIROLE HYDROCHLORIDE 1 MG: 1 TABLET, FILM COATED ORAL at 20:42

## 2025-04-21 RX ADMIN — LEVOTHYROXINE SODIUM 75 MCG: 0.07 TABLET ORAL at 05:17

## 2025-04-21 RX ADMIN — ENOXAPARIN SODIUM 40 MG: 40 INJECTION SUBCUTANEOUS at 08:24

## 2025-04-21 RX ADMIN — PHENOBARBITAL 32.4 MG: 32.4 TABLET ORAL at 15:54

## 2025-04-21 RX ADMIN — PHENOBARBITAL 32.4 MG: 32.4 TABLET ORAL at 20:42

## 2025-04-21 RX ADMIN — AMLODIPINE BESYLATE 5 MG: 5 TABLET ORAL at 08:25

## 2025-04-21 RX ADMIN — PHENOBARBITAL 32.4 MG: 32.4 TABLET ORAL at 08:24

## 2025-04-21 RX ADMIN — ESCITALOPRAM OXALATE 10 MG: 10 TABLET ORAL at 08:25

## 2025-04-21 RX ADMIN — FOLIC ACID 1 MG: 1 TABLET ORAL at 08:24

## 2025-04-21 RX ADMIN — BUPROPION HYDROCHLORIDE 150 MG: 150 TABLET, EXTENDED RELEASE ORAL at 08:25

## 2025-04-21 RX ADMIN — THIAMINE HCL TAB 100 MG 100 MG: 100 TAB at 08:25

## 2025-04-21 RX ADMIN — Medication 1 TABLET: at 08:25

## 2025-04-21 ASSESSMENT — COGNITIVE AND FUNCTIONAL STATUS - GENERAL
HELP NEEDED FOR BATHING: A LITTLE
WALKING IN HOSPITAL ROOM: A LITTLE
STANDING UP FROM CHAIR USING ARMS: A LITTLE
TURNING FROM BACK TO SIDE WHILE IN FLAT BAD: A LITTLE
DRESSING REGULAR LOWER BODY CLOTHING: A LITTLE
WALKING IN HOSPITAL ROOM: A LITTLE
MOVING TO AND FROM BED TO CHAIR: A LITTLE
DRESSING REGULAR UPPER BODY CLOTHING: A LITTLE
MOVING TO AND FROM BED TO CHAIR: A LITTLE
DAILY ACTIVITIY SCORE: 16
PERSONAL GROOMING: A LITTLE
TOILETING: A LOT
MOVING FROM LYING ON BACK TO SITTING ON SIDE OF FLAT BED WITH BEDRAILS: A LITTLE
CLIMB 3 TO 5 STEPS WITH RAILING: A LOT
DRESSING REGULAR LOWER BODY CLOTHING: A LITTLE
TURNING FROM BACK TO SIDE WHILE IN FLAT BAD: A LITTLE
CLIMB 3 TO 5 STEPS WITH RAILING: A LITTLE
PERSONAL GROOMING: A LOT
MOBILITY SCORE: 18
MOVING FROM LYING ON BACK TO SITTING ON SIDE OF FLAT BED WITH BEDRAILS: A LITTLE
DAILY ACTIVITIY SCORE: 19
STANDING UP FROM CHAIR USING ARMS: A LITTLE
MOBILITY SCORE: 17
HELP NEEDED FOR BATHING: A LOT
TOILETING: A LITTLE
DRESSING REGULAR UPPER BODY CLOTHING: A LITTLE

## 2025-04-21 ASSESSMENT — PAIN SCALES - GENERAL
PAINLEVEL_OUTOF10: 0 - NO PAIN
PAINLEVEL_OUTOF10: 0 - NO PAIN
PAINLEVEL_OUTOF10: 9

## 2025-04-21 ASSESSMENT — LIFESTYLE VARIABLES
ORIENTATION AND CLOUDING OF SENSORIUM: ORIENTED AND CAN DO SERIAL ADDITIONS
AUDITORY DISTURBANCES: NOT PRESENT
VISUAL DISTURBANCES: NOT PRESENT
TOTAL SCORE: 1
TREMOR: NOT VISIBLE, BUT CAN BE FELT FINGERTIP TO FINGERTIP
HEADACHE, FULLNESS IN HEAD: NOT PRESENT
ANXIETY: NO ANXIETY, AT EASE
VISUAL DISTURBANCES: NOT PRESENT
TOTAL SCORE: 1
NAUSEA AND VOMITING: NO NAUSEA AND NO VOMITING
BLOOD PRESSURE: 140/72
NAUSEA AND VOMITING: NO NAUSEA AND NO VOMITING
ANXIETY: NO ANXIETY, AT EASE
TREMOR: NOT VISIBLE, BUT CAN BE FELT FINGERTIP TO FINGERTIP
HEADACHE, FULLNESS IN HEAD: NOT PRESENT
VISUAL DISTURBANCES: NOT PRESENT
PAROXYSMAL SWEATS: NO SWEAT VISIBLE
ANXIETY: NO ANXIETY, AT EASE
AGITATION: NORMAL ACTIVITY
PAROXYSMAL SWEATS: NO SWEAT VISIBLE
AUDITORY DISTURBANCES: NOT PRESENT
AGITATION: NORMAL ACTIVITY
ORIENTATION AND CLOUDING OF SENSORIUM: ORIENTED AND CAN DO SERIAL ADDITIONS
TREMOR: NOT VISIBLE, BUT CAN BE FELT FINGERTIP TO FINGERTIP
HEADACHE, FULLNESS IN HEAD: NOT PRESENT
NAUSEA AND VOMITING: NO NAUSEA AND NO VOMITING
PAROXYSMAL SWEATS: NO SWEAT VISIBLE
ORIENTATION AND CLOUDING OF SENSORIUM: ORIENTED AND CAN DO SERIAL ADDITIONS
AUDITORY DISTURBANCES: NOT PRESENT
TOTAL SCORE: 1
AGITATION: NORMAL ACTIVITY

## 2025-04-21 ASSESSMENT — ACTIVITIES OF DAILY LIVING (ADL): HOME_MANAGEMENT_TIME_ENTRY: 1

## 2025-04-21 ASSESSMENT — PAIN - FUNCTIONAL ASSESSMENT: PAIN_FUNCTIONAL_ASSESSMENT: 0-10

## 2025-04-21 NOTE — CARE PLAN
The patient's goals for the shift include      The clinical goals for the shift include discharge

## 2025-04-21 NOTE — CARE PLAN
The patient's goals for the shift include      The clinical goals for the shift include remain hemodynamically stable      Problem: Pain - Adult  Goal: Verbalizes/displays adequate comfort level or baseline comfort level  Outcome: Progressing     Problem: Safety - Adult  Goal: Free from fall injury  Outcome: Progressing     Problem: Discharge Planning  Goal: Discharge to home or other facility with appropriate resources  Outcome: Progressing     Problem: Chronic Conditions and Co-morbidities  Goal: Patient's chronic conditions and co-morbidity symptoms are monitored and maintained or improved  Outcome: Progressing     Problem: Nutrition  Goal: Nutrient intake appropriate for maintaining nutritional needs  Outcome: Progressing     Problem: Fall/Injury  Goal: Not fall by end of shift  Outcome: Progressing  Goal: Be free from injury by end of the shift  Outcome: Progressing  Goal: Verbalize understanding of personal risk factors for fall in the hospital  Outcome: Progressing  Goal: Verbalize understanding of risk factor reduction measures to prevent injury from fall in the home  Outcome: Progressing  Goal: Use assistive devices by end of the shift  Outcome: Progressing  Goal: Pace activities to prevent fatigue by end of the shift  Outcome: Progressing

## 2025-04-21 NOTE — PROGRESS NOTES
Occupational Therapy    OT Treatment    Patient Name: Humble Banks  MRN: [id number]  Department: [facility]  Room: [facility]82A  Today's Date: 4/21/2025  Time Calculation  Start Time: 1055  Stop Time: 1109  Time Calculation (min): 14 min        Assessment:  OT Assessment: Pt participated and tolerated session well of note. Pt required SBA for bed mobility, and CGA for transfers and functional mobility with FWW. Pt required min VC throughout session for safe use of FWW and hand placement. Pt should benefit from contiued OT services to increase progress towards stated goals.  Prognosis: Good  End of Session Communication: Bedside nurse  End of Session Patient Position: Up in chair, Alarm on (Call light within reach)  Prognosis: Good  Plan:  Treatment Interventions: ADL retraining, Functional transfer training, Patient/family training, Equipment evaluation/education, Compensatory technique education, Endurance training (energy conservation / diaphragmatic breathing techniques)  OT Frequency: 3 times per week  OT Discharge Recommendations: Low intensity level of continued care, Moderate intensity level of continued care (pending functional progress)  OT - OK to Discharge: Yes  Treatment Interventions: ADL retraining, Functional transfer training, Patient/family training, Equipment evaluation/education, Compensatory technique education, Endurance training (energy conservation / diaphragmatic breathing techniques)    Subjective   Previous Visit Info:     General:  General  Co-Treatment: PT  Co-Treatment Reason: to maximie pt safety & mobility  Prior to Session Communication: Bedside nurse  Patient Position Received: Bed, 2 rail up, Alarm on  General Comment: Pt pleasant and agreeable to therapy  Precautions:  Medical Precautions: Fall precautions  Precautions Comment: tele; ciwa     Date/Time Vitals Session Patient Position Pulse Resp SpO2 BP MAP (mmHg)    04/21/25 11:27:11 --  --  61  19  96 %  123/72  91           Vital Signs  Comment: no concerns     Pain:  Pain Assessment  Pain Assessment: 0-10  0-10 (Numeric) Pain Score: 9  Pain Type: Chronic pain (Stated sciatic pain with laying down, no pain with  activity)  Pain Location: Leg  Pain Orientation: Left  Pain Interventions: Ambulation/increased activity  Response to Interventions: Content/relaxed    Objective    Cognition:  Cognition  Overall Cognitive Status:  (Noted mild confusion with describing need for pants. Stated that he had a pair in his room, however only gowns were present, he stated that is what he wanted. When given, became frustrated when asking for pants again, however unable to find correct word.)  Coordination:     Activities of Daily Living: Feeding  Feeding Level of Assistance: Modified independent  Feeding Comments: Able to open straw package and bring cup/straw to mouth    Grooming  Grooming Level of Assistance: Setup  Grooming Comments: Completed oral hygiene in chair with set-up. Declined any other further grooming tasks       Bed Mobility/Transfers: Bed Mobility  Bed Mobility:  (Completed supine> sit with SBA, use of rail. Min verbal cues as pt became implusive with mobility prior to blankets being removed.)    Transfers  Transfer:  (Completed Sit<> stand with CGA and FWW for safey and hand placement)      Functional Mobility:  Functional Mobility  Functional Mobility Performed:  (Long functional distance with FWW and CGA. No LOB noted.)  Sitting Balance:  Static Sitting Balance  Static Sitting-Level of Assistance: Close supervision  Dynamic Sitting Balance  Dynamic Sitting-Level of Assistance: Close supervision  Standing Balance:  Static Standing Balance  Static Standing-Level of Assistance: Contact guard  Dynamic Standing Balance  Dynamic Standing-Level of Assistance: Contact guard  Dynamic Standing-Comments: No LOB noted with donning pants in standing, pushed away FWW, and used chair arm rest to assist self up.         RUE   RUE : Within Functional Limits and  LUE   LUE: Within Functional Limits      Outcome Measures:[facility] Daily Activity  Putting on and taking off regular lower body clothing: A little  Bathing (including washing, rinsing, drying): A little  Putting on and taking off regular upper body clothing: A little  Toileting, which includes using toilet, bedpan or urinal: A little  Taking care of personal grooming such as brushing teeth: A little  Eating Meals: None  Daily Activity - Total Score: 19        Education Documentation  No documentation found.  Education Comments  No comments found.        OP EDUCATION:       Goals:  Encounter Problems       Encounter Problems (Active)       OT Goals       Patient will complete upper and lower body bathing/dressing; toileting with minimal assist using assistive techniques/adaptive equipment as needed  (Progressing)       Start:  04/15/25    Expected End:  04/29/25            Patient will perform bed mobility and progress to functional transfers safely with minimal assist: bed, chair, commode using DME as needed  (Progressing)       Start:  04/15/25    Expected End:  04/29/25            Patient will tolerate sitting for 10 mins. and show overall good (-) balance during ADL's and functional transfers/mobility  (Progressing)       Start:  04/15/25    Expected End:  04/29/25            Patient will apply energy conservation/diaphragmatic breathing techniques to ADL's and functional transfers with minimal cues  (Progressing)       Start:  04/15/25    Expected End:  04/29/25

## 2025-04-21 NOTE — PROGRESS NOTES
Physical Therapy    Physical Therapy Treatment    Patient Name: Humble Banks  MRN: [id number]  Department: [facility]  Room: [address] [place] [address]  Today's Date: 4/21/2025  Time Calculation  Start Time: 1054  Stop Time: 1110  Time Calculation (min): 16 min         Assessment/Plan   PT Assessment  End of Session Patient Position: Up in chair, Alarm on     PT Plan  Treatment/Interventions: Bed mobility, Transfer training, Gait training, Stair training, Balance training, Therapeutic exercise, Therapeutic activity  PT Plan: Ongoing PT  PT Frequency: 3 times per week  PT Discharge Recommendations: Low intensity level of continued care, Moderate intensity level of continued care (pending progress during acute los, anticipate improved mobility & safety as termors subside)  PT Recommended Transfer Status: Total assist  PT - OK to Discharge: Yes (to next level of care when cleared by medical team)      General Visit Information:   PT  Visit  PT Received On: 04/21/25  General  Co-Treatment: OT  Co-Treatment Reason: to maximie pt safety & mobility  Prior to Session Communication: Bedside nurse  Patient Position Received: Bed, 2 rail up, Alarm on  General Comment:  (pt agreeable to participate in therapy)    Subjective   Precautions:  Precautions  Medical Precautions: Fall precautions  Precautions Comment: tele; ciwa          Objective   Pain:  Pain Assessment  Pain Assessment:  (pt c/o LLE sciatic pain, up to 9/10)    Cognition:  Cognition  Overall Cognitive Status:  (largely appears WNL though demos some possible confusion during tx session, becoming quite frustrated in regards to wanting to put a hospital gown on as a pair of pants)     Treatments:  Bed Mobility  Bed Mobility:  (sup > sit with SBA)    Ambulation/Gait Training  Ambulation/Gait Training Performed:  (pt ambulates >/=80 ft x 2 with FWW and CGA.  slow, steady pace.  partial to full step through.  demos some impulsivity when letting go of walker handle in attempt to move  obstacle in hallway instead of maneuvering around.)    Transfers  Transfer:  (sit <> stand x2 trials with FWW and CGA.  cuing for safe hand placement/ sequencing.)    Outcome Measures:  [facility] Basic Mobility  Turning from your back to your side while in a flat bed without using bedrails: A little  Moving from lying on your back to sitting on the side of a flat bed without using bedrails: A little  Moving to and from bed to chair (including a wheelchair): A little  Standing up from a chair using your arms (e.g. wheelchair or bedside chair): A little  To walk in hospital room: A little  Climbing 3-5 steps with railing: A lot  Basic Mobility - Total Score: 17    Education Documentation  Precautions, taught by Nirali Alonso PTA at 4/21/2025 12:29 PM.  Learner: Patient  Readiness: Acceptance  Method: Explanation  Response: Verbalizes Understanding, Needs Reinforcement    Mobility Training, taught by Nirali Alonso PTA at 4/21/2025 12:29 PM.  Learner: Patient  Readiness: Acceptance  Method: Explanation  Response: Verbalizes Understanding, Needs Reinforcement    Education Comments  No comments found.        EDUCATION:       Encounter Problems       Encounter Problems (Active)       PT Problem       STG - Pt will transition supine <> sitting with sba  (Progressing)       Start:  04/15/25    Expected End:  04/29/25            STG - Pt will transfer STS with sba  (Progressing)       Start:  04/15/25    Expected End:  04/29/25            STG - Pt will amb >=40' using ww with cga  (Met)       Start:  04/15/25    Expected End:  04/29/25    Resolved:  04/21/25         STG -  Pt will navigate >=3stairs   with cga  (Progressing)       Start:  04/15/25    Expected End:  04/29/25            STG - Pt will perform 2-3 sets of BLE therex x10 to maximize functional strength and independence  (Progressing)       Start:  04/15/25    Expected End:  04/29/25

## 2025-04-21 NOTE — PROGRESS NOTES
Internal Medicine Progress Note         Humble Banks is a 69 y.o. male on day 6 of admission presenting with Alcoholic intoxication without complication.    SUBJECTIVE    Patient seen and examined at bedside. He reports feeling well this morning. He was observed to be eating breakfast.   OBJECTIVE    Vitals:    04/20/25 2018 04/20/25 2330 04/21/25 0327 04/21/25 0807   BP: 142/84 127/88 140/72 130/79   BP Location: Right arm Right arm  Left arm   Patient Position: Lying Lying  Sitting   Pulse: 78 77 70 90   Resp: 20 18 20 19   Temp: 36.7 °C (98.1 °F) 36.4 °C (97.5 °F) 36.5 °C (97.7 °F) 36.1 °C (97 °F)   TempSrc: Temporal Temporal Temporal Temporal   SpO2: 98% 98% 98% 95%   Weight:       Height:          Results from last 7 days   Lab Units 04/21/25  0543 04/15/25  0505 04/14/25  1735   WBC AUTO x10*3/uL 6.9   < > 4.6   HEMOGLOBIN g/dL 11.5*   < > 12.0*   HEMATOCRIT % 34.7*   < > 36.3*   PLATELETS AUTO x10*3/uL 122*   < > 49*   NEUTROS PCT AUTO %  --   --  33.2   LYMPHS PCT AUTO %  --   --  43.3   MONOS PCT AUTO %  --   --  16.5   EOS PCT AUTO %  --   --  4.8    < > = values in this interval not displayed.     Results from last 7 days   Lab Units 04/21/25  0543 04/16/25  0726 04/15/25  0505   SODIUM mmol/L 132*   < > 143   POTASSIUM mmol/L 3.9   < > 3.8   CHLORIDE mmol/L 103   < > 106   CO2 mmol/L 22   < > 24   BUN mg/dL 11   < > 9   CREATININE mg/dL 0.55   < > 0.63   CALCIUM mg/dL 8.9   < > 8.6   PROTEIN TOTAL g/dL  --   --  6.9   BILIRUBIN TOTAL mg/dL  --   --  0.8   ALK PHOS U/L  --   --  67   ALT U/L  --   --  50   AST U/L  --   --  93*   GLUCOSE mg/dL 96   < > 88    < > = values in this interval not displayed.       Scheduled Medications  Scheduled Medications[1]       Physical Exam  General: No acute distress  HEENT: EOMI  CV: Regular rate and rhythm, normal S1 and S2, no murmurs  Pulm: Clear to auscultation bilaterally, no wheezings, rales or  rhonchi  Abd: Nondistended, nontender  Ext: no cyanosis or edema   Skin: No rashes, warm and dry.   Neuro: no focal deficit       ASSESSMENT & PLAN  Humble Banks is a 69 y.o. male with a past medical history of alcohol abuse disorder, suspected Warnicke encephalopathy, generalized anxiety disorder, depression, dyslipidemia, hypertension, hypothyroidism, presented to [facility] found on the ground by his roommate.     #Acute alcohol withdrawal   #Hx alcohol use disorder  #Acute alcoholic hepatitis (AST: ALT ratio greater than 2 on admission)  #Mechanical fall vs. Syncope   PLAN:  -continue CIWA protocol  -Continue phenobarb taper  -continue thiamine, folate, multivitamin  -encouraged alcohol cessation   -continue tele monitoring   -[facility] 17/19  -[facility] following, waiting on precert for [place]     #Chronic macrocytic anemia  #Acute on chronic thrombocytopenia, improving  -Thrombocytopenia likely in setting of alcohol abuse from chronic bone marrow suppression  PLAN:  -start subcutaneous lovenox  -Trend CBC  -continue supplements     #Hypokalemia, resolved  PLAN:  -trend RFP  -Replete as necessary      #Hematuria  - Previous CT scans do show an obstructing stone  -UA showing minimal blood, patient is asymptomatic      Chronic medical conditions:  #Depression  #Anxiety  #Hypothyroidism  #Restless leg syndrome  -Continue bupropion, escitalopram, levothyroxine, ropinirole, amlodipine   -holding propanolol      DVT prophylaxis: subcutaneous lovenox  GI prophylaxis: None  Consults: None  Diet: Regular  CODE STATUS: Full code    Mai Rashid DO PGY-1 Internal Medicine  Please SecureChat for any further questions  This is a preliminary note, please await attending attestation for final A/P            [1] amLODIPine, 5 mg, oral, Daily  buPROPion XL, 150 mg, oral, Daily  enoxaparin, 40 mg, subcutaneous, q24h  escitalopram, 10 mg, oral, Daily  folic acid, 1 mg, oral, Daily  levothyroxine, 75 mcg, oral,  Daily  multivitamin with minerals, 1 tablet, oral, Daily  PHENobarbital, 32.4 mg, oral, TID  pneumoc 20-shonda conj-dip cr(PF), 0.5 mL, intramuscular, During hospitalization  [Held by provider] polyethylene glycol, 17 g, oral, Daily  [Held by provider] propranolol, 40 mg, oral, BID  rOPINIRole, 1 mg, oral, Nightly  thiamine, 100 mg, oral, Daily

## 2025-04-21 NOTE — PROGRESS NOTES
04/21/25 1025   Discharge Planning   Home or Post Acute Services Post acute facilities (Rehab/SNF/etc)   Type of Post Acute Facility Services Skilled nursing   Expected Discharge Disposition SNF     Received update from medical team on anticipated discharge date for tomorrow. Requested for PT/OT notes for pre-cert to be started for [facility].     Addendum 1341: Requested for direct pre-cert team to start pre-cert for [facility].

## 2025-04-22 ENCOUNTER — DOCUMENTATION (OUTPATIENT)
Dept: HOME HEALTH SERVICES | Facility: HOME HEALTH | Age: 70
End: 2025-04-22
Payer: MEDICARE

## 2025-04-22 ENCOUNTER — HOME HEALTH ADMISSION (OUTPATIENT)
Dept: HOME HEALTH SERVICES | Facility: HOME HEALTH | Age: 70
End: 2025-04-22
Payer: MEDICARE

## 2025-04-22 VITALS
HEIGHT: 66 IN | HEART RATE: 41 BPM | OXYGEN SATURATION: 98 % | RESPIRATION RATE: 20 BRPM | DIASTOLIC BLOOD PRESSURE: 78 MMHG | WEIGHT: 200 LBS | TEMPERATURE: 97 F | BODY MASS INDEX: 32.14 KG/M2 | SYSTOLIC BLOOD PRESSURE: 123 MMHG

## 2025-04-22 LAB
ALBUMIN SERPL BCP-MCNC: 3.7 G/DL (ref 3.4–5)
ANION GAP SERPL CALC-SCNC: 12 MMOL/L (ref 10–20)
BUN SERPL-MCNC: 12 MG/DL (ref 6–23)
CALCIUM SERPL-MCNC: 9 MG/DL (ref 8.6–10.3)
CHLORIDE SERPL-SCNC: 103 MMOL/L (ref 98–107)
CO2 SERPL-SCNC: 23 MMOL/L (ref 21–32)
CREAT SERPL-MCNC: 0.58 MG/DL (ref 0.5–1.3)
EGFRCR SERPLBLD CKD-EPI 2021: >90 ML/MIN/1.73M*2
ERYTHROCYTE [DISTWIDTH] IN BLOOD BY AUTOMATED COUNT: 12.1 % (ref 11.5–14.5)
GLUCOSE SERPL-MCNC: 98 MG/DL (ref 74–99)
HCT VFR BLD AUTO: 33.8 % (ref 41–52)
HGB BLD-MCNC: 11.4 G/DL (ref 13.5–17.5)
MCH RBC QN AUTO: 33.4 PG (ref 26–34)
MCHC RBC AUTO-ENTMCNC: 33.7 G/DL (ref 32–36)
MCV RBC AUTO: 99 FL (ref 80–100)
NRBC BLD-RTO: 0 /100 WBCS (ref 0–0)
PHOSPHATE SERPL-MCNC: 4.3 MG/DL (ref 2.5–4.9)
PLATELET # BLD AUTO: 182 X10*3/UL (ref 150–450)
POTASSIUM SERPL-SCNC: 3.6 MMOL/L (ref 3.5–5.3)
RBC # BLD AUTO: 3.41 X10*6/UL (ref 4.5–5.9)
SODIUM SERPL-SCNC: 134 MMOL/L (ref 136–145)
WBC # BLD AUTO: 6.5 X10*3/UL (ref 4.4–11.3)

## 2025-04-22 PROCEDURE — 85027 COMPLETE CBC AUTOMATED: CPT

## 2025-04-22 PROCEDURE — 2500000001 HC RX 250 WO HCPCS SELF ADMINISTERED DRUGS (ALT 637 FOR MEDICARE OP)

## 2025-04-22 PROCEDURE — 36415 COLL VENOUS BLD VENIPUNCTURE: CPT

## 2025-04-22 PROCEDURE — 2500000004 HC RX 250 GENERAL PHARMACY W/ HCPCS (ALT 636 FOR OP/ED): Mod: JZ

## 2025-04-22 PROCEDURE — 2500000002 HC RX 250 W HCPCS SELF ADMINISTERED DRUGS (ALT 637 FOR MEDICARE OP, ALT 636 FOR OP/ED)

## 2025-04-22 PROCEDURE — 80069 RENAL FUNCTION PANEL: CPT

## 2025-04-22 RX ADMIN — AMLODIPINE BESYLATE 5 MG: 5 TABLET ORAL at 08:37

## 2025-04-22 RX ADMIN — Medication 1 TABLET: at 08:37

## 2025-04-22 RX ADMIN — FOLIC ACID 1 MG: 1 TABLET ORAL at 08:37

## 2025-04-22 RX ADMIN — ESCITALOPRAM OXALATE 10 MG: 10 TABLET ORAL at 08:37

## 2025-04-22 RX ADMIN — ENOXAPARIN SODIUM 40 MG: 40 INJECTION SUBCUTANEOUS at 08:37

## 2025-04-22 RX ADMIN — PHENOBARBITAL 32.4 MG: 32.4 TABLET ORAL at 08:37

## 2025-04-22 RX ADMIN — LEVOTHYROXINE SODIUM 75 MCG: 0.07 TABLET ORAL at 05:54

## 2025-04-22 RX ADMIN — THIAMINE HCL TAB 100 MG 100 MG: 100 TAB at 08:37

## 2025-04-22 RX ADMIN — BUPROPION HYDROCHLORIDE 150 MG: 150 TABLET, EXTENDED RELEASE ORAL at 08:37

## 2025-04-22 ASSESSMENT — LIFESTYLE VARIABLES
TOTAL SCORE: 1
VISUAL DISTURBANCES: NOT PRESENT
AUDITORY DISTURBANCES: NOT PRESENT
ORIENTATION AND CLOUDING OF SENSORIUM: ORIENTED AND CAN DO SERIAL ADDITIONS
AGITATION: NORMAL ACTIVITY
TREMOR: NOT VISIBLE, BUT CAN BE FELT FINGERTIP TO FINGERTIP
ANXIETY: NO ANXIETY, AT EASE
PAROXYSMAL SWEATS: NO SWEAT VISIBLE
NAUSEA AND VOMITING: NO NAUSEA AND NO VOMITING
HEADACHE, FULLNESS IN HEAD: NOT PRESENT

## 2025-04-22 ASSESSMENT — COGNITIVE AND FUNCTIONAL STATUS - GENERAL
MOBILITY SCORE: 21
CLIMB 3 TO 5 STEPS WITH RAILING: A LITTLE
STANDING UP FROM CHAIR USING ARMS: A LITTLE
WALKING IN HOSPITAL ROOM: A LITTLE
DAILY ACTIVITIY SCORE: 24

## 2025-04-22 ASSESSMENT — PAIN SCALES - GENERAL: PAINLEVEL_OUTOF10: 0 - NO PAIN

## 2025-04-22 NOTE — CARE PLAN
The patient's goals for the shift include  to go home    The clinical goals for the shift include safety

## 2025-04-22 NOTE — PROGRESS NOTES
04/22/25 1039   Discharge Planning   Home or Post Acute Services Post acute facilities (Rehab/SNF/etc)   Type of Post Acute Facility Services Skilled nursing   Expected Discharge Disposition SNF     Pre-cert is pending for [facility].     Addendum 1154: Received update from direct pre cert team: Nancy has offered a P2P for Jocelyn's SNF request. Deadline is tomorrow (04/23) at 8:00am. Phone # [phone]. Peer to peer information provided to medical team.      Addendum 1322: Met with patient at bedside. Patient states his preference is to return home at discharge, patient declining skilled nursing facility at this time. Patient agreeable to home health care. Patient provided with [facility] choice list. Patient provided preference of [facility]. Patient states he lives at home with roommate Earnest and his girlfriend. [facility] in [facility]. Address and contact information verified with patient.     Addendum 1404: [facility] unable to accept. Patient updated at bedside. Patient provided preference of [facility].     Addendum 1411: IMM letter provided to patient at bedside. [facility] updated on plan for discharge today.     1430: Met with patient at bedside, patient states he plans to quit drinking on his own, patients states he is agreeable to resources. Sw updated.

## 2025-04-22 NOTE — DISCHARGE INSTRUCTIONS
It is very important that you stop drinking alcohol. Start looking into AA meetings and alcohol rehab centers. If you need assistance, contact your primary care physician.   Follow up with your primary care provider for after hospitalization visit.   Resume your home medications.

## 2025-04-22 NOTE — PROGRESS NOTES
04/22/25 7748   Discharge Planning   Assistance Needed Recieved request from [facility] to provide information resources to pt on etoh use. SW met with pt at bedside and provided education materials along with social service directory 211 for any etoh resource needs. Pt denies any futher etoh resource needs.

## 2025-04-22 NOTE — DISCHARGE SUMMARY
Discharge Diagnosis  Acute alcohol withdrawal   History of alcohol use disorder  Acute alcoholic hepatitis  Mechanical fall versus syncope  Chronic macrocytic anemia  Acute on chronic thrombocytopenia  Hypokalemia  Hematuria  Depression  Anxiety  Hypothyroidism  Restless leg syndrome    Issues Requiring Follow-Up  Alcohol use disorder    Discharge Meds     Medication List      CONTINUE taking these medications     * buPROPion  mg 24 hr tablet; Commonly known as: Wellbutrin XL   * buPROPion  mg 24 hr tablet; Commonly known as: Wellbutrin XL;   Take 1 tablet (150 mg) by mouth once daily for 14 days.   * escitalopram 10 mg tablet; Commonly known as: Lexapro   * escitalopram 10 mg tablet; Commonly known as: Lexapro; Take 1 tablet   (10 mg) by mouth once daily for 14 days.   * levothyroxine 75 mcg tablet; Commonly known as: Synthroid, Levoxyl   * levothyroxine 75 mcg tablet; Commonly known as: Synthroid, Levoxyl;   Take 1 tablet (75 mcg) by mouth once daily for 14 days.   meclizine 25 mg tablet; Commonly known as: Antivert   multivitamin with minerals tablet; Take 1 tablet by mouth once daily for   14 days.   propranolol 40 mg tablet; Commonly known as: Inderal   rOPINIRole 1 mg tablet; Commonly known as: Requip  * This list has 6 medication(s) that are the same as other medications   prescribed for you. Read the directions carefully, and ask your doctor or   other care provider to review them with you.     ASK your doctor about these medications     amLODIPine 5 mg tablet; Commonly known as: Norvasc; Take 1 tablet (5 mg)   by mouth once daily.   folic acid 1 mg tablet; Commonly known as: Folvite; Take 1 tablet (1 mg)   by mouth once daily.   ibuprofen 600 mg tablet   lidocaine 4 % patch; Place 1 patch over 12 hours on the skin once daily.   Remove & discard patch within 12 hours or as directed by MD. Apply to   back.   methocarbamol 500 mg tablet; Commonly known as: Robaxin; Take 1 tablet   (500 mg) by mouth  every 6 hours if needed for muscle spasms.   thiamine 100 mg tablet; Commonly known as: Vitamin B-1; Take 1 tablet   (100 mg) by mouth once daily.       Test Results Pending At Discharge  Pending Labs       No current pending labs.            Hospital Course   Humble Banks is a 69 y.o. male with a past medical history of alcohol abuse disorder, suspected Warnicke encephalopathy, generalized anxiety disorder, depression, dyslipidemia, hypertension, hypothyroidism, presented to [facility] after being found on the ground by his roommate Earnest.  CT scan of the head was negative for acute intracranial pathology.  CT scan of cervical spine was negative for acute fracture or malalignment.  He was admitted to the medical team for further management and was started on CIWA protocol with Ativan.  Patient started scoring high on CIWA scale and was becoming agitated on the floor.  He was escalated to stepdown unit to start IV phenobarb taper.  Patient tolerated the taper well and once his mental status was improved, he was transition to oral phenobarbital taper.  Patient was originally agreeable to SNF, however was denied by insurance.  Patient is agreeable to return home with home health care.  He was encouraged to quit his alcohol use and establish with AA or look into alcohol rehab facilities.  Patient was discharged on his home medications and instructed to follow-up outpatient.    Pertinent Physical Exam At Time of Discharge  Physical Exam  Constitutional:       Appearance: Normal appearance.   HENT:      Nose: Nose normal.      Mouth/Throat:      Mouth: Mucous membranes are moist.      Pharynx: Oropharynx is clear.   Eyes:      Extraocular Movements: Extraocular movements intact.      Conjunctiva/sclera: Conjunctivae normal.   Cardiovascular:      Rate and Rhythm: Normal rate.   Pulmonary:      Effort: Pulmonary effort is normal.      Breath sounds: No wheezing, rhonchi or rales.   Abdominal:      General: Abdomen is  flat.      Palpations: Abdomen is soft.   Musculoskeletal:      Right lower leg: No edema.      Left lower leg: No edema.   Skin:     General: Skin is warm and dry.   Neurological:      Mental Status: He is alert. Mental status is at baseline.         Outpatient Follow-Up  No future appointments.      Mai Rashid DO PGY-1 Internal Medicine  This is a preliminary note, please await attending attestation for final A/P.

## 2025-04-22 NOTE — HH CARE COORDINATION
Home Care received a Referral for Nursing, Physical Therapy, and Occupational Therapy. We have processed the referral for a Start of Care on 4/23-4/24    If you have any questions or concerns, please feel free to contact us at [phone]. Follow the prompts, enter your five digit zip code, and you will be directed to your care team on WE[facility] 3.

## 2025-04-25 ENCOUNTER — HOME CARE VISIT (OUTPATIENT)
Dept: HOME HEALTH SERVICES | Facility: HOME HEALTH | Age: 70
End: 2025-04-25
Payer: MEDICARE

## 2025-04-25 VITALS
SYSTOLIC BLOOD PRESSURE: 110 MMHG | DIASTOLIC BLOOD PRESSURE: 70 MMHG | OXYGEN SATURATION: 98 % | HEART RATE: 75 BPM | RESPIRATION RATE: 18 BRPM | TEMPERATURE: 97.6 F

## 2025-04-25 PROCEDURE — 169592 NO-PAY CLAIM PROCEDURE

## 2025-04-25 PROCEDURE — G0299 HHS/HOSPICE OF RN EA 15 MIN: HCPCS | Mod: HHH

## 2025-04-25 PROCEDURE — G0151 HHCP-SERV OF PT,EA 15 MIN: HCPCS | Mod: HHH

## 2025-04-25 ASSESSMENT — ENCOUNTER SYMPTOMS
PAIN SEVERITY GOAL: 0/10
DENIES PAIN: 1
HIGHEST PAIN SEVERITY IN PAST 24 HOURS: 5/10
HIGHEST PAIN SEVERITY IN PAST 24 HOURS: 0/10
PAIN SEVERITY GOAL: 0/10
LOWEST PAIN SEVERITY IN PAST 24 HOURS: 0/10
PERSON REPORTING PAIN: PATIENT
LOWEST PAIN SEVERITY IN PAST 24 HOURS: 0/10

## 2025-04-25 ASSESSMENT — ACTIVITIES OF DAILY LIVING (ADL)
AMBULATION_DISTANCE/DURATION_TOLERATED: MULTIPLE HOUSEHOLD DISTANCES LESS THAN 100 FT
ENTERING_EXITING_HOME: STAND BY ASSIST
OASIS_M1830: 03
AMBULATION ASSISTANCE ON FLAT SURFACES: 1

## 2025-04-25 ASSESSMENT — PAIN SCALES - PAIN ASSESSMENT IN ADVANCED DEMENTIA (PAINAD): BREATHING: 0

## 2025-04-26 ENCOUNTER — HOME CARE VISIT (OUTPATIENT)
Dept: HOME HEALTH SERVICES | Facility: HOME HEALTH | Age: 70
End: 2025-04-26
Payer: MEDICARE

## 2025-04-26 PROCEDURE — G0152 HHCP-SERV OF OT,EA 15 MIN: HCPCS | Mod: HHH

## 2025-04-26 ASSESSMENT — ACTIVITIES OF DAILY LIVING (ADL)
BATHING ASSESSED: 1
LAUNDRY: DEPENDENT
FEEDING: INDEPENDENT
TOILETING: INDEPENDENT
BATHING_CURRENT_FUNCTION: INDEPENDENT
PHYSICAL TRANSFERS ASSESSED: 1
PREPARING MEALS: NEEDS ASSISTANCE
FEEDING ASSESSED: 1
DRESSING_LB_CURRENT_FUNCTION: INDEPENDENT
DRESSING_UB_CURRENT_FUNCTION: INDEPENDENT
CURRENT_FUNCTION: INDEPENDENT
LAUNDRY ASSESSED: 1
GROOMING_CURRENT_FUNCTION: INDEPENDENT
GROOMING ASSESSED: 1
TOILETING: 1

## 2025-04-26 ASSESSMENT — ENCOUNTER SYMPTOMS
PAIN LOCATION - PAIN SEVERITY: 5/10
PERSON REPORTING PAIN: PATIENT
PAIN: 1

## 2025-04-26 NOTE — DOCUMENTATION CLARIFICATION NOTE
"    PATIENT:               YESIKA PADILLA  ACCT #:                  [id number]  MRN:                       [id number]  :                       1955  ADMIT DATE:       2025 4:39 PM  DISCH DATE:        2025 4:15 PM  RESPONDING PROVIDER #:        [id number]          PROVIDER RESPONSE TEXT:    Warnicke encephalopathy ruled in for this admission    CDI QUERY TEXT:    Clarification        Instruction:    Based on your assessment of the patient and the clinical information, please provide the requested documentation by clicking on the appropriate radio button and enter any additional information if prompted.    Question: Please further clarify the diagnosis of CDI TO ENTER as    When answering this query, please exercise your independent professional judgment. The fact that a question is being asked, does not imply that any particular answer is desired or expected.    The patient's clinical indicators include:  Clinical Information:  69 y.o. male with a past medical history of alcohol abuse disorder, suspected Warnicke encephalopathy, generalized anxiety disorder, depression, dyslipidemia, hypertension, hypothyroidism, presented to [facility] found on the ground by roommate    Clinical Indicators:  25 H and P:  \"69 y.o. male with a past medical history of alcohol abuse disorder, suspected Warnicke encephalopathy\"    Treatment:   CT of head  : thiamine tablet 100 mg  Daily labs      Risk Factors:  Alcohol abuse, Alcoholic hepatitis  Options provided:  -- Warnicke encephalopathy ruled out after workup  -- Warnicke encephalopathy ruled in for this admission  -- Other - I will add my own diagnosis  -- Refer to Clinical Documentation Reviewer    Query created by: Cynthia Ballesteros on 2025 1:26 PM      Electronically signed by:  DANIELLE GUTIERREZ MD 2025 6:37 PM          "

## 2025-04-26 NOTE — HOME HEALTH
Patient seen with his friend/roommate for OT evaluation visit. Patient was recently hospitalized with fall while intoxicated.     Lives with his friends in a ranch home with 4 steps to enter the front door. 1st floor has bedroom and full bathroom with a tub shower. Laundry in the basement with 12 steps down (laundry doesn't work).     Medical history of anemia, thrombocytopenia, hematuria, hypokalemia, depression/anxiety, restless leg, hypothyroidism.    Patient has a wheeled walker, tub transfer bench, edge of tub clamp.    Prior to hospitalization, was independent with ADLs. Friends help him with laundry. He performs IADLs otherwise with his friend. He states he doesn't usually drive, but will drive short local errands.     UE AROM and strength WNL. Patient in agreement with OT POC. Plan to see one more time later next week to review IADL training-light meal prep and home safety.

## 2025-04-26 NOTE — CASE COMMUNICATION
OT evaluation completed 4.26.25. Plan to see one more time next week to review kitchen/home safety.

## 2025-04-29 ENCOUNTER — HOME CARE VISIT (OUTPATIENT)
Dept: HOME HEALTH SERVICES | Facility: HOME HEALTH | Age: 70
End: 2025-04-29
Payer: MEDICARE

## 2025-04-29 PROCEDURE — G0157 HHC PT ASSISTANT EA 15: HCPCS | Mod: CQ,HHH

## 2025-04-29 ASSESSMENT — ENCOUNTER SYMPTOMS
HIGHEST PAIN SEVERITY IN PAST 24 HOURS: 5/10
PAIN: 1
PAIN LOCATION: LEFT LEG
PERSON REPORTING PAIN: PATIENT
SUBJECTIVE PAIN PROGRESSION: WAXING AND WANING
PAIN SEVERITY GOAL: 0/10
LOWEST PAIN SEVERITY IN PAST 24 HOURS: 5/10

## 2025-04-30 ENCOUNTER — HOME CARE VISIT (OUTPATIENT)
Dept: HOME HEALTH SERVICES | Facility: HOME HEALTH | Age: 70
End: 2025-04-30
Payer: MEDICARE

## 2025-04-30 VITALS
OXYGEN SATURATION: 98 % | DIASTOLIC BLOOD PRESSURE: 70 MMHG | HEART RATE: 72 BPM | RESPIRATION RATE: 18 BRPM | SYSTOLIC BLOOD PRESSURE: 115 MMHG | TEMPERATURE: 98.2 F

## 2025-04-30 PROCEDURE — G0300 HHS/HOSPICE OF LPN EA 15 MIN: HCPCS | Mod: HHH

## 2025-04-30 ASSESSMENT — ENCOUNTER SYMPTOMS
PERSON REPORTING PAIN: PATIENT
PAIN SEVERITY GOAL: 0/10
CHANGE IN APPETITE: UNCHANGED
LOWEST PAIN SEVERITY IN PAST 24 HOURS: 0/10
HIGHEST PAIN SEVERITY IN PAST 24 HOURS: 0/10
SUBJECTIVE PAIN PROGRESSION: UNCHANGED
APPETITE LEVEL: GOOD
DENIES PAIN: 1

## 2025-05-02 ENCOUNTER — HOME CARE VISIT (OUTPATIENT)
Dept: HOME HEALTH SERVICES | Facility: HOME HEALTH | Age: 70
End: 2025-05-02
Payer: MEDICARE

## 2025-05-02 PROCEDURE — G0157 HHC PT ASSISTANT EA 15: HCPCS | Mod: CQ,HHH

## 2025-05-02 PROCEDURE — G0152 HHCP-SERV OF OT,EA 15 MIN: HCPCS | Mod: HHH

## 2025-05-02 ASSESSMENT — ENCOUNTER SYMPTOMS
PAIN LOCATION: BACK
LOWEST PAIN SEVERITY IN PAST 24 HOURS: 5/10
PAIN: 1
PAIN SEVERITY GOAL: 0/10
SUBJECTIVE PAIN PROGRESSION: WAXING AND WANING
PERSON REPORTING PAIN: PATIENT
PERSON REPORTING PAIN: PATIENT
PAIN: 1
HIGHEST PAIN SEVERITY IN PAST 24 HOURS: 5/10
PAIN LOCATION - PAIN SEVERITY: 5/10

## 2025-05-02 NOTE — HOME HEALTH
Subjective: Patient seen alone for OT DC visit.  Primary Reason for Home Care: Decline in function and mobility due to recent hospitalization with fall.   Skilled Needs: IADL training, home safety instruction.  Precautions: None.  Instruction provided: See notes below.  Discipline Communication: Case communication.    Medication Reconciliation:    Demonstrates Adherence : Yes  Issues/Concerns: No  Provider Notified of Issues/Concerns: N/A  Caregiver Notified of Issues/Concerns: N/A  Pill bottles visualized during treatment: No, Explain: No med changes.    Discipline: Occupational Therapy  Reason for discharge: GOALS MET  Summary of care provided: Instructed on light meal prep and home safety.  Discharge instructions given: Continue to follow safety instructions.  Patient in agreement with discharge plan and instructions.    Services remaining: SN and PT  NOMNC obtained: SAÚL

## 2025-05-06 ENCOUNTER — HOME CARE VISIT (OUTPATIENT)
Dept: HOME HEALTH SERVICES | Facility: HOME HEALTH | Age: 70
End: 2025-05-06
Payer: MEDICARE

## 2025-05-06 PROCEDURE — G0157 HHC PT ASSISTANT EA 15: HCPCS | Mod: CQ,HHH

## 2025-05-06 ASSESSMENT — ENCOUNTER SYMPTOMS
LOWEST PAIN SEVERITY IN PAST 24 HOURS: 5/10
PERSON REPORTING PAIN: PATIENT
PAIN LOCATION: BACK
PAIN: 1
PAIN SEVERITY GOAL: 0/10
HIGHEST PAIN SEVERITY IN PAST 24 HOURS: 5/10

## 2025-05-07 ENCOUNTER — HOME CARE VISIT (OUTPATIENT)
Dept: HOME HEALTH SERVICES | Facility: HOME HEALTH | Age: 70
End: 2025-05-07
Payer: MEDICARE

## 2025-05-07 VITALS
DIASTOLIC BLOOD PRESSURE: 80 MMHG | RESPIRATION RATE: 18 BRPM | SYSTOLIC BLOOD PRESSURE: 142 MMHG | HEART RATE: 69 BPM | OXYGEN SATURATION: 97 % | TEMPERATURE: 98.6 F

## 2025-05-07 PROCEDURE — G0157 HHC PT ASSISTANT EA 15: HCPCS | Mod: CQ,HHH

## 2025-05-07 PROCEDURE — G0299 HHS/HOSPICE OF RN EA 15 MIN: HCPCS | Mod: HHH

## 2025-05-07 ASSESSMENT — ENCOUNTER SYMPTOMS
PAIN SEVERITY GOAL: 0/10
HIGHEST PAIN SEVERITY IN PAST 24 HOURS: 5/10
PAIN LOCATION: BACK
PAIN SEVERITY GOAL: 0/10
LOWEST PAIN SEVERITY IN PAST 24 HOURS: 5/10
MUSCLE WEAKNESS: 1
LOWEST PAIN SEVERITY IN PAST 24 HOURS: 5/10
PAIN: 1
PERSON REPORTING PAIN: PATIENT
SUBJECTIVE PAIN PROGRESSION: WAXING AND WANING
HIGHEST PAIN SEVERITY IN PAST 24 HOURS: 5/10
APPETITE LEVEL: FAIR

## 2025-05-07 ASSESSMENT — PAIN SCALES - PAIN ASSESSMENT IN ADVANCED DEMENTIA (PAINAD): BREATHING: 0

## 2025-05-14 ENCOUNTER — HOME CARE VISIT (OUTPATIENT)
Dept: HOME HEALTH SERVICES | Facility: HOME HEALTH | Age: 70
End: 2025-05-14
Payer: MEDICARE

## 2025-05-14 VITALS
SYSTOLIC BLOOD PRESSURE: 110 MMHG | RESPIRATION RATE: 18 BRPM | HEART RATE: 56 BPM | DIASTOLIC BLOOD PRESSURE: 70 MMHG | OXYGEN SATURATION: 94 % | TEMPERATURE: 97.7 F

## 2025-05-14 PROCEDURE — G0299 HHS/HOSPICE OF RN EA 15 MIN: HCPCS | Mod: HHH

## 2025-05-14 PROCEDURE — G0151 HHCP-SERV OF PT,EA 15 MIN: HCPCS | Mod: HHH

## 2025-05-14 ASSESSMENT — ENCOUNTER SYMPTOMS
PERSON REPORTING PAIN: PATIENT
HIGHEST PAIN SEVERITY IN PAST 24 HOURS: 6/10
APPETITE LEVEL: GOOD
COUGH: 1
HIGHEST PAIN SEVERITY IN PAST 24 HOURS: 5/10
MUSCLE WEAKNESS: 1
LOWEST PAIN SEVERITY IN PAST 24 HOURS: 5/10
LOWEST PAIN SEVERITY IN PAST 24 HOURS: 0/10
PAIN SEVERITY GOAL: 0/10
PAIN SEVERITY GOAL: 0/10
PAIN: 1
PAIN LOCATION: LEFT LEG

## 2025-05-14 ASSESSMENT — PAIN SCALES - PAIN ASSESSMENT IN ADVANCED DEMENTIA (PAINAD): BREATHING: 0

## 2025-05-21 ENCOUNTER — HOME CARE VISIT (OUTPATIENT)
Dept: HOME HEALTH SERVICES | Facility: HOME HEALTH | Age: 70
End: 2025-05-21
Payer: MEDICARE

## 2025-05-21 VITALS
OXYGEN SATURATION: 95 % | SYSTOLIC BLOOD PRESSURE: 126 MMHG | DIASTOLIC BLOOD PRESSURE: 70 MMHG | TEMPERATURE: 98.1 F | RESPIRATION RATE: 18 BRPM | HEART RATE: 86 BPM

## 2025-05-21 PROCEDURE — G0299 HHS/HOSPICE OF RN EA 15 MIN: HCPCS | Mod: HHH

## 2025-05-21 ASSESSMENT — ACTIVITIES OF DAILY LIVING (ADL)
OASIS_M1830: 00
HOME_HEALTH_OASIS: 01

## 2025-05-21 ASSESSMENT — ENCOUNTER SYMPTOMS
LOWEST PAIN SEVERITY IN PAST 24 HOURS: 0/10
HIGHEST PAIN SEVERITY IN PAST 24 HOURS: 3/10
PAIN SEVERITY GOAL: 0/10

## 2025-05-21 ASSESSMENT — PAIN SCALES - PAIN ASSESSMENT IN ADVANCED DEMENTIA (PAINAD): BREATHING: 0

## 2025-06-08 ENCOUNTER — APPOINTMENT (OUTPATIENT)
Dept: CARDIOLOGY | Facility: HOSPITAL | Age: 70
DRG: 894 | End: 2025-06-08
Payer: MEDICARE

## 2025-06-08 ENCOUNTER — APPOINTMENT (OUTPATIENT)
Dept: RADIOLOGY | Facility: HOSPITAL | Age: 70
DRG: 894 | End: 2025-06-08
Payer: MEDICARE

## 2025-06-08 ENCOUNTER — HOSPITAL ENCOUNTER (INPATIENT)
Facility: HOSPITAL | Age: 70
LOS: 2 days | Discharge: LEFT BEFORE DISCHARGE | DRG: 894 | End: 2025-06-10
Attending: EMERGENCY MEDICINE | Admitting: INTERNAL MEDICINE
Payer: MEDICARE

## 2025-06-08 DIAGNOSIS — F10.930 ALCOHOL WITHDRAWAL SYNDROME WITHOUT COMPLICATION (MULTI): Primary | ICD-10-CM

## 2025-06-08 LAB
ALBUMIN SERPL BCP-MCNC: 4.2 G/DL (ref 3.4–5)
ALP SERPL-CCNC: 59 U/L (ref 33–136)
ALT SERPL W P-5'-P-CCNC: 37 U/L (ref 10–52)
ANION GAP SERPL CALC-SCNC: 16 MMOL/L (ref 10–20)
APPEARANCE UR: CLEAR
AST SERPL W P-5'-P-CCNC: 70 U/L (ref 9–39)
BASOPHILS # BLD AUTO: 0.07 X10*3/UL (ref 0–0.1)
BASOPHILS NFR BLD AUTO: 1.9 %
BILIRUB SERPL-MCNC: 0.8 MG/DL (ref 0–1.2)
BILIRUB UR STRIP.AUTO-MCNC: NEGATIVE MG/DL
BUN SERPL-MCNC: 16 MG/DL (ref 6–23)
CALCIUM SERPL-MCNC: 9.3 MG/DL (ref 8.6–10.3)
CHLORIDE SERPL-SCNC: 105 MMOL/L (ref 98–107)
CO2 SERPL-SCNC: 26 MMOL/L (ref 21–32)
COLOR UR: ABNORMAL
CREAT SERPL-MCNC: 0.63 MG/DL (ref 0.5–1.3)
EGFRCR SERPLBLD CKD-EPI 2021: >90 ML/MIN/1.73M*2
EOSINOPHIL # BLD AUTO: 0.06 X10*3/UL (ref 0–0.7)
EOSINOPHIL NFR BLD AUTO: 1.6 %
ERYTHROCYTE [DISTWIDTH] IN BLOOD BY AUTOMATED COUNT: 15.2 % (ref 11.5–14.5)
ETHANOL SERPL-MCNC: <10 MG/DL
GLUCOSE SERPL-MCNC: 97 MG/DL (ref 74–99)
GLUCOSE UR STRIP.AUTO-MCNC: NORMAL MG/DL
HCT VFR BLD AUTO: 33.7 % (ref 41–52)
HGB BLD-MCNC: 11.5 G/DL (ref 13.5–17.5)
HYALINE CASTS #/AREA URNS AUTO: ABNORMAL /LPF
IMM GRANULOCYTES # BLD AUTO: 0.02 X10*3/UL (ref 0–0.7)
IMM GRANULOCYTES NFR BLD AUTO: 0.5 % (ref 0–0.9)
KETONES UR STRIP.AUTO-MCNC: ABNORMAL MG/DL
LEUKOCYTE ESTERASE UR QL STRIP.AUTO: NEGATIVE
LYMPHOCYTES # BLD AUTO: 0.69 X10*3/UL (ref 1.2–4.8)
LYMPHOCYTES NFR BLD AUTO: 18.4 %
MCH RBC QN AUTO: 34.8 PG (ref 26–34)
MCHC RBC AUTO-ENTMCNC: 34.1 G/DL (ref 32–36)
MCV RBC AUTO: 102 FL (ref 80–100)
MONOCYTES # BLD AUTO: 0.88 X10*3/UL (ref 0.1–1)
MONOCYTES NFR BLD AUTO: 23.5 %
MUCOUS THREADS #/AREA URNS AUTO: ABNORMAL /LPF
NEUTROPHILS # BLD AUTO: 2.03 X10*3/UL (ref 1.2–7.7)
NEUTROPHILS NFR BLD AUTO: 54.1 %
NITRITE UR QL STRIP.AUTO: NEGATIVE
NRBC BLD-RTO: 0 /100 WBCS (ref 0–0)
PH UR STRIP.AUTO: 8 [PH]
PLATELET # BLD AUTO: 110 X10*3/UL (ref 150–450)
POTASSIUM SERPL-SCNC: 3.8 MMOL/L (ref 3.5–5.3)
PROT SERPL-MCNC: 7.3 G/DL (ref 6.4–8.2)
PROT UR STRIP.AUTO-MCNC: ABNORMAL MG/DL
RBC # BLD AUTO: 3.3 X10*6/UL (ref 4.5–5.9)
RBC # UR STRIP.AUTO: NEGATIVE MG/DL
RBC #/AREA URNS AUTO: ABNORMAL /HPF
SODIUM SERPL-SCNC: 143 MMOL/L (ref 136–145)
SP GR UR STRIP.AUTO: 1.03
T4 FREE SERPL-MCNC: 0.94 NG/DL (ref 0.61–1.12)
TSH SERPL-ACNC: 4.68 MIU/L (ref 0.44–3.98)
UROBILINOGEN UR STRIP.AUTO-MCNC: NORMAL MG/DL
WBC # BLD AUTO: 3.8 X10*3/UL (ref 4.4–11.3)
WBC #/AREA URNS AUTO: ABNORMAL /HPF

## 2025-06-08 PROCEDURE — 2500000004 HC RX 250 GENERAL PHARMACY W/ HCPCS (ALT 636 FOR OP/ED): Performed by: EMERGENCY MEDICINE

## 2025-06-08 PROCEDURE — 93005 ELECTROCARDIOGRAM TRACING: CPT

## 2025-06-08 PROCEDURE — 85025 COMPLETE CBC W/AUTO DIFF WBC: CPT

## 2025-06-08 PROCEDURE — 84439 ASSAY OF FREE THYROXINE: CPT

## 2025-06-08 PROCEDURE — 2500000004 HC RX 250 GENERAL PHARMACY W/ HCPCS (ALT 636 FOR OP/ED)

## 2025-06-08 PROCEDURE — 2500000001 HC RX 250 WO HCPCS SELF ADMINISTERED DRUGS (ALT 637 FOR MEDICARE OP)

## 2025-06-08 PROCEDURE — 2550000001 HC RX 255 CONTRASTS: Performed by: EMERGENCY MEDICINE

## 2025-06-08 PROCEDURE — 84075 ASSAY ALKALINE PHOSPHATASE: CPT

## 2025-06-08 PROCEDURE — 1200000002 HC GENERAL ROOM WITH TELEMETRY DAILY

## 2025-06-08 PROCEDURE — 74177 CT ABD & PELVIS W/CONTRAST: CPT | Performed by: STUDENT IN AN ORGANIZED HEALTH CARE EDUCATION/TRAINING PROGRAM

## 2025-06-08 PROCEDURE — 36415 COLL VENOUS BLD VENIPUNCTURE: CPT

## 2025-06-08 PROCEDURE — 71046 X-RAY EXAM CHEST 2 VIEWS: CPT | Mod: FOREIGN READ | Performed by: RADIOLOGY

## 2025-06-08 PROCEDURE — 81001 URINALYSIS AUTO W/SCOPE: CPT

## 2025-06-08 PROCEDURE — 96375 TX/PRO/DX INJ NEW DRUG ADDON: CPT

## 2025-06-08 PROCEDURE — 99285 EMERGENCY DEPT VISIT HI MDM: CPT | Mod: 25 | Performed by: EMERGENCY MEDICINE

## 2025-06-08 PROCEDURE — 96374 THER/PROPH/DIAG INJ IV PUSH: CPT

## 2025-06-08 PROCEDURE — 84443 ASSAY THYROID STIM HORMONE: CPT

## 2025-06-08 PROCEDURE — 74177 CT ABD & PELVIS W/CONTRAST: CPT

## 2025-06-08 PROCEDURE — 96376 TX/PRO/DX INJ SAME DRUG ADON: CPT

## 2025-06-08 PROCEDURE — 82077 ASSAY SPEC XCP UR&BREATH IA: CPT

## 2025-06-08 PROCEDURE — 71046 X-RAY EXAM CHEST 2 VIEWS: CPT

## 2025-06-08 RX ORDER — FOLIC ACID 1 MG/1
1 TABLET ORAL DAILY
Status: DISCONTINUED | OUTPATIENT
Start: 2025-06-08 | End: 2025-06-08

## 2025-06-08 RX ORDER — PHENOBARBITAL SODIUM 65 MG/ML
INJECTION, SOLUTION INTRAMUSCULAR; INTRAVENOUS
Status: COMPLETED
Start: 2025-06-08 | End: 2025-06-08

## 2025-06-08 RX ORDER — AMLODIPINE BESYLATE 5 MG/1
5 TABLET ORAL DAILY
Status: DISCONTINUED | OUTPATIENT
Start: 2025-06-08 | End: 2025-06-08

## 2025-06-08 RX ORDER — PHENOBARBITAL 32.4 MG/1
64.8 TABLET ORAL EVERY 6 HOURS PRN
Status: DISCONTINUED | OUTPATIENT
Start: 2025-06-08 | End: 2025-06-10 | Stop reason: HOSPADM

## 2025-06-08 RX ORDER — LORAZEPAM 2 MG/ML
1 INJECTION INTRAMUSCULAR EVERY 2 HOUR PRN
Status: DISCONTINUED | OUTPATIENT
Start: 2025-06-08 | End: 2025-06-08

## 2025-06-08 RX ORDER — THIAMINE HYDROCHLORIDE 100 MG/ML
500 INJECTION, SOLUTION INTRAMUSCULAR; INTRAVENOUS EVERY 8 HOURS SCHEDULED
Status: DISCONTINUED | OUTPATIENT
Start: 2025-06-08 | End: 2025-06-10 | Stop reason: HOSPADM

## 2025-06-08 RX ORDER — MULTIVIT-MIN/IRON FUM/FOLIC AC 7.5 MG-4
1 TABLET ORAL DAILY
Status: DISCONTINUED | OUTPATIENT
Start: 2025-06-08 | End: 2025-06-10 | Stop reason: HOSPADM

## 2025-06-08 RX ORDER — ACETAMINOPHEN 650 MG/1
650 SUPPOSITORY RECTAL EVERY 4 HOURS PRN
Status: DISCONTINUED | OUTPATIENT
Start: 2025-06-08 | End: 2025-06-10 | Stop reason: HOSPADM

## 2025-06-08 RX ORDER — POLYETHYLENE GLYCOL 3350 17 G/17G
17 POWDER, FOR SOLUTION ORAL DAILY
Status: DISCONTINUED | OUTPATIENT
Start: 2025-06-08 | End: 2025-06-10 | Stop reason: HOSPADM

## 2025-06-08 RX ORDER — PHENOBARBITAL 32.4 MG/1
32.4 TABLET ORAL 3 TIMES DAILY
Status: DISCONTINUED | OUTPATIENT
Start: 2025-06-10 | End: 2025-06-10 | Stop reason: HOSPADM

## 2025-06-08 RX ORDER — ESCITALOPRAM OXALATE 10 MG/1
10 TABLET ORAL EVERY MORNING
Status: DISCONTINUED | OUTPATIENT
Start: 2025-06-09 | End: 2025-06-10 | Stop reason: HOSPADM

## 2025-06-08 RX ORDER — LORAZEPAM 2 MG/ML
2 INJECTION INTRAMUSCULAR EVERY 2 HOUR PRN
Status: DISCONTINUED | OUTPATIENT
Start: 2025-06-08 | End: 2025-06-08

## 2025-06-08 RX ORDER — BUPROPION HYDROCHLORIDE 150 MG/1
150 TABLET ORAL DAILY
Status: DISCONTINUED | OUTPATIENT
Start: 2025-06-08 | End: 2025-06-10 | Stop reason: HOSPADM

## 2025-06-08 RX ORDER — PHENOBARBITAL SODIUM 65 MG/ML
260 INJECTION, SOLUTION INTRAMUSCULAR; INTRAVENOUS
Status: COMPLETED | OUTPATIENT
Start: 2025-06-08 | End: 2025-06-08

## 2025-06-08 RX ORDER — LORAZEPAM 0.5 MG/1
1 TABLET ORAL EVERY 2 HOUR PRN
Status: DISCONTINUED | OUTPATIENT
Start: 2025-06-08 | End: 2025-06-08

## 2025-06-08 RX ORDER — LORAZEPAM 0.5 MG/1
0.5 TABLET ORAL EVERY 2 HOUR PRN
Status: DISCONTINUED | OUTPATIENT
Start: 2025-06-08 | End: 2025-06-08

## 2025-06-08 RX ORDER — PROPRANOLOL HYDROCHLORIDE 40 MG/1
40 TABLET ORAL 2 TIMES DAILY
Status: DISCONTINUED | OUTPATIENT
Start: 2025-06-08 | End: 2025-06-10 | Stop reason: HOSPADM

## 2025-06-08 RX ORDER — PHENOBARBITAL 32.4 MG/1
64.8 TABLET ORAL 3 TIMES DAILY
Status: DISCONTINUED | OUTPATIENT
Start: 2025-06-08 | End: 2025-06-10 | Stop reason: HOSPADM

## 2025-06-08 RX ORDER — ACETAMINOPHEN 325 MG/1
650 TABLET ORAL EVERY 4 HOURS PRN
Status: DISCONTINUED | OUTPATIENT
Start: 2025-06-08 | End: 2025-06-10 | Stop reason: HOSPADM

## 2025-06-08 RX ORDER — MECLIZINE HYDROCHLORIDE 25 MG/1
25 TABLET ORAL 3 TIMES DAILY
Status: DISCONTINUED | OUTPATIENT
Start: 2025-06-08 | End: 2025-06-10 | Stop reason: HOSPADM

## 2025-06-08 RX ORDER — LORAZEPAM 2 MG/ML
0.5 INJECTION INTRAMUSCULAR EVERY 2 HOUR PRN
Status: DISCONTINUED | OUTPATIENT
Start: 2025-06-08 | End: 2025-06-08

## 2025-06-08 RX ORDER — ACETAMINOPHEN 160 MG/5ML
650 SOLUTION ORAL EVERY 4 HOURS PRN
Status: DISCONTINUED | OUTPATIENT
Start: 2025-06-08 | End: 2025-06-10 | Stop reason: HOSPADM

## 2025-06-08 RX ORDER — LEVOTHYROXINE SODIUM 75 UG/1
75 TABLET ORAL DAILY
Status: DISCONTINUED | OUTPATIENT
Start: 2025-06-08 | End: 2025-06-08

## 2025-06-08 RX ORDER — ENOXAPARIN SODIUM 100 MG/ML
40 INJECTION SUBCUTANEOUS EVERY 24 HOURS
Status: DISCONTINUED | OUTPATIENT
Start: 2025-06-08 | End: 2025-06-10 | Stop reason: HOSPADM

## 2025-06-08 RX ORDER — FOLIC ACID 1 MG/1
1 TABLET ORAL DAILY
Status: DISCONTINUED | OUTPATIENT
Start: 2025-06-09 | End: 2025-06-10 | Stop reason: HOSPADM

## 2025-06-08 RX ORDER — LANOLIN ALCOHOL/MO/W.PET/CERES
100 CREAM (GRAM) TOPICAL DAILY
Status: DISCONTINUED | OUTPATIENT
Start: 2025-06-11 | End: 2025-06-10 | Stop reason: HOSPADM

## 2025-06-08 RX ORDER — ROPINIROLE 1 MG/1
1 TABLET, FILM COATED ORAL NIGHTLY
Status: DISCONTINUED | OUTPATIENT
Start: 2025-06-08 | End: 2025-06-10 | Stop reason: HOSPADM

## 2025-06-08 RX ORDER — LORAZEPAM 2 MG/1
2 TABLET ORAL EVERY 2 HOUR PRN
Status: DISCONTINUED | OUTPATIENT
Start: 2025-06-08 | End: 2025-06-08

## 2025-06-08 RX ADMIN — LORAZEPAM 2 MG: 2 TABLET ORAL at 08:07

## 2025-06-08 RX ADMIN — PHENOBARBITAL SODIUM 260 MG: 65 INJECTION INTRAMUSCULAR at 12:35

## 2025-06-08 RX ADMIN — PHENOBARBITAL SODIUM 260 MG: 65 INJECTION INTRAMUSCULAR; INTRAVENOUS at 12:35

## 2025-06-08 RX ADMIN — PHENOBARBITAL 64.8 MG: 32.4 TABLET ORAL at 19:00

## 2025-06-08 RX ADMIN — FOLIC ACID 1 MG: 1 TABLET ORAL at 08:07

## 2025-06-08 RX ADMIN — PHENOBARBITAL SODIUM 260 MG: 65 INJECTION INTRAMUSCULAR; INTRAVENOUS at 10:14

## 2025-06-08 RX ADMIN — THIAMINE HYDROCHLORIDE 500 MG: 100 INJECTION, SOLUTION INTRAMUSCULAR; INTRAVENOUS at 08:07

## 2025-06-08 RX ADMIN — Medication 1 TABLET: at 08:07

## 2025-06-08 RX ADMIN — THIAMINE HYDROCHLORIDE 500 MG: 100 INJECTION, SOLUTION INTRAMUSCULAR; INTRAVENOUS at 21:06

## 2025-06-08 RX ADMIN — IOHEXOL 75 ML: 350 INJECTION, SOLUTION INTRAVENOUS at 11:11

## 2025-06-08 RX ADMIN — PHENOBARBITAL 64.8 MG: 32.4 TABLET ORAL at 21:05

## 2025-06-08 RX ADMIN — THIAMINE HYDROCHLORIDE 500 MG: 100 INJECTION, SOLUTION INTRAMUSCULAR; INTRAVENOUS at 19:01

## 2025-06-08 RX ADMIN — ROPINIROLE HYDROCHLORIDE 1 MG: 1 TABLET, FILM COATED ORAL at 21:00

## 2025-06-08 RX ADMIN — ENOXAPARIN SODIUM 40 MG: 40 INJECTION SUBCUTANEOUS at 19:00

## 2025-06-08 RX ADMIN — PHENOBARBITAL SODIUM 260 MG: 65 INJECTION INTRAMUSCULAR; INTRAVENOUS at 11:10

## 2025-06-08 SDOH — SOCIAL STABILITY: SOCIAL INSECURITY: HAS ANYONE EVER THREATENED TO HURT YOUR FAMILY OR YOUR PETS?: NO

## 2025-06-08 SDOH — SOCIAL STABILITY: SOCIAL INSECURITY: HAVE YOU HAD ANY THOUGHTS OF HARMING ANYONE ELSE?: NO

## 2025-06-08 SDOH — SOCIAL STABILITY: SOCIAL INSECURITY: WERE YOU ABLE TO COMPLETE ALL THE BEHAVIORAL HEALTH SCREENINGS?: YES

## 2025-06-08 SDOH — SOCIAL STABILITY: SOCIAL INSECURITY: DOES ANYONE TRY TO KEEP YOU FROM HAVING/CONTACTING OTHER FRIENDS OR DOING THINGS OUTSIDE YOUR HOME?: NO

## 2025-06-08 SDOH — SOCIAL STABILITY: SOCIAL INSECURITY: HAVE YOU HAD THOUGHTS OF HARMING ANYONE ELSE?: NO

## 2025-06-08 SDOH — SOCIAL STABILITY: SOCIAL INSECURITY: DO YOU FEEL ANYONE HAS EXPLOITED OR TAKEN ADVANTAGE OF YOU FINANCIALLY OR OF YOUR PERSONAL PROPERTY?: NO

## 2025-06-08 SDOH — SOCIAL STABILITY: SOCIAL INSECURITY: DO YOU FEEL UNSAFE GOING BACK TO THE PLACE WHERE YOU ARE LIVING?: NO

## 2025-06-08 SDOH — SOCIAL STABILITY: SOCIAL INSECURITY: ABUSE: ADULT

## 2025-06-08 SDOH — SOCIAL STABILITY: SOCIAL INSECURITY: ARE THERE ANY APPARENT SIGNS OF INJURIES/BEHAVIORS THAT COULD BE RELATED TO ABUSE/NEGLECT?: NO

## 2025-06-08 SDOH — SOCIAL STABILITY: SOCIAL INSECURITY: ARE YOU OR HAVE YOU BEEN THREATENED OR ABUSED PHYSICALLY, EMOTIONALLY, OR SEXUALLY BY ANYONE?: NO

## 2025-06-08 ASSESSMENT — LIFESTYLE VARIABLES
TOTAL SCORE: 0
VISUAL DISTURBANCES: NOT PRESENT
ORIENTATION AND CLOUDING OF SENSORIUM: ORIENTED AND CAN DO SERIAL ADDITIONS
ORIENTATION AND CLOUDING OF SENSORIUM: ORIENTED AND CAN DO SERIAL ADDITIONS
TACTILE DISTURBANCES: VERY MILD ITCHING, PINS AND NEEDLES, BURNING OR NUMBNESS
AGITATION: NORMAL ACTIVITY
PULSE: 64
HEADACHE, FULLNESS IN HEAD: NOT PRESENT
ANXIETY: NO ANXIETY, AT EASE
AUDITORY DISTURBANCES: NOT PRESENT
TOTAL SCORE: 10
AUDITORY DISTURBANCES: NOT PRESENT
TREMOR: NO TREMOR
HEADACHE, FULLNESS IN HEAD: NOT PRESENT
ANXIETY: 3
TOTAL SCORE: 4
TREMOR: MODERATE, WITH PATIENT'S ARMS EXTENDED
ORIENTATION AND CLOUDING OF SENSORIUM: ORIENTED AND CAN DO SERIAL ADDITIONS
HOW MANY STANDARD DRINKS CONTAINING ALCOHOL DO YOU HAVE ON A TYPICAL DAY: 3 OR 4
ANXIETY: 2
VISUAL DISTURBANCES: NOT PRESENT
VISUAL DISTURBANCES: NOT PRESENT
TOTAL SCORE: 5
NAUSEA AND VOMITING: NO NAUSEA AND NO VOMITING
TACTILE DISTURBANCES: VERY MILD ITCHING, PINS AND NEEDLES, BURNING OR NUMBNESS
HEADACHE, FULLNESS IN HEAD: NOT PRESENT
HEADACHE, FULLNESS IN HEAD: NOT PRESENT
NAUSEA AND VOMITING: NO NAUSEA AND NO VOMITING
PAROXYSMAL SWEATS: BARELY PERCEPTIBLE SWEATING, PALMS MOIST
NAUSEA AND VOMITING: NO NAUSEA AND NO VOMITING
AUDITORY DISTURBANCES: NOT PRESENT
TOTAL SCORE: 10
TREMOR: 3
HEADACHE, FULLNESS IN HEAD: NOT PRESENT
PAROXYSMAL SWEATS: NO SWEAT VISIBLE
TREMOR: 2
ANXIETY: MILDLY ANXIOUS
PAROXYSMAL SWEATS: NO SWEAT VISIBLE
VISUAL DISTURBANCES: NOT PRESENT
AGITATION: NORMAL ACTIVITY
AGITATION: SOMEWHAT MORE THAN NORMAL ACTIVITY
HEADACHE, FULLNESS IN HEAD: NOT PRESENT
BLOOD PRESSURE: 164/102
PAROXYSMAL SWEATS: NO SWEAT VISIBLE
TREMOR: NO TREMOR
TOTAL SCORE: 1
PULSE: 63
PAROXYSMAL SWEATS: NO SWEAT VISIBLE
NAUSEA AND VOMITING: NO NAUSEA AND NO VOMITING
TOTAL SCORE: 3
ORIENTATION AND CLOUDING OF SENSORIUM: ORIENTED AND CAN DO SERIAL ADDITIONS
AGITATION: NORMAL ACTIVITY
BLOOD PRESSURE: 157/105
ANXIETY: 3
PULSE: 66
AGITATION: NORMAL ACTIVITY
TOTAL SCORE: 0
NAUSEA AND VOMITING: NO NAUSEA AND NO VOMITING
ORIENTATION AND CLOUDING OF SENSORIUM: ORIENTED AND CAN DO SERIAL ADDITIONS
ORIENTATION AND CLOUDING OF SENSORIUM: ORIENTED AND CAN DO SERIAL ADDITIONS
ANXIETY: 2
AUDITORY DISTURBANCES: NOT PRESENT
BLOOD PRESSURE: 146/88
BLOOD PRESSURE: 153/103
TOTAL SCORE: 7
TOTAL SCORE: 4
PAROXYSMAL SWEATS: 2
TREMOR: 2
AUDITORY DISTURBANCES: NOT PRESENT
VISUAL DISTURBANCES: NOT PRESENT
NAUSEA AND VOMITING: NO NAUSEA AND NO VOMITING
PAROXYSMAL SWEATS: NO SWEAT VISIBLE
VISUAL DISTURBANCES: NOT PRESENT
ORIENTATION AND CLOUDING OF SENSORIUM: ORIENTED AND CAN DO SERIAL ADDITIONS
PAROXYSMAL SWEATS: NO SWEAT VISIBLE
NAUSEA AND VOMITING: NO NAUSEA AND NO VOMITING
TREMOR: NO TREMOR
ORIENTATION AND CLOUDING OF SENSORIUM: ORIENTED AND CAN DO SERIAL ADDITIONS
AUDITORY DISTURBANCES: NOT PRESENT
PULSE: 68
ANXIETY: 2
VISUAL DISTURBANCES: NOT PRESENT
AGITATION: NORMAL ACTIVITY
HEADACHE, FULLNESS IN HEAD: NOT PRESENT
HOW OFTEN DO YOU HAVE A DRINK CONTAINING ALCOHOL: 4 OR MORE TIMES A WEEK
ANXIETY: 2
VISUAL DISTURBANCES: NOT PRESENT
AGITATION: NORMAL ACTIVITY
NAUSEA AND VOMITING: NO NAUSEA AND NO VOMITING
AUDITORY DISTURBANCES: NOT PRESENT
HEADACHE, FULLNESS IN HEAD: NOT PRESENT
TACTILE DISTURBANCES: VERY MILD ITCHING, PINS AND NEEDLES, BURNING OR NUMBNESS
AGITATION: NORMAL ACTIVITY
HEADACHE, FULLNESS IN HEAD: NOT PRESENT
VISUAL DISTURBANCES: NOT PRESENT
TREMOR: MODERATE, WITH PATIENT'S ARMS EXTENDED
TREMOR: NO TREMOR
AGITATION: NORMAL ACTIVITY
PAROXYSMAL SWEATS: 2
PAROXYSMAL SWEATS: NO SWEAT VISIBLE
ORIENTATION AND CLOUDING OF SENSORIUM: ORIENTED AND CAN DO SERIAL ADDITIONS
ANXIETY: MODERATELY ANXIOUS, OR GUARDED, SO ANXIETY IS INFERRED
VISUAL DISTURBANCES: NOT PRESENT
NAUSEA AND VOMITING: NO NAUSEA AND NO VOMITING
HEADACHE, FULLNESS IN HEAD: NOT PRESENT
ANXIETY: NO ANXIETY, AT EASE
AGITATION: NORMAL ACTIVITY
TREMOR: NOT VISIBLE, BUT CAN BE FELT FINGERTIP TO FINGERTIP
NAUSEA AND VOMITING: NO NAUSEA AND NO VOMITING
ORIENTATION AND CLOUDING OF SENSORIUM: ORIENTED AND CAN DO SERIAL ADDITIONS

## 2025-06-08 ASSESSMENT — COGNITIVE AND FUNCTIONAL STATUS - GENERAL
TOILETING: A LOT
DAILY ACTIVITIY SCORE: 22
WALKING IN HOSPITAL ROOM: A LOT
CLIMB 3 TO 5 STEPS WITH RAILING: A LOT
STANDING UP FROM CHAIR USING ARMS: A LOT
DAILY ACTIVITIY SCORE: 22
STANDING UP FROM CHAIR USING ARMS: A LOT
MOVING TO AND FROM BED TO CHAIR: A LOT
TURNING FROM BACK TO SIDE WHILE IN FLAT BAD: A LOT
WALKING IN HOSPITAL ROOM: A LOT
TURNING FROM BACK TO SIDE WHILE IN FLAT BAD: A LITTLE
MOVING TO AND FROM BED TO CHAIR: A LOT
TOILETING: A LOT
MOBILITY SCORE: 15
PATIENT BASELINE BEDBOUND: NO
CLIMB 3 TO 5 STEPS WITH RAILING: TOTAL
MOBILITY SCORE: 13

## 2025-06-08 ASSESSMENT — ACTIVITIES OF DAILY LIVING (ADL)
JUDGMENT_ADEQUATE_SAFELY_COMPLETE_DAILY_ACTIVITIES: YES
TOILETING: NEEDS ASSISTANCE
HEARING - LEFT EAR: DIFFICULTY WITH NOISE
FEEDING YOURSELF: INDEPENDENT
GROOMING: INDEPENDENT
WALKS IN HOME: NEEDS ASSISTANCE
BATHING: NEEDS ASSISTANCE
HEARING - RIGHT EAR: DIFFICULTY WITH NOISE
ASSISTIVE_DEVICE: WALKER
DRESSING YOURSELF: INDEPENDENT
ADEQUATE_TO_COMPLETE_ADL: YES
PATIENT'S MEMORY ADEQUATE TO SAFELY COMPLETE DAILY ACTIVITIES?: YES

## 2025-06-08 ASSESSMENT — PAIN - FUNCTIONAL ASSESSMENT: PAIN_FUNCTIONAL_ASSESSMENT: 0-10

## 2025-06-08 ASSESSMENT — PATIENT HEALTH QUESTIONNAIRE - PHQ9
SUM OF ALL RESPONSES TO PHQ9 QUESTIONS 1 & 2: 3
1. LITTLE INTEREST OR PLEASURE IN DOING THINGS: NOT AT ALL
2. FEELING DOWN, DEPRESSED OR HOPELESS: NEARLY EVERY DAY
2. FEELING DOWN, DEPRESSED OR HOPELESS: NEARLY EVERY DAY
1. LITTLE INTEREST OR PLEASURE IN DOING THINGS: NOT AT ALL
SUM OF ALL RESPONSES TO PHQ9 QUESTIONS 1 & 2: 3

## 2025-06-08 ASSESSMENT — PAIN SCALES - GENERAL
PAINLEVEL_OUTOF10: 3
PAINLEVEL_OUTOF10: 0 - NO PAIN
PAINLEVEL_OUTOF10: 0 - NO PAIN

## 2025-06-08 NOTE — H&P
"  Subjective/History     Humble Banks is a 69 y.o. male with a history of alcohol use disorder, CHINEDU, MDD, dyslipidemia, hypertension, hypothyroidism who presented on 6/8 for bilateral lower leg weakness for about the last 6 months.  Has been requiring the use of a walker now in order to get around.  He states that he has fallen twice in the last month because his legs \"give out.\"  He says that he has also been feeling some tingling in his legs as well that is different from his restless leg syndrome symptoms.  States that he has been trying to cut back on alcohol use and only drinks when he watches sporting events now.  Last drink 2 and half days ago when he had 2 beers and a shot of whiskey.  He denies history of withdrawal seizures/delirium tremens.  Lives with his friend and his friend's girlfriend in a house.    ROS: Negative unless stated above    ED Course: Vitals significant for blood pressure 184/98, heart rate 77, saturating 95% on room air.  EKG showing sinus rhythm at 65 bpm, QTc 476, no ischemic signs.  CBC shows white blood cell count 3.8, hemoglobin baseline at 11.5, ,  platelets 110.  CMP unremarkable aside from elevated AST.  TSH was elevated however T4 was normal.  CT A/P shows hepatic steatosis and hepatomegaly.  He was started on a phenobarb taper with 260 mg injection as well as thiamine and folate.    Surgical history: As below  Family history: As below  Risk/Social factors:     Medical History[1]    Surgical History[2]    Family history:family history includes No Known Problems in his father and mother.    Objective     Physical Exam  Vitals reviewed.   Constitutional:       Appearance: Normal appearance.   Cardiovascular:      Rate and Rhythm: Normal rate and regular rhythm.   Pulmonary:      Effort: Pulmonary effort is normal.      Breath sounds: Normal breath sounds. No wheezing or rhonchi.   Abdominal:      General: Abdomen is flat. There is distension.      Palpations: Abdomen " is soft.      Tenderness: There is no abdominal tenderness.   Musculoskeletal:      Right lower leg: No edema.      Left lower leg: No edema.   Skin:     General: Skin is warm and dry.      Coloration: Skin is not jaundiced.   Neurological:      General: No focal deficit present.      Mental Status: He is alert and oriented to person, place, and time.      Cranial Nerves: No cranial nerve deficit.      Sensory: No sensory deficit.      Motor: No weakness.      Comments: Tremors in all extremities       Last Recorded Vitals  Blood pressure 142/83, pulse 59, temperature 36.3 °C (97.3 °F), temperature source Temporal, resp. rate 14, height 1.829 m (6'), weight 83.9 kg (185 lb), SpO2 95%.  Intake/Output last 3 Shifts:  No intake/output data recorded.    Last CBC & BMP  Lab Results   Component Value Date    GLUCOSE 97 06/08/2025    CALCIUM 9.3 06/08/2025     06/08/2025    K 3.8 06/08/2025    CO2 26 06/08/2025     06/08/2025    BUN 16 06/08/2025    CREATININE 0.63 06/08/2025     Lab Results   Component Value Date    WBC 3.8 (L) 06/08/2025    HGB 11.5 (L) 06/08/2025    HCT 33.7 (L) 06/08/2025     (H) 06/08/2025     (L) 06/08/2025         Assessment / Plan   Humble Banks is a 69 y.o. male with a history of alcohol use disorder, CHINEDU, MDD, dyslipidemia, hypertension, hypothyroidism who presented on 6/8 for bilateral lower leg weakness for about the last 6 months.    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 contributing to falls  - He denies any injuries  - PT OT for possible rehab  placement    Chronic macrocytic anemia  Thrombocytopenia  - Likely secondary to bone marrow suppression from alcohol use  - Hemoglobin on admission around baseline at 11.5, platelets 110  - Monitor CBC, continue treatment of alcohol use disorder as above    MDD  CHINEDU  Hypothyroidism  Restless leg syndrome  - Continue home, escitalopram, levothyroxine, ropinirole, amlodipine, hold bupropion as it reduces seizure threshold    DVT: Lovenox  Diet: Regular  IVF: None  Antibiotics: None  Consults: PT/OT  Dispo: Telemetry  CODE STATUS: Full code    Agusto Almanzar MD  PGY-1, Internal Medicine    This is a preliminary note, please await attending attestation for final A/P         [1]   Past Medical History:  Diagnosis Date    Alcohol abuse     DDD (degenerative disc disease), lumbar     Generalized anxiety disorder     Anxiety, generalized    HLD (hyperlipidemia)     Hypertension     Hypothyroidism     Left inguinal hernia     Sciatica of left side     Tobacco use disorder    [2] History reviewed. No pertinent surgical history.

## 2025-06-08 NOTE — ED PROVIDER NOTES
Emergency Department Provider Note        History of Present Illness     History provided by: Patient  Limitations to History: None  External Records Reviewed with Brief Summary: Discharge Summary from 4/22/2025 which showed patient presenting for acute alcohol withdrawal and a fall    HPI:  Humble Banks is a 69 y.o. male with a past medical history of alcohol abuse disorder, suspected Wernicke encephalopathy, generalized anxiety disorder, depression, dyslipidemia, hypertension, hypothyroidism presenting today for bilateral lower leg weakness.  Patient notes that this weakness has been occurring for approximately 6 months and has progressively worsened.  He notes he used to be able to walk with minimal use of walker, but no in order to walk he needs a walker.  Patient notes some numbness or paresthesias in the lower extremities, denies back pain, denies any falls within the past month.  Patient notes some chronic sciatic pain of his left lower leg.   Patient notes his last drink of alcohol was 2 days ago.   She does note some abdominal pain for the last couple of days, he notes it primarily in the lower quadrants and states it feels like a gaseous sensation.  He notes he has had no changes in bowel movements, including no constipation or diarrhea.    Physical Exam   Triage vitals:  T 36.3 °C (97.3 °F)  HR 77  BP (!) 184/98  RR 18  O2 95 % None (Room air)    General: Awake, alert, in no acute distress. Tremulous  Eyes: Gaze conjugate.  No scleral icterus or injection.  PERRLA, EOMI  HENT: Normo-cephalic, atraumatic. No stridor  CV: Regular rate, regular rhythm. Radial pulses 2+ bilaterally  Resp: Breathing non-labored, speaking in full sentences.  Clear to auscultation bilaterally  GI: Soft, mildly distended, non-tender. No rebound or guarding.  MSK/Extremities: No gross bony deformities. Moving all extremities.  Strength 5 out of 5 in hip flexion, plantarflexion, dorsiflexion  Skin: Warm. Appropriate  color  Neuro: Alert. Oriented. Face symmetric. Speech is fluent.  Gross strength and sensation intact in b/l UE and LEs, CN II-XII intact.  Psych: Appropriate mood and affect    Medical Decision Making & ED Course   Medical Decision Makin y.o. male with a past medical history of alcohol abuse disorder, suspected Wernicke encephalopathy, generalized anxiety disorder, depression, dyslipidemia, hypertension, hypothyroidism presenting today for bilateral lower leg weakness.  Upon initial evaluation patient is well-appearing with reassuring vital signs.  Differentials at this time include malnutrition, alcohol withdrawal, chronic weakness, UTI, electrolyte abnormality, anemia, hypothyroidism.  Acute spinal pathology was also considered but due to the patient's denial of any recent falls, back pain, paresthesias this was deemed unlikely at this time.   Will obtain labs to further evaluate  CIWA initiated.  EKG stable.  CIWA initially 10, patient provided 2 of Ativan with minimal relief.  Patient started on phenobarbital load.  Labs revealed no overt abnormalities.  Phenobarb loaded.   Patient signed out pending repeat CIWA and likely admission.     ----      Differential diagnoses considered include but are not limited to: As above     Social Determinants of Health which Significantly Impact Care: None identified     EKG Independent Interpretation: The EKG obtained at 0848 was independently interpreted by myself. It demonstrates sinus rhythm with a ventricular rate of 65.  Normal axis. Intervals within normal limits. ST segments showed no signs of elevation or depression, no STEMI.  Similar when compared to prior EKG from 25.    Independent Result Review and Interpretation: As above    Chronic conditions affecting the patient's care: As documented above in [facility]    The patient was discussed with the following consultants/services: None    Care Considerations: As documented above in [facility]    ED Course:  ED Course as  of 06/08/25 1322   Sun Jun 08, 2025   1111 ED Attending Documentation: This patient was seen by the acting intern.  I have seen and examined the patient, agree with the workup, evaluation, management and diagnosis. I reviewed and edited the above documentation where necessary. I have looked at the EKG and agree with the interpretation. On my evaluation of the patient: 69-year-old male who presents with inability to walk at home secondary to bilateral lower extremity weakness.  Patient has good 5 out of 5 strength in bilateral extremities for me but is markedly tremulous and has some mild ataxia on my exam, finger-nose testing is slightly ataxic and heel-to-shin is difficult secondary to tremor.  Patient has a CIWA of 10 has not drank since yesterday, is in alcohol withdrawal and cannot ambulate so will be admitted to the hospital.  Will phenobarbital load prior to admission to the hospital.    Chaz Hernandez MD  EM Attending Physician   [RG]      ED Course User Index  [RG] Chaz Hernandez MD         Diagnoses as of 06/08/25 1322   Alcohol withdrawal syndrome without complication (Multi)     Disposition   Patient was signed out to Dr. Harkins at 1300 pending completion of their work-up.  Please see the next provider's transition of care note for the remainder of the patient's care.     Procedures   Procedures    Patient seen and discussed with ED attending physician.    Mil Garcia DO  Emergency Medicine       Mil Garcia DO  Resident  06/08/25 1325

## 2025-06-08 NOTE — PROGRESS NOTES
Pharmacy Medication History Review    Humble Banks is a 69 y.o. male admitted for No Principal Problem: There is no principal problem currently on the Problem List. Please update the Problem List and refresh.. Pharmacy reviewed the patient's [id number] medications and allergies for accuracy.    The list below reflectives the updated PTA list. Please review each medication in order reconciliation for additional clarification and justification.  Prior to Admission medications    Medication Sig Start Date End Date Taking? Authorizing Provider   buPROPion XL (Wellbutrin XL) 150 mg 24 hr tablet Take 1 tablet (150 mg) by mouth once daily.   Yes Historical Provider, MD   escitalopram (Lexapro) 10 mg tablet Take 1 tablet (10 mg) by mouth once daily in the morning.   Yes Historical Provider, MD   levothyroxine (Synthroid, Levoxyl) 75 mcg tablet Take 1 tablet (75 mcg) by mouth once daily in the morning. Take before meals.   Yes Historical Provider, MD   meclizine (Antivert) 25 mg tablet Take 1 tablet (25 mg) by mouth 3 times a day.   Yes Historical Provider, MD   propranolol (Inderal) 40 mg tablet Take 1 tablet (40 mg) by mouth 2 times a day.   Yes Historical Provider, MD   rOPINIRole (Requip) 1 mg tablet Take 1 tablet (1 mg) by mouth once daily at bedtime.   Yes Historical Provider, MD   acamprosate (Campral) 333 mg DR tablet Take 333 mg by mouth 3 times a day. Indications: alcohol use disorder  Patient not taking: Reported on 6/8/2025   no Historical Provider, MD   amLODIPine (Norvasc) 5 mg tablet Take 1 tablet (5 mg) by mouth once daily.  Patient not taking: Reported on 4/14/2025 12/27/24  no Garfield Norman MD   buPROPion XL (Wellbutrin XL) 150 mg 24 hr tablet Take 1 tablet (150 mg) by mouth once daily for 14 days.  Patient not taking: Reported on 4/14/2025 12/19/24 4/14/25 no Darrell Bautista DO   escitalopram (Lexapro) 10 mg tablet Take 1 tablet (10 mg) by mouth once daily for 14 days.  Patient not  taking: Reported on 4/14/2025 12/19/24 4/14/25 no Darrell Bautista DO   folic acid (Folvite) 1 mg tablet Take 1 tablet (1 mg) by mouth once daily.  Patient not taking: Reported on 4/14/2025 12/27/24  no Garfield Norman MD   levothyroxine (Synthroid, Levoxyl) 75 mcg tablet Take 1 tablet (75 mcg) by mouth once daily for 14 days.  Patient not taking: Reported on 4/14/2025 12/19/24 4/14/25 no Darrell Bautista DO   lidocaine 4 % patch Place 1 patch over 12 hours on the skin once daily. Remove & discard patch within 12 hours or as directed by MD. Apply to back.  Patient not taking: Reported on 4/14/2025 12/27/24  no Garfield Norman MD   methocarbamol (Robaxin) 500 mg tablet Take 1 tablet (500 mg) by mouth every 6 hours if needed for muscle spasms.  Patient not taking: Reported on 4/14/2025 12/26/24  no Garfield Norman MD   multivitamin with minerals tablet Take 1 tablet by mouth once daily for 14 days.  Patient not taking: Reported on 4/14/2025 12/19/24 1/2/25 no Darrell Bautista DO   thiamine (Vitamin B-1) 100 mg tablet Take 1 tablet (100 mg) by mouth once daily.  Patient not taking: Reported on 4/14/2025 12/27/24  no Garfield Norman MD        The list below reflectives the updated allergy list. Please review each documented allergy for additional clarification and justification.  Allergies  Reviewed by Kelli Grissom RN on 6/8/2025   No Known Allergies         Below are additional concerns with the patient's PTA list.    Medical and pharmacy records reviewed for supplemental history.    Abena Londono

## 2025-06-08 NOTE — PROGRESS NOTES
Emergency Department Transition of Care Note       Signout   I received Humble Banks in signout from Dr. Garcia.  Please see the ED Provider Note for all HPI, PE and MDM up to the time of signout at 1200 hrs.  This is in addition to the primary record.    In brief Humble Banks is an 69 y.o. male presenting for acute alcohol withdrawal and a fall    At the time of signout we were awaiting:  Repeat CIWA score and final disposition.    ED Course & Medical Decision Making   Medical Decision Making:  Under my care,     Patient's repeat CIWA score at 1145 was down to 4 from 7 an hour prior.  Patient appeared to doing well following phenobarbital.  Patient admitted to general medicine under Dr. Sheth stable condition for further treatment of alcohol withdrawal.        ED Course:  ED Course as of 06/08/25 1322   Sun Jun 08, 2025   1111 ED Attending Documentation: This patient was seen by the acting intern.  I have seen and examined the patient, agree with the workup, evaluation, management and diagnosis. I reviewed and edited the above documentation where necessary. I have looked at the EKG and agree with the interpretation. On my evaluation of the patient: 69-year-old male who presents with inability to walk at home secondary to bilateral lower extremity weakness.  Patient has good 5 out of 5 strength in bilateral extremities for me but is markedly tremulous and has some mild ataxia on my exam, finger-nose testing is slightly ataxic and heel-to-shin is difficult secondary to tremor.  Patient has a CIWA of 10 has not drank since yesterday, is in alcohol withdrawal and cannot ambulate so will be admitted to the hospital.  Will phenobarbital load prior to admission to the hospital.    Chaz Hernandez MD  EM Attending Physician   [RG]      ED Course User Index  [RG] Chaz Hernandez MD         Diagnoses as of 06/08/25 1322   Alcohol withdrawal syndrome without complication (Multi)       Disposition   As a result of  their workup, the patient will require admission to the hospital.  The patient was informed of his diagnosis.  The patient was given the opportunity to ask questions and I answered them. The patient agreed to be admitted to the hospital.    Procedures   Procedures    Patient seen and discussed with ED attending physician.    Daryl Harkins, DO  Emergency Medicine

## 2025-06-08 NOTE — CARE PLAN
The patient's goals for the shift include      The clinical goals for the shift include CIWA score less 9 and maintain safety      Problem: Pain - Adult  Goal: Verbalizes/displays adequate comfort level or baseline comfort level  Outcome: Progressing     Problem: Safety - Adult  Goal: Free from fall injury  Outcome: Progressing

## 2025-06-09 LAB
ALBUMIN SERPL BCP-MCNC: 4 G/DL (ref 3.4–5)
ALP SERPL-CCNC: 51 U/L (ref 33–136)
ALT SERPL W P-5'-P-CCNC: 29 U/L (ref 10–52)
ANION GAP SERPL CALC-SCNC: 13 MMOL/L (ref 10–20)
AST SERPL W P-5'-P-CCNC: 51 U/L (ref 9–39)
ATRIAL RATE: 66 BPM
BILIRUB SERPL-MCNC: 0.8 MG/DL (ref 0–1.2)
BUN SERPL-MCNC: 17 MG/DL (ref 6–23)
CALCIUM SERPL-MCNC: 9.2 MG/DL (ref 8.6–10.3)
CHLORIDE SERPL-SCNC: 101 MMOL/L (ref 98–107)
CO2 SERPL-SCNC: 26 MMOL/L (ref 21–32)
CREAT SERPL-MCNC: 0.66 MG/DL (ref 0.5–1.3)
EGFRCR SERPLBLD CKD-EPI 2021: >90 ML/MIN/1.73M*2
ERYTHROCYTE [DISTWIDTH] IN BLOOD BY AUTOMATED COUNT: 14.7 % (ref 11.5–14.5)
GLUCOSE SERPL-MCNC: 82 MG/DL (ref 74–99)
HCT VFR BLD AUTO: 33.8 % (ref 41–52)
HGB BLD-MCNC: 11.3 G/DL (ref 13.5–17.5)
HOLD SPECIMEN: NORMAL
MAGNESIUM SERPL-MCNC: 1.37 MG/DL (ref 1.6–2.4)
MCH RBC QN AUTO: 34.7 PG (ref 26–34)
MCHC RBC AUTO-ENTMCNC: 33.4 G/DL (ref 32–36)
MCV RBC AUTO: 104 FL (ref 80–100)
NRBC BLD-RTO: 0 /100 WBCS (ref 0–0)
P AXIS: 2 DEGREES
PLATELET # BLD AUTO: 117 X10*3/UL (ref 150–450)
POTASSIUM SERPL-SCNC: 3.5 MMOL/L (ref 3.5–5.3)
PR INTERVAL: 176 MS
PROT SERPL-MCNC: 6.9 G/DL (ref 6.4–8.2)
Q ONSET: 249 MS
QRS COUNT: 10 BEATS
QRS DURATION: 109 MS
QT INTERVAL: 457 MS
QTC CALCULATION(BAZETT): 476 MS
QTC FREDERICIA: 469 MS
R AXIS: -13 DEGREES
RBC # BLD AUTO: 3.26 X10*6/UL (ref 4.5–5.9)
SODIUM SERPL-SCNC: 136 MMOL/L (ref 136–145)
T AXIS: 11 DEGREES
T OFFSET: 478 MS
VENTRICULAR RATE: 65 BPM
WBC # BLD AUTO: 3.2 X10*3/UL (ref 4.4–11.3)

## 2025-06-09 PROCEDURE — 83735 ASSAY OF MAGNESIUM: CPT

## 2025-06-09 PROCEDURE — 2500000004 HC RX 250 GENERAL PHARMACY W/ HCPCS (ALT 636 FOR OP/ED)

## 2025-06-09 PROCEDURE — 2500000001 HC RX 250 WO HCPCS SELF ADMINISTERED DRUGS (ALT 637 FOR MEDICARE OP)

## 2025-06-09 PROCEDURE — S4991 NICOTINE PATCH NONLEGEND: HCPCS

## 2025-06-09 PROCEDURE — 80053 COMPREHEN METABOLIC PANEL: CPT

## 2025-06-09 PROCEDURE — 2500000002 HC RX 250 W HCPCS SELF ADMINISTERED DRUGS (ALT 637 FOR MEDICARE OP, ALT 636 FOR OP/ED)

## 2025-06-09 PROCEDURE — 1200000002 HC GENERAL ROOM WITH TELEMETRY DAILY

## 2025-06-09 PROCEDURE — 97161 PT EVAL LOW COMPLEX 20 MIN: CPT | Mod: GP

## 2025-06-09 PROCEDURE — 36415 COLL VENOUS BLD VENIPUNCTURE: CPT

## 2025-06-09 PROCEDURE — 97165 OT EVAL LOW COMPLEX 30 MIN: CPT | Mod: GO

## 2025-06-09 PROCEDURE — 85027 COMPLETE CBC AUTOMATED: CPT

## 2025-06-09 RX ORDER — MAGNESIUM SULFATE HEPTAHYDRATE 40 MG/ML
4 INJECTION, SOLUTION INTRAVENOUS ONCE
Status: COMPLETED | OUTPATIENT
Start: 2025-06-09 | End: 2025-06-09

## 2025-06-09 RX ORDER — MAGNESIUM SULFATE HEPTAHYDRATE 40 MG/ML
2 INJECTION, SOLUTION INTRAVENOUS ONCE
Status: DISCONTINUED | OUTPATIENT
Start: 2025-06-09 | End: 2025-06-09

## 2025-06-09 RX ORDER — IBUPROFEN 200 MG
1 TABLET ORAL DAILY
Status: DISCONTINUED | OUTPATIENT
Start: 2025-06-09 | End: 2025-06-10 | Stop reason: HOSPADM

## 2025-06-09 RX ADMIN — THIAMINE HYDROCHLORIDE 500 MG: 100 INJECTION, SOLUTION INTRAMUSCULAR; INTRAVENOUS at 21:33

## 2025-06-09 RX ADMIN — MAGNESIUM SULFATE HEPTAHYDRATE 4 G: 40 INJECTION, SOLUTION INTRAVENOUS at 08:44

## 2025-06-09 RX ADMIN — FOLIC ACID 1 MG: 1 TABLET ORAL at 08:45

## 2025-06-09 RX ADMIN — NICOTINE 1 PATCH: 14 PATCH, EXTENDED RELEASE TRANSDERMAL at 15:20

## 2025-06-09 RX ADMIN — ROPINIROLE HYDROCHLORIDE 1 MG: 1 TABLET, FILM COATED ORAL at 21:29

## 2025-06-09 RX ADMIN — PHENOBARBITAL 64.8 MG: 32.4 TABLET ORAL at 12:59

## 2025-06-09 RX ADMIN — THIAMINE HYDROCHLORIDE 500 MG: 100 INJECTION, SOLUTION INTRAMUSCULAR; INTRAVENOUS at 14:00

## 2025-06-09 RX ADMIN — POLYETHYLENE GLYCOL 3350 17 G: 17 POWDER, FOR SOLUTION ORAL at 08:46

## 2025-06-09 RX ADMIN — Medication 1 TABLET: at 08:45

## 2025-06-09 RX ADMIN — PROPRANOLOL HYDROCHLORIDE 40 MG: 40 TABLET ORAL at 21:29

## 2025-06-09 RX ADMIN — ENOXAPARIN SODIUM 40 MG: 40 INJECTION SUBCUTANEOUS at 14:01

## 2025-06-09 RX ADMIN — PHENOBARBITAL 64.8 MG: 32.4 TABLET ORAL at 21:29

## 2025-06-09 RX ADMIN — PHENOBARBITAL 64.8 MG: 32.4 TABLET ORAL at 15:20

## 2025-06-09 RX ADMIN — THIAMINE HYDROCHLORIDE 500 MG: 100 INJECTION, SOLUTION INTRAMUSCULAR; INTRAVENOUS at 06:31

## 2025-06-09 RX ADMIN — PROPRANOLOL HYDROCHLORIDE 40 MG: 40 TABLET ORAL at 10:35

## 2025-06-09 RX ADMIN — ESCITALOPRAM OXALATE 10 MG: 10 TABLET ORAL at 08:45

## 2025-06-09 RX ADMIN — PHENOBARBITAL 64.8 MG: 32.4 TABLET ORAL at 08:44

## 2025-06-09 ASSESSMENT — COGNITIVE AND FUNCTIONAL STATUS - GENERAL
PERSONAL GROOMING: A LITTLE
DRESSING REGULAR LOWER BODY CLOTHING: A LITTLE
TURNING FROM BACK TO SIDE WHILE IN FLAT BAD: A LITTLE
STANDING UP FROM CHAIR USING ARMS: A LITTLE
MOBILITY SCORE: 18
HELP NEEDED FOR BATHING: A LITTLE
MOVING TO AND FROM BED TO CHAIR: A LITTLE
TOILETING: A LITTLE
DAILY ACTIVITIY SCORE: 20
WALKING IN HOSPITAL ROOM: A LITTLE
CLIMB 3 TO 5 STEPS WITH RAILING: A LOT

## 2025-06-09 ASSESSMENT — LIFESTYLE VARIABLES
TOTAL SCORE: 0
TREMOR: NOT VISIBLE, BUT CAN BE FELT FINGERTIP TO FINGERTIP
ORIENTATION AND CLOUDING OF SENSORIUM: ORIENTED AND CAN DO SERIAL ADDITIONS
NAUSEA AND VOMITING: NO NAUSEA AND NO VOMITING
BLOOD PRESSURE: 146/90
TREMOR: NO TREMOR
ORIENTATION AND CLOUDING OF SENSORIUM: ORIENTED AND CAN DO SERIAL ADDITIONS
NAUSEA AND VOMITING: NO NAUSEA AND NO VOMITING
NAUSEA AND VOMITING: NO NAUSEA AND NO VOMITING
PULSE: 66
NAUSEA AND VOMITING: NO NAUSEA AND NO VOMITING
ORIENTATION AND CLOUDING OF SENSORIUM: ORIENTED AND CAN DO SERIAL ADDITIONS
ANXIETY: 2
VISUAL DISTURBANCES: NOT PRESENT
HEADACHE, FULLNESS IN HEAD: NOT PRESENT
NAUSEA AND VOMITING: NO NAUSEA AND NO VOMITING
NAUSEA AND VOMITING: NO NAUSEA AND NO VOMITING
PULSE: 70
AUDITORY DISTURBANCES: NOT PRESENT
VISUAL DISTURBANCES: NOT PRESENT
ANXIETY: NO ANXIETY, AT EASE
VISUAL DISTURBANCES: NOT PRESENT
BLOOD PRESSURE: 170/84
HEADACHE, FULLNESS IN HEAD: NOT PRESENT
VISUAL DISTURBANCES: NOT PRESENT
TREMOR: NOT VISIBLE, BUT CAN BE FELT FINGERTIP TO FINGERTIP
PAROXYSMAL SWEATS: NO SWEAT VISIBLE
PAROXYSMAL SWEATS: NO SWEAT VISIBLE
HEADACHE, FULLNESS IN HEAD: NOT PRESENT
PULSE: 68
ORIENTATION AND CLOUDING OF SENSORIUM: ORIENTED AND CAN DO SERIAL ADDITIONS
TOTAL SCORE: 0
ORIENTATION AND CLOUDING OF SENSORIUM: ORIENTED AND CAN DO SERIAL ADDITIONS
TOTAL SCORE: 7
ORIENTATION AND CLOUDING OF SENSORIUM: ORIENTED AND CAN DO SERIAL ADDITIONS
AGITATION: NORMAL ACTIVITY
BLOOD PRESSURE: 142/88
AGITATION: NORMAL ACTIVITY
BLOOD PRESSURE: 138/88
HEADACHE, FULLNESS IN HEAD: NOT PRESENT
AUDITORY DISTURBANCES: NOT PRESENT
PAROXYSMAL SWEATS: NO SWEAT VISIBLE
HEADACHE, FULLNESS IN HEAD: NOT PRESENT
ANXIETY: MILDLY ANXIOUS
AUDITORY DISTURBANCES: NOT PRESENT
AGITATION: NORMAL ACTIVITY
NAUSEA AND VOMITING: NO NAUSEA AND NO VOMITING
NAUSEA AND VOMITING: NO NAUSEA AND NO VOMITING
TREMOR: NOT VISIBLE, BUT CAN BE FELT FINGERTIP TO FINGERTIP
TOTAL SCORE: 0
TREMOR: 2
VISUAL DISTURBANCES: NOT PRESENT
AGITATION: 2
TOTAL SCORE: 2
AGITATION: NORMAL ACTIVITY
HEADACHE, FULLNESS IN HEAD: NOT PRESENT
HEADACHE, FULLNESS IN HEAD: NOT PRESENT
ANXIETY: MILDLY ANXIOUS
AUDITORY DISTURBANCES: NOT PRESENT
ANXIETY: NO ANXIETY, AT EASE
TREMOR: NO TREMOR
VISUAL DISTURBANCES: NOT PRESENT
TOTAL SCORE: 0
VISUAL DISTURBANCES: NOT PRESENT
PAROXYSMAL SWEATS: BARELY PERCEPTIBLE SWEATING, PALMS MOIST
ORIENTATION AND CLOUDING OF SENSORIUM: ORIENTED AND CAN DO SERIAL ADDITIONS
AUDITORY DISTURBANCES: NOT PRESENT
ANXIETY: MILDLY ANXIOUS
AGITATION: NORMAL ACTIVITY
PAROXYSMAL SWEATS: NO SWEAT VISIBLE
AUDITORY DISTURBANCES: NOT PRESENT
AGITATION: NORMAL ACTIVITY
AGITATION: NORMAL ACTIVITY
TREMOR: NO TREMOR
AUDITORY DISTURBANCES: NOT PRESENT
TOTAL SCORE: 3
VISUAL DISTURBANCES: NOT PRESENT
PULSE: 70
VISUAL DISTURBANCES: NOT PRESENT
AGITATION: NORMAL ACTIVITY
PAROXYSMAL SWEATS: NO SWEAT VISIBLE
ANXIETY: NO ANXIETY, AT EASE
TREMOR: NO TREMOR
PAROXYSMAL SWEATS: NO SWEAT VISIBLE
TOTAL SCORE: 2
PAROXYSMAL SWEATS: BARELY PERCEPTIBLE SWEATING, PALMS MOIST
AGITATION: NORMAL ACTIVITY
ANXIETY: MILDLY ANXIOUS
HEADACHE, FULLNESS IN HEAD: NOT PRESENT
ORIENTATION AND CLOUDING OF SENSORIUM: ORIENTED AND CAN DO SERIAL ADDITIONS
TREMOR: NO TREMOR
NAUSEA AND VOMITING: NO NAUSEA AND NO VOMITING
TOTAL SCORE: 3
AUDITORY DISTURBANCES: NOT PRESENT
PAROXYSMAL SWEATS: NO SWEAT VISIBLE
TOTAL SCORE: 0
ANXIETY: NO ANXIETY, AT EASE
PAROXYSMAL SWEATS: NO SWEAT VISIBLE
AUDITORY DISTURBANCES: NOT PRESENT
ORIENTATION AND CLOUDING OF SENSORIUM: ORIENTED AND CAN DO SERIAL ADDITIONS
VISUAL DISTURBANCES: NOT PRESENT
ANXIETY: NO ANXIETY, AT EASE
TREMOR: 2
NAUSEA AND VOMITING: NO NAUSEA AND NO VOMITING
ORIENTATION AND CLOUDING OF SENSORIUM: ORIENTED AND CAN DO SERIAL ADDITIONS
AUDITORY DISTURBANCES: NOT PRESENT
HEADACHE, FULLNESS IN HEAD: NOT PRESENT
HEADACHE, FULLNESS IN HEAD: NOT PRESENT

## 2025-06-09 ASSESSMENT — PAIN SCALES - GENERAL
PAINLEVEL_OUTOF10: 0 - NO PAIN

## 2025-06-09 ASSESSMENT — ACTIVITIES OF DAILY LIVING (ADL): ADL_ASSISTANCE: INDEPENDENT

## 2025-06-09 ASSESSMENT — PAIN - FUNCTIONAL ASSESSMENT
PAIN_FUNCTIONAL_ASSESSMENT: 0-10
PAIN_FUNCTIONAL_ASSESSMENT: 0-10

## 2025-06-09 NOTE — CARE PLAN
The patient's goals for the shift include  safety checks/ ciwa     The clinical goals for the shift include maintain safety    Over the shift, the patient did not make progress toward the following goals. Barriers to progression include assist as needed.

## 2025-06-09 NOTE — PROGRESS NOTES
06/09/25 1449   Discharge Planning   Living Arrangements Family members   Support Systems Family members   Assistance Needed per patient stated that he is independent with ADLs   Type of Residence Private residence  (ranch style home)   Number of Stairs to Enter Residence 3   Intensity of Service   Intensity of Service 0-30 min     Care transitions at bedside to complete assessment with patient.  TCC introduced self and explained role to patient.  Patient demographics reviewed and verified.  Patient stated that he lives at home stated that he is independent with ADLs.  Patient stated that he uses a walker to aide with ambulation.  States that he does not drive.  Pending PT/OT evals at this time.  Care transitions to follow.      PCP: Dr Sheth  Insurance: [employer]  Pharmacy: [facility]

## 2025-06-09 NOTE — PROGRESS NOTES
Spiritual Care Visit   Notes:  Mr. Banks welcomed a visit. This was a patient-centered visit. He used the agency of his voice to share his story. He shared about his present health issue. I created time and space for active, empathic listening. He shared that he is Islam. I intervened with emotional, spiritual care and maintained a non-anxious, nonjudgmental presence. I was unable to complete a spiritual assessment at this time. This  offered the opportunity for a holistic approach to wellness as whole-person care and allowed for integrative healing of the body, mind and spirit. There are no other needs. I left a spiritual connection card as a resource for him to contact the  with any future needs.  Spiritual Care Request    Reason for Visit:  Routine Visit:  (This  responded to spiritual care consult order.  Order complete.)  Continue Visiting: No (I left a connection card for Mr. Banks to reach out for future needs.  I remain available upon request.)   Request Received From:  Focus of Care:  Visited With: Patient not available (Mr. Banks appeared to be asleep at the time of this visit.)   Refer to :  Spiritual Care Assessment    Spiritual Assessment:  Patient Spiritual Care Encounters  Social Interaction: Unable to assess  Care Provided:  Intended Effects: Establish rapport and connectedness  Methods: Offer support  Interventions: Explain  role, Provide spiritual/Yazidism resources, Active listening, Ask guided questions  Sense of Community and or Amish Affiliation:  None    Addressed Needs/Concerns and/or Catie Through:  Outcome:  Advance Directives:  Spiritual Care Annotation    Annotation:    Patient: Humble Banks    Date: 6/9/2025  Time: 3:49 PM  Total time (min.): 20    I offered instruction on how to reach out to the Spiritual Care Department for future needs. I remain available upon request.  [facility] Department of  Spiritual Care Contact #: [phone]  Signed by: Rev. Kendra Go ThM, MA

## 2025-06-09 NOTE — PROGRESS NOTES
Physical Therapy    Physical Therapy Evaluation    Patient Name: Humble Banks  MRN: [id number]  Today's Date: 6/9/2025   Time Calculation  Start Time: 1155  Stop Time: 1205  Time Calculation (min): 10 min  618/618-A    Assessment/Plan   PT Assessment  PT Assessment Results: Decreased strength, Impaired balance, Decreased mobility  Rehab Prognosis: Good  Barriers to Discharge Home: Physical needs  Physical Needs: Stair navigation into home limited by function/safety  Evaluation/Treatment Tolerance: Patient tolerated treatment well  Medical Staff Made Aware: Yes  Strengths: Attitude of self  Barriers to Participation: Comorbidities  End of Session Communication: Bedside nurse  Assessment Comment: pt tolerated session. pt required assist during functional mobility to maintain safety. pt presented with decreased functional mobility, strength, and balance. pt would benefit from low int therapy in order to return to PLOF.  End of Session Patient Position: Bed, 2 rail up, Alarm on (call light in reach)  IP OR SWING BED PT PLAN  Inpatient or Swing Bed: Inpatient  PT Plan  Treatment/Interventions: Bed mobility, Transfer training, Gait training, Balance training, Strengthening, Therapeutic exercise, Therapeutic activity  PT Plan: Ongoing PT  PT Frequency: 2 times per week  PT Discharge Recommendations: Low intensity level of continued care (24/7 supervision)  Equipment Recommended upon Discharge: Wheeled walker  PT Recommended Transfer Status: Assist x1  PT - OK to Discharge: Yes (once medically cleared)    Subjective     Current Problem:  1. Alcohol withdrawal syndrome without complication (Multi)          Problem List[1]    General Visit Information:  General  Reason for Referral: PT alanal (admitted 6/8 with ETOH withdrawal syndrome, Bliateral LE weakness, and recent fall)  Referred By: Meryl  Past Medical History Relevant to Rehab: HTN, HLD, hypothyroidism, ETOH abuse disorder, suspected wernicke encephalopathy, CHINEDU,  depression, MDD, chronic macrocytic anemia, restless leg syndrome  Family/Caregiver Present: No  Co-Treatment: OT  Co-Treatment Reason: to maximize pt safety and mobility  Prior to Session Communication: Bedside nurse  Patient Position Received:  (seated EOB)  General Comment: pt agreeable to therapy    Home Living:  Home Living  Home Living Comments: lives with 3 roomates in house with 1+3 TERESA and rail. laundry in basement. pt has tub shower with GBs, chair, and HHS. pt has std toilet and std bed. amb with std walker. IND in ADLs. shares iADLs with roomate. roomate completes laundry. roomate drives and grocery shops.    Prior Level of Function:  Prior Function Per Pt/Caregiver Report  Level of [place]: Independent with ADLs and functional transfers    Precautions:  Precautions  Precautions Comment: falls, CIWA    Vital Signs: VSS     Objective     Pain:  Pain Assessment  Pain Assessment: 0-10  0-10 (Numeric) Pain Score: 0 - No pain    Cognition:  Cognition  Overall Cognitive Status: Within Functional Limits    General Assessments:         Sensation  Light Touch: No apparent deficits  Strength  Strength Comments: BLE strength 4-/5 grossly. BLE ROM WFL for pts age                   Functional Assessments:     Bed Mobility  Bed Mobility: Yes  Bed Mobility 1  Bed Mobility 1: Supine to sitting, Sitting to supine (completed IND)  Transfers  Transfer: Yes  Transfer 1  Transfer From 1: Bed to, Sit to, Stand to  Transfer Device 1: Walker (completed with CGA; v/c for safety and sequencing; impulsive)  Ambulation/Gait Training  Ambulation/Gait Training Performed: Yes  Ambulation/Gait Training 1  Surface 1: Level tile  Device 1: Rolling walker (completed amb of 10ft with minAx1; v/c for safety and sequencing; impulsive)          Outcome Measures:     [facility] Basic Mobility  Turning from your back to your side while in a flat bed without using bedrails: None  Moving from lying on your back to sitting on the side of a flat  bed without using bedrails: A little  Moving to and from bed to chair (including a wheelchair): A little  Standing up from a chair using your arms (e.g. wheelchair or bedside chair): A little  To walk in hospital room: A little  Climbing 3-5 steps with railing: A lot  Basic Mobility - Total Score: 18                                                             Goals:  Encounter Problems       Encounter Problems (Active)       PT Problem       STG - Pt will perform a B LE ther ex program of 2-3 sets of 10  (Progressing)       Start:  06/09/25    Expected End:  06/23/25            STG - Pt will transfer STS with Rajat  (Progressing)       Start:  06/09/25    Expected End:  06/23/25            STG - Pt will amb 100' using w/w with SBA  (Progressing)       Start:  06/09/25    Expected End:  06/23/25            STG - Pt will demonstrate good dynamic/static standing/seated balance with no LOB noted   (Progressing)       Start:  06/09/25    Expected End:  06/23/25               Pain - Adult            Education Documentation  Mobility Training, taught by Karely Hedrick, PT at 6/9/2025  2:05 PM.  Learner: Patient  Readiness: Acceptance  Method: Explanation  Response: Verbalizes Understanding    Education Comments  No comments found.              [1]   Patient Active Problem List  Diagnosis    Abnormal gait    Age-related nuclear cataract of both eyes    Agitation    Alcohol withdrawal (Multi)    Asymmetric SNHL (sensorineural hearing loss)    Benign essential hypertension    Bilateral presbyopia    Alcohol dependence    Cigarette nicotine dependence    Dizziness    Fatigue    Imbalance    Inguinal hernia, left    Lower extremity weakness    Memory loss    Mixed hyperlipidemia    PVD (posterior vitreous detachment), both eyes    Scotoma    Subjective tinnitus of both ears    Syncope    Transaminitis    Tremor    Lumbar radiculopathy    Posterior vitreous detachment    Rib fractures    Tobacco use disorder    Left sided sciatica     Generalized anxiety disorder    DDD (degenerative disc disease), lumbar    Unable to ambulate    Alcoholic intoxication without complication    Alcohol withdrawal syndrome without complication (Multi)

## 2025-06-09 NOTE — PROGRESS NOTES
Subjective     Patient seen and examined. No acute overnight events.  Patient packed his things this morning and states that he is ready to leave because all the testing is over.  Counseled him that he is in treated for alcohol withdrawal and has not yet been evaluated by physical therapy and it would be a risk for him to go home and fall again. CIWA scores 0 overnight      Assessment / Plan   Humble Banks is a 69 y.o. male with a history of alcohol use disorder, CHINEDU, MDD, dyslipidemia, hypertension, hypothyroidism who presented on 6/8 for bilateral lower leg weakness for about the last 6 months.     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 contributing to falls  - He denies any injuries  - PT OT for possible rehab placement     Chronic macrocytic anemia  Thrombocytopenia  - Likely secondary to bone marrow suppression from alcohol use  - Hemoglobin on admission around baseline at 11.5, platelets 110  - Monitor CBC, continue treatment of alcohol use disorder as above     MDD  CHINEDU  Hypothyroidism  Restless leg syndrome  - Continue home, escitalopram, levothyroxine, ropinirole, amlodipine, hold bupropion as it reduces seizure threshold     DVT: Lovenox  Diet: Regular  IVF: None  Antibiotics: None  Consults: PT/OT  Dispo: Telemetry  CODE STATUS: Full code        gAusto Almanzar MD  PGY-1, Internal Medicine    This is a preliminary note, please await attending attestation for final recommendations      Objective   Physical Exam  Vitals reviewed.   Constitutional:       Appearance: Normal appearance.   Cardiovascular:      Rate and Rhythm: Normal  rate and regular rhythm.   Pulmonary:      Effort: Pulmonary effort is normal.      Breath sounds: Normal breath sounds.   Abdominal:      General: Abdomen is flat. There is no distension.      Palpations: Abdomen is soft.      Tenderness: There is no abdominal tenderness.   Musculoskeletal:      Right lower leg: No edema.      Left lower leg: No edema.   Skin:     General: Skin is warm and dry.   Neurological:      General: No focal deficit present.      Mental Status: He is alert and oriented to person, place, and time.       Last Recorded Vitals  Blood pressure 142/88, pulse 70, temperature 36.2 °C (97.2 °F), temperature source Temporal, resp. rate 18, height 1.829 m (6'), weight 83.9 kg (185 lb), SpO2 96%.  Intake/Output last 3 Shifts:  No intake/output data recorded.    Last CBC & BMP  Lab Results   Component Value Date    GLUCOSE 97 06/08/2025    CALCIUM 9.3 06/08/2025     06/08/2025    K 3.8 06/08/2025    CO2 26 06/08/2025     06/08/2025    BUN 16 06/08/2025    CREATININE 0.63 06/08/2025     Lab Results   Component Value Date    WBC 3.8 (L) 06/08/2025    HGB 11.5 (L) 06/08/2025    HCT 33.7 (L) 06/08/2025     (H) 06/08/2025     (L) 06/08/2025

## 2025-06-09 NOTE — PROGRESS NOTES
"Occupational Therapy    Occupational Therapy    Evaluation    Patient Name: Humble Banks  MRN: [id number]  Today's Date: 6/9/2025  Time Calculation  Start Time: 1154  Stop Time: 1204  Time Calculation (min): 10 min  618/618-A    Assessment  IP OT Assessment  OT Assessment: Pt. presents with a mild decline in self-care, mobility, and safety and would benefit from skilled OT services to maximize independence and promote return to prior level of function.  Prognosis: Good  Barriers to Discharge Home: No anticipated barriers  Evaluation/Treatment Tolerance: Patient tolerated treatment well  Medical Staff Made Aware: Yes  End of Session Communication: Bedside nurse  End of Session Patient Position: Bed, 3 rail up, Alarm off, not on at start of session (call light in reach. all needs met)    Plan:  Treatment Interventions: ADL retraining, Functional transfer training, Endurance training, Patient/family training, Equipment evaluation/education  OT Frequency: 3 times per week  OT Discharge Recommendations: Low intensity level of continued care  OT Recommended Transfer Status: Minimal assist  OT - OK to Discharge: Yes (once cleared by medical team)    Subjective     Current Problem:  1. Alcohol withdrawal syndrome without complication (Multi)            General:  General  Reason for Referral: OT eval and treat for adls  Referred By: Agusto Almanzar MD  Past Medical History Relevant to Rehab: Humble Banks is a 69 y.o. male with a history of alcohol use disorder, CHINEDU, MDD, dyslipidemia, hypertension, hypothyroidism who presented on 6/8 for bilateral lower leg weakness for about the last 6 months.  Has been requiring the use of a walker now in order to get around.  He states that he has fallen twice in the last month because his legs \"give out.\"  He says that he has also been feeling some tingling in his legs as well that is different from his restless leg syndrome symptoms  Family/Caregiver Present: No  Co-Treatment: " PT  Co-Treatment Reason: to maximize pt. safety and mobility  Prior to Session Communication: Bedside nurse  Patient Position Received: Bed, 3 rail up, Alarm off, not on at start of session  General Comment: pt. pleasant and agreeable to therapy evaluation    Precautions:  Medical Precautions: Fall precautions  Precautions Comment: CIWA    Pain:  Pain Assessment  Pain Assessment: 0-10  0-10 (Numeric) Pain Score: 0 - No pain    Objective     Cognition:  Overall Cognitive Status: Within Functional Limits  Orientation Level: Oriented X4     Home Living:  Type of Home: House  Lives With:  (with 3 roommates)  Home Adaptive Equipment: Walker rolling or standard (std. walker)  Home Layout: One level, Laundry in basement  Home Access: Stairs to enter with rails (1+3 entry steps)  Bathroom Shower/Tub: Tub/shower unit (grab bar, chair, hhs)  Bathroom Toilet: Standard (sink for support)     Prior Function:  Level of [place]: Independent with ADLs and functional transfers, Needs assistance with homemaking (roommate does driving/laundry. shares cooking/cleaning. pt. does not drive)  Receives Help From:  (roommates)  ADL Assistance: Independent  Ambulatory Assistance: Independent (s/walker)  Hand Dominance: Right    IADL History:  IADL Comments: shares with roommates    ADL:  Eating Assistance: Independent  Grooming Assistance: Stand by  Bathing Assistance:  (cga)  UE Dressing Assistance: Stand by  LE Dressing Assistance:  (cga)  Toileting Assistance with Device: Stand by    Activity Tolerance:  Endurance: Decreased tolerance for upright activites    Bed Mobility/Transfers:   Bed Mobility  Bed Mobility:  (independent supine <> sit to eob)  Transfers  Transfer:  (sit to stand with cga/cues)    Ambulation/Gait Training:  Functional Mobility  Functional Mobility Performed:  (pt. ambulated to and from bathroom using rolling walker with min. assist/cga)    Sitting Balance:  Static Sitting Balance  Static Sitting-Level of  Assistance: Independent    Standing Balance:  Static Standing Balance  Static Standing-Level of Assistance: Close supervision    Sensation:  Light Touch: No apparent deficits    Strength:  Strength Comments: bue  strength 4/5    Coordination:  Movements are Fluid and Coordinated: Yes     Hand Function:  Hand Function  Gross Grasp: Functional  Coordination: Functional    Extremities:   RUE : Within Functional Limits   LUE: Within Functional Limits    Outcome Measures: [facility] Daily Activity  Putting on and taking off regular lower body clothing: A little  Bathing (including washing, rinsing, drying): A little  Putting on and taking off regular upper body clothing: None  Toileting, which includes using toilet, bedpan or urinal: A little  Taking care of personal grooming such as brushing teeth: A little  Eating Meals: None  Daily Activity - Total Score: 20     EDUCATION:     Education Documentation  ADL Training, taught by Adriana Herbert OT at 6/9/2025  3:36 PM.  Learner: Patient  Readiness: Acceptance  Method: Explanation  Response: Verbalizes Understanding    Education Comments  No comments found.        Goals:   Encounter Problems       Encounter Problems (Active)       OT Goals       Pt. will perform bathing and dressing ind.'ly using adaptive equipment as needed.  (Progressing)       Start:  06/09/25    Expected End:  06/23/25            Pt. will perform toileting ind.'ly using dme/adaptive equipment as needed.  (Progressing)       Start:  06/09/25    Expected End:  06/23/25            Pt. will perform bed mobility/chair/commode/shower chair transfers ind.'ly. (Progressing)       Start:  06/09/25    Expected End:  06/23/25            Pt. will perform functional mobility with walker ind.'ly in prep. for homemaking tasks.  (Progressing)       Start:  06/09/25    Expected End:  06/23/25            Pt. will demonstrate knowledge and application of energy conservation/work simplification/purse-lipped breathing  techniques during adl tasks.  (Progressing)       Start:  06/09/25    Expected End:  06/23/25

## 2025-06-10 VITALS
TEMPERATURE: 97.5 F | BODY MASS INDEX: 25.06 KG/M2 | HEART RATE: 57 BPM | SYSTOLIC BLOOD PRESSURE: 105 MMHG | DIASTOLIC BLOOD PRESSURE: 64 MMHG | OXYGEN SATURATION: 95 % | WEIGHT: 185 LBS | RESPIRATION RATE: 16 BRPM | HEIGHT: 72 IN

## 2025-06-10 PROCEDURE — 2500000002 HC RX 250 W HCPCS SELF ADMINISTERED DRUGS (ALT 637 FOR MEDICARE OP, ALT 636 FOR OP/ED)

## 2025-06-10 PROCEDURE — 2500000001 HC RX 250 WO HCPCS SELF ADMINISTERED DRUGS (ALT 637 FOR MEDICARE OP)

## 2025-06-10 PROCEDURE — 2500000004 HC RX 250 GENERAL PHARMACY W/ HCPCS (ALT 636 FOR OP/ED)

## 2025-06-10 PROCEDURE — S4991 NICOTINE PATCH NONLEGEND: HCPCS

## 2025-06-10 RX ADMIN — PROPRANOLOL HYDROCHLORIDE 40 MG: 40 TABLET ORAL at 08:44

## 2025-06-10 RX ADMIN — PHENOBARBITAL 64.8 MG: 32.4 TABLET ORAL at 08:43

## 2025-06-10 RX ADMIN — ESCITALOPRAM OXALATE 10 MG: 10 TABLET ORAL at 08:44

## 2025-06-10 RX ADMIN — FOLIC ACID 1 MG: 1 TABLET ORAL at 08:44

## 2025-06-10 RX ADMIN — Medication 1 TABLET: at 08:43

## 2025-06-10 RX ADMIN — THIAMINE HYDROCHLORIDE 500 MG: 100 INJECTION, SOLUTION INTRAMUSCULAR; INTRAVENOUS at 06:11

## 2025-06-10 RX ADMIN — NICOTINE 1 PATCH: 14 PATCH, EXTENDED RELEASE TRANSDERMAL at 08:44

## 2025-06-10 RX ADMIN — PHENOBARBITAL 64.8 MG: 32.4 TABLET ORAL at 12:02

## 2025-06-10 ASSESSMENT — LIFESTYLE VARIABLES
VISUAL DISTURBANCES: NOT PRESENT
HEADACHE, FULLNESS IN HEAD: NOT PRESENT
ORIENTATION AND CLOUDING OF SENSORIUM: ORIENTED AND CAN DO SERIAL ADDITIONS
ORIENTATION AND CLOUDING OF SENSORIUM: ORIENTED AND CAN DO SERIAL ADDITIONS
NAUSEA AND VOMITING: NO NAUSEA AND NO VOMITING
AUDITORY DISTURBANCES: NOT PRESENT
PAROXYSMAL SWEATS: 2
TOTAL SCORE: 0
PAROXYSMAL SWEATS: NO SWEAT VISIBLE
TOTAL SCORE: 0
VISUAL DISTURBANCES: MILD SENSITIVITY
ANXIETY: NO ANXIETY, AT EASE
AGITATION: SOMEWHAT MORE THAN NORMAL ACTIVITY
ORIENTATION AND CLOUDING OF SENSORIUM: ORIENTED AND CAN DO SERIAL ADDITIONS
TREMOR: NO TREMOR
TOTAL SCORE: 10
NAUSEA AND VOMITING: NO NAUSEA AND NO VOMITING
ANXIETY: NO ANXIETY, AT EASE
VISUAL DISTURBANCES: NOT PRESENT
PAROXYSMAL SWEATS: NO SWEAT VISIBLE
ANXIETY: 3
TREMOR: 2
PAROXYSMAL SWEATS: NO SWEAT VISIBLE
TOTAL SCORE: 2
TREMOR: NOT VISIBLE, BUT CAN BE FELT FINGERTIP TO FINGERTIP
HEADACHE, FULLNESS IN HEAD: NOT PRESENT
AUDITORY DISTURBANCES: NOT PRESENT
TREMOR: NO TREMOR
AGITATION: NORMAL ACTIVITY
VISUAL DISTURBANCES: NOT PRESENT
ORIENTATION AND CLOUDING OF SENSORIUM: ORIENTED AND CAN DO SERIAL ADDITIONS
HEADACHE, FULLNESS IN HEAD: NOT PRESENT
AGITATION: NORMAL ACTIVITY
ANXIETY: MILDLY ANXIOUS
AUDITORY DISTURBANCES: NOT PRESENT
HEADACHE, FULLNESS IN HEAD: NOT PRESENT
AGITATION: NORMAL ACTIVITY
AUDITORY DISTURBANCES: NOT PRESENT
NAUSEA AND VOMITING: NO NAUSEA AND NO VOMITING
NAUSEA AND VOMITING: NO NAUSEA AND NO VOMITING

## 2025-06-10 ASSESSMENT — PAIN SCALES - GENERAL
PAINLEVEL_OUTOF10: 0 - NO PAIN
PAINLEVEL_OUTOF10: 0 - NO PAIN

## 2025-06-10 NOTE — CARE PLAN
The patient's goals for the shift include  to not be impulsive/ call for assistance    The clinical goals for the shift include patient will have RASS score of  zero throughout the shift    Over the shift, the patient did not make progress toward the following goals. Barriers to progression include assist with walking. Frequent rounds.

## 2025-06-10 NOTE — SIGNIFICANT EVENT
Informed by nursing staff that patient expresses desire to leave AGAINST MEDICAL ADVICE.  I counseled the patient that he is still going through acute alcohol withdrawal and I that I was concerned he would be prone to falls and worsening of his withdrawal and even death if he were to leave the hospital at this time.  He verbalized understanding and stated that he needed to get home so he could complete paperwork.  He understands and accepts the risks associated with leaving AGAINST MEDICAL ADVICE and has the capacity to make his own medical decisions. He was given strict return precautions prior to leaving. AMA paperwork provided, nursing staff informed.    Agusto Almanzar MD  PGY-1, Internal Medicine

## 2025-06-10 NOTE — PROGRESS NOTES
Subjective     Patient seen and examined. No acute overnight events.  He is anxious to leave again this morning and says that he has paperwork to do.  Plan for discharge tomorrow morning      Assessment / Plan   Humble Banks is a 69 y.o. male with a history of alcohol use disorder, CHINEDU, MDD, dyslipidemia, hypertension, hypothyroidism who presented on 6/8 for bilateral lower leg weakness for about the last 6 months.     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 contributing to falls  - He denies any injuries  - PT OT for possible rehab placement     Chronic macrocytic anemia  Thrombocytopenia  - Likely secondary to bone marrow suppression from alcohol use  - Hemoglobin on admission around baseline at 11.5, platelets 110  - Monitor CBC, continue treatment of alcohol use disorder as above     MDD  CHINEDU  Hypothyroidism  Restless leg syndrome  - Continue home, escitalopram, levothyroxine, ropinirole, amlodipine, hold bupropion as it reduces seizure threshold     DVT: Lovenox  Diet: Regular  IVF: None  Antibiotics: None  Consults: PT/OT  Dispo: Telemetry  CODE STATUS: Full code        Agusto Almanzar MD  PGY-1, Internal Medicine    This is a preliminary note, please await attending attestation for final recommendations      Objective   Physical Exam  Vitals reviewed.   Constitutional:       Appearance: Normal appearance.   Cardiovascular:      Rate and Rhythm: Normal rate and regular rhythm.   Pulmonary:      Effort: Pulmonary effort is normal.      Breath sounds: Normal breath sounds.   Abdominal:      General: Abdomen is flat. There is no  distension.      Palpations: Abdomen is soft.      Tenderness: There is no abdominal tenderness.   Musculoskeletal:      Right lower leg: No edema.      Left lower leg: No edema.   Skin:     General: Skin is warm and dry.   Neurological:      General: No focal deficit present.      Mental Status: He is alert and oriented to person, place, and time.       Last Recorded Vitals  Blood pressure 105/64, pulse 57, temperature 36.4 °C (97.5 °F), resp. rate 16, height 1.829 m (6'), weight 83.9 kg (185 lb), SpO2 95%.  Intake/Output last 3 Shifts:  I/O last 3 completed shifts:  In: 100 (1.2 mL/kg) [I.V.:100 (1.2 mL/kg)]  Out: - (0 mL/kg)   Weight: 83.9 kg     Last CBC & BMP  Lab Results   Component Value Date    GLUCOSE 82 06/09/2025    CALCIUM 9.2 06/09/2025     06/09/2025    K 3.5 06/09/2025    CO2 26 06/09/2025     06/09/2025    BUN 17 06/09/2025    CREATININE 0.66 06/09/2025     Lab Results   Component Value Date    WBC 3.2 (L) 06/09/2025    HGB 11.3 (L) 06/09/2025    HCT 33.8 (L) 06/09/2025     (H) 06/09/2025     (L) 06/09/2025

## 2025-06-10 NOTE — CARE PLAN
The patient's goals for the shift include  safety, comfort, and decreased withdrawal symptoms    The clinical goals for the shift include patient will have RASS score of  zero throughout the shift      Problem: Pain - Adult  Goal: Verbalizes/displays adequate comfort level or baseline comfort level  Outcome: Progressing     Problem: Safety - Adult  Goal: Free from fall injury  Outcome: Progressing       Problem: Chronic Conditions and Co-morbidities  Goal: Patient's chronic conditions and co-morbidity symptoms are monitored and maintained or improved  Outcome: Progressing

## 2025-06-12 NOTE — DISCHARGE SUMMARY
Discharge Diagnosis  Alcohol withdrawal syndrome without complication (Multi)    Issues Requiring Follow-Up  ***    Test Results Pending At Discharge  Pending Labs       No current pending labs.            Hospital Course   ***    Visit Vitals  /64   Pulse 57   Temp 36.4 °C (97.5 °F)   Resp 16     Vitals:    06/08/25 0650   Weight: 83.9 kg (185 lb)       Immunization History   Administered Date(s) Administered   • COVID-19, mRNA, LNP-S, PF, 30 mcg/0.3 mL dose 06/02/2021   • Pfizer Purple Cap SARS-CoV-2 05/04/2021       Results  {If you would like to pull in Medications, type .meds     If you would like to pull in Lab results for the last 24 hours, type .[id number]    If you would like to pull in Imaging results, type .imgrslt :99}      Pertinent Physical Exam At Time of Discharge  Physical Exam    Home Medications     Medication List      ASK your doctor about these medications    • acamprosate 333 mg DR tablet; Commonly known as: Campral  • amLODIPine 5 mg tablet; Commonly known as: Norvasc; Take 1 tablet (5 mg)   by mouth once daily.  • * buPROPion  mg 24 hr tablet; Commonly known as: Wellbutrin XL  • * buPROPion  mg 24 hr tablet; Commonly known as: Wellbutrin XL;   Take 1 tablet (150 mg) by mouth once daily for 14 days.  • * escitalopram 10 mg tablet; Commonly known as: Lexapro  • * escitalopram 10 mg tablet; Commonly known as: Lexapro; Take 1 tablet   (10 mg) by mouth once daily for 14 days.  • folic acid 1 mg tablet; Commonly known as: Folvite; Take 1 tablet (1 mg)   by mouth once daily.  • * levothyroxine 75 mcg tablet; Commonly known as: Synthroid, Levoxyl  • * levothyroxine 75 mcg tablet; Commonly known as: Synthroid, Levoxyl;   Take 1 tablet (75 mcg) by mouth once daily for 14 days.  • lidocaine 4 % patch; Place 1 patch over 12 hours on the skin once daily.   Remove & discard patch within 12 hours or as directed by MD. Apply to   back.  • meclizine 25 mg tablet; Commonly known as:  Antivert  • methocarbamol 500 mg tablet; Commonly known as: Robaxin; Take 1 tablet   (500 mg) by mouth every 6 hours if needed for muscle spasms.  • multivitamin with minerals tablet; Take 1 tablet by mouth once daily for   14 days.  • propranolol 40 mg tablet; Commonly known as: Inderal  • rOPINIRole 1 mg tablet; Commonly known as: Requip  • thiamine 100 mg tablet; Commonly known as: Vitamin B-1; Take 1 tablet   (100 mg) by mouth once daily.  * This list has 6 medication(s) that are the same as other medications   prescribed for you. Read the directions carefully, and ask your doctor or   other care provider to review them with you.       Outpatient Follow-Up  No future appointments.    Reggie Sheth MD

## 2025-06-29 LAB
ATRIAL RATE: 66 BPM
P AXIS: 2 DEGREES
PR INTERVAL: 176 MS
Q ONSET: 249 MS
QRS COUNT: 10 BEATS
QRS DURATION: 109 MS
QT INTERVAL: 457 MS
QTC CALCULATION(BAZETT): 476 MS
QTC FREDERICIA: 469 MS
R AXIS: -13 DEGREES
T AXIS: 11 DEGREES
T OFFSET: 478 MS
VENTRICULAR RATE: 65 BPM

## 2025-07-25 NOTE — DISCHARGE SUMMARY
Discharge Diagnosis  Alcohol withdrawal syndrome without complication (Multi)           Issues Requiring Follow-Up      Discharge Meds     Medication List      ASK your doctor about these medications     acamprosate 333 mg DR tablet; Commonly known as: Campral   amLODIPine 5 mg tablet; Commonly known as: Norvasc; Take 1 tablet (5 mg)   by mouth once daily.   * buPROPion  mg 24 hr tablet; Commonly known as: Wellbutrin XL   * buPROPion  mg 24 hr tablet; Commonly known as: Wellbutrin XL;   Take 1 tablet (150 mg) by mouth once daily for 14 days.   * escitalopram 10 mg tablet; Commonly known as: Lexapro   * escitalopram 10 mg tablet; Commonly known as: Lexapro; Take 1 tablet   (10 mg) by mouth once daily for 14 days.   folic acid 1 mg tablet; Commonly known as: Folvite; Take 1 tablet (1 mg)   by mouth once daily.   * levothyroxine 75 mcg tablet; Commonly known as: Synthroid, Levoxyl   * levothyroxine 75 mcg tablet; Commonly known as: Synthroid, Levoxyl;   Take 1 tablet (75 mcg) by mouth once daily for 14 days.   lidocaine 4 % patch; Place 1 patch over 12 hours on the skin once daily.   Remove & discard patch within 12 hours or as directed by MD. Apply to   back.   meclizine 25 mg tablet; Commonly known as: Antivert   methocarbamol 500 mg tablet; Commonly known as: Robaxin; Take 1 tablet   (500 mg) by mouth every 6 hours if needed for muscle spasms.   multivitamin with minerals tablet; Take 1 tablet by mouth once daily for   14 days.   propranolol 40 mg tablet; Commonly known as: Inderal   rOPINIRole 1 mg tablet; Commonly known as: Requip   thiamine 100 mg tablet; Commonly known as: Vitamin B-1; Take 1 tablet   (100 mg) by mouth once daily.  * This list has 6 medication(s) that are the same as other medications   prescribed for you. Read the directions carefully, and ask your doctor or   other care provider to review them with you.       Test Results Pending At Discharge  Pending Labs       No current pending  labs.            Hospital Course       Pertinent Physical Exam At Time of Discharge  Physical Exam    Outpatient Follow-Up  No future appointments.      Reggie Sheth MD

## 2025-08-01 ENCOUNTER — HOSPITAL ENCOUNTER (INPATIENT)
Facility: HOSPITAL | Age: 70
LOS: 5 days | Discharge: NURSING, REHAB OR LONG-TERM CARE | End: 2025-08-07
Attending: INTERNAL MEDICINE | Admitting: INTERNAL MEDICINE
Payer: MEDICARE

## 2025-08-01 ENCOUNTER — APPOINTMENT (OUTPATIENT)
Dept: RADIOLOGY | Facility: HOSPITAL | Age: 70
End: 2025-08-01
Payer: MEDICARE

## 2025-08-01 ENCOUNTER — APPOINTMENT (OUTPATIENT)
Dept: CARDIOLOGY | Facility: HOSPITAL | Age: 70
End: 2025-08-01
Payer: MEDICARE

## 2025-08-01 DIAGNOSIS — F10.21 ALCOHOL DEPENDENCE IN REMISSION (MULTI): ICD-10-CM

## 2025-08-01 DIAGNOSIS — F10.930 ALCOHOL WITHDRAWAL SYNDROME WITHOUT COMPLICATION (MULTI): ICD-10-CM

## 2025-08-01 DIAGNOSIS — R53.1 GENERALIZED WEAKNESS: ICD-10-CM

## 2025-08-01 DIAGNOSIS — F10.939: Primary | ICD-10-CM

## 2025-08-01 DIAGNOSIS — F10.90 ALCOHOL USE DISORDER: ICD-10-CM

## 2025-08-01 LAB
ALBUMIN SERPL BCP-MCNC: 4.3 G/DL (ref 3.4–5)
ALP SERPL-CCNC: 94 U/L (ref 33–136)
ALT SERPL W P-5'-P-CCNC: 84 U/L (ref 10–52)
ANION GAP SERPL CALC-SCNC: 25 MMOL/L (ref 10–20)
AST SERPL W P-5'-P-CCNC: 268 U/L (ref 9–39)
BASOPHILS # BLD AUTO: 0.13 X10*3/UL (ref 0–0.1)
BASOPHILS NFR BLD AUTO: 3.4 %
BILIRUB SERPL-MCNC: 1 MG/DL (ref 0–1.2)
BUN SERPL-MCNC: 16 MG/DL (ref 6–23)
CALCIUM SERPL-MCNC: 9.1 MG/DL (ref 8.6–10.3)
CARDIAC TROPONIN I PNL SERPL HS: 13 NG/L (ref 0–20)
CHLORIDE SERPL-SCNC: 100 MMOL/L (ref 98–107)
CO2 SERPL-SCNC: 24 MMOL/L (ref 21–32)
CREAT SERPL-MCNC: 0.5 MG/DL (ref 0.5–1.3)
EGFRCR SERPLBLD CKD-EPI 2021: >90 ML/MIN/1.73M*2
EOSINOPHIL # BLD AUTO: 0.12 X10*3/UL (ref 0–0.7)
EOSINOPHIL NFR BLD AUTO: 3.1 %
ERYTHROCYTE [DISTWIDTH] IN BLOOD BY AUTOMATED COUNT: 14.1 % (ref 11.5–14.5)
ETHANOL SERPL-MCNC: 347 MG/DL
GLUCOSE SERPL-MCNC: 75 MG/DL (ref 74–99)
HCT VFR BLD AUTO: 37 % (ref 41–52)
HGB BLD-MCNC: 12.4 G/DL (ref 13.5–17.5)
IMM GRANULOCYTES # BLD AUTO: 0.04 X10*3/UL (ref 0–0.7)
IMM GRANULOCYTES NFR BLD AUTO: 1 % (ref 0–0.9)
LYMPHOCYTES # BLD AUTO: 1.84 X10*3/UL (ref 1.2–4.8)
LYMPHOCYTES NFR BLD AUTO: 48.3 %
MCH RBC QN AUTO: 34.9 PG (ref 26–34)
MCHC RBC AUTO-ENTMCNC: 33.5 G/DL (ref 32–36)
MCV RBC AUTO: 104 FL (ref 80–100)
MONOCYTES # BLD AUTO: 0.66 X10*3/UL (ref 0.1–1)
MONOCYTES NFR BLD AUTO: 17.3 %
NEUTROPHILS # BLD AUTO: 1.02 X10*3/UL (ref 1.2–7.7)
NEUTROPHILS NFR BLD AUTO: 26.9 %
NRBC BLD-RTO: 0 /100 WBCS (ref 0–0)
OVALOCYTES BLD QL SMEAR: NORMAL
PLATELET # BLD AUTO: 62 X10*3/UL (ref 150–450)
POLYCHROMASIA BLD QL SMEAR: NORMAL
POTASSIUM SERPL-SCNC: 4.7 MMOL/L (ref 3.5–5.3)
PROT SERPL-MCNC: 7.4 G/DL (ref 6.4–8.2)
RBC # BLD AUTO: 3.55 X10*6/UL (ref 4.5–5.9)
RBC MORPH BLD: NORMAL
SODIUM SERPL-SCNC: 144 MMOL/L (ref 136–145)
WBC # BLD AUTO: 3.8 X10*3/UL (ref 4.4–11.3)

## 2025-08-01 PROCEDURE — 80053 COMPREHEN METABOLIC PANEL: CPT | Performed by: PHYSICIAN ASSISTANT

## 2025-08-01 PROCEDURE — 36415 COLL VENOUS BLD VENIPUNCTURE: CPT | Performed by: PHYSICIAN ASSISTANT

## 2025-08-01 PROCEDURE — 72125 CT NECK SPINE W/O DYE: CPT | Performed by: RADIOLOGY

## 2025-08-01 PROCEDURE — 70450 CT HEAD/BRAIN W/O DYE: CPT | Performed by: RADIOLOGY

## 2025-08-01 PROCEDURE — 71045 X-RAY EXAM CHEST 1 VIEW: CPT

## 2025-08-01 PROCEDURE — 82077 ASSAY SPEC XCP UR&BREATH IA: CPT | Performed by: PHYSICIAN ASSISTANT

## 2025-08-01 PROCEDURE — 93005 ELECTROCARDIOGRAM TRACING: CPT

## 2025-08-01 PROCEDURE — 84484 ASSAY OF TROPONIN QUANT: CPT | Performed by: PHYSICIAN ASSISTANT

## 2025-08-01 PROCEDURE — 72125 CT NECK SPINE W/O DYE: CPT

## 2025-08-01 PROCEDURE — 71045 X-RAY EXAM CHEST 1 VIEW: CPT | Mod: FOREIGN READ | Performed by: RADIOLOGY

## 2025-08-01 PROCEDURE — 70450 CT HEAD/BRAIN W/O DYE: CPT

## 2025-08-01 PROCEDURE — 85025 COMPLETE CBC W/AUTO DIFF WBC: CPT | Performed by: PHYSICIAN ASSISTANT

## 2025-08-01 PROCEDURE — 99285 EMERGENCY DEPT VISIT HI MDM: CPT | Mod: 25

## 2025-08-01 RX ORDER — PHENOBARBITAL SODIUM 65 MG/ML
130 INJECTION, SOLUTION INTRAMUSCULAR; INTRAVENOUS ONCE
Status: COMPLETED | OUTPATIENT
Start: 2025-08-01 | End: 2025-08-02

## 2025-08-01 SDOH — HEALTH STABILITY: MENTAL HEALTH: BEHAVIORAL HEALTH(WDL): WITHIN DEFINED LIMITS

## 2025-08-01 ASSESSMENT — LIFESTYLE VARIABLES
EVER FELT BAD OR GUILTY ABOUT YOUR DRINKING: NO
PAROXYSMAL SWEATS: NO SWEAT VISIBLE
EVER HAD A DRINK FIRST THING IN THE MORNING TO STEADY YOUR NERVES TO GET RID OF A HANGOVER: NO
HEADACHE, FULLNESS IN HEAD: NOT PRESENT
TACTILE DISTURBANCES: MODERATE ITCHING, PINS AND NEEDLES, BURNING OR NUMBNESS
TREMOR: 3
VISUAL DISTURBANCES: NOT PRESENT
TOTAL SCORE: 0
HAVE PEOPLE ANNOYED YOU BY CRITICIZING YOUR DRINKING: NO
NAUSEA AND VOMITING: NO NAUSEA AND NO VOMITING
TOTAL SCORE: 7
AGITATION: SOMEWHAT MORE THAN NORMAL ACTIVITY
ANXIETY: NO ANXIETY, AT EASE
HAVE YOU EVER FELT YOU SHOULD CUT DOWN ON YOUR DRINKING: NO
ORIENTATION AND CLOUDING OF SENSORIUM: ORIENTED AND CAN DO SERIAL ADDITIONS
AUDITORY DISTURBANCES: NOT PRESENT

## 2025-08-01 NOTE — ED TRIAGE NOTES
Pt presents to ED with c/o general weakness x1 week and alcohol intoxication, pt has been drinking all day.

## 2025-08-01 NOTE — ED PROVIDER NOTES
"Limitations to History: Clinical condition  External Records Reviewed  Independent Historians: Self, nursing staff  Social determinants affecting care: None    HPI  Humble Banks is a 69 y.o. male with significant past medical history of alcohol use disorder, anxiety, depression, dyslipidemia, hypertension, hypothyroidism, who presents emergency department for assessment of weakness in his lower extremities today.  He reports that he fell because his legs were weak but did not sustain any injuries.  He reports that he could not get himself up and his friend called 911.  They recommended he be brought in for assessment.  He does report that he smokes and drinks every day.  He denies drinking alcohol today.  Denies any injuries but he feels like his legs are \"Jell-O \".  He is a poor historian due to his clinical condition    [facility]  Medical History[1] reviewed by myself.    Meds  Current Outpatient Medications   Medication Instructions    acamprosate (CAMPRAL) 333 mg, 3 times daily    amLODIPine (NORVASC) 5 mg, oral, Daily    buPROPion XL (Wellbutrin XL) 150 mg 24 hr tablet 1 tablet, Daily    buPROPion XL (WELLBUTRIN XL) 150 mg, oral, Daily    escitalopram (Lexapro) 10 mg tablet 1 tablet, Every morning    escitalopram (LEXAPRO) 10 mg, oral, Daily    folic acid (FOLVITE) 1 mg, oral, Daily    levothyroxine (Synthroid, Levoxyl) 75 mcg tablet 1 tablet, Daily before breakfast    levothyroxine (SYNTHROID, LEVOXYL) 75 mcg, oral, Daily    lidocaine 4 % patch 1 patch, transdermal, Daily, Remove & discard patch within 12 hours or as directed by MD. Apply to back.    meclizine (Antivert) 25 mg tablet 1 tablet, 3 times daily    methocarbamol (ROBAXIN) 500 mg, oral, Every 6 hours PRN    multivitamin with minerals tablet 1 tablet, oral, Daily    propranolol (Inderal) 40 mg tablet 1 tablet, 2 times daily    rOPINIRole (Requip) 1 mg tablet 1 tablet, Nightly    thiamine (VITAMIN B-1) 100 mg, oral, Daily       Allergies  RX " Allergies[2] reviewed by myself.    SHx  Social History[3] reviewed by myself.      ------------------------------------------------------------------------------------------------------------------------------------------    /84   Pulse 72   Resp 18   Wt 83.9 kg (185 lb)   SpO2 97%   BMI 25.09 kg/m²     Physical Exam  Vitals and nursing note reviewed.   Constitutional:       Appearance: Normal appearance. He is normal weight.   HENT:      Head: Normocephalic and atraumatic. No raccoon eyes or Murray's sign.      Nose: Nose normal.      Mouth/Throat:      Mouth: Mucous membranes are moist.     Eyes:      Extraocular Movements: Extraocular movements intact.      Conjunctiva/sclera: Conjunctivae normal.       Cardiovascular:      Rate and Rhythm: Normal rate and regular rhythm.      Pulses:           Dorsalis pedis pulses are 2+ on the right side and 2+ on the left side.   Pulmonary:      Effort: Pulmonary effort is normal.      Breath sounds: Normal breath sounds.   Abdominal:      General: Abdomen is flat.      Palpations: Abdomen is soft.      Tenderness: There is no abdominal tenderness.     Musculoskeletal:         General: Normal range of motion.      Cervical back: Normal range of motion and neck supple.      Right lower leg: No edema.      Left lower leg: No edema.      Comments: Upper or lower extremities without any difficulties.  Normal strength of the upper and lower extremities.     Skin:     General: Skin is warm and dry.     Neurological:      General: No focal deficit present.      Mental Status: He is alert and oriented to person, place, and time.      GCS: GCS eye subscore is 4. GCS verbal subscore is 5. GCS motor subscore is 6.      Cranial Nerves: Cranial nerves 2-12 are intact.      Sensory: Sensation is intact.      Motor: Motor function is intact.     Psychiatric:         Attention and Perception: Attention normal.         Mood and Affect: Mood normal.           ------------------------------------------------------------------------------------------------------------------------------------------  Labs  Labs Reviewed   CBC WITH AUTO DIFFERENTIAL - Abnormal       Result Value    WBC 3.8 (*)     nRBC 0.0      RBC 3.55 (*)     Hemoglobin 12.4 (*)     Hematocrit 37.0 (*)      (*)     MCH 34.9 (*)     MCHC 33.5      RDW 14.1      Platelets 62 (*)     Neutrophils % 26.9      Immature Granulocytes %, Automated 1.0 (*)     Lymphocytes % 48.3      Monocytes % 17.3      Eosinophils % 3.1      Basophils % 3.4      Neutrophils Absolute 1.02 (*)     Immature Granulocytes Absolute, Automated 0.04      Lymphocytes Absolute 1.84      Monocytes Absolute 0.66      Eosinophils Absolute 0.12      Basophils Absolute 0.13 (*)    COMPREHENSIVE METABOLIC PANEL - Abnormal    Glucose 75      Sodium 144      Potassium 4.7      Chloride 100      Bicarbonate 24      Anion Gap 25 (*)     Urea Nitrogen 16      Creatinine 0.50      eGFR >90      Calcium 9.1      Albumin 4.3      Alkaline Phosphatase 94      Total Protein 7.4       (*)     Bilirubin, Total 1.0      ALT 84 (*)    ALCOHOL - Abnormal    Alcohol 347 (*)    TROPONIN I, HIGH SENSITIVITY - Normal    Troponin I, High Sensitivity 13      Narrative:     Less than 99th percentile of normal range cutoff-  Female and children under 18 years old <14 ng/L; Male <21 ng/L: Negative  Repeat testing should be performed if clinically indicated.     Female and children under 18 years old 14-50 ng/L; Male 21-50 ng/L:  Consistent with possible cardiac damage and possible increased clinical   risk. Serial measurements may help to assess extent of myocardial damage.     >50 ng/L: Consistent with cardiac damage, increased clinical risk and  myocardial infarction. Serial measurements may help assess extent of   myocardial damage.      NOTE: Children less than 1 year old may have higher baseline troponin   levels and results should be interpreted  in conjunction with the overall   clinical context.     NOTE: Troponin I testing is performed using a different   testing methodology at [facility] than at other   [facility] hospitals. Direct result comparisons should only   be made within the same method.   URINALYSIS WITH REFLEX CULTURE AND MICROSCOPIC    Narrative:     The following orders were created for panel order Urinalysis with Reflex Culture and Microscopic.  Procedure                               Abnormality         Status                     ---------                               -----------         ------                     Urinalysis with Reflex C...[[id number]]                                                 Extra Urine Gray Tube[[id number]]                                                         Please view results for these tests on the individual orders.   URINALYSIS WITH REFLEX CULTURE AND MICROSCOPIC   EXTRA URINE GRAY TUBE   MORPHOLOGY    RBC Morphology See Below      Polychromasia Mild      Ovalocytes Few          Imaging  CT head wo IV contrast   Final Result   No acute intracranial abnormality.        No acute fracture or traumatic subluxation of the cervical spine.                  MACRO:   None        Signed by: Lew Olivier 8/1/2025 10:59 PM   Dictation workstation:   [id number]      CT cervical spine wo IV contrast   Final Result   No acute intracranial abnormality.        No acute fracture or traumatic subluxation of the cervical spine.                  MACRO:   None        Signed by: Lew Olivier 8/1/2025 10:59 PM   Dictation workstation:   [id number]      XR chest 1 view   Final Result   No acute cardiopulmonary disease.  No definite change is appreciated   when compared to the prior chest x-ray.   Signed by Luis Sims MD           ED Course  Diagnoses as of 08/02/25 0010   Generalized weakness   Alcohol use disorder        Medical Decision Making: He was evaluated by myself 1850 due to high patient acuity and  volume and emergency department.  He did not appear ill or toxic.  Vital signs reviewed and stable.  Comprehensive workup initiated for his weakness.    Differential diagnoses considered: Alcohol intoxication, electrolyte abnormalities, DEOVNTE, UTI, pneumonia, others    Medications given: IV phenobarb    EKG interpreted by myself and ED attending: Normal sinus rhythm.  Ventricular rate 90 bpm.  No acute ST elevations.  T wave inversions throughout    Independent Result Review and Interpretation: Relevant laboratory and radiographic results were reviewed and independently interpreted by myself.  As necessary, they are commented on in the ED Course.     Chronic conditions affecting the patient's care: As documented above in [facility]     The patient was discussed with the following consultants/services: None     Care Considerations: As documented above in [facility]    I reviewed the labs from today.  Leukopenia 3.8.  Hemoglobin 12.4 with hematocrit of 37.  Platelets 62.  Anion gap 25.  BUN and creatinine normal.  Alcohol 347.  LFTs slightly elevated at 268 and 84 likely due to his alcohol use.  Troponin negative.  Chest x-ray showing no acute cardiopulmonary process.  CT of the head showed no acute intracranial hemorrhage or abnormality.  CT of the cervical spine showed no acute fracture.  Patient was ambulated in the emergency department.  He did not have a steady gait and he wants to be placed at the skilled nursing facility.  He was placed on CIWA protocol.  Initial CIWA was 7.  I consulted his PCP.  I spoke with Dr. Sheth who will admit.  Resident was notified like IV phenobarbital.  He will continue to monitor.  Case discussed and evaluated with ED attending who is agreeable to patient plan of care.    Diagnosis: Generalized weakness, alcohol use disorder  Plan: Admit       [1]   Past Medical History:  Diagnosis Date    Alcohol abuse     DDD (degenerative disc disease), lumbar     Generalized anxiety disorder     Anxiety,  generalized    HLD (hyperlipidemia)     Hypertension     Hypothyroidism     Left inguinal hernia     Sciatica of left side     Tobacco use disorder    [2] No Known Allergies  [3]   Social History  Tobacco Use    Smoking status: Every Day     Types: Cigarettes    Smokeless tobacco: Current   Substance Use Topics    Alcohol use: Yes    Drug use: Never        Damon Guerra PA-C  08/02/25 0010

## 2025-08-02 ENCOUNTER — APPOINTMENT (OUTPATIENT)
Dept: RADIOLOGY | Facility: HOSPITAL | Age: 70
End: 2025-08-02
Payer: MEDICARE

## 2025-08-02 PROBLEM — F10.939: Status: ACTIVE | Noted: 2025-08-02

## 2025-08-02 PROBLEM — R53.1 GENERALIZED WEAKNESS: Status: ACTIVE | Noted: 2025-08-02

## 2025-08-02 LAB
ALBUMIN SERPL BCP-MCNC: 4.1 G/DL (ref 3.4–5)
ALP SERPL-CCNC: 83 U/L (ref 33–136)
ALT SERPL W P-5'-P-CCNC: 81 U/L (ref 10–52)
ANION GAP SERPL CALC-SCNC: 20 MMOL/L (ref 10–20)
AST SERPL W P-5'-P-CCNC: 228 U/L (ref 9–39)
BILIRUB SERPL-MCNC: 1.1 MG/DL (ref 0–1.2)
BUN SERPL-MCNC: 18 MG/DL (ref 6–23)
CALCIUM SERPL-MCNC: 8.6 MG/DL (ref 8.6–10.3)
CHLORIDE SERPL-SCNC: 100 MMOL/L (ref 98–107)
CO2 SERPL-SCNC: 26 MMOL/L (ref 21–32)
CREAT SERPL-MCNC: 0.47 MG/DL (ref 0.5–1.3)
EGFRCR SERPLBLD CKD-EPI 2021: >90 ML/MIN/1.73M*2
ERYTHROCYTE [DISTWIDTH] IN BLOOD BY AUTOMATED COUNT: 14 % (ref 11.5–14.5)
FOLATE SERPL-MCNC: 8.4 NG/ML
GLUCOSE SERPL-MCNC: 80 MG/DL (ref 74–99)
HCT VFR BLD AUTO: 33.3 % (ref 41–52)
HGB BLD-MCNC: 11.7 G/DL (ref 13.5–17.5)
INR PPP: 1 (ref 0.9–1.1)
MAGNESIUM SERPL-MCNC: 1.3 MG/DL (ref 1.6–2.4)
MCH RBC QN AUTO: 35.8 PG (ref 26–34)
MCHC RBC AUTO-ENTMCNC: 35.1 G/DL (ref 32–36)
MCV RBC AUTO: 102 FL (ref 80–100)
NRBC BLD-RTO: 0 /100 WBCS (ref 0–0)
PLATELET # BLD AUTO: 58 X10*3/UL (ref 150–450)
POTASSIUM SERPL-SCNC: 3.4 MMOL/L (ref 3.5–5.3)
PROT SERPL-MCNC: 7.1 G/DL (ref 6.4–8.2)
PROTHROMBIN TIME: 10.7 SECONDS (ref 9.8–12.4)
RBC # BLD AUTO: 3.27 X10*6/UL (ref 4.5–5.9)
SODIUM SERPL-SCNC: 143 MMOL/L (ref 136–145)
VIT B12 SERPL-MCNC: 404 PG/ML (ref 211–911)
WBC # BLD AUTO: 4 X10*3/UL (ref 4.4–11.3)

## 2025-08-02 PROCEDURE — 76705 ECHO EXAM OF ABDOMEN: CPT | Performed by: RADIOLOGY

## 2025-08-02 PROCEDURE — 76705 ECHO EXAM OF ABDOMEN: CPT

## 2025-08-02 PROCEDURE — 2500000004 HC RX 250 GENERAL PHARMACY W/ HCPCS (ALT 636 FOR OP/ED)

## 2025-08-02 PROCEDURE — 36415 COLL VENOUS BLD VENIPUNCTURE: CPT

## 2025-08-02 PROCEDURE — 82607 VITAMIN B-12: CPT | Mod: PARLAB

## 2025-08-02 PROCEDURE — 82746 ASSAY OF FOLIC ACID SERUM: CPT | Mod: PARLAB

## 2025-08-02 PROCEDURE — 83735 ASSAY OF MAGNESIUM: CPT

## 2025-08-02 PROCEDURE — 2060000001 HC INTERMEDIATE ICU ROOM DAILY

## 2025-08-02 PROCEDURE — 2500000004 HC RX 250 GENERAL PHARMACY W/ HCPCS (ALT 636 FOR OP/ED): Performed by: PHYSICIAN ASSISTANT

## 2025-08-02 PROCEDURE — 85027 COMPLETE CBC AUTOMATED: CPT

## 2025-08-02 PROCEDURE — 80053 COMPREHEN METABOLIC PANEL: CPT

## 2025-08-02 PROCEDURE — 85610 PROTHROMBIN TIME: CPT

## 2025-08-02 PROCEDURE — 2500000001 HC RX 250 WO HCPCS SELF ADMINISTERED DRUGS (ALT 637 FOR MEDICARE OP)

## 2025-08-02 RX ORDER — PHENOBARBITAL 32.4 MG/1
32.4 TABLET ORAL 3 TIMES DAILY
Status: DISCONTINUED | OUTPATIENT
Start: 2025-08-06 | End: 2025-08-02

## 2025-08-02 RX ORDER — MULTIVIT-MIN/IRON FUM/FOLIC AC 7.5 MG-4
1 TABLET ORAL DAILY
Status: DISCONTINUED | OUTPATIENT
Start: 2025-08-02 | End: 2025-08-07 | Stop reason: HOSPADM

## 2025-08-02 RX ORDER — ESCITALOPRAM OXALATE 10 MG/1
10 TABLET ORAL DAILY
Status: DISCONTINUED | OUTPATIENT
Start: 2025-08-02 | End: 2025-08-07 | Stop reason: HOSPADM

## 2025-08-02 RX ORDER — PHENOBARBITAL 32.4 MG/1
97.2 TABLET ORAL 3 TIMES DAILY
Status: DISCONTINUED | OUTPATIENT
Start: 2025-08-02 | End: 2025-08-02

## 2025-08-02 RX ORDER — ACETAMINOPHEN 650 MG/1
650 SUPPOSITORY RECTAL EVERY 4 HOURS PRN
Status: DISCONTINUED | OUTPATIENT
Start: 2025-08-02 | End: 2025-08-02

## 2025-08-02 RX ORDER — THIAMINE HYDROCHLORIDE 100 MG/ML
500 INJECTION, SOLUTION INTRAMUSCULAR; INTRAVENOUS EVERY 8 HOURS
Status: DISPENSED | OUTPATIENT
Start: 2025-08-02 | End: 2025-08-05

## 2025-08-02 RX ORDER — LEVOTHYROXINE SODIUM 75 UG/1
75 TABLET ORAL DAILY
Status: DISCONTINUED | OUTPATIENT
Start: 2025-08-02 | End: 2025-08-07 | Stop reason: HOSPADM

## 2025-08-02 RX ORDER — ACETAMINOPHEN 160 MG/5ML
650 SOLUTION ORAL EVERY 4 HOURS PRN
Status: DISCONTINUED | OUTPATIENT
Start: 2025-08-02 | End: 2025-08-02

## 2025-08-02 RX ORDER — ACETAMINOPHEN 325 MG/1
650 TABLET ORAL EVERY 6 HOURS PRN
Status: DISCONTINUED | OUTPATIENT
Start: 2025-08-02 | End: 2025-08-07 | Stop reason: HOSPADM

## 2025-08-02 RX ORDER — BUPROPION HYDROCHLORIDE 150 MG/1
150 TABLET ORAL DAILY
Status: DISCONTINUED | OUTPATIENT
Start: 2025-08-02 | End: 2025-08-07 | Stop reason: HOSPADM

## 2025-08-02 RX ORDER — PHENOBARBITAL 32.4 MG/1
64.8 TABLET ORAL 3 TIMES DAILY
Status: DISCONTINUED | OUTPATIENT
Start: 2025-08-04 | End: 2025-08-02

## 2025-08-02 RX ORDER — PHENOBARBITAL 32.4 MG/1
64.8 TABLET ORAL EVERY 6 HOURS PRN
Status: DISCONTINUED | OUTPATIENT
Start: 2025-08-02 | End: 2025-08-02

## 2025-08-02 RX ORDER — ACETAMINOPHEN 325 MG/1
650 TABLET ORAL EVERY 4 HOURS PRN
Status: DISCONTINUED | OUTPATIENT
Start: 2025-08-02 | End: 2025-08-02

## 2025-08-02 RX ORDER — ACETAMINOPHEN 500 MG
5 TABLET ORAL NIGHTLY PRN
Status: DISCONTINUED | OUTPATIENT
Start: 2025-08-02 | End: 2025-08-07 | Stop reason: HOSPADM

## 2025-08-02 RX ORDER — DIAZEPAM 5 MG/ML
10 INJECTION, SOLUTION INTRAMUSCULAR; INTRAVENOUS EVERY 2 HOUR PRN
Status: DISCONTINUED | OUTPATIENT
Start: 2025-08-02 | End: 2025-08-07 | Stop reason: HOSPADM

## 2025-08-02 RX ORDER — LANOLIN ALCOHOL/MO/W.PET/CERES
100 CREAM (GRAM) TOPICAL DAILY
Status: DISCONTINUED | OUTPATIENT
Start: 2025-08-05 | End: 2025-08-07 | Stop reason: HOSPADM

## 2025-08-02 RX ORDER — ACETAMINOPHEN 650 MG/1
650 SUPPOSITORY RECTAL EVERY 6 HOURS PRN
Status: DISCONTINUED | OUTPATIENT
Start: 2025-08-02 | End: 2025-08-07 | Stop reason: HOSPADM

## 2025-08-02 RX ORDER — ROPINIROLE 1 MG/1
1 TABLET, FILM COATED ORAL NIGHTLY
Status: DISCONTINUED | OUTPATIENT
Start: 2025-08-02 | End: 2025-08-07 | Stop reason: HOSPADM

## 2025-08-02 RX ORDER — ACETAMINOPHEN 160 MG/5ML
650 SOLUTION ORAL EVERY 6 HOURS PRN
Status: DISCONTINUED | OUTPATIENT
Start: 2025-08-02 | End: 2025-08-07 | Stop reason: HOSPADM

## 2025-08-02 RX ORDER — FOLIC ACID 1 MG/1
1 TABLET ORAL DAILY
Status: DISCONTINUED | OUTPATIENT
Start: 2025-08-02 | End: 2025-08-07 | Stop reason: HOSPADM

## 2025-08-02 RX ADMIN — FOLIC ACID 1 MG: 1 TABLET ORAL at 11:14

## 2025-08-02 RX ADMIN — THIAMINE HYDROCHLORIDE 500 MG: 100 INJECTION, SOLUTION INTRAMUSCULAR; INTRAVENOUS at 16:20

## 2025-08-02 RX ADMIN — PHENOBARBITAL SODIUM 130 MG: 65 INJECTION INTRAMUSCULAR; INTRAVENOUS at 00:24

## 2025-08-02 RX ADMIN — Medication 1 TABLET: at 11:14

## 2025-08-02 RX ADMIN — BUPROPION HYDROCHLORIDE 150 MG: 150 TABLET, EXTENDED RELEASE ORAL at 11:11

## 2025-08-02 RX ADMIN — PSYLLIUM HUSK 1 PACKET: 3.4 POWDER ORAL at 11:19

## 2025-08-02 RX ADMIN — ROPINIROLE HYDROCHLORIDE 1 MG: 1 TABLET, FILM COATED ORAL at 02:19

## 2025-08-02 RX ADMIN — THIAMINE HYDROCHLORIDE 500 MG: 100 INJECTION, SOLUTION INTRAMUSCULAR; INTRAVENOUS at 01:59

## 2025-08-02 RX ADMIN — ESCITALOPRAM OXALATE 10 MG: 10 TABLET ORAL at 11:13

## 2025-08-02 RX ADMIN — DIAZEPAM 10 MG: 10 INJECTION, SOLUTION INTRAMUSCULAR; INTRAVENOUS at 20:36

## 2025-08-02 RX ADMIN — THIAMINE HYDROCHLORIDE 500 MG: 100 INJECTION, SOLUTION INTRAMUSCULAR; INTRAVENOUS at 11:20

## 2025-08-02 RX ADMIN — ROPINIROLE HYDROCHLORIDE 1 MG: 1 TABLET, FILM COATED ORAL at 20:36

## 2025-08-02 RX ADMIN — DIAZEPAM 10 MG: 10 INJECTION, SOLUTION INTRAMUSCULAR; INTRAVENOUS at 12:48

## 2025-08-02 RX ADMIN — DIAZEPAM 10 MG: 10 INJECTION, SOLUTION INTRAMUSCULAR; INTRAVENOUS at 16:43

## 2025-08-02 ASSESSMENT — LIFESTYLE VARIABLES
TREMOR: MODERATE, WITH PATIENT'S ARMS EXTENDED
AUDITORY DISTURBANCES: NOT PRESENT
ORIENTATION AND CLOUDING OF SENSORIUM: ORIENTED AND CAN DO SERIAL ADDITIONS
VISUAL DISTURBANCES: NOT PRESENT
PAROXYSMAL SWEATS: NO SWEAT VISIBLE
TREMOR: MODERATE, WITH PATIENT'S ARMS EXTENDED
AGITATION: NORMAL ACTIVITY
AUDITORY DISTURBANCES: NOT PRESENT
HEADACHE, FULLNESS IN HEAD: NOT PRESENT
AUDITORY DISTURBANCES: NOT PRESENT
TOTAL SCORE: 8
ANXIETY: MILDLY ANXIOUS
HEADACHE, FULLNESS IN HEAD: NOT PRESENT
AUDITORY DISTURBANCES: NOT PRESENT
TREMOR: MODERATE, WITH PATIENT'S ARMS EXTENDED
VISUAL DISTURBANCES: NOT PRESENT
ORIENTATION AND CLOUDING OF SENSORIUM: ORIENTED AND CAN DO SERIAL ADDITIONS
AGITATION: NORMAL ACTIVITY
HEADACHE, FULLNESS IN HEAD: NOT PRESENT
HEADACHE, FULLNESS IN HEAD: NOT PRESENT
NAUSEA AND VOMITING: NO NAUSEA AND NO VOMITING
PAROXYSMAL SWEATS: NO SWEAT VISIBLE
TOTAL SCORE: 6
ORIENTATION AND CLOUDING OF SENSORIUM: ORIENTED AND CAN DO SERIAL ADDITIONS
PAROXYSMAL SWEATS: NO SWEAT VISIBLE
VISUAL DISTURBANCES: NOT PRESENT
TOTAL SCORE: 7
VISUAL DISTURBANCES: NOT PRESENT
PAROXYSMAL SWEATS: NO SWEAT VISIBLE
VISUAL DISTURBANCES: NOT PRESENT
NAUSEA AND VOMITING: NO NAUSEA AND NO VOMITING
ANXIETY: 2
NAUSEA AND VOMITING: NO NAUSEA AND NO VOMITING
TOTAL SCORE: 7
TREMOR: MODERATE, WITH PATIENT'S ARMS EXTENDED
ORIENTATION AND CLOUDING OF SENSORIUM: ORIENTED AND CAN DO SERIAL ADDITIONS
AGITATION: NORMAL ACTIVITY
ORIENTATION AND CLOUDING OF SENSORIUM: ORIENTED AND CAN DO SERIAL ADDITIONS
TREMOR: 5
VISUAL DISTURBANCES: NOT PRESENT
HEADACHE, FULLNESS IN HEAD: NOT PRESENT
PAROXYSMAL SWEATS: NO SWEAT VISIBLE
TOTAL SCORE: 6
ANXIETY: 2
TOTAL SCORE: 5
PAROXYSMAL SWEATS: NO SWEAT VISIBLE
TREMOR: 5
HEADACHE, FULLNESS IN HEAD: NOT PRESENT
ORIENTATION AND CLOUDING OF SENSORIUM: ORIENTED AND CAN DO SERIAL ADDITIONS
AUDITORY DISTURBANCES: NOT PRESENT
VISUAL DISTURBANCES: NOT PRESENT
PAROXYSMAL SWEATS: NO SWEAT VISIBLE
NAUSEA AND VOMITING: NO NAUSEA AND NO VOMITING
ANXIETY: 2
ANXIETY: 2
NAUSEA AND VOMITING: NO NAUSEA AND NO VOMITING
TOTAL SCORE: 6
AUDITORY DISTURBANCES: NOT PRESENT
AGITATION: NORMAL ACTIVITY
AGITATION: NORMAL ACTIVITY
ANXIETY: 2
ORIENTATION AND CLOUDING OF SENSORIUM: ORIENTED AND CAN DO SERIAL ADDITIONS
TREMOR: 5
NAUSEA AND VOMITING: NO NAUSEA AND NO VOMITING
AUDITORY DISTURBANCES: NOT PRESENT
NAUSEA AND VOMITING: NO NAUSEA AND NO VOMITING
HEADACHE, FULLNESS IN HEAD: NOT PRESENT
ANXIETY: 3
AGITATION: NORMAL ACTIVITY
AGITATION: NORMAL ACTIVITY

## 2025-08-02 ASSESSMENT — COGNITIVE AND FUNCTIONAL STATUS - GENERAL
WALKING IN HOSPITAL ROOM: A LITTLE
MOBILITY SCORE: 17
DRESSING REGULAR UPPER BODY CLOTHING: A LITTLE
HELP NEEDED FOR BATHING: A LITTLE
TURNING FROM BACK TO SIDE WHILE IN FLAT BAD: A LITTLE
PERSONAL GROOMING: A LITTLE
HELP NEEDED FOR BATHING: A LITTLE
EATING MEALS: A LITTLE
STANDING UP FROM CHAIR USING ARMS: A LITTLE
WALKING IN HOSPITAL ROOM: A LITTLE
TOILETING: A LITTLE
CLIMB 3 TO 5 STEPS WITH RAILING: A LOT
MOVING TO AND FROM BED TO CHAIR: A LOT
MOVING TO AND FROM BED TO CHAIR: A LOT
PERSONAL GROOMING: A LITTLE
DAILY ACTIVITIY SCORE: 18
DRESSING REGULAR LOWER BODY CLOTHING: A LITTLE
EATING MEALS: A LITTLE
DAILY ACTIVITIY SCORE: 18
STANDING UP FROM CHAIR USING ARMS: A LITTLE
DRESSING REGULAR LOWER BODY CLOTHING: A LITTLE
TURNING FROM BACK TO SIDE WHILE IN FLAT BAD: A LITTLE
MOBILITY SCORE: 17
TOILETING: A LITTLE
CLIMB 3 TO 5 STEPS WITH RAILING: A LOT
DRESSING REGULAR UPPER BODY CLOTHING: A LITTLE

## 2025-08-02 ASSESSMENT — PAIN SCALES - GENERAL
PAINLEVEL_OUTOF10: 0 - NO PAIN
PAINLEVEL_OUTOF10: 0 - NO PAIN

## 2025-08-02 ASSESSMENT — PAIN - FUNCTIONAL ASSESSMENT
PAIN_FUNCTIONAL_ASSESSMENT: 0-10
PAIN_FUNCTIONAL_ASSESSMENT: 0-10

## 2025-08-02 NOTE — PROGRESS NOTES
Humble Banks is a 69 y.o. male on day 1 of admission presenting with Generalized weakness.      SUBJECTIVE     Patient evaluated this morning and found to be resting comfortably in bed. He is feeling better now however, his hands are trembling. He denies history of loss of consciousness, nausea, vomiting, headache or any pain given his history of fall.     OBJECTIVE     Vitals:    08/02/25 0315 08/02/25 0700 08/02/25 1100 08/02/25 1541   BP: (!) 138/91 (!) 148/94 140/90 (!) 150/99   BP Location:  Left arm Left arm Left arm   Patient Position:  Lying Lying Lying   Pulse: (!) 112 85 98 85   Resp: 24 18 18 18   Temp: 36.4 °C (97.5 °F) 36.4 °C (97.5 °F) 36.6 °C (97.9 °F) 36.4 °C (97.5 °F)   TempSrc:  Temporal Temporal Temporal   SpO2: 94% 96% 95% 92%   Weight:          Results from last 7 days   Lab Units 08/02/25  0558 08/01/25  1854   WBC AUTO x10*3/uL 4.0* 3.8*   HEMOGLOBIN g/dL 11.7* 12.4*   HEMATOCRIT % 33.3* 37.0*   PLATELETS AUTO x10*3/uL 58* 62*   NEUTROS PCT AUTO %  --  26.9   LYMPHS PCT AUTO %  --  48.3   MONOS PCT AUTO %  --  17.3   EOS PCT AUTO %  --  3.1     Results from last 7 days   Lab Units 08/02/25  0558   SODIUM mmol/L 143   POTASSIUM mmol/L 3.4*   CHLORIDE mmol/L 100   CO2 mmol/L 26   BUN mg/dL 18   CREATININE mg/dL 0.47*   CALCIUM mg/dL 8.6   PROTEIN TOTAL g/dL 7.1   BILIRUBIN TOTAL mg/dL 1.1   ALK PHOS U/L 83   ALT U/L 81*   AST U/L 228*   GLUCOSE mg/dL 80       Scheduled Medications  Scheduled Medications[1]     Physical Exam  Constitutional: no acute distress, alert and cooperative, alcoholic odor noted  Neuro: A&Ox3, no focal deficits noted though slurring words  Eyes: EOMI, clear sclera  Respiratory/Thorax: CTAB, good chest expansion  Cardiovascular: Regular rate, regular rhythm  Gastrointestinal: mildly distended, soft, non-tender  Extremities: No cyanosis or edema. Peripheral pulses intact. Bilateral tremors noted.  Skin: Warm and dry    Pertinent Imaging    8/02/2025 CXR  - No acute  cardiopulmonary disease.  No definite change is appreciated when compared to the prior chest x-ray.     8/02/2025 CT  Head & Cervical spine wo IV contrast  - No acute intracranial abnormality.   - No acute fracture or traumatic subluxation of the cervical spine.       ASSESSMENT & PLAN     Hubmle Banks is a 69 year old male with PMH significant for DLD, HTN, hypothyroidism, alcohol use disorder (with multiple admissions for alcohol withdrawal in last 4 months), suspected Wernicke's encephalopathy, multiple falls, RLS, CHINEDU, MDD who presented to [facility] for assessment of weakness in his lower extremities following fall. Admitted to the general medical service for evaluation and management of alcohol withdrawal.    Acute Conditions  #Acute alcohol intoxication alcohol level 347  #Acute alcohol withdrawal hand tremors  #Elevated AST > ALT  - Stop Phenobarbital.  - IV Diazepam 10 mg PRN.  - Watch for signs of withdrawal.  - Monitor ALT/AST  - Discharge tomorrow    #Pancytopenia   - As evidenced by his blood work with WBC 3.8, Hb 12.4 & Plt 62, and macrocytic cells, this patient might have vitamin deficiencies vs bone marrow suppression secondary to chronic alcohol ingestion.  Plan:  - S. Vit B12, Folate  - Supplement deficient vitamins  - Advise for alcohol abstinence   - Monitor CBC    Chronic Conditions  - MDD, CHINEDU: continue home escitalopram and bupropion  - Hypothyroidism: continue home levothyroxine  - RLS: continue home ropinirole    Check Lists  Code: Full code  DVT ppx: SCDs  Diet: Adult Regular Diet  IVF: --      Tammie Lozada MD  PGY-1, Internal Medicine  Please SecureChat for any further questions  This is a preliminary note, please await attending attestation for final A/P              [1] buPROPion XL, 150 mg, oral, Daily  escitalopram, 10 mg, oral, Daily  folic acid, 1 mg, oral, Daily  levothyroxine, 75 mcg, oral, Daily  multivitamin with minerals, 1 tablet, oral, Daily  psyllium, 1 packet, oral,  Daily  rOPINIRole, 1 mg, oral, Nightly  [START ON 8/5/2025] thiamine, 100 mg, oral, Daily  thiamine, 500 mg, intravenous, q8h

## 2025-08-02 NOTE — H&P
"Subjective     Edsouth Banks is a 69 year old male with PMH significant for DLD, HTN, hypothyroidism, alcohol use disorder (with multiple admissions for alcohol withdrawal in last 4 months), suspected Wernicke's encephalopathy, multiple falls, RLS, CHINEDU, MDD who presented to [facility] for chief complaint of a fall earlier this morning. History somewhat limited as patient is grossly intoxicated on examination tonight. Patient states that he stood up, felt dizzy, and fell. Patient denies any head trauma or other injury. He denies passing out or any other prodromal symptoms (chest pain, heart palpitations, diaphoresis, nausea). He denies any change in his bowel or bladder habits, fever, chills, increased cough, fatigue. He states that he only drinks beer \"during sports games\" and that he thinks his last drink was 2-3 days ago. He denies any alcohol consumption today. He is unable/unwilling to describe how much alcohol he generally drinks in one week. He primarily drinks whiskey but also drinks beer. He states he drinks \"as much as I can\". He denies any history of withdrawal seizures. After his fall, his friend (whom he lives with) was concerned and called EMS, who brought him to the ED for further evaluation.     In the ED, initial vitals were /82, HR 88, RR 18, SpO2 98% on RA. Labs were significant for elevated AST > ALT; leukopenia at 3.8 (similar to previous admission); thrombocytopenia at 62 (worsened from last admission); macrocytic anemia with hemoglobin 12.4 and . CXR negative for acute cardiopulmonary process. CT head and CT C-spine negative for acute intracranial abnormality or C-spine fracture. Patient received phenobarbital load and admitted to the general medical service for evaluation and management of alcohol withdrawal.    PMH: as above  PSH: none  Social History: admits to alcohol use, admits to tobacco use (rolls own cigarettes, unsure of how much), denies recreational drug use.    A 10+ " point ROS was completed and otherwise negative except as noted above and per HPI.    Objective     Vitals:    08/01/25 1650 08/01/25 1833 08/01/25 2332 08/01/25 2342   BP: 116/82 119/78 108/72 119/82   BP Location:  Left arm     Patient Position:  Lying     Pulse: 88 88 (!) 106 71   Resp: 18 18 18 18   SpO2: 98% 99% 98% 99%   Weight: 83.9 kg (185 lb)           Results from last 7 days   Lab Units 08/01/25  1854   WBC AUTO x10*3/uL 3.8*   HEMOGLOBIN g/dL 12.4*   HEMATOCRIT % 37.0*   PLATELETS AUTO x10*3/uL 62*   NEUTROS PCT AUTO % 26.9   LYMPHS PCT AUTO % 48.3   MONOS PCT AUTO % 17.3   EOS PCT AUTO % 3.1        Results from last 7 days   Lab Units 08/01/25  1854   SODIUM mmol/L 144   POTASSIUM mmol/L 4.7   CHLORIDE mmol/L 100   CO2 mmol/L 24   BUN mg/dL 16   CREATININE mg/dL 0.50   CALCIUM mg/dL 9.1   PROTEIN TOTAL g/dL 7.4   BILIRUBIN TOTAL mg/dL 1.0   ALK PHOS U/L 94   ALT U/L 84*   AST U/L 268*   GLUCOSE mg/dL 75       All other labs and Imaging have been personally reviewed.     Physical exam:    Constitutional: no acute distress, alert and cooperative, alcoholic odor noted  Neuro: A&Ox3, no focal deficits noted though slurring words  Eyes: EOMI, clear sclera  Respiratory/Thorax: CTAB, good chest expansion  Cardiovascular: Regular rate, regular rhythm  Gastrointestinal: mildly distended, soft, non-tender  Extremities: No cyanosis or edema. Peripheral pulses intact. Bilateral tremors noted.  Skin: Warm and dry    Assessment/Plan     Humble Banks is a 69 year old male with PMH significant for DLD, HTN, hypothyroidism, alcohol use disorder (with multiple admissions for alcohol withdrawal in last 4 months), suspected Wernicke's encephalopathy, multiple falls, RLS, CHINEDU, MDD who presented to [facility] for chief complaint of a fall and admitted to the general medical service for evaluation and management of alcohol withdrawal.    Acute Problems:    #Acute alcohol intoxication  #Acute alcohol withdrawal  #Elevated AST >  ALT  -Patient grossly intoxicated on examination, slurring words, unable to ambulate. Suspect reported fall secondary to alcohol intoxication. Alcohol level 347. Reported last drink 2-3 days ago. CIWA 6 in ED.   - and ALT 84 on admission. Have been high in past. Thrombocytopenia noted as below.  PLAN:  -Phenobarbital load given in ED, continue PO taper per protocol  -High-dose thiamine, folate, multivitamin ordered  -Check RUQ US given elevated LFTs and thrombocytopenia to evaluate for imaging signs of cirrhosis  -Check INR to evaluate for underlying hepatic dysfunction  -Trend LFTs  -Check UDS/UA    #Pancytopenia  -Mild leukopenia, improved chronic macrocytic anemia, and acute on chronic thrombocytopenia on admission. Likely secondary to bone marrow suppression in setting of chronic alcohol use  PLAN:  -Check B12/folate to evaluate for underlying nutrient deficiency  -RUQ US and INR as above  -Monitor CBC    Chronic Conditions:  #MDD, #CHINEDU- continue home escitalopram and bupropion  #Hypothyroidism- continue home levothyroxine  #RLS- continue home ropinirole    DVT ppx: SCDs  IVF: none  Diet: regular  Consults: none  CODE STATUS: Full code    Julio Samuel DO, PGY-2, Internal Medicine  Please SecureChat for any further questions  This is a preliminary note, please await attending attestation for final A/P

## 2025-08-03 LAB
ALBUMIN SERPL BCP-MCNC: 4.1 G/DL (ref 3.4–5)
ALP SERPL-CCNC: 88 U/L (ref 33–136)
ALT SERPL W P-5'-P-CCNC: 66 U/L (ref 10–52)
ANION GAP SERPL CALC-SCNC: 17 MMOL/L (ref 10–20)
AST SERPL W P-5'-P-CCNC: 158 U/L (ref 9–39)
BASOPHILS # BLD AUTO: 0.07 X10*3/UL (ref 0–0.1)
BASOPHILS NFR BLD AUTO: 1.4 %
BILIRUB SERPL-MCNC: 1.9 MG/DL (ref 0–1.2)
BUN SERPL-MCNC: 15 MG/DL (ref 6–23)
CALCIUM SERPL-MCNC: 8.8 MG/DL (ref 8.6–10.3)
CHLORIDE SERPL-SCNC: 97 MMOL/L (ref 98–107)
CO2 SERPL-SCNC: 27 MMOL/L (ref 21–32)
CREAT SERPL-MCNC: 0.5 MG/DL (ref 0.5–1.3)
EGFRCR SERPLBLD CKD-EPI 2021: >90 ML/MIN/1.73M*2
EOSINOPHIL # BLD AUTO: 0.07 X10*3/UL (ref 0–0.7)
EOSINOPHIL NFR BLD AUTO: 1.4 %
ERYTHROCYTE [DISTWIDTH] IN BLOOD BY AUTOMATED COUNT: 13.5 % (ref 11.5–14.5)
GLUCOSE SERPL-MCNC: 126 MG/DL (ref 74–99)
HCT VFR BLD AUTO: 33.5 % (ref 41–52)
HGB BLD-MCNC: 11.4 G/DL (ref 13.5–17.5)
IMM GRANULOCYTES # BLD AUTO: 0.04 X10*3/UL (ref 0–0.7)
IMM GRANULOCYTES NFR BLD AUTO: 0.8 % (ref 0–0.9)
LYMPHOCYTES # BLD AUTO: 1.25 X10*3/UL (ref 1.2–4.8)
LYMPHOCYTES NFR BLD AUTO: 24.4 %
MCH RBC QN AUTO: 34.7 PG (ref 26–34)
MCHC RBC AUTO-ENTMCNC: 34 G/DL (ref 32–36)
MCV RBC AUTO: 102 FL (ref 80–100)
MONOCYTES # BLD AUTO: 0.94 X10*3/UL (ref 0.1–1)
MONOCYTES NFR BLD AUTO: 18.3 %
NEUTROPHILS # BLD AUTO: 2.76 X10*3/UL (ref 1.2–7.7)
NEUTROPHILS NFR BLD AUTO: 53.7 %
NRBC BLD-RTO: 0.4 /100 WBCS (ref 0–0)
PLATELET # BLD AUTO: 54 X10*3/UL (ref 150–450)
POTASSIUM SERPL-SCNC: 3.1 MMOL/L (ref 3.5–5.3)
PROT SERPL-MCNC: 7 G/DL (ref 6.4–8.2)
RBC # BLD AUTO: 3.29 X10*6/UL (ref 4.5–5.9)
SODIUM SERPL-SCNC: 138 MMOL/L (ref 136–145)
WBC # BLD AUTO: 5.1 X10*3/UL (ref 4.4–11.3)

## 2025-08-03 PROCEDURE — 2500000004 HC RX 250 GENERAL PHARMACY W/ HCPCS (ALT 636 FOR OP/ED)

## 2025-08-03 PROCEDURE — 36415 COLL VENOUS BLD VENIPUNCTURE: CPT

## 2025-08-03 PROCEDURE — 2500000002 HC RX 250 W HCPCS SELF ADMINISTERED DRUGS (ALT 637 FOR MEDICARE OP, ALT 636 FOR OP/ED)

## 2025-08-03 PROCEDURE — 2060000001 HC INTERMEDIATE ICU ROOM DAILY

## 2025-08-03 PROCEDURE — 2500000001 HC RX 250 WO HCPCS SELF ADMINISTERED DRUGS (ALT 637 FOR MEDICARE OP)

## 2025-08-03 PROCEDURE — 84075 ASSAY ALKALINE PHOSPHATASE: CPT

## 2025-08-03 PROCEDURE — 97165 OT EVAL LOW COMPLEX 30 MIN: CPT | Mod: GO

## 2025-08-03 PROCEDURE — 97161 PT EVAL LOW COMPLEX 20 MIN: CPT | Mod: GP

## 2025-08-03 PROCEDURE — 85025 COMPLETE CBC W/AUTO DIFF WBC: CPT

## 2025-08-03 PROCEDURE — S4991 NICOTINE PATCH NONLEGEND: HCPCS

## 2025-08-03 RX ORDER — IBUPROFEN 200 MG
1 TABLET ORAL DAILY
Status: DISCONTINUED | OUTPATIENT
Start: 2025-08-03 | End: 2025-08-07 | Stop reason: HOSPADM

## 2025-08-03 RX ORDER — NICOTINE 7MG/24HR
1 PATCH, TRANSDERMAL 24 HOURS TRANSDERMAL DAILY
Status: DISCONTINUED | OUTPATIENT
Start: 2025-09-28 | End: 2025-08-07 | Stop reason: HOSPADM

## 2025-08-03 RX ORDER — SODIUM CHLORIDE, SODIUM LACTATE, POTASSIUM CHLORIDE, CALCIUM CHLORIDE 600; 310; 30; 20 MG/100ML; MG/100ML; MG/100ML; MG/100ML
100 INJECTION, SOLUTION INTRAVENOUS CONTINUOUS
Status: ACTIVE | OUTPATIENT
Start: 2025-08-03 | End: 2025-08-04

## 2025-08-03 RX ORDER — IBUPROFEN 200 MG
1 TABLET ORAL DAILY
Status: DISCONTINUED | OUTPATIENT
Start: 2025-09-14 | End: 2025-08-07 | Stop reason: HOSPADM

## 2025-08-03 RX ORDER — POTASSIUM CHLORIDE 14.9 MG/ML
20 INJECTION INTRAVENOUS
Status: COMPLETED | OUTPATIENT
Start: 2025-08-03 | End: 2025-08-03

## 2025-08-03 RX ORDER — MAGNESIUM SULFATE HEPTAHYDRATE 40 MG/ML
2 INJECTION, SOLUTION INTRAVENOUS ONCE
Status: COMPLETED | OUTPATIENT
Start: 2025-08-03 | End: 2025-08-03

## 2025-08-03 RX ADMIN — BUPROPION HYDROCHLORIDE 150 MG: 150 TABLET, EXTENDED RELEASE ORAL at 09:53

## 2025-08-03 RX ADMIN — NICOTINE 1 PATCH: 21 PATCH, EXTENDED RELEASE TRANSDERMAL at 16:20

## 2025-08-03 RX ADMIN — DIAZEPAM 10 MG: 10 INJECTION, SOLUTION INTRAMUSCULAR; INTRAVENOUS at 22:25

## 2025-08-03 RX ADMIN — FOLIC ACID 1 MG: 1 TABLET ORAL at 09:53

## 2025-08-03 RX ADMIN — DIAZEPAM 10 MG: 10 INJECTION, SOLUTION INTRAMUSCULAR; INTRAVENOUS at 16:42

## 2025-08-03 RX ADMIN — DIAZEPAM 10 MG: 10 INJECTION, SOLUTION INTRAMUSCULAR; INTRAVENOUS at 02:09

## 2025-08-03 RX ADMIN — MAGNESIUM SULFATE HEPTAHYDRATE 2 G: 40 INJECTION, SOLUTION INTRAVENOUS at 14:07

## 2025-08-03 RX ADMIN — Medication 5 MG: at 20:20

## 2025-08-03 RX ADMIN — DIAZEPAM 10 MG: 10 INJECTION, SOLUTION INTRAMUSCULAR; INTRAVENOUS at 18:22

## 2025-08-03 RX ADMIN — PSYLLIUM HUSK 1 PACKET: 3.4 POWDER ORAL at 09:54

## 2025-08-03 RX ADMIN — Medication 1 TABLET: at 09:53

## 2025-08-03 RX ADMIN — POTASSIUM CHLORIDE 20 MEQ: 14.9 INJECTION, SOLUTION INTRAVENOUS at 16:38

## 2025-08-03 RX ADMIN — THIAMINE HYDROCHLORIDE 500 MG: 100 INJECTION, SOLUTION INTRAMUSCULAR; INTRAVENOUS at 00:38

## 2025-08-03 RX ADMIN — THIAMINE HYDROCHLORIDE 500 MG: 100 INJECTION, SOLUTION INTRAMUSCULAR; INTRAVENOUS at 08:40

## 2025-08-03 RX ADMIN — ESCITALOPRAM OXALATE 10 MG: 10 TABLET ORAL at 09:53

## 2025-08-03 RX ADMIN — LEVOTHYROXINE SODIUM 75 MCG: 0.07 TABLET ORAL at 05:47

## 2025-08-03 RX ADMIN — DIAZEPAM 10 MG: 10 INJECTION, SOLUTION INTRAMUSCULAR; INTRAVENOUS at 20:20

## 2025-08-03 RX ADMIN — ROPINIROLE HYDROCHLORIDE 1 MG: 1 TABLET, FILM COATED ORAL at 20:20

## 2025-08-03 RX ADMIN — POTASSIUM CHLORIDE 20 MEQ: 14.9 INJECTION, SOLUTION INTRAVENOUS at 14:07

## 2025-08-03 RX ADMIN — DIAZEPAM 10 MG: 10 INJECTION, SOLUTION INTRAMUSCULAR; INTRAVENOUS at 11:50

## 2025-08-03 RX ADMIN — THIAMINE HYDROCHLORIDE 500 MG: 100 INJECTION, SOLUTION INTRAMUSCULAR; INTRAVENOUS at 16:41

## 2025-08-03 RX ADMIN — SODIUM CHLORIDE, POTASSIUM CHLORIDE, SODIUM LACTATE AND CALCIUM CHLORIDE 75 ML/HR: 600; 310; 30; 20 INJECTION, SOLUTION INTRAVENOUS at 16:41

## 2025-08-03 ASSESSMENT — LIFESTYLE VARIABLES
NAUSEA AND VOMITING: NO NAUSEA AND NO VOMITING
ANXIETY: 2
TOTAL SCORE: 8
AUDITORY DISTURBANCES: NOT PRESENT
TOTAL SCORE: 6
NAUSEA AND VOMITING: NO NAUSEA AND NO VOMITING
ORIENTATION AND CLOUDING OF SENSORIUM: CANNOT DO SERIAL ADDITIONS OR IS UNCERTAIN ABOUT DATE
ANXIETY: 3
ANXIETY: 3
VISUAL DISTURBANCES: NOT PRESENT
TREMOR: 5
ORIENTATION AND CLOUDING OF SENSORIUM: ORIENTED AND CAN DO SERIAL ADDITIONS
TREMOR: 5
ANXIETY: 3
VISUAL DISTURBANCES: NOT PRESENT
TOTAL SCORE: 11
VISUAL DISTURBANCES: NOT PRESENT
PAROXYSMAL SWEATS: NO SWEAT VISIBLE
VISUAL DISTURBANCES: NOT PRESENT
AUDITORY DISTURBANCES: NOT PRESENT
AGITATION: NORMAL ACTIVITY
ORIENTATION AND CLOUDING OF SENSORIUM: ORIENTED AND CAN DO SERIAL ADDITIONS
VISUAL DISTURBANCES: NOT PRESENT
NAUSEA AND VOMITING: NO NAUSEA AND NO VOMITING
TREMOR: 5
ORIENTATION AND CLOUDING OF SENSORIUM: CANNOT DO SERIAL ADDITIONS OR IS UNCERTAIN ABOUT DATE
HEADACHE, FULLNESS IN HEAD: NOT PRESENT
PAROXYSMAL SWEATS: NO SWEAT VISIBLE
AGITATION: SOMEWHAT MORE THAN NORMAL ACTIVITY
AGITATION: NORMAL ACTIVITY
TREMOR: 5
HEADACHE, FULLNESS IN HEAD: NOT PRESENT
PAROXYSMAL SWEATS: NO SWEAT VISIBLE
PAROXYSMAL SWEATS: NO SWEAT VISIBLE
HEADACHE, FULLNESS IN HEAD: NOT PRESENT
PAROXYSMAL SWEATS: NO SWEAT VISIBLE
AGITATION: SOMEWHAT MORE THAN NORMAL ACTIVITY
AGITATION: NORMAL ACTIVITY
AUDITORY DISTURBANCES: NOT PRESENT
TREMOR: 5
AUDITORY DISTURBANCES: NOT PRESENT
ANXIETY: MILDLY ANXIOUS
HEADACHE, FULLNESS IN HEAD: NOT PRESENT
VISUAL DISTURBANCES: NOT PRESENT
PAROXYSMAL SWEATS: NO SWEAT VISIBLE
AUDITORY DISTURBANCES: NOT PRESENT
AUDITORY DISTURBANCES: NOT PRESENT
PAROXYSMAL SWEATS: NO SWEAT VISIBLE
HEADACHE, FULLNESS IN HEAD: NOT PRESENT
TOTAL SCORE: 9
ORIENTATION AND CLOUDING OF SENSORIUM: CANNOT DO SERIAL ADDITIONS OR IS UNCERTAIN ABOUT DATE
AUDITORY DISTURBANCES: NOT PRESENT
TOTAL SCORE: 8
NAUSEA AND VOMITING: NO NAUSEA AND NO VOMITING
AGITATION: NORMAL ACTIVITY
NAUSEA AND VOMITING: NO NAUSEA AND NO VOMITING
BLOOD PRESSURE: 162/98
NAUSEA AND VOMITING: NO NAUSEA AND NO VOMITING
HEADACHE, FULLNESS IN HEAD: NOT PRESENT
TREMOR: 5
AUDITORY DISTURBANCES: NOT PRESENT
ANXIETY: MODERATELY ANXIOUS, OR GUARDED, SO ANXIETY IS INFERRED
HEADACHE, FULLNESS IN HEAD: NOT PRESENT
TOTAL SCORE: 9
TOTAL SCORE: 7
HEADACHE, FULLNESS IN HEAD: NOT PRESENT
TREMOR: 5
ANXIETY: MODERATELY ANXIOUS, OR GUARDED, SO ANXIETY IS INFERRED
PAROXYSMAL SWEATS: NO SWEAT VISIBLE
ORIENTATION AND CLOUDING OF SENSORIUM: CANNOT DO SERIAL ADDITIONS OR IS UNCERTAIN ABOUT DATE
AGITATION: NORMAL ACTIVITY
ORIENTATION AND CLOUDING OF SENSORIUM: ORIENTED AND CAN DO SERIAL ADDITIONS
ORIENTATION AND CLOUDING OF SENSORIUM: ORIENTED AND CAN DO SERIAL ADDITIONS
VISUAL DISTURBANCES: NOT PRESENT
TREMOR: 5
AGITATION: NORMAL ACTIVITY
NAUSEA AND VOMITING: NO NAUSEA AND NO VOMITING
VISUAL DISTURBANCES: NOT PRESENT
ANXIETY: 3
TOTAL SCORE: 11
NAUSEA AND VOMITING: NO NAUSEA AND NO VOMITING

## 2025-08-03 ASSESSMENT — COGNITIVE AND FUNCTIONAL STATUS - GENERAL
MOVING FROM LYING ON BACK TO SITTING ON SIDE OF FLAT BED WITH BEDRAILS: A LITTLE
DRESSING REGULAR LOWER BODY CLOTHING: A LOT
HELP NEEDED FOR BATHING: A LOT
PERSONAL GROOMING: A LITTLE
DRESSING REGULAR UPPER BODY CLOTHING: A LITTLE
STANDING UP FROM CHAIR USING ARMS: A LOT
DAILY ACTIVITIY SCORE: 14
MOBILITY SCORE: 13
TURNING FROM BACK TO SIDE WHILE IN FLAT BAD: A LITTLE
TOILETING: TOTAL
WALKING IN HOSPITAL ROOM: A LOT
EATING MEALS: A LITTLE
CLIMB 3 TO 5 STEPS WITH RAILING: TOTAL
MOVING TO AND FROM BED TO CHAIR: A LOT

## 2025-08-03 ASSESSMENT — ACTIVITIES OF DAILY LIVING (ADL)
ADL_ASSISTANCE: INDEPENDENT
ADL_ASSISTANCE: INDEPENDENT
BATHING_ASSISTANCE: MODERATE

## 2025-08-03 ASSESSMENT — PAIN - FUNCTIONAL ASSESSMENT
PAIN_FUNCTIONAL_ASSESSMENT: 0-10

## 2025-08-03 ASSESSMENT — PAIN SCALES - GENERAL
PAINLEVEL_OUTOF10: 0 - NO PAIN
PAINLEVEL_OUTOF10: 0 - NO PAIN

## 2025-08-03 NOTE — PROGRESS NOTES
Occupational Therapy    Evaluation    Patient Name: Humble Banks  MRN: [id number]  Today's Date: 8/3/2025  Time Calculation  Start Time: 0843  Stop Time: 0856  Time Calculation (min): 13 min  824/824-A    Assessment  IP OT Assessment  OT Assessment: impaired adls  Prognosis: Fair  Barriers to Discharge Home: Caregiver assistance, Physical needs, Social Drivers of Health considerations  Evaluation/Treatment Tolerance: Patient tolerated treatment well  Medical Staff Made Aware: Yes  End of Session Communication: Bedside nurse  End of Session Patient Position: Bed, 3 rail up, Alarm on    Plan:  Treatment Interventions: ADL retraining, Visual perceptual retraining, Functional transfer training, UE strengthening/ROM, Endurance training, Equipment evaluation/education, Patient/family training, Compensatory technique education  OT Frequency: 3 times per week (this hospital stay)  OT Discharge Recommendations: Moderate intensity level of continued care (Based on current functional status and rehab potential, patient is anticipated to tolerate and benefit from 5 or more days per week of skilled rehabilitative therapy after discharge from this acute inpatient hospitilization.)  OT - OK to Discharge: Yes (once medically appropriate)    Subjective     Current Problem:  1. Alcohol withdrawal with complication with inpatient treatment (Multi)        2. Generalized weakness        3. Alcohol use disorder            General:  General  Reason for Referral: OT eval and treat  Referred By: Julio Samuel DO  Past Medical History Relevant to Rehab: DLD, HTN, hypothyroidism, alcohol use disorder (with multiple admissions for alcohol withdrawal in last 4 months), suspected Wernicke's encephalopathy, multiple falls, RLS, CHINEDU, MDD  Family/Caregiver Present: No  Co-Treatment: PT  Prior to Session Communication: Bedside nurse  Patient Position Received: Bed, 3 rail up, Alarm on  General Comment: 70 y/o M presents s/p fall and evidence of  acute intoxication. CIWA precautions    Precautions:  Medical Precautions: Fall precautions  Precautions Comment: CIWA      Pain:  Pain Assessment  Pain Assessment:  (pt reports slight pinch in his L hip, no other pain reported)    Objective     Cognition:  Orientation Level: Oriented X4  Insight: Severe  Processing Speed: Delayed             Home Living:  Home Living Comments: lives with 3 roomates in house with 1+3 TERESA and rail. laundry in basement. pt has tub shower with GBs, chair, and HHS. pt has std toilet and std bed. amb with std walker. IND in ADLs. shares iADLs with roomate. roommate completes laundry. roomate drives and grocery shops.     Prior Function:  Level of [place]: Independent with ADLs and functional transfers  ADL Assistance: Independent  Homemaking Assistance: Independent  Ambulatory Assistance: Independent (walker)  Prior Function Comments: per EMR, frequent falls. does not drive.    IADL History:       ADL:  Eating Assistance: Stand by  Eating Deficit: Setup  Grooming Assistance: Stand by  Grooming Deficit: Setup  Bathing Assistance: Moderate  UE Dressing Assistance: Minimal  LE Dressing Assistance: Maximal  LE Dressing Deficit:  (incr time to put on crocs, anticipate max A to don/doff pants/socks)  Toileting Assistance with Device: Total    Activity Tolerance:  Endurance: Decreased tolerance for upright activites    Bed Mobility/Transfers:   Bed Mobility  Bed Mobility: Yes  Bed Mobility 1  Bed Mobility 1: Supine to sitting, Sitting to supine  Bed Mobility Comments 1: SBA; incr time to perform  Transfers  Transfer: Yes  Transfer 1  Technique 1: Sit to stand, Stand to sit  Transfer Device 1: Walker  Transfer Level of Assistance 1: Moderate assistance  Trials/Comments 1: support for hand placement and technique    Ambulation/Gait Training:  Functional Mobility  Functional Mobility Performed: Yes  Functional Mobility 1  Device 1: Rolling walker  Assistance 1: Moderate assistance  Comments  1: mod A x 1 to complete side steps, pt required continuous cues for motor planning and walker management    Sitting Balance:  Static Sitting Balance  Static Sitting-Level of Assistance: Contact guard    Standing Balance:  Static Standing Balance  Static Standing-Level of Assistance: Moderate assistance    Vision: Vision - Basic Assessment  Current Vision: No visual deficits   and      Sensation:  Light Touch: No apparent deficits    Strength:  Strength Comments: bilat UE WFL      Coordination:  Coordination Comment: tremulous bilat UE/LE at rest and during mobility       Extremities: RUE   RUE : Within Functional Limits and LUE   LUE: Within Functional Limits    Outcome Measures: [facility] Daily Activity  Putting on and taking off regular lower body clothing: A lot  Bathing (including washing, rinsing, drying): A lot  Putting on and taking off regular upper body clothing: A little  Toileting, which includes using toilet, bedpan or urinal: Total  Taking care of personal grooming such as brushing teeth: A little  Eating Meals: A little  Daily Activity - Total Score: 14                       EDUCATION:     Education Documentation  ADL Training, taught by Katerin Price, OT at 8/3/2025 10:09 AM.  Learner: Patient  Readiness: Acceptance  Method: Explanation  Response: Verbalizes Understanding    Education Comments  No comments found.        Goals:   Encounter Problems       Encounter Problems (Active)       OT Goals       Mod I for all functional transfers (Progressing)       Start:  08/03/25    Expected End:  08/17/25            Mod I for simulated HH distances (Progressing)       Start:  08/03/25    Expected End:  08/17/25            Pt will complete upper and lower body bathing/dressing; toileting with modified independence using adaptive equipment as needed   (Progressing)       Start:  08/03/25    Expected End:  08/17/25

## 2025-08-03 NOTE — CARE PLAN
Problem: Skin  Goal: Prevent/manage excess moisture  Outcome: Progressing  Flowsheets (Taken 8/3/2025 0120)  Prevent/manage excess moisture: Moisturize dry skin     Problem: Safety - Adult  Goal: Free from fall injury  Outcome: Progressing   The patient's goals for the shift include      The clinical goals for the shift include pt safety will be maintained in duration of the shift    Over the shift, the patient did make progress toward the following goals.

## 2025-08-03 NOTE — PROGRESS NOTES
Physical Therapy    Physical Therapy Evaluation    Patient Name: Humble Banks  MRN: [id number]  Today's Date: 8/3/2025   Time Calculation  Start Time: 0844  Stop Time: 0856  Time Calculation (min): 12 min  824/824-A    Assessment/Plan   PT Assessment  PT Assessment Results: Decreased strength, Impaired balance, Decreased mobility  Rehab Prognosis: Fair  Barriers to Discharge Home: Physical needs  Physical Needs: Stair navigation into home limited by function/safety  Evaluation/Treatment Tolerance: Patient tolerated treatment well  Medical Staff Made Aware: Yes  Strengths: Attitude of self  Barriers to Participation: Comorbidities  End of Session Communication: Bedside nurse  Assessment Comment: pt tolerated session. pt required assist during functional mobility to maintain safety. pt presented with decreased functional mobility, strength, and balance. pt would benefit from mod int therapy in order to return to PLOF.  End of Session Patient Position: Bed, 3 rail up, Alarm on  IP OR SWING BED PT PLAN  Inpatient or Swing Bed: Inpatient  PT Plan  Treatment/Interventions: Bed mobility, Transfer training, Gait training, Balance training, Strengthening, Therapeutic exercise, Therapeutic activity  PT Plan: Ongoing PT  PT Frequency: 3 times per week (during this acute inpatient hospitalization.)  PT Discharge Recommendations: Moderate intensity level of continued care (Based on current functional status and rehab potential, patient is anticipated to tolerate and benefit from 5 or more days per week of skilled rehabilitative therapy after discharge from this acute inpatient hospitalization.)  PT Recommended Transfer Status: Assist x1  PT - OK to Discharge: Yes (once medically cleared)    Subjective     Current Problem:  1. Alcohol withdrawal with complication with inpatient treatment (Multi)        2. Generalized weakness        3. Alcohol use disorder          Problem List[1]    General Visit Information:  General  Reason  for Referral: PT eval and treat (generalized weakness, Alcohol withdrawal)  Referred By: Julio Samuel DO  Past Medical History Relevant to Rehab: DLD, HTN, hypothyroidism, alcohol use disorder (with multiple admissions for alcohol withdrawal in last 4 months), suspected Wernicke's encephalopathy, multiple falls, RLS, CHINEDU, MDD  Family/Caregiver Present: No  Co-Treatment: PT  Co-Treatment Reason: to maximize pt safety and mobility  Prior to Session Communication: Bedside nurse  Patient Position Received: Bed, 3 rail up, Alarm on  General Comment: 68 y/o M presents s/p fall and evidence of acute intoxication. CIWA precautions    Home Living:  Home Living  Home Living Comments: lives with 3 roomates in house with 1+3 TERESA and rail. laundry in basement. pt has tub shower with GBs, chair, and HHS. pt has std toilet and std bed. amb with std walker. IND in ADLs. shares iADLs with roomate. roommate completes laundry. roomate drives and grocery shops.    Prior Level of Function:  Prior Function Per Pt/Caregiver Report  Level of [place]: Independent with ADLs and functional transfers  ADL Assistance: Independent  Homemaking Assistance: Independent  Ambulatory Assistance: Independent (walker)  Prior Function Comments: per EMR, frequent falls. does not drive.    Precautions:  Precautions  Medical Precautions: Fall precautions  Precautions Comment: [place]  Objective     Pain:  Pain Assessment  Pain Assessment: 0-10  0-10 (Numeric) Pain Score:  (pt reports slight pinch in his L hip, no other pain reported)    Cognition:  Cognition  Overall Cognitive Status: Within Functional Limits  Orientation Level: Oriented X4  Insight: Severe  Processing Speed: Delayed    General Assessments:      Activity Tolerance  Endurance: Decreased tolerance for upright activites  Sensation  Light Touch: No apparent deficits  Strength  Strength Comments: BLE strength 3-/5 grossly. BLE ROM WFL for pts age                   Functional Assessments:      Bed Mobility  Bed Mobility: Yes  Bed Mobility 1  Bed Mobility 1: Supine to sitting, Sitting to supine  Bed Mobility Comments 1: SBA; increased time and effort  Transfers  Transfer: Yes  Transfer 1  Transfer From 1: Bed to, Sit to, Stand to  Technique 1: Sit to stand, Stand to sit  Transfer Device 1: Walker  Transfer Level of Assistance 1: Moderate assistance  Trials/Comments 1: v/c for sequencing  Ambulation/Gait Training  Ambulation/Gait Training Performed: Yes  Ambulation/Gait Training 1  Surface 1: Level tile  Device 1: Rolling walker  Comments/Distance (ft) 1: completed side steps with modax1; v/c for sequencing and safety          Extremity/Trunk Assessments:  RUE   RUE : Within Functional Limits  LUE   LUE: Within Functional Limits          Outcome Measures:     [facility] Basic Mobility  Turning from your back to your side while in a flat bed without using bedrails: A little  Moving from lying on your back to sitting on the side of a flat bed without using bedrails: A little  Moving to and from bed to chair (including a wheelchair): A lot  Standing up from a chair using your arms (e.g. wheelchair or bedside chair): A lot  To walk in hospital room: A lot  Climbing 3-5 steps with railing: Total  Basic Mobility - Total Score: 13                                                             Goals:  Encounter Problems       Encounter Problems (Active)       PT Problem       STG - Pt will perform a B LE ther ex program of 2-3 sets of 10  (Progressing)       Start:  08/03/25    Expected End:  08/17/25            STG - Pt will transition supine <> sitting with Rajat (Progressing)       Start:  08/03/25    Expected End:  08/17/25            STG - Pt will transfer STS with minAx1 (Progressing)       Start:  08/03/25    Expected End:  08/17/25            STG - Pt will amb 50' using w/w with minAx1  (Progressing)       Start:  08/03/25    Expected End:  08/17/25                 Education Documentation  Mobility Training,  taught by Karely Hedrick, PT at 8/3/2025 11:41 AM.  Learner: Patient  Readiness: Acceptance  Method: Explanation  Response: Verbalizes Understanding  Comment: PT POC    Education Comments  No comments found.              [1]   Patient Active Problem List  Diagnosis    Abnormal gait    Age-related nuclear cataract of both eyes    Agitation    Alcohol withdrawal (Multi)    Asymmetric SNHL (sensorineural hearing loss)    Benign essential hypertension    Bilateral presbyopia    Alcohol dependence    Cigarette nicotine dependence    Dizziness    Fatigue    Imbalance    Inguinal hernia, left    Lower extremity weakness    Memory loss    Mixed hyperlipidemia    PVD (posterior vitreous detachment), both eyes    Scotoma    Subjective tinnitus of both ears    Syncope    Transaminitis    Tremor    Lumbar radiculopathy    Posterior vitreous detachment    Rib fractures    Tobacco use disorder    Left sided sciatica    Generalized anxiety disorder    DDD (degenerative disc disease), lumbar    Unable to ambulate    Alcoholic intoxication without complication    Alcohol withdrawal syndrome without complication (Multi)    Generalized weakness    Alcohol withdrawal with complication with inpatient treatment (Multi)

## 2025-08-03 NOTE — PROGRESS NOTES
Humble Banks is a 69 y.o. male on day 2 of admission presenting with Generalized weakness.      SUBJECTIVE     Patient evaluated this morning and found to be resting comfortably in bed. He is feeling better now however, his hands are trembling. He slept for 4 hours last night and wants to sleep now to compensate his last 72 sleepless hours. He had a normal bowl movement today.    OBJECTIVE     Vitals:    08/03/25 0400 08/03/25 0728 08/03/25 0921 08/03/25 1136   BP: (!) 162/98 117/79 (!) 120/91 147/84   BP Location:  Right arm  Right arm   Patient Position:  Lying  Lying   Pulse: 82 106 86 (!) 114   Resp: 20 19  20   Temp: 36.3 °C (97.3 °F) 36.4 °C (97.5 °F)  36.5 °C (97.7 °F)   TempSrc:  Temporal  Temporal   SpO2: 93% 95% 96% 95%   Weight:          Results from last 7 days   Lab Units 08/03/25  0546   WBC AUTO x10*3/uL 5.1   HEMOGLOBIN g/dL 11.4*   HEMATOCRIT % 33.5*   PLATELETS AUTO x10*3/uL 54*   NEUTROS PCT AUTO % 53.7   LYMPHS PCT AUTO % 24.4   MONOS PCT AUTO % 18.3   EOS PCT AUTO % 1.4     Results from last 7 days   Lab Units 08/03/25  0547   SODIUM mmol/L 138   POTASSIUM mmol/L 3.1*   CHLORIDE mmol/L 97*   CO2 mmol/L 27   BUN mg/dL 15   CREATININE mg/dL 0.50   CALCIUM mg/dL 8.8   PROTEIN TOTAL g/dL 7.0   BILIRUBIN TOTAL mg/dL 1.9*   ALK PHOS U/L 88   ALT U/L 66*   AST U/L 158*   GLUCOSE mg/dL 126*       Scheduled Medications  Scheduled Medications[1]     Physical Exam  Constitutional: no acute distress, alert and cooperative  Neuro: A&Ox3, no focal deficits noted though slurring words  Eyes: EOMI, clear sclera  Respiratory/Thorax: CTAB, good chest expansion  Cardiovascular: Regular rate, regular rhythm  Gastrointestinal: mildly distended, soft, non-tender  Extremities: No cyanosis or edema. Peripheral pulses intact. Bilateral tremors noted.  Skin: Warm and dry    Pertinent Imaging    8/02/2025 CXR  - No acute cardiopulmonary disease.  No definite change is appreciated when compared to the prior chest  x-ray.     8/02/2025 CT  Head & Cervical spine wo IV contrast  1. No acute intracranial abnormality.   2. No acute fracture or traumatic subluxation of the cervical spine.     8/02/2025 US RUQ  1. 6 mm gallbladder polyp.  2. Enlarged liver with fatty infiltration.    ASSESSMENT & PLAN     Humble Banks is a 69 year old male with PMH significant for DLD, HTN, hypothyroidism, alcohol use disorder (with multiple admissions for alcohol withdrawal in last 4 months), suspected Wernicke's encephalopathy, multiple falls, RLS, CHINEDU, MDD who presented to [facility] for assessment of weakness in his lower extremities following fall. Admitted to the general medical service for evaluation and management of alcohol withdrawal.    Acute Conditions  #Acute alcohol intoxication alcohol level 347  #Acute alcohol withdrawal hand tremors  #Elevated AST & /84 to 158/66  8/2- Stop Phenobarbital.  - Maintenance IV Fluids RL @75 ml/Hr patient urine is concentrated and not drinking enough fluids  - IV Diazepam 10 mg PRN.  - Watch for signs of withdrawal.  - Monitor ALT/AST  - Discharge tomorrow if patient can do ADLs.    #Hypokalemia  #Hypomagnesemia  - Replenish K and Mg  - Monitor K/Mg levels AM labs    #Pancytopenia   - As evidenced by his blood work with WBC 3.8 to 5.1, Hb 12.4 to 11.4 & Plt 62 to 54, and macrocytic cells, this patient might have vitamin deficiencies vs bone marrow suppression secondary to chronic alcohol ingestion.  Plan:  - S. Vit B12 404, Folate 8.4: WNL  - Advise for alcohol abstinence   - CBC AM    Chronic Conditions  - MDD, CHINEDU: continue home escitalopram and bupropion  - Hypothyroidism: continue home levothyroxine  - RLS: continue home ropinirole    Check Lists  Code: Full code  DVT ppx: SCDs  Diet: Adult Regular Diet  IVF: RL @75ml/Hr    ( Patient wanted to go AMA. Earnest his POA was concerned regarding this so we contacted him via phone and explained the situation and tried to address his concerns. We have  also explained to Earnest that If Humble is determined on signing AMA, he is capable of making decision)    Tammie Lozada MD  PGY-1, Internal Medicine  Please SecureChat for any further questions  This is a preliminary note, please await attending attestation for final A/P                [1] buPROPion XL, 150 mg, oral, Daily  escitalopram, 10 mg, oral, Daily  folic acid, 1 mg, oral, Daily  levothyroxine, 75 mcg, oral, Daily  magnesium sulfate, 2 g, intravenous, Once  multivitamin with minerals, 1 tablet, oral, Daily  nicotine, 1 patch, transdermal, Daily   Followed by  [START ON 9/14/2025] nicotine, 1 patch, transdermal, Daily   Followed by  [START ON 9/28/2025] nicotine, 1 patch, transdermal, Daily  potassium chloride, 20 mEq, intravenous, q2h  psyllium, 1 packet, oral, Daily  rOPINIRole, 1 mg, oral, Nightly  [START ON 8/5/2025] thiamine, 100 mg, oral, Daily  thiamine, 500 mg, intravenous, q8h

## 2025-08-04 LAB
ALBUMIN SERPL BCP-MCNC: 4 G/DL (ref 3.4–5)
ALP SERPL-CCNC: 81 U/L (ref 33–136)
ALT SERPL W P-5'-P-CCNC: 61 U/L (ref 10–52)
ANION GAP SERPL CALC-SCNC: 19 MMOL/L (ref 10–20)
AST SERPL W P-5'-P-CCNC: 142 U/L (ref 9–39)
ATRIAL RATE: 89 BPM
BASOPHILS # BLD AUTO: 0.06 X10*3/UL (ref 0–0.1)
BASOPHILS NFR BLD AUTO: 0.9 %
BILIRUB SERPL-MCNC: 1.7 MG/DL (ref 0–1.2)
BUN SERPL-MCNC: 10 MG/DL (ref 6–23)
CALCIUM SERPL-MCNC: 9.2 MG/DL (ref 8.6–10.3)
CHLORIDE SERPL-SCNC: 96 MMOL/L (ref 98–107)
CO2 SERPL-SCNC: 25 MMOL/L (ref 21–32)
CREAT SERPL-MCNC: 0.46 MG/DL (ref 0.5–1.3)
EGFRCR SERPLBLD CKD-EPI 2021: >90 ML/MIN/1.73M*2
EOSINOPHIL # BLD AUTO: 0.08 X10*3/UL (ref 0–0.7)
EOSINOPHIL NFR BLD AUTO: 1.2 %
ERYTHROCYTE [DISTWIDTH] IN BLOOD BY AUTOMATED COUNT: 13.3 % (ref 11.5–14.5)
GLUCOSE SERPL-MCNC: 104 MG/DL (ref 74–99)
HCT VFR BLD AUTO: 32.9 % (ref 41–52)
HGB BLD-MCNC: 11.2 G/DL (ref 13.5–17.5)
IMM GRANULOCYTES # BLD AUTO: 0.06 X10*3/UL (ref 0–0.7)
IMM GRANULOCYTES NFR BLD AUTO: 0.9 % (ref 0–0.9)
LYMPHOCYTES # BLD AUTO: 1.18 X10*3/UL (ref 1.2–4.8)
LYMPHOCYTES NFR BLD AUTO: 17.5 %
MAGNESIUM SERPL-MCNC: 1.26 MG/DL (ref 1.6–2.4)
MCH RBC QN AUTO: 35.1 PG (ref 26–34)
MCHC RBC AUTO-ENTMCNC: 34 G/DL (ref 32–36)
MCV RBC AUTO: 103 FL (ref 80–100)
MONOCYTES # BLD AUTO: 0.96 X10*3/UL (ref 0.1–1)
MONOCYTES NFR BLD AUTO: 14.3 %
NEUTROPHILS # BLD AUTO: 4.39 X10*3/UL (ref 1.2–7.7)
NEUTROPHILS NFR BLD AUTO: 65.2 %
NRBC BLD-RTO: 0.3 /100 WBCS (ref 0–0)
P AXIS: -17 DEGREES
PLATELET # BLD AUTO: 54 X10*3/UL (ref 150–450)
POTASSIUM SERPL-SCNC: 3.2 MMOL/L (ref 3.5–5.3)
PR INTERVAL: 209 MS
PROT SERPL-MCNC: 6.6 G/DL (ref 6.4–8.2)
Q ONSET: 251 MS
QRS COUNT: 14 BEATS
QRS DURATION: 103 MS
QT INTERVAL: 371 MS
QTC CALCULATION(BAZETT): 454 MS
QTC FREDERICIA: 424 MS
R AXIS: -37 DEGREES
RBC # BLD AUTO: 3.19 X10*6/UL (ref 4.5–5.9)
SODIUM SERPL-SCNC: 137 MMOL/L (ref 136–145)
T AXIS: -1 DEGREES
T OFFSET: 436 MS
VENTRICULAR RATE: 90 BPM
WBC # BLD AUTO: 6.7 X10*3/UL (ref 4.4–11.3)

## 2025-08-04 PROCEDURE — 84075 ASSAY ALKALINE PHOSPHATASE: CPT

## 2025-08-04 PROCEDURE — 2060000001 HC INTERMEDIATE ICU ROOM DAILY

## 2025-08-04 PROCEDURE — 83735 ASSAY OF MAGNESIUM: CPT

## 2025-08-04 PROCEDURE — 85025 COMPLETE CBC W/AUTO DIFF WBC: CPT

## 2025-08-04 PROCEDURE — 36415 COLL VENOUS BLD VENIPUNCTURE: CPT

## 2025-08-04 PROCEDURE — 2500000002 HC RX 250 W HCPCS SELF ADMINISTERED DRUGS (ALT 637 FOR MEDICARE OP, ALT 636 FOR OP/ED)

## 2025-08-04 PROCEDURE — 2500000004 HC RX 250 GENERAL PHARMACY W/ HCPCS (ALT 636 FOR OP/ED)

## 2025-08-04 PROCEDURE — 2500000001 HC RX 250 WO HCPCS SELF ADMINISTERED DRUGS (ALT 637 FOR MEDICARE OP)

## 2025-08-04 PROCEDURE — S4991 NICOTINE PATCH NONLEGEND: HCPCS

## 2025-08-04 RX ORDER — SODIUM CHLORIDE, SODIUM LACTATE, POTASSIUM CHLORIDE, CALCIUM CHLORIDE 600; 310; 30; 20 MG/100ML; MG/100ML; MG/100ML; MG/100ML
100 INJECTION, SOLUTION INTRAVENOUS CONTINUOUS
Status: DISCONTINUED | OUTPATIENT
Start: 2025-08-04 | End: 2025-08-05

## 2025-08-04 RX ORDER — MAGNESIUM SULFATE HEPTAHYDRATE 40 MG/ML
4 INJECTION, SOLUTION INTRAVENOUS ONCE
Status: COMPLETED | OUTPATIENT
Start: 2025-08-04 | End: 2025-08-04

## 2025-08-04 RX ORDER — POTASSIUM CHLORIDE 14.9 MG/ML
20 INJECTION INTRAVENOUS
Status: COMPLETED | OUTPATIENT
Start: 2025-08-04 | End: 2025-08-04

## 2025-08-04 RX ORDER — LIDOCAINE 560 MG/1
1 PATCH PERCUTANEOUS; TOPICAL; TRANSDERMAL DAILY
Status: DISCONTINUED | OUTPATIENT
Start: 2025-08-04 | End: 2025-08-04

## 2025-08-04 RX ORDER — POTASSIUM CHLORIDE 1.5 G/1.58G
20 POWDER, FOR SOLUTION ORAL 2 TIMES DAILY
Status: DISCONTINUED | OUTPATIENT
Start: 2025-08-04 | End: 2025-08-05

## 2025-08-04 RX ADMIN — POTASSIUM CHLORIDE 20 MEQ: 14.9 INJECTION, SOLUTION INTRAVENOUS at 17:35

## 2025-08-04 RX ADMIN — BUPROPION HYDROCHLORIDE 150 MG: 150 TABLET, EXTENDED RELEASE ORAL at 09:46

## 2025-08-04 RX ADMIN — MAGNESIUM SULFATE HEPTAHYDRATE 4 G: 40 INJECTION, SOLUTION INTRAVENOUS at 09:47

## 2025-08-04 RX ADMIN — POTASSIUM CHLORIDE 20 MEQ: 1.5 POWDER, FOR SOLUTION ORAL at 20:07

## 2025-08-04 RX ADMIN — SODIUM CHLORIDE, SODIUM LACTATE, POTASSIUM CHLORIDE, AND CALCIUM CHLORIDE 100 ML/HR: .6; .31; .03; .02 INJECTION, SOLUTION INTRAVENOUS at 17:38

## 2025-08-04 RX ADMIN — ROPINIROLE HYDROCHLORIDE 1 MG: 1 TABLET, FILM COATED ORAL at 20:07

## 2025-08-04 RX ADMIN — DIAZEPAM 10 MG: 10 INJECTION, SOLUTION INTRAMUSCULAR; INTRAVENOUS at 00:39

## 2025-08-04 RX ADMIN — NICOTINE 1 PATCH: 21 PATCH, EXTENDED RELEASE TRANSDERMAL at 09:46

## 2025-08-04 RX ADMIN — THIAMINE HYDROCHLORIDE 500 MG: 100 INJECTION, SOLUTION INTRAMUSCULAR; INTRAVENOUS at 09:46

## 2025-08-04 RX ADMIN — DIAZEPAM 10 MG: 10 INJECTION, SOLUTION INTRAMUSCULAR; INTRAVENOUS at 06:05

## 2025-08-04 RX ADMIN — THIAMINE HYDROCHLORIDE 500 MG: 100 INJECTION, SOLUTION INTRAMUSCULAR; INTRAVENOUS at 00:39

## 2025-08-04 RX ADMIN — ESCITALOPRAM OXALATE 10 MG: 10 TABLET ORAL at 09:46

## 2025-08-04 RX ADMIN — SODIUM CHLORIDE, POTASSIUM CHLORIDE, SODIUM LACTATE AND CALCIUM CHLORIDE 75 ML/HR: 600; 310; 30; 20 INJECTION, SOLUTION INTRAVENOUS at 05:55

## 2025-08-04 RX ADMIN — FOLIC ACID 1 MG: 1 TABLET ORAL at 09:46

## 2025-08-04 RX ADMIN — POTASSIUM CHLORIDE 20 MEQ: 14.9 INJECTION, SOLUTION INTRAVENOUS at 14:17

## 2025-08-04 RX ADMIN — PSYLLIUM HUSK 1 PACKET: 3.4 POWDER ORAL at 09:46

## 2025-08-04 RX ADMIN — Medication 1 TABLET: at 09:46

## 2025-08-04 RX ADMIN — LEVOTHYROXINE SODIUM 75 MCG: 0.07 TABLET ORAL at 05:49

## 2025-08-04 RX ADMIN — SODIUM CHLORIDE, SODIUM LACTATE, POTASSIUM CHLORIDE, AND CALCIUM CHLORIDE 500 ML: .6; .31; .03; .02 INJECTION, SOLUTION INTRAVENOUS at 14:23

## 2025-08-04 ASSESSMENT — LIFESTYLE VARIABLES
HEADACHE, FULLNESS IN HEAD: NOT PRESENT
AGITATION: NORMAL ACTIVITY
ORIENTATION AND CLOUDING OF SENSORIUM: CANNOT DO SERIAL ADDITIONS OR IS UNCERTAIN ABOUT DATE
HEADACHE, FULLNESS IN HEAD: NOT PRESENT
HEADACHE, FULLNESS IN HEAD: NOT PRESENT
TOTAL SCORE: 2
VISUAL DISTURBANCES: NOT PRESENT
ANXIETY: 3
HEADACHE, FULLNESS IN HEAD: NOT PRESENT
AGITATION: SOMEWHAT MORE THAN NORMAL ACTIVITY
AUDITORY DISTURBANCES: NOT PRESENT
PAROXYSMAL SWEATS: NO SWEAT VISIBLE
NAUSEA AND VOMITING: NO NAUSEA AND NO VOMITING
TREMOR: NOT VISIBLE, BUT CAN BE FELT FINGERTIP TO FINGERTIP
NAUSEA AND VOMITING: NO NAUSEA AND NO VOMITING
PAROXYSMAL SWEATS: NO SWEAT VISIBLE
VISUAL DISTURBANCES: NOT PRESENT
AGITATION: NORMAL ACTIVITY
NAUSEA AND VOMITING: NO NAUSEA AND NO VOMITING
TOTAL SCORE: 6
NAUSEA AND VOMITING: NO NAUSEA AND NO VOMITING
ANXIETY: NO ANXIETY, AT EASE
AGITATION: NORMAL ACTIVITY
NAUSEA AND VOMITING: NO NAUSEA AND NO VOMITING
PAROXYSMAL SWEATS: NO SWEAT VISIBLE
AUDITORY DISTURBANCES: NOT PRESENT
NAUSEA AND VOMITING: NO NAUSEA AND NO VOMITING
ORIENTATION AND CLOUDING OF SENSORIUM: CANNOT DO SERIAL ADDITIONS OR IS UNCERTAIN ABOUT DATE
HEADACHE, FULLNESS IN HEAD: NOT PRESENT
ANXIETY: NO ANXIETY, AT EASE
ANXIETY: NO ANXIETY, AT EASE
AUDITORY DISTURBANCES: NOT PRESENT
ORIENTATION AND CLOUDING OF SENSORIUM: ORIENTED AND CAN DO SERIAL ADDITIONS
TREMOR: 2
ORIENTATION AND CLOUDING OF SENSORIUM: ORIENTED AND CAN DO SERIAL ADDITIONS
ANXIETY: 3
VISUAL DISTURBANCES: NOT PRESENT
AGITATION: NORMAL ACTIVITY
TOTAL SCORE: 3
ANXIETY: NO ANXIETY, AT EASE
VISUAL DISTURBANCES: NOT PRESENT
ORIENTATION AND CLOUDING OF SENSORIUM: ORIENTED AND CAN DO SERIAL ADDITIONS
NAUSEA AND VOMITING: NO NAUSEA AND NO VOMITING
AUDITORY DISTURBANCES: NOT PRESENT
PAROXYSMAL SWEATS: NO SWEAT VISIBLE
TREMOR: 5
PULSE: 82
HEADACHE, FULLNESS IN HEAD: NOT PRESENT
BLOOD PRESSURE: 129/91
TOTAL SCORE: 3
VISUAL DISTURBANCES: NOT PRESENT
TOTAL SCORE: 1
PAROXYSMAL SWEATS: NO SWEAT VISIBLE
ANXIETY: NO ANXIETY, AT EASE
PAROXYSMAL SWEATS: NO SWEAT VISIBLE
AGITATION: SOMEWHAT MORE THAN NORMAL ACTIVITY
TREMOR: 2
TREMOR: NOT VISIBLE, BUT CAN BE FELT FINGERTIP TO FINGERTIP
ORIENTATION AND CLOUDING OF SENSORIUM: ORIENTED AND CAN DO SERIAL ADDITIONS
PAROXYSMAL SWEATS: NO SWEAT VISIBLE
AUDITORY DISTURBANCES: NOT PRESENT
NAUSEA AND VOMITING: NO NAUSEA AND NO VOMITING
VISUAL DISTURBANCES: NOT PRESENT
TOTAL SCORE: 9
HEADACHE, FULLNESS IN HEAD: NOT PRESENT
HEADACHE, FULLNESS IN HEAD: NOT PRESENT
ORIENTATION AND CLOUDING OF SENSORIUM: CANNOT DO SERIAL ADDITIONS OR IS UNCERTAIN ABOUT DATE
AGITATION: SOMEWHAT MORE THAN NORMAL ACTIVITY
PAROXYSMAL SWEATS: NO SWEAT VISIBLE
PULSE: 87
ANXIETY: NO ANXIETY, AT EASE
TREMOR: 2
AGITATION: SOMEWHAT MORE THAN NORMAL ACTIVITY
TREMOR: MODERATE, WITH PATIENT'S ARMS EXTENDED
TREMOR: MODERATE, WITH PATIENT'S ARMS EXTENDED
VISUAL DISTURBANCES: NOT PRESENT
VISUAL DISTURBANCES: NOT PRESENT
TOTAL SCORE: 9
AUDITORY DISTURBANCES: NOT PRESENT

## 2025-08-04 ASSESSMENT — COGNITIVE AND FUNCTIONAL STATUS - GENERAL
WALKING IN HOSPITAL ROOM: A LITTLE
MOVING TO AND FROM BED TO CHAIR: A LITTLE
DRESSING REGULAR UPPER BODY CLOTHING: A LITTLE
STANDING UP FROM CHAIR USING ARMS: A LITTLE
STANDING UP FROM CHAIR USING ARMS: A LITTLE
DRESSING REGULAR LOWER BODY CLOTHING: A LITTLE
PERSONAL GROOMING: A LITTLE
MOBILITY SCORE: 18
TOILETING: A LITTLE
TURNING FROM BACK TO SIDE WHILE IN FLAT BAD: A LITTLE
TURNING FROM BACK TO SIDE WHILE IN FLAT BAD: A LITTLE
MOBILITY SCORE: 18
MOVING TO AND FROM BED TO CHAIR: A LITTLE
DRESSING REGULAR LOWER BODY CLOTHING: A LITTLE
EATING MEALS: A LITTLE
TOILETING: A LITTLE
WALKING IN HOSPITAL ROOM: A LITTLE
MOVING FROM LYING ON BACK TO SITTING ON SIDE OF FLAT BED WITH BEDRAILS: A LITTLE
HELP NEEDED FOR BATHING: A LITTLE
DAILY ACTIVITIY SCORE: 18
DRESSING REGULAR UPPER BODY CLOTHING: A LITTLE
CLIMB 3 TO 5 STEPS WITH RAILING: A LITTLE
EATING MEALS: A LITTLE
MOVING FROM LYING ON BACK TO SITTING ON SIDE OF FLAT BED WITH BEDRAILS: A LITTLE
DAILY ACTIVITIY SCORE: 18
PERSONAL GROOMING: A LITTLE
CLIMB 3 TO 5 STEPS WITH RAILING: A LITTLE
HELP NEEDED FOR BATHING: A LITTLE

## 2025-08-04 ASSESSMENT — PAIN - FUNCTIONAL ASSESSMENT: PAIN_FUNCTIONAL_ASSESSMENT: 0-10

## 2025-08-04 ASSESSMENT — PAIN SCALES - GENERAL
PAINLEVEL_OUTOF10: 0 - NO PAIN
PAINLEVEL_OUTOF10: 0 - NO PAIN

## 2025-08-04 NOTE — PROGRESS NOTES
SW attempted to meet with pt at bedside two times today to complete DC planning assessment and offer ETOH cessation resources. Pt was asleep both times and was unable to wake up for SW. Pt on CIWA protocol.  SW will attempt again at a later time. SW will continue to provide support and services as needed.

## 2025-08-04 NOTE — PROGRESS NOTES
Humble Banks is a 69 y.o. male on day 3 of admission presenting with Generalized weakness.      SUBJECTIVE     Patient evaluated this morning and found to be resting comfortably in bed. He is feeling better now however, his hands are trembling which might be his baseline. He slept for 4-5 hours last night and wants to sleep now to compensate his remaining hours. He is concerned about not allowed to walk by himself and is asking to arrange visit with PT.    OBJECTIVE     Vitals:    08/04/25 0742 08/04/25 1146 08/04/25 1315 08/04/25 1503   BP: (!) 143/98 80/52 135/78 140/90   BP Location: Right arm Left arm Left arm    Patient Position: Lying Lying Lying    Pulse: 69 90 75    Resp: 18 18 18    Temp: 36 °C (96.8 °F) 35.8 °C (96.4 °F) 36 °C (96.8 °F) 36.2 °C (97.2 °F)   TempSrc:       SpO2: 96% 96% 95%    Weight:          Results from last 7 days   Lab Units 08/04/25  0551   WBC AUTO x10*3/uL 6.7   HEMOGLOBIN g/dL 11.2*   HEMATOCRIT % 32.9*   PLATELETS AUTO x10*3/uL 54*   NEUTROS PCT AUTO % 65.2   LYMPHS PCT AUTO % 17.5   MONOS PCT AUTO % 14.3   EOS PCT AUTO % 1.2     Results from last 7 days   Lab Units 08/04/25  0551   SODIUM mmol/L 137   POTASSIUM mmol/L 3.2*   CHLORIDE mmol/L 96*   CO2 mmol/L 25   BUN mg/dL 10   CREATININE mg/dL 0.46*   CALCIUM mg/dL 9.2   PROTEIN TOTAL g/dL 6.6   BILIRUBIN TOTAL mg/dL 1.7*   ALK PHOS U/L 81   ALT U/L 61*   AST U/L 142*   GLUCOSE mg/dL 104*       Scheduled Medications  Scheduled Medications[1]     Physical Exam  Constitutional: no acute distress, alert and cooperative  Neuro: A&Ox3, no focal deficits noted though slurring words  Eyes: EOMI, clear sclera  Respiratory/Thorax: CTAB, good chest expansion  Cardiovascular: Regular rate, regular rhythm  Gastrointestinal: mildly distended, soft, non-tender  Extremities: No cyanosis or edema. Peripheral pulses intact. Bilateral tremors noted.  Skin: Warm and dry    Pertinent Imaging    8/02/2025 CXR  - No acute cardiopulmonary disease.   No definite change is appreciated when compared to the prior chest x-ray.     8/02/2025 CT  Head & Cervical spine wo IV contrast  1. No acute intracranial abnormality.   2. No acute fracture or traumatic subluxation of the cervical spine.     8/02/2025 US RUQ  1. 6 mm gallbladder polyp.  2. Enlarged liver with fatty infiltration.    ASSESSMENT & PLAN     Humble Banks is a 69 year old male with PMH significant for DLD, HTN, hypothyroidism, alcohol use disorder (with multiple admissions for alcohol withdrawal in last 4 months), suspected Wernicke's encephalopathy, multiple falls, RLS, CHINEDU, MDD who presented to [facility] for assessment of weakness in his lower extremities following fall. Admitted to the general medical service for evaluation and management of alcohol withdrawal.    Acute Conditions  #Acute alcohol intoxication alcohol level 347  #Acute alcohol withdrawal hand tremors  #Elevated AST & /84 to 142/62  8/2- Stop Phenobarbital.  - Maintenance IV Fluids RL @100 ml/Hr , This afternoon his BP dropped to 80/52 mmHg. He denies dizziness, sweating and was lying on bed. Bolus  ml given and maintenance increased to 100ml/Hr.  - IV Diazepam 10 mg PRN. Based upon CIWA score >7 or HR>120, he was given diazepam 4 times over night with last one at 0605AM. Recs for considering hand tremors at his baseline to nursing staff provided to prevent over scoring.  - Monitor ALT/AST  - Discharge tomorrow if patient can do ADLs.    #Hypokalemia  #Hypomagnesemia  - Replenish with IV K and Mg, oral pot chloride  - Monitor K/Mg levels AM labs    #Pancytopenia   - As evidenced by his blood work with WBC 3.8 to 6.7, Hb 12.4 to 11.2 & Plt 62 to 54, and macrocytic cells, this patient might have vitamin deficiencies vs bone marrow suppression secondary to chronic alcohol ingestion.  Plan:  - S. Vit B12 404, Folate 8.4: WNL  - Advise for alcohol abstinence   - CBC AM    Chronic Conditions  - MDD, CHINEDU: continue home escitalopram  and bupropion  - Hypothyroidism: continue home levothyroxine  - RLS: continue home ropinirole    Check Lists  Code: Full code  DVT ppx: SCDs  Diet: Adult Regular Diet  IVF: RL @100ml/Hr    Tammie Lozada MD  PGY-1, Internal Medicine  Please SecureChat for any further questions  This is a preliminary note, please await attending attestation for final A/P                  [1] buPROPion XL, 150 mg, oral, Daily  escitalopram, 10 mg, oral, Daily  folic acid, 1 mg, oral, Daily  lactated Ringer's, 500 mL, intravenous, Once  levothyroxine, 75 mcg, oral, Daily  multivitamin with minerals, 1 tablet, oral, Daily  nicotine, 1 patch, transdermal, Daily   Followed by  [START ON 9/14/2025] nicotine, 1 patch, transdermal, Daily   Followed by  [START ON 9/28/2025] nicotine, 1 patch, transdermal, Daily  potassium chloride, 20 mEq, oral, BID  potassium chloride, 20 mEq, intravenous, q2h  psyllium, 1 packet, oral, Daily  rOPINIRole, 1 mg, oral, Nightly  [START ON 8/5/2025] thiamine, 100 mg, oral, Daily  thiamine, 500 mg, intravenous, q8h

## 2025-08-04 NOTE — CARE PLAN
Problem: Safety - Adult  Goal: Free from fall injury  Outcome: Progressing     Problem: Skin  Goal: Decreased wound size/increased tissue granulation at next dressing change  Outcome: Progressing  Flowsheets (Taken 8/4/2025 0002)  Decreased wound size/increased tissue granulation at next dressing change: Protective dressings over bony prominences   The patient's goals for the shift include      The clinical goals for the shift include Pt safety will be pankaj kaufman,[id number] /,.lynne,[id number]    Over the shift, the patient did make progress toward the following goals.

## 2025-08-05 LAB
ALBUMIN SERPL BCP-MCNC: 3.3 G/DL (ref 3.4–5)
ALP SERPL-CCNC: 66 U/L (ref 33–136)
ALT SERPL W P-5'-P-CCNC: 45 U/L (ref 10–52)
ANION GAP SERPL CALC-SCNC: 16 MMOL/L (ref 10–20)
APPEARANCE UR: CLEAR
AST SERPL W P-5'-P-CCNC: 74 U/L (ref 9–39)
BASOPHILS # BLD AUTO: 0.03 X10*3/UL (ref 0–0.1)
BASOPHILS NFR BLD AUTO: 0.4 %
BILIRUB SERPL-MCNC: 1.2 MG/DL (ref 0–1.2)
BILIRUB UR STRIP.AUTO-MCNC: ABNORMAL MG/DL
BUN SERPL-MCNC: 10 MG/DL (ref 6–23)
CALCIUM SERPL-MCNC: 8.7 MG/DL (ref 8.6–10.3)
CAOX CRY #/AREA UR COMP ASSIST: ABNORMAL /HPF
CHLORIDE SERPL-SCNC: 100 MMOL/L (ref 98–107)
CO2 SERPL-SCNC: 25 MMOL/L (ref 21–32)
COLOR UR: YELLOW
CREAT SERPL-MCNC: 0.38 MG/DL (ref 0.5–1.3)
EGFRCR SERPLBLD CKD-EPI 2021: >90 ML/MIN/1.73M*2
EOSINOPHIL # BLD AUTO: 0.19 X10*3/UL (ref 0–0.7)
EOSINOPHIL NFR BLD AUTO: 2.8 %
ERYTHROCYTE [DISTWIDTH] IN BLOOD BY AUTOMATED COUNT: 13.8 % (ref 11.5–14.5)
GLUCOSE SERPL-MCNC: 88 MG/DL (ref 74–99)
GLUCOSE UR STRIP.AUTO-MCNC: NORMAL MG/DL
HCT VFR BLD AUTO: 31.6 % (ref 41–52)
HGB BLD-MCNC: 10.9 G/DL (ref 13.5–17.5)
HYALINE CASTS #/AREA URNS AUTO: ABNORMAL /LPF
IMM GRANULOCYTES # BLD AUTO: 0.05 X10*3/UL (ref 0–0.7)
IMM GRANULOCYTES NFR BLD AUTO: 0.7 % (ref 0–0.9)
KETONES UR STRIP.AUTO-MCNC: ABNORMAL MG/DL
LEUKOCYTE ESTERASE UR QL STRIP.AUTO: NEGATIVE
LYMPHOCYTES # BLD AUTO: 1.6 X10*3/UL (ref 1.2–4.8)
LYMPHOCYTES NFR BLD AUTO: 23.4 %
MAGNESIUM SERPL-MCNC: 1.83 MG/DL (ref 1.6–2.4)
MCH RBC QN AUTO: 35.4 PG (ref 26–34)
MCHC RBC AUTO-ENTMCNC: 34.5 G/DL (ref 32–36)
MCV RBC AUTO: 103 FL (ref 80–100)
MONOCYTES # BLD AUTO: 0.94 X10*3/UL (ref 0.1–1)
MONOCYTES NFR BLD AUTO: 13.7 %
MUCOUS THREADS #/AREA URNS AUTO: ABNORMAL /LPF
NEUTROPHILS # BLD AUTO: 4.04 X10*3/UL (ref 1.2–7.7)
NEUTROPHILS NFR BLD AUTO: 59 %
NITRITE UR QL STRIP.AUTO: NEGATIVE
NRBC BLD-RTO: 0.3 /100 WBCS (ref 0–0)
PH UR STRIP.AUTO: 7 [PH]
PLATELET # BLD AUTO: 54 X10*3/UL (ref 150–450)
POTASSIUM SERPL-SCNC: 3.3 MMOL/L (ref 3.5–5.3)
PROT SERPL-MCNC: 5.7 G/DL (ref 6.4–8.2)
PROT UR STRIP.AUTO-MCNC: ABNORMAL MG/DL
RBC # BLD AUTO: 3.08 X10*6/UL (ref 4.5–5.9)
RBC # UR STRIP.AUTO: NEGATIVE MG/DL
RBC #/AREA URNS AUTO: ABNORMAL /HPF
SODIUM SERPL-SCNC: 138 MMOL/L (ref 136–145)
SP GR UR STRIP.AUTO: 1.02
UROBILINOGEN UR STRIP.AUTO-MCNC: ABNORMAL MG/DL
WBC # BLD AUTO: 6.9 X10*3/UL (ref 4.4–11.3)
WBC #/AREA URNS AUTO: ABNORMAL /HPF

## 2025-08-05 PROCEDURE — 36415 COLL VENOUS BLD VENIPUNCTURE: CPT

## 2025-08-05 PROCEDURE — 2500000004 HC RX 250 GENERAL PHARMACY W/ HCPCS (ALT 636 FOR OP/ED)

## 2025-08-05 PROCEDURE — 97535 SELF CARE MNGMENT TRAINING: CPT | Mod: GP,CQ

## 2025-08-05 PROCEDURE — 2500000002 HC RX 250 W HCPCS SELF ADMINISTERED DRUGS (ALT 637 FOR MEDICARE OP, ALT 636 FOR OP/ED)

## 2025-08-05 PROCEDURE — 83735 ASSAY OF MAGNESIUM: CPT

## 2025-08-05 PROCEDURE — 2500000001 HC RX 250 WO HCPCS SELF ADMINISTERED DRUGS (ALT 637 FOR MEDICARE OP)

## 2025-08-05 PROCEDURE — 85025 COMPLETE CBC W/AUTO DIFF WBC: CPT

## 2025-08-05 PROCEDURE — S4991 NICOTINE PATCH NONLEGEND: HCPCS

## 2025-08-05 PROCEDURE — 97535 SELF CARE MNGMENT TRAINING: CPT | Mod: GO,CO

## 2025-08-05 PROCEDURE — 81003 URINALYSIS AUTO W/O SCOPE: CPT

## 2025-08-05 PROCEDURE — 84075 ASSAY ALKALINE PHOSPHATASE: CPT

## 2025-08-05 PROCEDURE — 2060000001 HC INTERMEDIATE ICU ROOM DAILY

## 2025-08-05 RX ORDER — POTASSIUM CHLORIDE 1.5 G/1.58G
20 POWDER, FOR SOLUTION ORAL ONCE
Status: COMPLETED | OUTPATIENT
Start: 2025-08-05 | End: 2025-08-05

## 2025-08-05 RX ORDER — SODIUM CHLORIDE, SODIUM LACTATE, POTASSIUM CHLORIDE, CALCIUM CHLORIDE 600; 310; 30; 20 MG/100ML; MG/100ML; MG/100ML; MG/100ML
75 INJECTION, SOLUTION INTRAVENOUS CONTINUOUS
Status: DISCONTINUED | OUTPATIENT
Start: 2025-08-05 | End: 2025-08-06

## 2025-08-05 RX ORDER — POTASSIUM CHLORIDE 14.9 MG/ML
20 INJECTION INTRAVENOUS
Status: DISCONTINUED | OUTPATIENT
Start: 2025-08-05 | End: 2025-08-05

## 2025-08-05 RX ORDER — POTASSIUM CHLORIDE 1.5 G/1.58G
20 POWDER, FOR SOLUTION ORAL 2 TIMES DAILY
Status: COMPLETED | OUTPATIENT
Start: 2025-08-05 | End: 2025-08-06

## 2025-08-05 RX ADMIN — POTASSIUM CHLORIDE 20 MEQ: 1.5 POWDER, FOR SOLUTION ORAL at 20:49

## 2025-08-05 RX ADMIN — BUPROPION HYDROCHLORIDE 150 MG: 150 TABLET, EXTENDED RELEASE ORAL at 09:58

## 2025-08-05 RX ADMIN — POTASSIUM CHLORIDE 20 MEQ: 1.5 POWDER, FOR SOLUTION ORAL at 18:10

## 2025-08-05 RX ADMIN — LEVOTHYROXINE SODIUM 75 MCG: 0.07 TABLET ORAL at 06:33

## 2025-08-05 RX ADMIN — THIAMINE HCL TAB 100 MG 100 MG: 100 TAB at 09:58

## 2025-08-05 RX ADMIN — ESCITALOPRAM OXALATE 10 MG: 10 TABLET ORAL at 09:58

## 2025-08-05 RX ADMIN — PSYLLIUM HUSK 1 PACKET: 3.4 POWDER ORAL at 09:58

## 2025-08-05 RX ADMIN — POTASSIUM CHLORIDE 20 MEQ: 1.5 POWDER, FOR SOLUTION ORAL at 09:58

## 2025-08-05 RX ADMIN — SODIUM CHLORIDE, SODIUM LACTATE, POTASSIUM CHLORIDE, AND CALCIUM CHLORIDE 100 ML/HR: .6; .31; .03; .02 INJECTION, SOLUTION INTRAVENOUS at 03:13

## 2025-08-05 RX ADMIN — NICOTINE 1 PATCH: 21 PATCH, EXTENDED RELEASE TRANSDERMAL at 09:58

## 2025-08-05 RX ADMIN — FOLIC ACID 1 MG: 1 TABLET ORAL at 09:58

## 2025-08-05 RX ADMIN — Medication 1 TABLET: at 09:58

## 2025-08-05 RX ADMIN — SODIUM CHLORIDE, SODIUM LACTATE, POTASSIUM CHLORIDE, AND CALCIUM CHLORIDE 75 ML/HR: .6; .31; .03; .02 INJECTION, SOLUTION INTRAVENOUS at 18:08

## 2025-08-05 RX ADMIN — ROPINIROLE HYDROCHLORIDE 1 MG: 1 TABLET, FILM COATED ORAL at 20:49

## 2025-08-05 ASSESSMENT — COGNITIVE AND FUNCTIONAL STATUS - GENERAL
DRESSING REGULAR UPPER BODY CLOTHING: A LITTLE
PERSONAL GROOMING: A LITTLE
MOVING FROM LYING ON BACK TO SITTING ON SIDE OF FLAT BED WITH BEDRAILS: A LITTLE
DRESSING REGULAR UPPER BODY CLOTHING: A LITTLE
STANDING UP FROM CHAIR USING ARMS: A LOT
CLIMB 3 TO 5 STEPS WITH RAILING: A LITTLE
MOVING FROM LYING ON BACK TO SITTING ON SIDE OF FLAT BED WITH BEDRAILS: A LITTLE
HELP NEEDED FOR BATHING: A LITTLE
CLIMB 3 TO 5 STEPS WITH RAILING: TOTAL
DAILY ACTIVITIY SCORE: 18
MOVING TO AND FROM BED TO CHAIR: A LITTLE
WALKING IN HOSPITAL ROOM: A LITTLE
DAILY ACTIVITIY SCORE: 18
CLIMB 3 TO 5 STEPS WITH RAILING: A LITTLE
MOBILITY SCORE: 18
HELP NEEDED FOR BATHING: A LITTLE
HELP NEEDED FOR BATHING: A LOT
EATING MEALS: A LITTLE
MOBILITY SCORE: 17
MOVING FROM LYING ON BACK TO SITTING ON SIDE OF FLAT BED WITH BEDRAILS: A LITTLE
DRESSING REGULAR LOWER BODY CLOTHING: A LITTLE
DRESSING REGULAR UPPER BODY CLOTHING: A LITTLE
MOVING TO AND FROM BED TO CHAIR: A LOT
EATING MEALS: A LITTLE
TURNING FROM BACK TO SIDE WHILE IN FLAT BAD: A LITTLE
STANDING UP FROM CHAIR USING ARMS: A LITTLE
MOVING TO AND FROM BED TO CHAIR: A LITTLE
STANDING UP FROM CHAIR USING ARMS: A LITTLE
TURNING FROM BACK TO SIDE WHILE IN FLAT BAD: A LOT
PERSONAL GROOMING: A LITTLE
TOILETING: A LITTLE
TOILETING: A LOT
PERSONAL GROOMING: A LITTLE
EATING MEALS: A LITTLE
WALKING IN HOSPITAL ROOM: A LITTLE
WALKING IN HOSPITAL ROOM: A LOT
MOBILITY SCORE: 13
DRESSING REGULAR LOWER BODY CLOTHING: A LITTLE
DRESSING REGULAR LOWER BODY CLOTHING: A LITTLE
TOILETING: A LITTLE
TURNING FROM BACK TO SIDE WHILE IN FLAT BAD: A LITTLE

## 2025-08-05 ASSESSMENT — LIFESTYLE VARIABLES
TREMOR: NOT VISIBLE, BUT CAN BE FELT FINGERTIP TO FINGERTIP
TOTAL SCORE: 1
HEADACHE, FULLNESS IN HEAD: NOT PRESENT
AUDITORY DISTURBANCES: NOT PRESENT
ORIENTATION AND CLOUDING OF SENSORIUM: ORIENTED AND CAN DO SERIAL ADDITIONS
ANXIETY: NO ANXIETY, AT EASE
VISUAL DISTURBANCES: NOT PRESENT
NAUSEA AND VOMITING: NO NAUSEA AND NO VOMITING
AGITATION: NORMAL ACTIVITY
PAROXYSMAL SWEATS: NO SWEAT VISIBLE

## 2025-08-05 ASSESSMENT — PAIN SCALES - GENERAL
PAINLEVEL_OUTOF10: 0 - NO PAIN

## 2025-08-05 ASSESSMENT — ACTIVITIES OF DAILY LIVING (ADL): HOME_MANAGEMENT_TIME_ENTRY: 17

## 2025-08-05 ASSESSMENT — PAIN - FUNCTIONAL ASSESSMENT
PAIN_FUNCTIONAL_ASSESSMENT: 0-10
PAIN_FUNCTIONAL_ASSESSMENT: 0-10

## 2025-08-05 NOTE — CARE PLAN
The patient's goals for the shift include      The clinical goals for the shift include patient will maintain safey and refrain from further coplications    Over the shift, the patient will remain hemodynamically stable

## 2025-08-05 NOTE — PROGRESS NOTES
08/05/25 1249   Discharge Planning   Home or Post Acute Services Post acute facilities (Rehab/SNF/etc)   Type of Post Acute Facility Services Skilled nursing   Expected Discharge Disposition SNF   Does the patient need discharge transport arranged? Yes   [facility] coordination needed? Yes   Has discharge transport been arranged? No     [facility] not able to [facility]. Patient updated at bedside. Patient has SNF choice list, patient to review list for additional preferences.     Addendum 1521: Met with patient at bedside to follow up on discharge plan. Patient is reviewing list for preferences.

## 2025-08-05 NOTE — PROGRESS NOTES
Humble Banks is a 69 y.o. male on day 4 of admission presenting with Generalized weakness.      SUBJECTIVE     Patient evaluated this morning and found to be resting comfortably in bed. He is A&Ox3 and feeling much better now however, his hands are still trembling which might be his baseline. He slept for 6 hours last night.     OBJECTIVE     Vitals:    08/05/25 0300 08/05/25 0741 08/05/25 1120 08/05/25 1516   BP: 151/78 (!) 154/91 (!) 131/91 (!) 134/100   BP Location:  Left arm     Patient Position:  Lying     Pulse: 71 64 95 96   Resp: 16 18     Temp: 36.2 °C (97.2 °F) 36 °C (96.8 °F) 35.6 °C (96.1 °F) 36 °C (96.8 °F)   TempSrc:  Temporal     SpO2: 95% 97% 93% 95%   Weight:          Results from last 7 days   Lab Units 08/05/25  0549   WBC AUTO x10*3/uL 6.9   HEMOGLOBIN g/dL 10.9*   HEMATOCRIT % 31.6*   PLATELETS AUTO x10*3/uL 54*   NEUTROS PCT AUTO % 59.0   LYMPHS PCT AUTO % 23.4   MONOS PCT AUTO % 13.7   EOS PCT AUTO % 2.8     Results from last 7 days   Lab Units 08/05/25  0549   SODIUM mmol/L 138   POTASSIUM mmol/L 3.3*   CHLORIDE mmol/L 100   CO2 mmol/L 25   BUN mg/dL 10   CREATININE mg/dL 0.38*   CALCIUM mg/dL 8.7   PROTEIN TOTAL g/dL 5.7*   BILIRUBIN TOTAL mg/dL 1.2   ALK PHOS U/L 66   ALT U/L 45   AST U/L 74*   GLUCOSE mg/dL 88       Scheduled Medications  Scheduled Medications[1]     Physical Exam  Constitutional: no acute distress, alert and cooperative  Neuro: A&Ox3, no focal deficits   Eyes: EOMI, clear sclera  Respiratory/Thorax: CTAB, good chest expansion  Cardiovascular: Regular rate, regular rhythm  Gastrointestinal: mildly distended, tympanic note, soft, non-tender, BS +  Extremities: No cyanosis or edema. Peripheral pulses intact. Bilateral tremors noted.  Skin: Warm and dry    Pertinent Imaging    8/02/2025 CXR  - No acute cardiopulmonary disease.  No definite change is appreciated when compared to the prior chest x-ray.     8/02/2025 CT  Head & Cervical spine wo IV contrast  1. No acute  intracranial abnormality.   2. No acute fracture or traumatic subluxation of the cervical spine.     8/02/2025 US RUQ  1. 6 mm gallbladder polyp.  2. Enlarged liver with fatty infiltration.    ASSESSMENT & PLAN     Humble Banks is a 69 year old male with PMH significant for DLD, HTN, hypothyroidism, alcohol use disorder (with multiple admissions for alcohol withdrawal in last 4 months), suspected Wernicke's encephalopathy, multiple falls, RLS, CHINEDU, MDD who presented to [facility] for assessment of weakness in his lower extremities following fall. Admitted to the general medical service for evaluation and management of alcohol withdrawal.    Acute Conditions  #Acute alcohol intoxication  resolved alcohol level 347  #Acute alcohol withdrawal hand tremors  #Elevated AST & /84 to 74/45  8/2- Stop Phenobarbital.  - Maintenance IV Fluids RL @75 ml/Hr.  - IV Diazepam 10 mg PRN. Based upon CIWA score >7 or HR>120, he has not received a single dose since last 24 hours till this AM.  - Monitor ALT/AST  - Monitor BP  - Discharge tomorrow if patient is able to get acceptance from SNF.    #Hypokalemia  #Hypomagnesemia resolved  - Replenish K with oral pot chloride  - Monitor K/Mg levels AM labs    #Pancytopenia   - As evidenced by his blood work with WBC 3.8 to 6.9, Hb 12.4 to 10.9 & Plt 62 to 54, and macrocytic cells, this patient might have vitamin deficiencies vs bone marrow suppression secondary to chronic alcohol ingestion.  Plan:  - S. Vit B12 404, Folate 8.4: WNL  - Advise for alcohol abstinence   - CBC AM    Chronic Conditions  - MDD, CHINEDU: continue home escitalopram and bupropion  - Hypothyroidism: continue home levothyroxine  - RLS: continue home ropinirole    Check Lists  Code: Full code  DVT ppx: SCDs  Diet: Adult Regular Diet  IVF: RL @75ml/Hr    Tammie Lozada MD  PGY-1, Internal Medicine  Please SecureChat for any further questions  This is a preliminary note, please await attending attestation for final  A/P                    [1] buPROPion XL, 150 mg, oral, Daily  escitalopram, 10 mg, oral, Daily  folic acid, 1 mg, oral, Daily  levothyroxine, 75 mcg, oral, Daily  multivitamin with minerals, 1 tablet, oral, Daily  nicotine, 1 patch, transdermal, Daily   Followed by  [START ON 9/14/2025] nicotine, 1 patch, transdermal, Daily   Followed by  [START ON 9/28/2025] nicotine, 1 patch, transdermal, Daily  potassium chloride, 20 mEq, oral, BID  potassium chloride, 20 mEq, oral, Once  psyllium, 1 packet, oral, Daily  rOPINIRole, 1 mg, oral, Nightly  thiamine, 100 mg, oral, Daily

## 2025-08-05 NOTE — PROGRESS NOTES
Physical Therapy    Physical Therapy    Physical Therapy Treatment    Patient Name: Humble Banks  MRN: [id number]  Today's Date: 8/5/2025  Time Calculation  Start Time: 1100  Stop Time: 1132  Time Calculation (min): 32 min     824/824-A    Assessment/Plan   PT Assessment  PT Assessment Results: Decreased strength, Impaired balance, Decreased mobility  Rehab Prognosis: Fair  Barriers to Discharge Home: Physical needs  Physical Needs: Stair navigation into home limited by function/safety  Evaluation/Treatment Tolerance: Patient tolerated treatment well  Medical Staff Made Aware: Yes  Strengths: Attitude of self  Barriers to Participation: Comorbidities  End of Session Communication: PCT/NA/CTA  Assessment Comment: pt tolerated session. pt required assist during functional mobility to maintain safety. pt presented with decreased functional mobility, strength, and balance. pt would benefit from mod int therapy in order to return to PLOF.  End of Session Patient Position: Up in chair, Alarm on     PT Plan  Treatment/Interventions: Bed mobility, Transfer training, Gait training, Balance training, Strengthening, Therapeutic exercise, Therapeutic activity  PT Plan: Ongoing PT  PT Frequency: 3 times per week (during this acute inpatient hospitalization.)  PT Discharge Recommendations: Moderate intensity level of continued care (Based on current functional status and rehab potential, patient is anticipated to tolerate and benefit from 5 or more days per week of skilled rehabilitative therapy after discharge from this acute inpatient hospitalization.)  PT Recommended Transfer Status: Assist x1  PT - OK to Discharge: Yes (once medically cleared)      General Visit Information:   PT  Visit  PT Received On: 08/05/25  General  Co-Treatment: OT  Co-Treatment Reason: to maximize pt safety and mobility  Prior to Session Communication: Bedside nurse  Patient Position Received: Bed, 2 rail up, Alarm on  General Comment: pt pleasant  and agreeable to participate in therapy  Subjective     Precautions:  Precautions  Medical Precautions: Fall precautions  Precautions Comment: tele; piv; external catheter removed (nursing aware); ciwa; oob with assist    Vital Signs:  Vital Signs  Vital Signs Comment: VSS.  SpO2 97%  Objective     Pain:  Pain Assessment  Pain Assessment: 0-10  0-10 (Numeric) Pain Score: 0 - No pain    Cognition:  Cognition  Overall Cognitive Status: Within Functional Limits  Orientation Level: Oriented X4 (increased processing time needed)    Treatments:  Therapeutic Exercise  Therapeutic Exercise Performed:  (seated BLE AP, LAQ, and marching x15 ea.)     Bed Mobility  Bed Mobility:  (sup > sit with CGA; bedrail used to assist.  increased time needed to scoot fully to eob.)    Transfers  Transfer:  (sit <> stand x5 trials with FWW and largely mod A x 2 however able to progress to min A x 2 on last trial.  cuing for safe hand placement and sequencing throughout.)    Ambulation/Gait Training  Ambulation/Gait Training Performed:  (pt ambulates 3 ft bed > chair, ~10 ft in small loop, and lastly 2 ft each chair <> BSC; seated rest breaks btwn activities.  FWW and min A x 2 with cuing for sequencing and safe walker management/positioning.  pt also completes several trials static stance of 30-90 seconds each, FWW and min A for support/balance.)        Outcome Measures:  [facility] Basic Mobility  Turning from your back to your side while in a flat bed without using bedrails: A little  Moving from lying on your back to sitting on the side of a flat bed without using bedrails: A little  Moving to and from bed to chair (including a wheelchair): A lot  Standing up from a chair using your arms (e.g. wheelchair or bedside chair): A lot  To walk in hospital room: A lot  Climbing 3-5 steps with railing: Total  Basic Mobility - Total Score: 13        Education Documentation  Precautions, taught by Nirali Alonso PTA at 8/5/2025 11:47 AM.  Learner:  Patient  Readiness: Acceptance  Method: Explanation  Response: Verbalizes Understanding, Needs Reinforcement    Mobility Training, taught by Nirali Alonso PTA at 8/5/2025 11:47 AM.  Learner: Patient  Readiness: Acceptance  Method: Explanation  Response: Verbalizes Understanding, Needs Reinforcement        EDUCATION:     Encounter Problems       Encounter Problems (Active)       PT Problem       STG - Pt will perform a B LE ther ex program of 2-3 sets of 10  (Progressing)       Start:  08/03/25    Expected End:  08/17/25            STG - Pt will transition supine <> sitting with Rajat (Progressing)       Start:  08/03/25    Expected End:  08/17/25            STG - Pt will transfer STS with minAx1 (Progressing)       Start:  08/03/25    Expected End:  08/17/25            STG - Pt will amb 50' using w/w with minAx1  (Progressing)       Start:  08/03/25    Expected End:  08/17/25

## 2025-08-05 NOTE — CARE PLAN
The patient's goals for the shift include  rest    The clinical goals for the shift include Remain HDS      Problem: Skin  Goal: Prevent/manage excess moisture  Outcome: Progressing  Flowsheets (Taken 8/4/2025 2148)  Prevent/manage excess moisture:   Moisturize dry skin   Cleanse incontinence/protect with barrier cream  Goal: Prevent/minimize sheer/friction injuries  Outcome: Progressing  Flowsheets (Taken 8/4/2025 2148)  Prevent/minimize sheer/friction injuries:   Complete micro-shifts as needed if patient unable. Adjust patient position to relieve pressure points, not a full turn   Use pull sheet   HOB 30 degrees or less   Turn/reposition every 2 hours/use positioning/transfer devices

## 2025-08-05 NOTE — PROGRESS NOTES
08/05/25 1101   Discharge Planning   Living Arrangements Friends   Support Systems Friends/neighbors   Assistance Needed Pt uses a walker   Type of Residence Private residence   Number of Stairs to Enter Residence 3   Number of Stairs Within Residence 3   Home or Post Acute Services In home services   Type of Home Care Services Home PT;Home OT   Expected Discharge Disposition SNF   Financial Resource Strain   How hard is it for you to pay for the very basics like food, housing, medical care, and heating? Not very   Patient Choice   Provider Choice list and CMS website (https://medicare.gov/care-compare#search) for post-acute Quality and Resource Measure Data were provided and reviewed with: Patient   Patient / Family choosing to utilize agency / facility established prior to hospitalization No   Stroke Family Assessment   Stroke Family Assessment Needed No   Intensity of Service   Intensity of Service 0-30 min     SW met with pt at bedside to complete DC planning assessment and offer ETOH cessation resources. TWIN introduced self and explained role. Verified name, demographics, PCP. Pt resides in a ranch style home with 3 stairs to enter and 3 stairs inside. Pt resides with his friend and friend's girlfriend. Pt uses a walker to ambulate and receives PT/OT in the home weekly. Pt is independent with ADL's. [facility] 13 ad 14. TWIN provided pt with SNF list. Pt first choice is [facility] [place]. Pt on [facility] protocol. Pt accepted ETOH resources and willing to speak with [facility]. TWIN placed referral with Kaitlin from [facility]. TWIN will continue to provide support and services as needed.     8/6/2025 11:00AM TWIN followed up with Kaitlin from [facility]. She states she spoke to the pt and he was open to speaking with her but was not interested in ETOH treatment.

## 2025-08-05 NOTE — PROGRESS NOTES
Occupational Therapy    OT Treatment    Patient Name: Humble Banks  MRN: [id number]  Department: [facility]  Room: [facility]82-  Today's Date: 8/5/2025  Time Calculation  Start Time: 1100  Stop Time: 1132  Time Calculation (min): 32 min        Assessment:  End of Session Patient Position: Up in chair, Alarm on     Plan:  Treatment Interventions: ADL retraining, Visual perceptual retraining, Functional transfer training, UE strengthening/ROM, Endurance training, Equipment evaluation/education, Patient/family training, Compensatory technique education  OT Frequency: 3 times per week (this hospital stay)  OT Discharge Recommendations: Moderate intensity level of continued care (Based on current functional status and rehab potential, patient is anticipated to tolerate and benefit from 5 or more days per week of skilled rehabilitative therapy after discharge from this acute inpatient hospitilization.)  OT - OK to Discharge: Yes (once medically appropriate)  Treatment Interventions: ADL retraining, Visual perceptual retraining, Functional transfer training, UE strengthening/ROM, Endurance training, Equipment evaluation/education, Patient/family training, Compensatory technique education    Subjective   OT Visit Info:  OT Received On: 08/05/25  General Visit Info:  General  Reason for Referral: OT eval and treat  Past Medical History Relevant to Rehab: DLD, HTN, hypothyroidism, alcohol use disorder (with multiple admissions for alcohol withdrawal in last 4 months), suspected Wernicke's encephalopathy, multiple falls, RLS, CHINEDU, MDD  Family/Caregiver Present: No  Co-Treatment: PT  Co-Treatment Reason: to maximize pt safety and mobility  Prior to Session Communication: Bedside nurse  Patient Position Received: Bed, 2 rail up, Alarm on  General Comment: pt pleasant and agreeable to participate in therapy  Precautions:  Medical Precautions: Fall precautions  Precautions Comment: CIWA      Date/Time Vitals Session Patient Position  Pulse Resp SpO2 BP MAP (mmHg)    08/05/25 11:20:26 --  --  95  --  93 %  131/91  105            Objective    Cognition:  Cognition  Overall Cognitive Status: Within Functional Limits  Orientation Level: Oriented X4     Activities of Daily Living: LE Dressing  LE Dressing: Yes  Pants Level of Assistance: Contact guard  Sock Level of Assistance: Maximum assistance  LE Dressing Where Assessed: Edge of bed  LE Dressing Comments: at this time pt was unable to use AE d/t tremors.    Toileting  Toileting Level of Assistance: Minimum assistance  Where Assessed: Bedside commode  Toileting Comments: Pt required assist for deon care stating he did not trust himself to complete task; Pt required assist to doff pants from ankle d/t fatigue.    Bed Mobility/Transfers: Bed Mobility  Bed Mobility: Yes  Bed Mobility 1  Bed Mobility 1: Supine to sitting, Sitting to supine  Level of Assistance 1: Close supervision    Transfers  Transfer: Yes  Transfer 1  Transfer From 1: Bed to, Sit to, Stand to  Transfer to 1: Chair with arms  Technique 1: Sit to stand, Stand to sit  Transfer Device 1: Walker  Transfer Level of Assistance 1: Moderate assistance, +2  Trials/Comments 1: support for hand placement and technique      Functional Mobility:  Functional Mobility  Functional Mobility Performed: Yes  Functional Mobility 1  Surface 1: Level tile  Device 1: Rolling walker  Assistance 1: Minimum assistance  Comments 1: Pt dynamic standing balance at F-; able to ambulate short household distance d/t fatigue    Outcome Measures:[facility] Daily Activity  Putting on and taking off regular lower body clothing: A little  Bathing (including washing, rinsing, drying): A lot  Putting on and taking off regular upper body clothing: A little  Toileting, which includes using toilet, bedpan or urinal: A lot  Taking care of personal grooming such as brushing teeth: A little  Eating Meals: A little  Daily Activity - Total Score: 16        Education  Documentation  ADL Training, taught by JIHAN Coppola at 8/5/2025 12:01 PM.  Learner: Patient  Readiness: Acceptance  Method: Explanation  Response: Verbalizes Understanding    Precautions, taught by JIHAN Coppola at 8/5/2025 12:01 PM.  Learner: Patient  Readiness: Acceptance  Method: Explanation  Response: Verbalizes Understanding    Mobility Training, taught by JIHAN Coppola at 8/5/2025 12:01 PM.  Learner: Patient  Readiness: Acceptance  Method: Explanation  Response: Verbalizes Understanding    Education Comments  No comments found.         Goals:  Encounter Problems       Encounter Problems (Active)       OT Goals       Mod I for all functional transfers (Progressing)       Start:  08/03/25    Expected End:  08/17/25            Mod I for simulated HH distances (Progressing)       Start:  08/03/25    Expected End:  08/17/25            Pt will complete upper and lower body bathing/dressing; toileting with modified independence using adaptive equipment as needed   (Progressing)       Start:  08/03/25    Expected End:  08/17/25

## 2025-08-05 NOTE — CARE PLAN
The patient's goals for the shift include      The clinical goals for the shift include Remain HDS    Over the shift, the patient will maintain safety and refrain from further complications

## 2025-08-06 LAB
ALBUMIN SERPL BCP-MCNC: 3.2 G/DL (ref 3.4–5)
ALP SERPL-CCNC: 80 U/L (ref 33–136)
ALT SERPL W P-5'-P-CCNC: 41 U/L (ref 10–52)
ANION GAP SERPL CALC-SCNC: 13 MMOL/L (ref 10–20)
AST SERPL W P-5'-P-CCNC: 64 U/L (ref 9–39)
BASOPHILS # BLD AUTO: 0.06 X10*3/UL (ref 0–0.1)
BASOPHILS NFR BLD AUTO: 1 %
BILIRUB SERPL-MCNC: 1 MG/DL (ref 0–1.2)
BUN SERPL-MCNC: 13 MG/DL (ref 6–23)
CALCIUM SERPL-MCNC: 8.4 MG/DL (ref 8.6–10.3)
CHLORIDE SERPL-SCNC: 102 MMOL/L (ref 98–107)
CO2 SERPL-SCNC: 24 MMOL/L (ref 21–32)
CREAT SERPL-MCNC: 0.42 MG/DL (ref 0.5–1.3)
EGFRCR SERPLBLD CKD-EPI 2021: >90 ML/MIN/1.73M*2
EOSINOPHIL # BLD AUTO: 0.18 X10*3/UL (ref 0–0.7)
EOSINOPHIL NFR BLD AUTO: 3.1 %
ERYTHROCYTE [DISTWIDTH] IN BLOOD BY AUTOMATED COUNT: 13.7 % (ref 11.5–14.5)
GLUCOSE SERPL-MCNC: 121 MG/DL (ref 74–99)
HCT VFR BLD AUTO: 30.6 % (ref 41–52)
HGB BLD-MCNC: 10.2 G/DL (ref 13.5–17.5)
HOLD SPECIMEN: NORMAL
IMM GRANULOCYTES # BLD AUTO: 0.04 X10*3/UL (ref 0–0.7)
IMM GRANULOCYTES NFR BLD AUTO: 0.7 % (ref 0–0.9)
LYMPHOCYTES # BLD AUTO: 1.4 X10*3/UL (ref 1.2–4.8)
LYMPHOCYTES NFR BLD AUTO: 24.1 %
MAGNESIUM SERPL-MCNC: 1.36 MG/DL (ref 1.6–2.4)
MCH RBC QN AUTO: 34.6 PG (ref 26–34)
MCHC RBC AUTO-ENTMCNC: 33.3 G/DL (ref 32–36)
MCV RBC AUTO: 104 FL (ref 80–100)
MONOCYTES # BLD AUTO: 0.99 X10*3/UL (ref 0.1–1)
MONOCYTES NFR BLD AUTO: 17 %
NEUTROPHILS # BLD AUTO: 3.15 X10*3/UL (ref 1.2–7.7)
NEUTROPHILS NFR BLD AUTO: 54.1 %
NRBC BLD-RTO: 0 /100 WBCS (ref 0–0)
PLATELET # BLD AUTO: 74 X10*3/UL (ref 150–450)
POTASSIUM SERPL-SCNC: 3.3 MMOL/L (ref 3.5–5.3)
PROT SERPL-MCNC: 5.7 G/DL (ref 6.4–8.2)
RBC # BLD AUTO: 2.95 X10*6/UL (ref 4.5–5.9)
SODIUM SERPL-SCNC: 136 MMOL/L (ref 136–145)
WBC # BLD AUTO: 5.8 X10*3/UL (ref 4.4–11.3)

## 2025-08-06 PROCEDURE — 2500000002 HC RX 250 W HCPCS SELF ADMINISTERED DRUGS (ALT 637 FOR MEDICARE OP, ALT 636 FOR OP/ED)

## 2025-08-06 PROCEDURE — 36415 COLL VENOUS BLD VENIPUNCTURE: CPT

## 2025-08-06 PROCEDURE — 2500000004 HC RX 250 GENERAL PHARMACY W/ HCPCS (ALT 636 FOR OP/ED)

## 2025-08-06 PROCEDURE — 2500000001 HC RX 250 WO HCPCS SELF ADMINISTERED DRUGS (ALT 637 FOR MEDICARE OP)

## 2025-08-06 PROCEDURE — 85025 COMPLETE CBC W/AUTO DIFF WBC: CPT

## 2025-08-06 PROCEDURE — S4991 NICOTINE PATCH NONLEGEND: HCPCS

## 2025-08-06 PROCEDURE — 80053 COMPREHEN METABOLIC PANEL: CPT

## 2025-08-06 PROCEDURE — 83735 ASSAY OF MAGNESIUM: CPT

## 2025-08-06 PROCEDURE — 2060000001 HC INTERMEDIATE ICU ROOM DAILY

## 2025-08-06 RX ORDER — MAGNESIUM SULFATE HEPTAHYDRATE 40 MG/ML
4 INJECTION, SOLUTION INTRAVENOUS ONCE
Status: COMPLETED | OUTPATIENT
Start: 2025-08-06 | End: 2025-08-06

## 2025-08-06 RX ADMIN — ESCITALOPRAM OXALATE 10 MG: 10 TABLET ORAL at 08:30

## 2025-08-06 RX ADMIN — ROPINIROLE HYDROCHLORIDE 1 MG: 1 TABLET, FILM COATED ORAL at 20:37

## 2025-08-06 RX ADMIN — THIAMINE HCL TAB 100 MG 100 MG: 100 TAB at 08:30

## 2025-08-06 RX ADMIN — PSYLLIUM HUSK 1 PACKET: 3.4 POWDER ORAL at 08:30

## 2025-08-06 RX ADMIN — SODIUM CHLORIDE, SODIUM LACTATE, POTASSIUM CHLORIDE, AND CALCIUM CHLORIDE 75 ML/HR: .6; .31; .03; .02 INJECTION, SOLUTION INTRAVENOUS at 05:09

## 2025-08-06 RX ADMIN — MAGNESIUM SULFATE HEPTAHYDRATE 4 G: 40 INJECTION, SOLUTION INTRAVENOUS at 08:31

## 2025-08-06 RX ADMIN — POTASSIUM CHLORIDE 20 MEQ: 1.5 POWDER, FOR SOLUTION ORAL at 08:30

## 2025-08-06 RX ADMIN — Medication 1 TABLET: at 08:30

## 2025-08-06 RX ADMIN — NICOTINE 1 PATCH: 21 PATCH, EXTENDED RELEASE TRANSDERMAL at 08:31

## 2025-08-06 RX ADMIN — POTASSIUM CHLORIDE 20 MEQ: 1.5 POWDER, FOR SOLUTION ORAL at 20:37

## 2025-08-06 RX ADMIN — Medication 5 MG: at 20:37

## 2025-08-06 RX ADMIN — FOLIC ACID 1 MG: 1 TABLET ORAL at 08:30

## 2025-08-06 RX ADMIN — LEVOTHYROXINE SODIUM 75 MCG: 0.07 TABLET ORAL at 05:50

## 2025-08-06 RX ADMIN — BUPROPION HYDROCHLORIDE 150 MG: 150 TABLET, EXTENDED RELEASE ORAL at 08:30

## 2025-08-06 ASSESSMENT — LIFESTYLE VARIABLES
TREMOR: NO TREMOR
NAUSEA AND VOMITING: NO NAUSEA AND NO VOMITING
AGITATION: NORMAL ACTIVITY
HEADACHE, FULLNESS IN HEAD: NOT PRESENT
AGITATION: NORMAL ACTIVITY
ORIENTATION AND CLOUDING OF SENSORIUM: ORIENTED AND CAN DO SERIAL ADDITIONS
TREMOR: 2
NAUSEA AND VOMITING: NO NAUSEA AND NO VOMITING
AGITATION: NORMAL ACTIVITY
TOTAL SCORE: 0
PAROXYSMAL SWEATS: NO SWEAT VISIBLE
ANXIETY: NO ANXIETY, AT EASE
ORIENTATION AND CLOUDING OF SENSORIUM: ORIENTED AND CAN DO SERIAL ADDITIONS
PAROXYSMAL SWEATS: NO SWEAT VISIBLE
HEADACHE, FULLNESS IN HEAD: NOT PRESENT
VISUAL DISTURBANCES: NOT PRESENT
TOTAL SCORE: 1
VISUAL DISTURBANCES: NOT PRESENT
ORIENTATION AND CLOUDING OF SENSORIUM: ORIENTED AND CAN DO SERIAL ADDITIONS
ANXIETY: NO ANXIETY, AT EASE
AUDITORY DISTURBANCES: NOT PRESENT
NAUSEA AND VOMITING: NO NAUSEA AND NO VOMITING
TOTAL SCORE: 2
TREMOR: NOT VISIBLE, BUT CAN BE FELT FINGERTIP TO FINGERTIP
VISUAL DISTURBANCES: NOT PRESENT
AUDITORY DISTURBANCES: NOT PRESENT
ANXIETY: NO ANXIETY, AT EASE
HEADACHE, FULLNESS IN HEAD: NOT PRESENT
PAROXYSMAL SWEATS: NO SWEAT VISIBLE
AUDITORY DISTURBANCES: NOT PRESENT

## 2025-08-06 ASSESSMENT — COGNITIVE AND FUNCTIONAL STATUS - GENERAL
HELP NEEDED FOR BATHING: A LITTLE
TOILETING: A LITTLE
DRESSING REGULAR LOWER BODY CLOTHING: A LITTLE
DRESSING REGULAR UPPER BODY CLOTHING: A LITTLE
STANDING UP FROM CHAIR USING ARMS: A LITTLE
TURNING FROM BACK TO SIDE WHILE IN FLAT BAD: A LITTLE
TOILETING: A LITTLE
WALKING IN HOSPITAL ROOM: A LOT
HELP NEEDED FOR BATHING: A LITTLE
DRESSING REGULAR UPPER BODY CLOTHING: A LITTLE
STANDING UP FROM CHAIR USING ARMS: A LITTLE
DAILY ACTIVITIY SCORE: 20
MOVING TO AND FROM BED TO CHAIR: A LITTLE
MOVING TO AND FROM BED TO CHAIR: A LITTLE
CLIMB 3 TO 5 STEPS WITH RAILING: A LOT
DRESSING REGULAR LOWER BODY CLOTHING: A LITTLE
MOBILITY SCORE: 18
WALKING IN HOSPITAL ROOM: A LITTLE
MOBILITY SCORE: 18
TURNING FROM BACK TO SIDE WHILE IN FLAT BAD: A LITTLE
DAILY ACTIVITIY SCORE: 20
CLIMB 3 TO 5 STEPS WITH RAILING: A LITTLE

## 2025-08-06 ASSESSMENT — PAIN - FUNCTIONAL ASSESSMENT: PAIN_FUNCTIONAL_ASSESSMENT: 0-10

## 2025-08-06 ASSESSMENT — PAIN SCALES - GENERAL
PAINLEVEL_OUTOF10: 0 - NO PAIN
PAINLEVEL_OUTOF10: 0 - NO PAIN

## 2025-08-06 NOTE — PROGRESS NOTES
08/06/25 1102   Discharge Planning   Home or Post Acute Services Post acute facilities (Rehab/SNF/etc)   Type of Post Acute Facility Services Skilled nursing   Expected Discharge Disposition SNF     Met with patient at bedside to follow up on SNF preferences. Patient states his friends Ifrah and Deric has the list of facilities and he will be discussing with them when they visit.     Addendum 1256: Met with patient at bedside. Patient requested that [facility] contact his POA Earnest, called and spoke with Earnest and he provided preference of [facility]. Confirmed with patient that he is agreeable to [facility]. Referral sent to [facility].     Addendum 1559: [facility] can accept. Requested that direct pre-cert team start pre-cert for [facility].

## 2025-08-06 NOTE — CARE PLAN
The patient's goals for the shift include      The clinical goals for the shift include Remain HDS      Problem: Pain - Adult  Goal: Verbalizes/displays adequate comfort level or baseline comfort level  Outcome: Progressing     Problem: Safety - Adult  Goal: Free from fall injury  Outcome: Progressing     Problem: Discharge Planning  Goal: Discharge to home or other facility with appropriate resources  Outcome: Progressing     Problem: Chronic Conditions and Co-morbidities  Goal: Patient's chronic conditions and co-morbidity symptoms are monitored and maintained or improved  Outcome: Progressing     Problem: Nutrition  Goal: Nutrient intake appropriate for maintaining nutritional needs  Outcome: Progressing     Problem: Skin  Goal: Decreased wound size/increased tissue granulation at next dressing change  Outcome: Progressing  Goal: Participates in plan/prevention/treatment measures  Outcome: Progressing  Goal: Prevent/manage excess moisture  Outcome: Progressing  Goal: Prevent/minimize sheer/friction injuries  Outcome: Progressing  Goal: Promote/optimize nutrition  Outcome: Progressing  Goal: Promote skin healing  Outcome: Progressing

## 2025-08-06 NOTE — PROGRESS NOTES
Humble Banks is a 69 y.o. male on day 5 of admission presenting with Generalized weakness.      SUBJECTIVE     Patient evaluated this morning and found to be resting comfortably in bed. He is A&Ox3 and feeling much better now however, his hands are still trembling which might be his baseline. He says he is still searching for placement options.    OBJECTIVE     Vitals:    08/06/25 0740 08/06/25 1143 08/06/25 1207 08/06/25 1550   BP: (!) 163/99 (!) 163/99 (!) 180/102 (!) 167/96   BP Location: Right arm Right arm Right arm Right arm   Patient Position: Lying Lying Lying Lying   Pulse: 81 81 66 68   Resp: 18 18 18 18   Temp: 35.8 °C (96.4 °F) 35.8 °C (96.4 °F) 35.8 °C (96.4 °F) 35.2 °C (95.4 °F)   TempSrc: Temporal Temporal Temporal Temporal   SpO2:  95% 96% 97%   Weight:          Results from last 7 days   Lab Units 08/06/25  0536   WBC AUTO x10*3/uL 5.8   HEMOGLOBIN g/dL 10.2*   HEMATOCRIT % 30.6*   PLATELETS AUTO x10*3/uL 74*   NEUTROS PCT AUTO % 54.1   LYMPHS PCT AUTO % 24.1   MONOS PCT AUTO % 17.0   EOS PCT AUTO % 3.1     Results from last 7 days   Lab Units 08/06/25  0536   SODIUM mmol/L 136   POTASSIUM mmol/L 3.3*   CHLORIDE mmol/L 102   CO2 mmol/L 24   BUN mg/dL 13   CREATININE mg/dL 0.42*   CALCIUM mg/dL 8.4*   PROTEIN TOTAL g/dL 5.7*   BILIRUBIN TOTAL mg/dL 1.0   ALK PHOS U/L 80   ALT U/L 41   AST U/L 64*   GLUCOSE mg/dL 121*       Scheduled Medications  Scheduled Medications[1]     Physical Exam  Constitutional: no acute distress, alert and cooperative  Neuro: A&Ox3, no focal deficits   Eyes: EOMI, clear sclera  Respiratory/Thorax: CTAB, good chest expansion  Cardiovascular: Regular rate, regular rhythm  Gastrointestinal: mildly distended, tympanic note, soft, non-tender, BS +  Extremities: No cyanosis or edema. Peripheral pulses intact. Bilateral tremors noted.  Skin: Warm and dry    Pertinent Imaging    8/02/2025 CXR  - No acute cardiopulmonary disease.  No definite change is appreciated when compared  to the prior chest x-ray.     8/02/2025 CT  Head & Cervical spine wo IV contrast  1. No acute intracranial abnormality.   2. No acute fracture or traumatic subluxation of the cervical spine.     8/02/2025 US RUQ  1. 6 mm gallbladder polyp.  2. Enlarged liver with fatty infiltration.    ASSESSMENT & PLAN     Humble Banks is a 69 year old male with PMH significant for DLD, HTN, hypothyroidism, alcohol use disorder (with multiple admissions for alcohol withdrawal in last 4 months), suspected Wernicke's encephalopathy, multiple falls, RLS, CHINEDU, MDD who presented to [facility] for assessment of weakness in his lower extremities following fall. Admitted to the general medical service for evaluation and management of alcohol withdrawal.    Acute Conditions  #Acute alcohol intoxication  resolved alcohol level 347  #Acute alcohol withdrawal hand tremors  #Elevated AST & /84 to 64/41  - IV Diazepam 10 mg PRN. Based upon CIWA score >7 or HR>120, he has not received a single dose since day before yesterday  - Monitor ALT/AST  - Monitor BP  - Discharge tomorrow if patient is able to get acceptance from SNF.    #Hypokalemia 3.3  #Hypomagnesemia 1.36  - Replenish K with oral pot chloride  - Monitor K/Mg levels AM labs    #Pancytopenia   - As evidenced by his blood work with WBC 3.8 to 5.8, Hb 12.4 to 10.2 & Plt 62 to 74, and macrocytic cells, this patient might have vitamin deficiencies vs bone marrow suppression secondary to chronic alcohol ingestion.  Plan:  - S. Vit B12 404, Folate 8.4: WNL  - Advise for alcohol abstinence   - CBC AM    Chronic Conditions  - MDD, CHINEDU: continue home escitalopram and bupropion  - Hypothyroidism: continue home levothyroxine  - RLS: continue home ropinirole    Check Lists  Code: Full code  DVT ppx: SCDs  Diet: Adult Regular Diet  IVF: ---    ( Waiting for acceptance from [facility])    Tammie Lozada MD  PGY-1, Internal Medicine  Please SecureChat for any further questions  This is a  preliminary note, please await attending attestation for final A/P                      [1] buPROPion XL, 150 mg, oral, Daily  escitalopram, 10 mg, oral, Daily  folic acid, 1 mg, oral, Daily  levothyroxine, 75 mcg, oral, Daily  multivitamin with minerals, 1 tablet, oral, Daily  nicotine, 1 patch, transdermal, Daily   Followed by  [START ON 9/14/2025] nicotine, 1 patch, transdermal, Daily   Followed by  [START ON 9/28/2025] nicotine, 1 patch, transdermal, Daily  potassium chloride, 20 mEq, oral, BID  psyllium, 1 packet, oral, Daily  rOPINIRole, 1 mg, oral, Nightly  thiamine, 100 mg, oral, Daily

## 2025-08-07 VITALS
OXYGEN SATURATION: 97 % | RESPIRATION RATE: 18 BRPM | DIASTOLIC BLOOD PRESSURE: 88 MMHG | SYSTOLIC BLOOD PRESSURE: 169 MMHG | BODY MASS INDEX: 25.09 KG/M2 | WEIGHT: 185 LBS | HEART RATE: 63 BPM | TEMPERATURE: 99.7 F

## 2025-08-07 LAB
ALBUMIN SERPL BCP-MCNC: 3.4 G/DL (ref 3.4–5)
ALP SERPL-CCNC: 82 U/L (ref 33–136)
ALT SERPL W P-5'-P-CCNC: 45 U/L (ref 10–52)
ANION GAP SERPL CALC-SCNC: 12 MMOL/L (ref 10–20)
AST SERPL W P-5'-P-CCNC: 59 U/L (ref 9–39)
BASOPHILS # BLD AUTO: 0.07 X10*3/UL (ref 0–0.1)
BASOPHILS NFR BLD AUTO: 0.9 %
BILIRUB SERPL-MCNC: 1.1 MG/DL (ref 0–1.2)
BUN SERPL-MCNC: 9 MG/DL (ref 6–23)
CALCIUM SERPL-MCNC: 8.7 MG/DL (ref 8.6–10.3)
CHLORIDE SERPL-SCNC: 104 MMOL/L (ref 98–107)
CO2 SERPL-SCNC: 24 MMOL/L (ref 21–32)
CREAT SERPL-MCNC: 0.36 MG/DL (ref 0.5–1.3)
EGFRCR SERPLBLD CKD-EPI 2021: >90 ML/MIN/1.73M*2
EOSINOPHIL # BLD AUTO: 0.21 X10*3/UL (ref 0–0.7)
EOSINOPHIL NFR BLD AUTO: 2.8 %
ERYTHROCYTE [DISTWIDTH] IN BLOOD BY AUTOMATED COUNT: 13.6 % (ref 11.5–14.5)
GLUCOSE SERPL-MCNC: 113 MG/DL (ref 74–99)
HCT VFR BLD AUTO: 31.1 % (ref 41–52)
HGB BLD-MCNC: 10.4 G/DL (ref 13.5–17.5)
IMM GRANULOCYTES # BLD AUTO: 0.06 X10*3/UL (ref 0–0.7)
IMM GRANULOCYTES NFR BLD AUTO: 0.8 % (ref 0–0.9)
LYMPHOCYTES # BLD AUTO: 1.29 X10*3/UL (ref 1.2–4.8)
LYMPHOCYTES NFR BLD AUTO: 17 %
MAGNESIUM SERPL-MCNC: 1.62 MG/DL (ref 1.6–2.4)
MCH RBC QN AUTO: 35.4 PG (ref 26–34)
MCHC RBC AUTO-ENTMCNC: 33.4 G/DL (ref 32–36)
MCV RBC AUTO: 106 FL (ref 80–100)
MONOCYTES # BLD AUTO: 1.45 X10*3/UL (ref 0.1–1)
MONOCYTES NFR BLD AUTO: 19.2 %
NEUTROPHILS # BLD AUTO: 4.49 X10*3/UL (ref 1.2–7.7)
NEUTROPHILS NFR BLD AUTO: 59.3 %
NRBC BLD-RTO: 0 /100 WBCS (ref 0–0)
PLATELET # BLD AUTO: 111 X10*3/UL (ref 150–450)
POTASSIUM SERPL-SCNC: 3.6 MMOL/L (ref 3.5–5.3)
PROT SERPL-MCNC: 6.2 G/DL (ref 6.4–8.2)
RBC # BLD AUTO: 2.94 X10*6/UL (ref 4.5–5.9)
SODIUM SERPL-SCNC: 136 MMOL/L (ref 136–145)
WBC # BLD AUTO: 7.6 X10*3/UL (ref 4.4–11.3)

## 2025-08-07 PROCEDURE — S4991 NICOTINE PATCH NONLEGEND: HCPCS

## 2025-08-07 PROCEDURE — 85025 COMPLETE CBC W/AUTO DIFF WBC: CPT

## 2025-08-07 PROCEDURE — 2500000002 HC RX 250 W HCPCS SELF ADMINISTERED DRUGS (ALT 637 FOR MEDICARE OP, ALT 636 FOR OP/ED)

## 2025-08-07 PROCEDURE — 36415 COLL VENOUS BLD VENIPUNCTURE: CPT

## 2025-08-07 PROCEDURE — 83735 ASSAY OF MAGNESIUM: CPT

## 2025-08-07 PROCEDURE — 2500000001 HC RX 250 WO HCPCS SELF ADMINISTERED DRUGS (ALT 637 FOR MEDICARE OP)

## 2025-08-07 PROCEDURE — 80053 COMPREHEN METABOLIC PANEL: CPT

## 2025-08-07 RX ORDER — CALCIUM CARBONATE 300MG(750)
400 TABLET,CHEWABLE ORAL DAILY
Qty: 7 TABLET | Refills: 0 | Status: SHIPPED | OUTPATIENT
Start: 2025-08-07

## 2025-08-07 RX ORDER — POTASSIUM CHLORIDE 750 MG/1
10 TABLET, FILM COATED, EXTENDED RELEASE ORAL 2 TIMES DAILY
Qty: 6 TABLET | Refills: 0 | Status: SHIPPED | OUTPATIENT
Start: 2025-08-07 | End: 2025-08-10

## 2025-08-07 RX ORDER — LANOLIN ALCOHOL/MO/W.PET/CERES
100 CREAM (GRAM) TOPICAL DAILY
Qty: 5 TABLET | Refills: 0 | Status: SHIPPED | OUTPATIENT
Start: 2025-08-07

## 2025-08-07 RX ADMIN — NICOTINE 1 PATCH: 21 PATCH, EXTENDED RELEASE TRANSDERMAL at 08:47

## 2025-08-07 RX ADMIN — LEVOTHYROXINE SODIUM 75 MCG: 0.07 TABLET ORAL at 06:44

## 2025-08-07 RX ADMIN — Medication 1 TABLET: at 08:47

## 2025-08-07 RX ADMIN — PSYLLIUM HUSK 1 PACKET: 3.4 POWDER ORAL at 08:48

## 2025-08-07 RX ADMIN — THIAMINE HCL TAB 100 MG 100 MG: 100 TAB at 08:47

## 2025-08-07 RX ADMIN — FOLIC ACID 1 MG: 1 TABLET ORAL at 08:47

## 2025-08-07 RX ADMIN — BUPROPION HYDROCHLORIDE 150 MG: 150 TABLET, EXTENDED RELEASE ORAL at 08:47

## 2025-08-07 RX ADMIN — ESCITALOPRAM OXALATE 10 MG: 10 TABLET ORAL at 08:47

## 2025-08-07 ASSESSMENT — LIFESTYLE VARIABLES
ANXIETY: NO ANXIETY, AT EASE
TOTAL SCORE: 2
ORIENTATION AND CLOUDING OF SENSORIUM: ORIENTED AND CAN DO SERIAL ADDITIONS
PAROXYSMAL SWEATS: NO SWEAT VISIBLE
HEADACHE, FULLNESS IN HEAD: NOT PRESENT
VISUAL DISTURBANCES: NOT PRESENT
TREMOR: 2
AUDITORY DISTURBANCES: NOT PRESENT
NAUSEA AND VOMITING: NO NAUSEA AND NO VOMITING
AGITATION: NORMAL ACTIVITY

## 2025-08-07 ASSESSMENT — COGNITIVE AND FUNCTIONAL STATUS - GENERAL
TURNING FROM BACK TO SIDE WHILE IN FLAT BAD: A LITTLE
STANDING UP FROM CHAIR USING ARMS: A LITTLE
DRESSING REGULAR LOWER BODY CLOTHING: A LITTLE
DRESSING REGULAR UPPER BODY CLOTHING: A LITTLE
MOVING TO AND FROM BED TO CHAIR: A LITTLE
HELP NEEDED FOR BATHING: A LITTLE
DAILY ACTIVITIY SCORE: 20
CLIMB 3 TO 5 STEPS WITH RAILING: A LITTLE
WALKING IN HOSPITAL ROOM: A LOT
MOBILITY SCORE: 18
TOILETING: A LITTLE

## 2025-08-07 ASSESSMENT — PAIN SCALES - GENERAL
PAINLEVEL_OUTOF10: 0 - NO PAIN
PAINLEVEL_OUTOF10: 0 - NO PAIN

## 2025-08-07 ASSESSMENT — PAIN - FUNCTIONAL ASSESSMENT: PAIN_FUNCTIONAL_ASSESSMENT: 0-10

## 2025-08-07 NOTE — CARE PLAN
The patient's goals for the shift include      The clinical goals for the shift include Hemodynamical stability    Problem: Safety - Adult  Goal: Free from fall injury  Outcome: Progressing     Problem: Skin  Goal: Promote/optimize nutrition  Outcome: Progressing  Flowsheets (Taken 8/7/2025 1405)  Promote/optimize nutrition:   Consume > 50% meals/supplements   Offer water/supplements/favorite foods     Problem: Chronic Conditions and Co-morbidities  Goal: Patient's chronic conditions and co-morbidity symptoms are monitored and maintained or improved  Outcome: Progressing

## 2025-08-07 NOTE — CARE PLAN
Problem: Pain - Adult  Goal: Verbalizes/displays adequate comfort level or baseline comfort level  Outcome: Progressing     Problem: Safety - Adult  Goal: Free from fall injury  Outcome: Progressing   The patient's goals for the shift include      The clinical goals for the shift include pt safety will be maintaed in duration of the shift    Over the shift, the patient did make progress toward the following goals.

## 2025-08-07 NOTE — PROGRESS NOTES
08/07/25 0809   Discharge Planning   Home or Post Acute Services Post acute facilities (Rehab/SNF/etc)   Type of Post Acute Facility Services Skilled nursing   Expected Discharge Disposition SNF   Does the patient need discharge transport arranged? Yes   [facility] coordination needed? Yes   Has discharge transport been arranged? No     Received update from direct pre-cert team that pre-cert has been approved for [facility], 8/6-8/12.     Addendum 1049: AVS/[facility]enrod sent to [facility]. Transport scheduled for 3:30 PM. Facility updated. Patient updated at bedside. Bedside nurse provided with report number.

## 2025-08-07 NOTE — DISCHARGE INSTRUCTIONS
- Discharge to SNF.  - Follow with nutrition at SNF.  - Rehab for alcohol abstinence.  - Continue Mg, K and thiamine for 3 more days.  - Continue your home medicines.  - Follow up with your PCP in a week with CBC with differentials, CMP and S. Mg levels.  
vomiting, abdominal pain/nausea

## 2025-08-07 NOTE — DISCHARGE SUMMARY
Discharge diagnosis:  #Acute alcohol intoxication  alcohol level 347  #Acute alcohol withdrawal   #Elevated AST & ALT in the background of alcohol use, improving  #Hypokalemia  #Hypomagnesemia  #Pancytopenia    Hospital course:  Humble Banks is a 69 years old male with significant past medical history of HTN, hypothyroidism, alcohol use disorder (multiple admissions for alcohol withdrawal in the last 4 months), admitted Wernicke's encephalopathy, multiple falls, RLS, CHINEDU and MDD presented to [facility] for assessment of weakness in his lower extremities following fall.  His alcohol level was 347 on the day of admission, admitted to the general medical service for evaluation and management of alcohol withdrawal.  His liver enzymes AST and ALT were elevated possibly due to alcohol ingestion which are improving now.  He also had mild pancytopenia which might be due to bone marrow suppression secondary to chronic alcohol ingestion as vitamin levels for B12 and folate were within normal limit. He was monitored for possible withdrawal with CIWA score >7 or HR>120 and IV benzo as needed. He is feeling better now however, concerns regarding his independent active mobility and high risk for fall is still there.  Therefore, he is being discharged to SNF for further care.    Scheduled Medications  Scheduled Medications[1]   Vitals:    08/07/25 0749   BP: (!) 165/96   Pulse: 65   Resp: 18   Temp: 35.9 °C (96.6 °F)   SpO2: 98%      Physical Exam:    Constitutional: No acute distress, alert and cooperative  Neuro: A&Ox3, no focal deficits   Eyes: EOMI, clear sclera  Respiratory/Thorax: CTAB, good chest expansion  Cardiovascular: Regular rate, regular rhythm  Gastrointestinal: Soft, non-tender, BS +  Extremities: No cyanosis or edema. Peripheral pulses intact. Bilateral tremors noted.  Skin: Warm and dry    Outpatient follow-up instructions and medication changes:  - Continue Mg & K oral supplements and oral thiamine for 3 more  days.  - Follow up with Nutrition at SNF.  - Follow up with CBC with differentials, CMP and Mg after 1 week.  - Follow up with your PCP in a week.      Tammie Lozada MD  PGY-1, Internal Medicine  Please SecureChat for any further questions          [1] buPROPion XL, 150 mg, oral, Daily  escitalopram, 10 mg, oral, Daily  folic acid, 1 mg, oral, Daily  levothyroxine, 75 mcg, oral, Daily  multivitamin with minerals, 1 tablet, oral, Daily  nicotine, 1 patch, transdermal, Daily   Followed by  [START ON 9/14/2025] nicotine, 1 patch, transdermal, Daily   Followed by  [START ON 9/28/2025] nicotine, 1 patch, transdermal, Daily  psyllium, 1 packet, oral, Daily  rOPINIRole, 1 mg, oral, Nightly  thiamine, 100 mg, oral, Daily

## 2025-08-15 NOTE — DISCHARGE SUMMARY
"  Patient Name: Humble Banks  YOB: 1955    Discharge Date: 6/10/2025  Discharge Diagnosis:  Acute alcohol withdrawal  Alcohol use disorder  Elevated AST  Bilateral lower extremity weakness  Falls  Chronic macrocytic anemia  Thrombocytopenia  MDD  CHINEDU  Hypothyroidism  Restless leg syndrome    Hospital Course:  Humble Banks is a 69 y.o. male with a history of alcohol use disorder, CHINEDU, MDD, dyslipidemia, hypertension, hypothyroidism who presented on 6/8 for bilateral lower leg weakness for about the last 6 months.  Has been requiring the use of a walker now in order to get around.  He states that he has fallen twice in the last month because his legs \"give out.\"  He says that he has also been feeling some tingling in his legs as well that is different from his restless leg syndrome symptoms.  States that he has been trying to cut back on alcohol use and only drinks when he watches sporting events now.  Last drink 2 and half days ago when he had 2 beers and a shot of whiskey.  He denies history of withdrawal seizures/delirium tremens.  Lives with his friend and his friend's girlfriend in a house. Vitals significant for blood pressure 184/98, heart rate 77, saturating 95% on room air.  EKG showing sinus rhythm at 65 bpm, QTc 476, no ischemic signs.  CBC shows white blood cell count 3.8, hemoglobin baseline at 11.5, ,  platelets 110.  CMP unremarkable aside from elevated AST.  TSH was elevated however T4 was normal.  CT A/P shows hepatic steatosis and hepatomegaly.  He was started on a phenobarb taper with 260 mg injection as well as thiamine and folate.  Phenobarb taper continued on hospital admission, CIWA 0 overnight.  He left AMA on 6/10 despite being counseled on the risks of doing so, signed appropriate paperwork.        Dr. Matthew Blake, DO   Internal Medicine, PGY-3    "